# Patient Record
Sex: FEMALE | Race: WHITE | Employment: OTHER | URBAN - NONMETROPOLITAN AREA
[De-identification: names, ages, dates, MRNs, and addresses within clinical notes are randomized per-mention and may not be internally consistent; named-entity substitution may affect disease eponyms.]

---

## 2018-06-03 ENCOUNTER — NURSE TRIAGE (OUTPATIENT)
Dept: ADMINISTRATIVE | Age: 23
End: 2018-06-03

## 2019-02-03 ENCOUNTER — HOSPITAL ENCOUNTER (EMERGENCY)
Age: 24
Discharge: HOME | End: 2019-02-03
Payer: MEDICARE

## 2019-02-03 ENCOUNTER — APPOINTMENT (OUTPATIENT)
Dept: GENERAL RADIOLOGY | Age: 24
End: 2019-02-03
Payer: MEDICARE

## 2019-02-03 VITALS
SYSTOLIC BLOOD PRESSURE: 140 MMHG | RESPIRATION RATE: 18 BRPM | HEART RATE: 90 BPM | DIASTOLIC BLOOD PRESSURE: 91 MMHG | TEMPERATURE: 98.3 F | OXYGEN SATURATION: 98 %

## 2019-02-03 DIAGNOSIS — S39.012A STRAIN OF LUMBAR REGION, INITIAL ENCOUNTER: Primary | ICD-10-CM

## 2019-02-03 DIAGNOSIS — R10.9 FLANK PAIN: ICD-10-CM

## 2019-02-03 LAB
BILIRUBIN URINE: NEGATIVE
BLOOD, URINE: NEGATIVE
CHARACTER, URINE: CLEAR
COLOR: YELLOW
GLUCOSE URINE: NEGATIVE MG/DL
KETONES, URINE: NEGATIVE
LEUKOCYTE ESTERASE, URINE: NEGATIVE
NITRITE, URINE: NEGATIVE
PH UA: 6.5
PREGNANCY, URINE: NEGATIVE
PROTEIN UA: NEGATIVE
SPECIFIC GRAVITY, URINE: 1.01 (ref 1–1.03)
UROBILINOGEN, URINE: 0.2 EU/DL

## 2019-02-03 PROCEDURE — 6370000000 HC RX 637 (ALT 250 FOR IP)

## 2019-02-03 PROCEDURE — 72100 X-RAY EXAM L-S SPINE 2/3 VWS: CPT

## 2019-02-03 PROCEDURE — 81025 URINE PREGNANCY TEST: CPT

## 2019-02-03 PROCEDURE — 81003 URINALYSIS AUTO W/O SCOPE: CPT

## 2019-02-03 PROCEDURE — 99283 EMERGENCY DEPT VISIT LOW MDM: CPT

## 2019-02-03 RX ORDER — LIDOCAINE 4 G/G
1 PATCH TOPICAL DAILY
Status: DISCONTINUED | OUTPATIENT
Start: 2019-02-03 | End: 2019-02-03 | Stop reason: HOSPADM

## 2019-02-03 RX ORDER — LIDOCAINE 4 G/G
PATCH TOPICAL
Status: DISCONTINUED
Start: 2019-02-03 | End: 2019-02-03 | Stop reason: HOSPADM

## 2019-02-03 ASSESSMENT — PAIN SCALES - GENERAL: PAINLEVEL_OUTOF10: 6

## 2019-02-03 ASSESSMENT — ENCOUNTER SYMPTOMS: BACK PAIN: 1

## 2019-03-20 ENCOUNTER — HOSPITAL ENCOUNTER (OUTPATIENT)
Dept: CT IMAGING | Age: 24
Discharge: HOME | End: 2019-03-20
Payer: MEDICARE

## 2019-03-20 DIAGNOSIS — M43.25 FUSION OF SPINE OF THORACOLUMBAR REGION: ICD-10-CM

## 2019-03-20 DIAGNOSIS — M41.125 ADOLESCENT IDIOPATHIC SCOLIOSIS OF THORACOLUMBAR REGION: ICD-10-CM

## 2019-03-20 DIAGNOSIS — Z98.1 S/P LUMBAR SPINAL FUSION: ICD-10-CM

## 2019-03-20 PROCEDURE — 72131 CT LUMBAR SPINE W/O DYE: CPT

## 2019-12-15 ENCOUNTER — HOSPITAL ENCOUNTER (EMERGENCY)
Age: 24
Discharge: HOME | End: 2019-12-15
Payer: MEDICARE

## 2019-12-15 VITALS
HEART RATE: 85 BPM | SYSTOLIC BLOOD PRESSURE: 162 MMHG | RESPIRATION RATE: 16 BRPM | DIASTOLIC BLOOD PRESSURE: 97 MMHG | OXYGEN SATURATION: 99 % | TEMPERATURE: 98.7 F

## 2019-12-15 DIAGNOSIS — H66.90 ACUTE OTITIS MEDIA, UNSPECIFIED OTITIS MEDIA TYPE: Primary | ICD-10-CM

## 2019-12-15 PROCEDURE — 99282 EMERGENCY DEPT VISIT SF MDM: CPT

## 2019-12-15 PROCEDURE — 6370000000 HC RX 637 (ALT 250 FOR IP): Performed by: NURSE PRACTITIONER

## 2019-12-15 RX ORDER — DIPHENHYDRAMINE HCL 25 MG
25 TABLET ORAL ONCE
Status: COMPLETED | OUTPATIENT
Start: 2019-12-15 | End: 2019-12-15

## 2019-12-15 RX ORDER — AMOXICILLIN 250 MG/1
500 CAPSULE ORAL ONCE
Status: COMPLETED | OUTPATIENT
Start: 2019-12-15 | End: 2019-12-15

## 2019-12-15 RX ORDER — AMOXICILLIN 500 MG/1
500 CAPSULE ORAL 3 TIMES DAILY
Qty: 30 CAPSULE | Refills: 0 | Status: SHIPPED | OUTPATIENT
Start: 2019-12-15 | End: 2019-12-25

## 2019-12-15 RX ORDER — NAPROXEN 250 MG/1
500 TABLET ORAL ONCE
Status: COMPLETED | OUTPATIENT
Start: 2019-12-15 | End: 2019-12-15

## 2019-12-15 RX ADMIN — DIPHENHYDRAMINE HCL 25 MG: 25 TABLET ORAL at 20:49

## 2019-12-15 RX ADMIN — NAPROXEN 500 MG: 250 TABLET ORAL at 20:49

## 2019-12-15 RX ADMIN — AMOXICILLIN 500 MG: 250 CAPSULE ORAL at 20:48

## 2019-12-15 ASSESSMENT — ENCOUNTER SYMPTOMS
SHORTNESS OF BREATH: 0
COUGH: 0
RHINORRHEA: 0
WHEEZING: 0
SINUS PRESSURE: 1
TROUBLE SWALLOWING: 0
ABDOMINAL PAIN: 0
CHEST TIGHTNESS: 0
NAUSEA: 0
VOMITING: 0
SORE THROAT: 0

## 2019-12-15 ASSESSMENT — PAIN DESCRIPTION - PAIN TYPE: TYPE: ACUTE PAIN

## 2019-12-15 ASSESSMENT — PAIN SCALES - GENERAL: PAINLEVEL_OUTOF10: 8

## 2019-12-15 ASSESSMENT — PAIN DESCRIPTION - LOCATION: LOCATION: EAR;FACE

## 2019-12-15 ASSESSMENT — PAIN DESCRIPTION - DESCRIPTORS: DESCRIPTORS: PRESSURE

## 2019-12-15 ASSESSMENT — PAIN DESCRIPTION - ORIENTATION: ORIENTATION: RIGHT;LEFT

## 2020-03-05 ENCOUNTER — HOSPITAL ENCOUNTER (OUTPATIENT)
Age: 25
Discharge: HOME | End: 2020-03-05
Payer: MEDICARE

## 2020-03-05 LAB
ABO: NORMAL
ANTIBODY SCREEN: NORMAL
ERYTHROCYTE [DISTWIDTH] IN BLOOD BY AUTOMATED COUNT: 12.7 % (ref 11.5–14.5)
ERYTHROCYTE [DISTWIDTH] IN BLOOD BY AUTOMATED COUNT: 38.5 FL (ref 35–45)
HCT VFR BLD CALC: 38.2 % (ref 37–47)
HEMOGLOBIN: 13 GM/DL (ref 12–16)
HEPATITIS B SURFACE ANTIGEN: NEGATIVE
MCH RBC QN AUTO: 28.7 PG (ref 26–33)
MCHC RBC AUTO-ENTMCNC: 34 GM/DL (ref 32.2–35.5)
MCV RBC AUTO: 84.3 FL (ref 81–99)
PLATELET # BLD: 313 THOU/MM3 (ref 130–400)
PMV BLD AUTO: 9.5 FL (ref 9.4–12.4)
RBC # BLD: 4.53 MILL/MM3 (ref 4.2–5.4)
RH FACTOR: NORMAL
RUBELLA: 180.4 IU/ML
WBC # BLD: 8.5 THOU/MM3 (ref 4.8–10.8)

## 2020-03-05 PROCEDURE — 86762 RUBELLA ANTIBODY: CPT

## 2020-03-05 PROCEDURE — 36415 COLL VENOUS BLD VENIPUNCTURE: CPT

## 2020-03-05 PROCEDURE — 86900 BLOOD TYPING SEROLOGIC ABO: CPT

## 2020-03-05 PROCEDURE — 87086 URINE CULTURE/COLONY COUNT: CPT

## 2020-03-05 PROCEDURE — 87389 HIV-1 AG W/HIV-1&-2 AB AG IA: CPT

## 2020-03-05 PROCEDURE — 86592 SYPHILIS TEST NON-TREP QUAL: CPT

## 2020-03-05 PROCEDURE — 85027 COMPLETE CBC AUTOMATED: CPT

## 2020-03-05 PROCEDURE — 86850 RBC ANTIBODY SCREEN: CPT

## 2020-03-05 PROCEDURE — 87340 HEPATITIS B SURFACE AG IA: CPT

## 2020-03-05 PROCEDURE — 86901 BLOOD TYPING SEROLOGIC RH(D): CPT

## 2020-03-06 LAB
HIV-2 AB: NEGATIVE
ORGANISM: ABNORMAL
RPR: NONREACTIVE
URINE CULTURE, ROUTINE: ABNORMAL

## 2020-04-09 ENCOUNTER — HOSPITAL ENCOUNTER (OUTPATIENT)
Age: 25
Discharge: HOME | End: 2020-04-09
Payer: MEDICARE

## 2020-04-09 PROCEDURE — 82105 ALPHA-FETOPROTEIN SERUM: CPT

## 2020-04-09 PROCEDURE — 36415 COLL VENOUS BLD VENIPUNCTURE: CPT

## 2020-04-12 LAB
AFP MOM: 1.68
AFP: 43 NG/ML
DATING: NORMAL
DUE DATE: NORMAL
FAMILY HISTORY NTD: NO
GESTATIONAL AGE CALC AT COLLECT: NORMAL
INSULIN REQ DIABETES: NO
MATERNAL AGE AT EDD: 27.1 YR
MATERNAL SCREEN INTERPRETATION: NORMAL
MATERNAL WEIGHT: NORMAL
NUMBER OF FETUSES: NORMAL
RACE: NORMAL
SMOKING: NO
SPECIMEN: NORMAL

## 2020-05-30 ENCOUNTER — APPOINTMENT (OUTPATIENT)
Dept: CT IMAGING | Age: 25
End: 2020-05-30
Payer: MEDICARE

## 2020-05-30 ENCOUNTER — HOSPITAL ENCOUNTER (EMERGENCY)
Age: 25
Discharge: HOME | End: 2020-05-30
Attending: FAMILY MEDICINE
Payer: MEDICARE

## 2020-05-30 VITALS
OXYGEN SATURATION: 100 % | RESPIRATION RATE: 20 BRPM | TEMPERATURE: 98.9 F | SYSTOLIC BLOOD PRESSURE: 136 MMHG | HEART RATE: 94 BPM | DIASTOLIC BLOOD PRESSURE: 81 MMHG

## 2020-05-30 LAB
ALBUMIN SERPL-MCNC: 3.6 G/DL (ref 3.5–5.1)
ALP BLD-CCNC: 64 U/L (ref 38–126)
ALT SERPL-CCNC: 47 U/L (ref 11–66)
ANION GAP SERPL CALCULATED.3IONS-SCNC: 11 MEQ/L (ref 8–16)
AST SERPL-CCNC: 32 U/L (ref 5–40)
BACTERIA: ABNORMAL
BASOPHILS # BLD: 0.3 %
BASOPHILS ABSOLUTE: 0 THOU/MM3 (ref 0–0.1)
BILIRUB SERPL-MCNC: < 0.2 MG/DL (ref 0.3–1.2)
BILIRUBIN URINE: NEGATIVE
BLOOD, URINE: NEGATIVE
BUN BLDV-MCNC: 7 MG/DL (ref 7–22)
CALCIUM SERPL-MCNC: 9.8 MG/DL (ref 8.5–10.5)
CASTS: ABNORMAL /LPF
CASTS: ABNORMAL /LPF
CHARACTER, URINE: ABNORMAL
CHLORIDE BLD-SCNC: 99 MEQ/L (ref 98–111)
CO2: 22 MEQ/L (ref 23–33)
COLOR: YELLOW
CREAT SERPL-MCNC: 0.4 MG/DL (ref 0.4–1.2)
CRYSTALS: ABNORMAL
EKG ATRIAL RATE: 90 BPM
EKG P AXIS: 46 DEGREES
EKG P-R INTERVAL: 182 MS
EKG Q-T INTERVAL: 330 MS
EKG QRS DURATION: 88 MS
EKG QTC CALCULATION (BAZETT): 403 MS
EKG R AXIS: 66 DEGREES
EKG T AXIS: 56 DEGREES
EKG VENTRICULAR RATE: 90 BPM
EOSINOPHIL # BLD: 0.9 %
EOSINOPHILS ABSOLUTE: 0.1 THOU/MM3 (ref 0–0.4)
EPITHELIAL CELLS, UA: ABNORMAL /HPF
ERYTHROCYTE [DISTWIDTH] IN BLOOD BY AUTOMATED COUNT: 13.2 % (ref 11.5–14.5)
ERYTHROCYTE [DISTWIDTH] IN BLOOD BY AUTOMATED COUNT: 42.9 FL (ref 35–45)
GFR SERPL CREATININE-BSD FRML MDRD: > 90 ML/MIN/1.73M2
GLUCOSE BLD-MCNC: 103 MG/DL (ref 70–108)
GLUCOSE, URINE: NEGATIVE MG/DL
HCT VFR BLD CALC: 32 % (ref 37–47)
HEMOGLOBIN: 10.6 GM/DL (ref 12–16)
IMMATURE GRANS (ABS): 0.07 THOU/MM3 (ref 0–0.07)
IMMATURE GRANULOCYTES: 0.6 %
KETONES, URINE: NEGATIVE
LEUKOCYTE EST, POC: ABNORMAL
LYMPHOCYTES # BLD: 17.7 %
LYMPHOCYTES ABSOLUTE: 2 THOU/MM3 (ref 1–4.8)
MCH RBC QN AUTO: 29.5 PG (ref 26–33)
MCHC RBC AUTO-ENTMCNC: 33.1 GM/DL (ref 32.2–35.5)
MCV RBC AUTO: 89.1 FL (ref 81–99)
MISCELLANEOUS LAB TEST RESULT: ABNORMAL
MONOCYTES # BLD: 5.8 %
MONOCYTES ABSOLUTE: 0.7 THOU/MM3 (ref 0.4–1.3)
NITRITE, URINE: NEGATIVE
NUCLEATED RED BLOOD CELLS: 0 /100 WBC
OSMOLALITY CALCULATION: 262.7 MOSMOL/KG (ref 275–300)
PH UA: 5.5 (ref 5–9)
PLATELET # BLD: 308 THOU/MM3 (ref 130–400)
PMV BLD AUTO: 9.2 FL (ref 9.4–12.4)
POTASSIUM REFLEX MAGNESIUM: 3.6 MEQ/L (ref 3.5–5.2)
PROTEIN UA: NEGATIVE MG/DL
RBC # BLD: 3.59 MILL/MM3 (ref 4.2–5.4)
RBC URINE: ABNORMAL /HPF
RENAL EPITHELIAL, UA: ABNORMAL
SEG NEUTROPHILS: 74.7 %
SEGMENTED NEUTROPHILS ABSOLUTE COUNT: 8.4 THOU/MM3 (ref 1.8–7.7)
SODIUM BLD-SCNC: 132 MEQ/L (ref 135–145)
SPECIFIC GRAVITY UA: 1.01 (ref 1–1.03)
TOTAL PROTEIN: 6.3 G/DL (ref 6.1–8)
UROBILINOGEN, URINE: 0.2 EU/DL (ref 0–1)
WBC # BLD: 11.3 THOU/MM3 (ref 4.8–10.8)
WBC UA: ABNORMAL /HPF
YEAST: ABNORMAL

## 2020-05-30 PROCEDURE — 85025 COMPLETE CBC W/AUTO DIFF WBC: CPT

## 2020-05-30 PROCEDURE — 36415 COLL VENOUS BLD VENIPUNCTURE: CPT

## 2020-05-30 PROCEDURE — 80053 COMPREHEN METABOLIC PANEL: CPT

## 2020-05-30 PROCEDURE — 99282 EMERGENCY DEPT VISIT SF MDM: CPT

## 2020-05-30 PROCEDURE — 70450 CT HEAD/BRAIN W/O DYE: CPT

## 2020-05-30 PROCEDURE — 93005 ELECTROCARDIOGRAM TRACING: CPT | Performed by: FAMILY MEDICINE

## 2020-05-30 PROCEDURE — 81001 URINALYSIS AUTO W/SCOPE: CPT

## 2020-05-30 RX ORDER — 0.9 % SODIUM CHLORIDE 0.9 %
1000 INTRAVENOUS SOLUTION INTRAVENOUS ONCE
Status: DISCONTINUED | OUTPATIENT
Start: 2020-05-30 | End: 2020-05-30 | Stop reason: HOSPADM

## 2020-05-30 ASSESSMENT — PAIN SCALES - WONG BAKER: WONGBAKER_NUMERICALRESPONSE: 4

## 2020-05-30 NOTE — ED PROVIDER NOTES
EMERGENCY DEPARTMENT ENCOUNTER     CHIEF COMPLAINT   Chief Complaint   Patient presents with    Headache    Vision Change        HPI   Karly Osorio is a 30 y.o. female 23 weeks pregnant  who presents with dizziness since onset this afternoon after cleaning her house all day, on her knees scrubbing the floors and sweeping. When she got up, she felt her vision got floaters on them for a while with a slight headache. She denies any vision loss or extremity weakness. The quality of the dizziness is non-specific, and the symptoms are aggravated by changing head position, and headache is mild throbbing in frontal area The patient has associated blurry vision but denies neck stiffness or recent trauma. REVIEW OF SYSTEMS   Neurologic: +dizziness and headache; No LOC, No hearing loss   Eyes: blurry vision  Cardiac: No Chest Pain, Denies syncope   Respiratory: No cough or difficulty breathing   GI: See history of present illness   General: Denies Fever   All other review of systems were reviewed and are otherwise negative.      PAST MEDICAL & SURGICAL HISTORY   Past Medical History:   Diagnosis Date    Ovarian cyst     Scoliosis       Past Surgical History:   Procedure Laterality Date    ADENOIDECTOMY      BACK SURGERY  2006 and 2008    TONSILLECTOMY          CURRENT MEDICATIONS   Current Outpatient Rx   Medication Sig Dispense Refill    cyclobenzaprine (FLEXERIL) 10 MG tablet Take 1 tablet by mouth 3 times daily as needed for Muscle spasms 90 tablet 2    traMADol (ULTRAM) 50 MG tablet Take 1 tablet by mouth 2 times daily 60 tablet 2    traMADol (ULTRAM) 50 MG tablet Take 50 mg by mouth every 6 hours as needed for Pain      ibuprofen (ADVIL;MOTRIN) 800 MG tablet Take 1 tablet by mouth every 6 hours as needed for Pain 120 tablet 1    Tens Unit MISC by Does not apply route 1 each 0    fluticasone (FLONASE) 50 MCG/ACT nasal spray 1 spray by Nasal route daily for 20 days 1 Bottle 0      ALLERGIES   No Known Pupils equally round and react to light, extraocular movement are intact, The patient had fatigable horizontal nystagmus upon lateral gaze during this maneuver that exacerbates the vertigo   HENT: Atraumatic, moist mucus membranes   Neck: supple, no JVD   Respiratory: Lungs Clear, no retractions   Cardiovascular: Reg rate, no murmurs   GI: Soft, nontender, normal bowel sounds   Musculoskeletal: No edema, no deformities   Integument: Well hydrated, no petechiae   Neurologic: Alert & oriented, no slurred speech, normal finger to nose test bilaterally, normal gross motor. No truncal ataxia noted, speech is normal. 5/5 strength in both upper and lower extremities. No pronator drift noted in UE. Psych: Pleasant affect     EKG (interpreted by me)   EKG Interpretation. EKG Interpretation    Interpreted by emergency department physician on 5/30/2020 17:34    Rhythm: normal sinus   Rate: 90 bpm  Axis: normal  Ectopy: none  Conduction: normal  ST Segments: no acute change  T Waves: no acute change  Q Waves: none    Clinical Impression: non-specific EKG      RADIOLOGY   CT Head WO Contrast   Final Result   Negative CT brain            **This report has been created using voice recognition software. It may contain minor errors which are inherent in voice recognition technology. **      Final report electronically signed by Dr. Man Ocampo on 5/30/2020 7:46 PM           LABS   Labs Reviewed   CBC WITH AUTO DIFFERENTIAL - Abnormal; Notable for the following components:       Result Value    WBC 11.3 (*)     RBC 3.59 (*)     Hemoglobin 10.6 (*)     Hematocrit 32.0 (*)     MPV 9.2 (*)     Segs Absolute 8.4 (*)     All other components within normal limits   COMPREHENSIVE METABOLIC PANEL W/ REFLEX TO MG FOR LOW K - Abnormal; Notable for the following components:    Sodium 132 (*)     CO2 22 (*)     Total Bilirubin <0.2 (*)     All other components within normal limits   OSMOLALITY - Abnormal; Notable for the following components:

## 2020-05-31 PROCEDURE — 93010 ELECTROCARDIOGRAM REPORT: CPT | Performed by: INTERNAL MEDICINE

## 2020-08-09 ENCOUNTER — NURSE TRIAGE (OUTPATIENT)
Dept: OTHER | Facility: CLINIC | Age: 25
End: 2020-08-09

## 2020-08-09 NOTE — TELEPHONE ENCOUNTER
Reason for Disposition   Baby has dropped    Answer Assessment - Initial Assessment Questions  1. ONSET: \"When did the symptoms begin? \"        10 minutes ago  2. CONTRACTIONS: \"Describe the contractions that you are having. \" (e.g., duration, frequency, regularity, severity)     Heart beat and increased movement  3. RADHA: \"What date are you expecting to deliver? \"      6 weeks  4. PARITY: \"Have you had a baby before? \" If yes, \"How long did the labor last?\"     No  5. FETAL MOVEMENT: \"Has the baby's movement decreased or changed significantly from normal?\"      Increased Movement   6. OTHER SYMPTOMS: \"Do you have any other symptoms? \" (e.g., leaking fluid from vagina, fever, hand/facial swelling)      No    Protocols used: PREGNANCY - LABOR - -ADULT-    Patient educated regarding care advice and disposition. Patient notified to call back if symptoms worsen or if she has any question or concerns. Please do not reply to the triage nurse through this encounter. Any subsequent communication should be directly with the patient.  Patient recommended to call OBGYN today to report symptoms

## 2020-09-21 ENCOUNTER — ANESTHESIA (OUTPATIENT)
Dept: LABOR AND DELIVERY | Age: 25
End: 2020-09-21
Payer: MEDICARE

## 2020-09-21 ENCOUNTER — ANESTHESIA EVENT (OUTPATIENT)
Dept: LABOR AND DELIVERY | Age: 25
End: 2020-09-21
Payer: MEDICARE

## 2020-09-21 ENCOUNTER — HOSPITAL ENCOUNTER (INPATIENT)
Age: 25
LOS: 4 days | Discharge: HOME | End: 2020-09-25
Attending: OBSTETRICS & GYNECOLOGY | Admitting: OBSTETRICS & GYNECOLOGY
Payer: MEDICARE

## 2020-09-21 VITALS — SYSTOLIC BLOOD PRESSURE: 230 MMHG | OXYGEN SATURATION: 100 % | DIASTOLIC BLOOD PRESSURE: 110 MMHG

## 2020-09-21 PROBLEM — E66.01 MORBID OBESITY (HCC): Status: ACTIVE | Noted: 2020-09-21

## 2020-09-21 LAB
ABO: NORMAL
AMNISURE PATIENT RESULT: NORMAL
AMPHETAMINE+METHAMPHETAMINE URINE SCREEN: NEGATIVE
ANTIBODY SCREEN: NORMAL
BARBITURATE QUANTITATIVE URINE: NEGATIVE
BENZODIAZEPINE QUANTITATIVE URINE: NEGATIVE
CANNABINOID QUANTITATIVE URINE: NEGATIVE
COCAINE METABOLITE QUANTITATIVE URINE: NEGATIVE
ERYTHROCYTE [DISTWIDTH] IN BLOOD BY AUTOMATED COUNT: 14.7 % (ref 11.5–14.5)
ERYTHROCYTE [DISTWIDTH] IN BLOOD BY AUTOMATED COUNT: 48.8 FL (ref 35–45)
HCT VFR BLD CALC: 38.4 % (ref 37–47)
HEMOGLOBIN: 12.9 GM/DL (ref 12–16)
MCH RBC QN AUTO: 30.8 PG (ref 26–33)
MCHC RBC AUTO-ENTMCNC: 33.6 GM/DL (ref 32.2–35.5)
MCV RBC AUTO: 91.6 FL (ref 81–99)
OPIATES, URINE: NEGATIVE
OXYCODONE: NEGATIVE
PHENCYCLIDINE QUANTITATIVE URINE: NEGATIVE
PLATELET # BLD: 282 THOU/MM3 (ref 130–400)
PMV BLD AUTO: 10 FL (ref 9.4–12.4)
RBC # BLD: 4.19 MILL/MM3 (ref 4.2–5.4)
RH FACTOR: NORMAL
RPR: NONREACTIVE
WBC # BLD: 10.4 THOU/MM3 (ref 4.8–10.8)

## 2020-09-21 PROCEDURE — 84112 EVAL AMNIOTIC FLUID PROTEIN: CPT

## 2020-09-21 PROCEDURE — 88307 TISSUE EXAM BY PATHOLOGIST: CPT

## 2020-09-21 PROCEDURE — 2580000003 HC RX 258: Performed by: OBSTETRICS & GYNECOLOGY

## 2020-09-21 PROCEDURE — 85027 COMPLETE CBC AUTOMATED: CPT

## 2020-09-21 PROCEDURE — 6360000002 HC RX W HCPCS

## 2020-09-21 PROCEDURE — 2580000003 HC RX 258

## 2020-09-21 PROCEDURE — 2500000003 HC RX 250 WO HCPCS

## 2020-09-21 PROCEDURE — 6360000002 HC RX W HCPCS: Performed by: OBSTETRICS & GYNECOLOGY

## 2020-09-21 PROCEDURE — 6370000000 HC RX 637 (ALT 250 FOR IP): Performed by: OBSTETRICS & GYNECOLOGY

## 2020-09-21 PROCEDURE — 2709999900 HC NON-CHARGEABLE SUPPLY: Performed by: OBSTETRICS & GYNECOLOGY

## 2020-09-21 PROCEDURE — 3609079900 HC CESAREAN SECTION: Performed by: OBSTETRICS & GYNECOLOGY

## 2020-09-21 PROCEDURE — 3700000000 HC ANESTHESIA ATTENDED CARE: Performed by: OBSTETRICS & GYNECOLOGY

## 2020-09-21 PROCEDURE — 86901 BLOOD TYPING SEROLOGIC RH(D): CPT

## 2020-09-21 PROCEDURE — 2500000003 HC RX 250 WO HCPCS: Performed by: OBSTETRICS & GYNECOLOGY

## 2020-09-21 PROCEDURE — 7100000000 HC PACU RECOVERY - FIRST 15 MIN: Performed by: OBSTETRICS & GYNECOLOGY

## 2020-09-21 PROCEDURE — 86850 RBC ANTIBODY SCREEN: CPT

## 2020-09-21 PROCEDURE — 86900 BLOOD TYPING SEROLOGIC ABO: CPT

## 2020-09-21 PROCEDURE — 86592 SYPHILIS TEST NON-TREP QUAL: CPT

## 2020-09-21 PROCEDURE — 7100000001 HC PACU RECOVERY - ADDTL 15 MIN: Performed by: OBSTETRICS & GYNECOLOGY

## 2020-09-21 PROCEDURE — 3700000001 HC ADD 15 MINUTES (ANESTHESIA): Performed by: OBSTETRICS & GYNECOLOGY

## 2020-09-21 PROCEDURE — 80307 DRUG TEST PRSMV CHEM ANLYZR: CPT

## 2020-09-21 PROCEDURE — 36415 COLL VENOUS BLD VENIPUNCTURE: CPT

## 2020-09-21 PROCEDURE — 1220000000 HC SEMI PRIVATE OB R&B

## 2020-09-21 RX ORDER — DIPHENHYDRAMINE HCL 25 MG
50 TABLET ORAL EVERY 6 HOURS PRN
Status: DISCONTINUED | OUTPATIENT
Start: 2020-09-21 | End: 2020-09-25 | Stop reason: HOSPADM

## 2020-09-21 RX ORDER — PROPOFOL 10 MG/ML
INJECTION, EMULSION INTRAVENOUS PRN
Status: DISCONTINUED | OUTPATIENT
Start: 2020-09-21 | End: 2020-09-21 | Stop reason: SDUPTHER

## 2020-09-21 RX ORDER — SIMETHICONE 80 MG
80 TABLET,CHEWABLE ORAL EVERY 6 HOURS PRN
Status: DISCONTINUED | OUTPATIENT
Start: 2020-09-21 | End: 2020-09-25 | Stop reason: HOSPADM

## 2020-09-21 RX ORDER — FERROUS SULFATE 325(65) MG
325 TABLET ORAL
Status: DISCONTINUED | OUTPATIENT
Start: 2020-09-22 | End: 2020-09-25 | Stop reason: HOSPADM

## 2020-09-21 RX ORDER — SODIUM CHLORIDE, SODIUM LACTATE, POTASSIUM CHLORIDE, AND CALCIUM CHLORIDE .6; .31; .03; .02 G/100ML; G/100ML; G/100ML; G/100ML
1000 INJECTION, SOLUTION INTRAVENOUS ONCE
Status: CANCELLED | OUTPATIENT
Start: 2020-09-21 | End: 2020-09-21

## 2020-09-21 RX ORDER — MODIFIED LANOLIN
OINTMENT (GRAM) TOPICAL
Status: DISCONTINUED | OUTPATIENT
Start: 2020-09-21 | End: 2020-09-25 | Stop reason: HOSPADM

## 2020-09-21 RX ORDER — METOCLOPRAMIDE HYDROCHLORIDE 5 MG/ML
10 INJECTION INTRAMUSCULAR; INTRAVENOUS ONCE
Status: COMPLETED | OUTPATIENT
Start: 2020-09-21 | End: 2020-09-21

## 2020-09-21 RX ORDER — HYDROMORPHONE HCL 110MG/55ML
PATIENT CONTROLLED ANALGESIA SYRINGE INTRAVENOUS PRN
Status: DISCONTINUED | OUTPATIENT
Start: 2020-09-21 | End: 2020-09-21 | Stop reason: SDUPTHER

## 2020-09-21 RX ORDER — ONDANSETRON 4 MG/1
8 TABLET, ORALLY DISINTEGRATING ORAL EVERY 8 HOURS PRN
Status: DISCONTINUED | OUTPATIENT
Start: 2020-09-21 | End: 2020-09-25 | Stop reason: HOSPADM

## 2020-09-21 RX ORDER — PRENATAL WITH FERROUS FUM AND FOLIC ACID 3080; 920; 120; 400; 22; 1.84; 3; 20; 10; 1; 12; 200; 27; 25; 2 [IU]/1; [IU]/1; MG/1; [IU]/1; MG/1; MG/1; MG/1; MG/1; MG/1; MG/1; UG/1; MG/1; MG/1; MG/1; MG/1
1 TABLET ORAL DAILY
Status: DISCONTINUED | OUTPATIENT
Start: 2020-09-21 | End: 2020-09-25 | Stop reason: HOSPADM

## 2020-09-21 RX ORDER — ONDANSETRON 2 MG/ML
4 INJECTION INTRAMUSCULAR; INTRAVENOUS EVERY 6 HOURS PRN
Status: DISCONTINUED | OUTPATIENT
Start: 2020-09-21 | End: 2020-09-25 | Stop reason: HOSPADM

## 2020-09-21 RX ORDER — ZOLPIDEM TARTRATE 10 MG/1
10 TABLET ORAL NIGHTLY PRN
Status: DISCONTINUED | OUTPATIENT
Start: 2020-09-21 | End: 2020-09-25 | Stop reason: HOSPADM

## 2020-09-21 RX ORDER — ROCURONIUM BROMIDE 10 MG/ML
INJECTION, SOLUTION INTRAVENOUS PRN
Status: DISCONTINUED | OUTPATIENT
Start: 2020-09-21 | End: 2020-09-21 | Stop reason: SDUPTHER

## 2020-09-21 RX ORDER — SODIUM CHLORIDE, SODIUM LACTATE, POTASSIUM CHLORIDE, CALCIUM CHLORIDE 600; 310; 30; 20 MG/100ML; MG/100ML; MG/100ML; MG/100ML
INJECTION, SOLUTION INTRAVENOUS CONTINUOUS PRN
Status: DISCONTINUED | OUTPATIENT
Start: 2020-09-21 | End: 2020-09-21 | Stop reason: SDUPTHER

## 2020-09-21 RX ORDER — KETOROLAC TROMETHAMINE 30 MG/ML
INJECTION, SOLUTION INTRAMUSCULAR; INTRAVENOUS PRN
Status: DISCONTINUED | OUTPATIENT
Start: 2020-09-21 | End: 2020-09-21 | Stop reason: SDUPTHER

## 2020-09-21 RX ORDER — OXYCODONE HYDROCHLORIDE 5 MG/1
5 TABLET ORAL EVERY 4 HOURS PRN
Status: DISCONTINUED | OUTPATIENT
Start: 2020-09-21 | End: 2020-09-25 | Stop reason: HOSPADM

## 2020-09-21 RX ORDER — METHYLERGONOVINE MALEATE 0.2 MG/ML
200 INJECTION INTRAVENOUS PRN
Status: DISCONTINUED | OUTPATIENT
Start: 2020-09-21 | End: 2020-09-25 | Stop reason: HOSPADM

## 2020-09-21 RX ORDER — SODIUM CHLORIDE, SODIUM LACTATE, POTASSIUM CHLORIDE, CALCIUM CHLORIDE 600; 310; 30; 20 MG/100ML; MG/100ML; MG/100ML; MG/100ML
INJECTION, SOLUTION INTRAVENOUS CONTINUOUS
Status: DISCONTINUED | OUTPATIENT
Start: 2020-09-21 | End: 2020-09-21

## 2020-09-21 RX ORDER — ACETAMINOPHEN 500 MG
1000 TABLET ORAL EVERY 8 HOURS SCHEDULED
Status: DISCONTINUED | OUTPATIENT
Start: 2020-09-21 | End: 2020-09-25 | Stop reason: HOSPADM

## 2020-09-21 RX ORDER — FENTANYL CITRATE 50 UG/ML
INJECTION, SOLUTION INTRAMUSCULAR; INTRAVENOUS PRN
Status: DISCONTINUED | OUTPATIENT
Start: 2020-09-21 | End: 2020-09-21 | Stop reason: SDUPTHER

## 2020-09-21 RX ORDER — SODIUM CHLORIDE 0.9 % (FLUSH) 0.9 %
10 SYRINGE (ML) INJECTION PRN
Status: DISCONTINUED | OUTPATIENT
Start: 2020-09-21 | End: 2020-09-22

## 2020-09-21 RX ORDER — KETOROLAC TROMETHAMINE 30 MG/ML
30 INJECTION, SOLUTION INTRAMUSCULAR; INTRAVENOUS EVERY 6 HOURS
Status: DISPENSED | OUTPATIENT
Start: 2020-09-21 | End: 2020-09-22

## 2020-09-21 RX ORDER — OXYCODONE HYDROCHLORIDE 5 MG/1
10 TABLET ORAL EVERY 4 HOURS PRN
Status: DISCONTINUED | OUTPATIENT
Start: 2020-09-21 | End: 2020-09-25 | Stop reason: HOSPADM

## 2020-09-21 RX ORDER — METHYLERGONOVINE MALEATE 0.2 MG/ML
INJECTION INTRAVENOUS PRN
Status: DISCONTINUED | OUTPATIENT
Start: 2020-09-21 | End: 2020-09-21 | Stop reason: SDUPTHER

## 2020-09-21 RX ORDER — MORPHINE SULFATE 4 MG/ML
4 INJECTION, SOLUTION INTRAMUSCULAR; INTRAVENOUS
Status: DISCONTINUED | OUTPATIENT
Start: 2020-09-21 | End: 2020-09-25 | Stop reason: HOSPADM

## 2020-09-21 RX ORDER — OXYTOCIN 10 [USP'U]/ML
INJECTION, SOLUTION INTRAMUSCULAR; INTRAVENOUS PRN
Status: DISCONTINUED | OUTPATIENT
Start: 2020-09-21 | End: 2020-09-21 | Stop reason: SDUPTHER

## 2020-09-21 RX ORDER — DEXAMETHASONE SODIUM PHOSPHATE 4 MG/ML
INJECTION, SOLUTION INTRA-ARTICULAR; INTRALESIONAL; INTRAMUSCULAR; INTRAVENOUS; SOFT TISSUE PRN
Status: DISCONTINUED | OUTPATIENT
Start: 2020-09-21 | End: 2020-09-21 | Stop reason: SDUPTHER

## 2020-09-21 RX ORDER — SODIUM CHLORIDE 0.9 % (FLUSH) 0.9 %
10 SYRINGE (ML) INJECTION EVERY 12 HOURS SCHEDULED
Status: DISCONTINUED | OUTPATIENT
Start: 2020-09-21 | End: 2020-09-22

## 2020-09-21 RX ORDER — DOCUSATE SODIUM 100 MG/1
100 CAPSULE, LIQUID FILLED ORAL 2 TIMES DAILY
Status: DISCONTINUED | OUTPATIENT
Start: 2020-09-21 | End: 2020-09-25 | Stop reason: HOSPADM

## 2020-09-21 RX ORDER — SUCCINYLCHOLINE/SOD CL,ISO/PF 200MG/10ML
SYRINGE (ML) INTRAVENOUS PRN
Status: DISCONTINUED | OUTPATIENT
Start: 2020-09-21 | End: 2020-09-21 | Stop reason: SDUPTHER

## 2020-09-21 RX ORDER — IBUPROFEN 800 MG/1
800 TABLET ORAL EVERY 8 HOURS
Status: DISCONTINUED | OUTPATIENT
Start: 2020-09-22 | End: 2020-09-25 | Stop reason: HOSPADM

## 2020-09-21 RX ORDER — DIPHENHYDRAMINE HYDROCHLORIDE 50 MG/ML
25 INJECTION INTRAMUSCULAR; INTRAVENOUS EVERY 6 HOURS PRN
Status: DISCONTINUED | OUTPATIENT
Start: 2020-09-21 | End: 2020-09-25 | Stop reason: HOSPADM

## 2020-09-21 RX ORDER — MORPHINE SULFATE 2 MG/ML
2 INJECTION, SOLUTION INTRAMUSCULAR; INTRAVENOUS
Status: DISCONTINUED | OUTPATIENT
Start: 2020-09-21 | End: 2020-09-25 | Stop reason: HOSPADM

## 2020-09-21 RX ORDER — TRISODIUM CITRATE DIHYDRATE AND CITRIC ACID MONOHYDRATE 500; 334 MG/5ML; MG/5ML
15 SOLUTION ORAL ONCE
Status: COMPLETED | OUTPATIENT
Start: 2020-09-21 | End: 2020-09-21

## 2020-09-21 RX ORDER — CARBOPROST TROMETHAMINE 250 UG/ML
250 INJECTION, SOLUTION INTRAMUSCULAR PRN
Status: DISCONTINUED | OUTPATIENT
Start: 2020-09-21 | End: 2020-09-25 | Stop reason: HOSPADM

## 2020-09-21 RX ORDER — MISOPROSTOL 200 UG/1
800 TABLET ORAL PRN
Status: DISCONTINUED | OUTPATIENT
Start: 2020-09-21 | End: 2020-09-25 | Stop reason: HOSPADM

## 2020-09-21 RX ORDER — SODIUM CHLORIDE, SODIUM LACTATE, POTASSIUM CHLORIDE, CALCIUM CHLORIDE 600; 310; 30; 20 MG/100ML; MG/100ML; MG/100ML; MG/100ML
INJECTION, SOLUTION INTRAVENOUS CONTINUOUS
Status: DISCONTINUED | OUTPATIENT
Start: 2020-09-21 | End: 2020-09-25 | Stop reason: HOSPADM

## 2020-09-21 RX ORDER — ACETAMINOPHEN 325 MG/1
975 TABLET ORAL ONCE
Status: COMPLETED | OUTPATIENT
Start: 2020-09-21 | End: 2020-09-21

## 2020-09-21 RX ORDER — BISACODYL 10 MG
10 SUPPOSITORY, RECTAL RECTAL DAILY PRN
Status: DISCONTINUED | OUTPATIENT
Start: 2020-09-21 | End: 2020-09-25 | Stop reason: HOSPADM

## 2020-09-21 RX ORDER — ONDANSETRON 2 MG/ML
4 INJECTION INTRAMUSCULAR; INTRAVENOUS EVERY 6 HOURS PRN
Status: DISCONTINUED | OUTPATIENT
Start: 2020-09-21 | End: 2020-09-21 | Stop reason: HOSPADM

## 2020-09-21 RX ORDER — FAMOTIDINE 20 MG/1
20 TABLET, FILM COATED ORAL 2 TIMES DAILY
Status: DISCONTINUED | OUTPATIENT
Start: 2020-09-21 | End: 2020-09-25 | Stop reason: HOSPADM

## 2020-09-21 RX ORDER — ONDANSETRON 2 MG/ML
INJECTION INTRAMUSCULAR; INTRAVENOUS PRN
Status: DISCONTINUED | OUTPATIENT
Start: 2020-09-21 | End: 2020-09-21 | Stop reason: SDUPTHER

## 2020-09-21 RX ORDER — ESMOLOL HYDROCHLORIDE 10 MG/ML
INJECTION INTRAVENOUS PRN
Status: DISCONTINUED | OUTPATIENT
Start: 2020-09-21 | End: 2020-09-21 | Stop reason: SDUPTHER

## 2020-09-21 RX ORDER — HYDROCORTISONE ACETATE 25 MG/1
25 SUPPOSITORY RECTAL 2 TIMES DAILY PRN
Status: DISCONTINUED | OUTPATIENT
Start: 2020-09-21 | End: 2020-09-25 | Stop reason: HOSPADM

## 2020-09-21 RX ADMIN — Medication 3 G: at 12:01

## 2020-09-21 RX ADMIN — ACETAMINOPHEN 975 MG: 325 TABLET ORAL at 09:11

## 2020-09-21 RX ADMIN — Medication 3 G: at 20:03

## 2020-09-21 RX ADMIN — FENTANYL CITRATE 50 MCG: 50 INJECTION, SOLUTION INTRAMUSCULAR; INTRAVENOUS at 12:35

## 2020-09-21 RX ADMIN — ACETAMINOPHEN 1000 MG: 500 TABLET ORAL at 16:43

## 2020-09-21 RX ADMIN — Medication 50 MILLI-UNITS/MIN: at 13:30

## 2020-09-21 RX ADMIN — OXYTOCIN 1 ML: 10 INJECTION, SOLUTION INTRAMUSCULAR; INTRAVENOUS at 12:28

## 2020-09-21 RX ADMIN — Medication 160 MG: at 12:22

## 2020-09-21 RX ADMIN — METOCLOPRAMIDE 10 MG: 5 INJECTION, SOLUTION INTRAMUSCULAR; INTRAVENOUS at 11:16

## 2020-09-21 RX ADMIN — ONDANSETRON HYDROCHLORIDE 4 MG: 4 INJECTION, SOLUTION INTRAMUSCULAR; INTRAVENOUS at 12:27

## 2020-09-21 RX ADMIN — FAMOTIDINE 20 MG: 10 INJECTION INTRAVENOUS at 11:03

## 2020-09-21 RX ADMIN — SODIUM CITRATE AND CITRIC ACID MONOHYDRATE 15 ML: 500; 334 SOLUTION ORAL at 11:53

## 2020-09-21 RX ADMIN — PROPOFOL 70 MG: 10 INJECTION, EMULSION INTRAVENOUS at 12:41

## 2020-09-21 RX ADMIN — HYDROMORPHONE HYDROCHLORIDE 0.5 MG: 2 INJECTION INTRAMUSCULAR; INTRAVENOUS; SUBCUTANEOUS at 13:12

## 2020-09-21 RX ADMIN — HYDROMORPHONE HYDROCHLORIDE 0.5 MG: 2 INJECTION INTRAMUSCULAR; INTRAVENOUS; SUBCUTANEOUS at 13:17

## 2020-09-21 RX ADMIN — OXYTOCIN 2 ML: 10 INJECTION, SOLUTION INTRAMUSCULAR; INTRAVENOUS at 12:27

## 2020-09-21 RX ADMIN — FENTANYL CITRATE 50 MCG: 50 INJECTION, SOLUTION INTRAMUSCULAR; INTRAVENOUS at 12:28

## 2020-09-21 RX ADMIN — SUGAMMADEX 400 MG: 100 INJECTION, SOLUTION INTRAVENOUS at 13:05

## 2020-09-21 RX ADMIN — SODIUM CHLORIDE, POTASSIUM CHLORIDE, SODIUM LACTATE AND CALCIUM CHLORIDE: 600; 310; 30; 20 INJECTION, SOLUTION INTRAVENOUS at 12:08

## 2020-09-21 RX ADMIN — SODIUM CHLORIDE, POTASSIUM CHLORIDE, SODIUM LACTATE AND CALCIUM CHLORIDE: 600; 310; 30; 20 INJECTION, SOLUTION INTRAVENOUS at 12:27

## 2020-09-21 RX ADMIN — SODIUM CHLORIDE, POTASSIUM CHLORIDE, SODIUM LACTATE AND CALCIUM CHLORIDE: 600; 310; 30; 20 INJECTION, SOLUTION INTRAVENOUS at 16:46

## 2020-09-21 RX ADMIN — METHYLERGONOVINE MALEATE 200 MCG: 0.2 INJECTION, SOLUTION INTRAMUSCULAR; INTRAVENOUS at 12:32

## 2020-09-21 RX ADMIN — OXYCODONE 5 MG: 5 TABLET ORAL at 23:49

## 2020-09-21 RX ADMIN — VITAMIN A, VITAMIN C, VITAMIN D-3, VITAMIN E, VITAMIN B-1, VITAMIN B-2, NIACIN, VITAMIN B-6, CALCIUM, IRON, ZINC, COPPER 1 TABLET: 4000; 120; 400; 22; 1.84; 3; 20; 10; 1; 12; 200; 27; 25; 2 TABLET ORAL at 17:35

## 2020-09-21 RX ADMIN — DEXAMETHASONE SODIUM PHOSPHATE 10 MG: 4 INJECTION, SOLUTION INTRAMUSCULAR; INTRAVENOUS at 12:32

## 2020-09-21 RX ADMIN — DOCUSATE SODIUM 100 MG: 100 CAPSULE, LIQUID FILLED ORAL at 21:22

## 2020-09-21 RX ADMIN — ROCURONIUM BROMIDE 40 MG: 10 INJECTION INTRAVENOUS at 12:41

## 2020-09-21 RX ADMIN — SODIUM CHLORIDE, POTASSIUM CHLORIDE, SODIUM LACTATE AND CALCIUM CHLORIDE: 600; 310; 30; 20 INJECTION, SOLUTION INTRAVENOUS at 08:35

## 2020-09-21 RX ADMIN — KETOROLAC TROMETHAMINE 30 MG: 30 INJECTION, SOLUTION INTRAMUSCULAR at 18:50

## 2020-09-21 RX ADMIN — ESMOLOL HYDROCHLORIDE 10 MG: 10 INJECTION, SOLUTION INTRAVENOUS at 13:02

## 2020-09-21 RX ADMIN — OXYCODONE 5 MG: 5 TABLET ORAL at 23:08

## 2020-09-21 RX ADMIN — HYDROMORPHONE HYDROCHLORIDE 1 MG: 2 INJECTION INTRAMUSCULAR; INTRAVENOUS; SUBCUTANEOUS at 13:25

## 2020-09-21 RX ADMIN — KETOROLAC TROMETHAMINE 30 MG: 30 INJECTION, SOLUTION INTRAMUSCULAR at 13:09

## 2020-09-21 RX ADMIN — OXYCODONE 10 MG: 5 TABLET ORAL at 18:52

## 2020-09-21 RX ADMIN — SODIUM CHLORIDE, POTASSIUM CHLORIDE, SODIUM LACTATE AND CALCIUM CHLORIDE: 600; 310; 30; 20 INJECTION, SOLUTION INTRAVENOUS at 12:55

## 2020-09-21 RX ADMIN — PROPOFOL 200 MG: 10 INJECTION, EMULSION INTRAVENOUS at 12:22

## 2020-09-21 RX ADMIN — KETOROLAC TROMETHAMINE 30 MG: 30 INJECTION, SOLUTION INTRAMUSCULAR at 13:10

## 2020-09-21 RX ADMIN — SODIUM CHLORIDE, POTASSIUM CHLORIDE, SODIUM LACTATE AND CALCIUM CHLORIDE: 600; 310; 30; 20 INJECTION, SOLUTION INTRAVENOUS at 11:01

## 2020-09-21 ASSESSMENT — PULMONARY FUNCTION TESTS
PIF_VALUE: 28
PIF_VALUE: 1
PIF_VALUE: 28
PIF_VALUE: 0
PIF_VALUE: 30
PIF_VALUE: 25
PIF_VALUE: 25
PIF_VALUE: 30
PIF_VALUE: 31
PIF_VALUE: 29
PIF_VALUE: 25
PIF_VALUE: 30
PIF_VALUE: 25
PIF_VALUE: 0
PIF_VALUE: 13
PIF_VALUE: 32
PIF_VALUE: 2
PIF_VALUE: 29
PIF_VALUE: 9
PIF_VALUE: 31
PIF_VALUE: 30
PIF_VALUE: 25
PIF_VALUE: 35
PIF_VALUE: 3
PIF_VALUE: 30
PIF_VALUE: 30
PIF_VALUE: 0
PIF_VALUE: 2
PIF_VALUE: 15
PIF_VALUE: 1
PIF_VALUE: 32
PIF_VALUE: 1
PIF_VALUE: 8
PIF_VALUE: 1
PIF_VALUE: 13
PIF_VALUE: 1
PIF_VALUE: 33
PIF_VALUE: 15
PIF_VALUE: 2
PIF_VALUE: 1
PIF_VALUE: 2
PIF_VALUE: 6
PIF_VALUE: 2
PIF_VALUE: 31
PIF_VALUE: 30
PIF_VALUE: 10
PIF_VALUE: 30
PIF_VALUE: 2
PIF_VALUE: 23
PIF_VALUE: 30
PIF_VALUE: 15
PIF_VALUE: 23
PIF_VALUE: 26
PIF_VALUE: 1
PIF_VALUE: 2
PIF_VALUE: 14
PIF_VALUE: 32
PIF_VALUE: 3
PIF_VALUE: 30
PIF_VALUE: 0
PIF_VALUE: 34
PIF_VALUE: 25
PIF_VALUE: 30
PIF_VALUE: 30
PIF_VALUE: 35
PIF_VALUE: 1
PIF_VALUE: 16
PIF_VALUE: 28

## 2020-09-21 ASSESSMENT — PAIN SCALES - GENERAL
PAINLEVEL_OUTOF10: 7
PAINLEVEL_OUTOF10: 4
PAINLEVEL_OUTOF10: 7
PAINLEVEL_OUTOF10: 5
PAINLEVEL_OUTOF10: 0
PAINLEVEL_OUTOF10: 6

## 2020-09-21 NOTE — FLOWSHEET NOTE
PT to left lateral, FHR able to be found now. Adjusting EFM often due to still having difficulty tracing.

## 2020-09-21 NOTE — FLOWSHEET NOTE
Dr Funmilayo bernal, states is aware of c/s with Dr Stormy Soto around noon and that she will go under general anesthesia d/t a history of a spinal fusion.

## 2020-09-21 NOTE — FLOWSHEET NOTE
RN to bedside with doppler to attempt to find FHR. Difficulty tracing due to abdominal girth. Report received from RN. SROM at 0545 this morning. PT unsure if having ctx. Clear fluids noted. SVE 1cm but unsure of presenting part. Dr. Brina Elmore was made aware that pt was on her way up from ED when he was on the phone earlier, have not called for report until assessment completed. FHR difficult to trace. Repositioning to assist. PT calm and cooperative. States feeling fetal movement.

## 2020-09-21 NOTE — FLOWSHEET NOTE
Dr Saintclair Silvan returned page informed of prolonged decels on fetal tracing, pt was on her back repositioned. Reassuring at this time with contractions noted approximately every 7 minutes. Thick meconium noted to be leaking.  No change in POC

## 2020-09-21 NOTE — PROGRESS NOTES
Patient in room at 1314 on room air.  Patient complaining of pain    1320- still complaining of pain, VSS    1325- VSS    1330- VSS    1335- VSS    1340- resting more comfortably, VSS     1350- complaining of right hand/arm being numb    1354- meets pacu discharge criteria, no complaints of pain

## 2020-09-21 NOTE — PROGRESS NOTES
OB/Gyn Service   Brief Operative Report      Pre-operative Diagnosis:  Breech, morbid obesity    Post-operative Diagnosis:  same    Procedure:      Surgeon:  Angelica Carrero    First assist:  Namrata     Anesthesia:  general    Estimated blood loss:  800 ML     Findings:  VIABLE INFANT male    Complications:  NONE      See dictated operative report for full details.

## 2020-09-21 NOTE — H&P
[address] [place] [address]  History and Physical Update    Pt Name: Kwaku Benjamin  MRN: [id number]  YOB: 1995  Date of evaluation: 9/21/2020    [] I have examined the patient and reviewed the H&P/Consult and there are no changes to the patient or plans. [x] I have examined the patient and reviewed the H&P/Consult and have noted the following changes:    Presents and 40+ wks with SROM, not in labor, FHTs Cat. 1.  Bedside US confirms transverse, back down presentation. Known LGA baby by 36 wks US. Not a candidate for attempted version. Morbid obesity with large pannus. Will need supra umbilical midline incision. RBA of CS discussed and will proceed later this morning. Will need General d/t prior spine surgery. Discussion with the patient and/ or family for proposed care, treatment, services; benefits, risks, side effects; likelihood of achieving goals and potential problems that may occur during recuperation was had and all questions were answered. Discussion with the patient and/ or family of reasonable alternatives to the proposed care, treatment, services and the discussion of the risks, benefits, side effects related to the alternatives and the risk related to not receiving the proposed care treatment services was also had and all questions were answered. For scheduled inductions of labor, please refer to the scheduling sheet for any maternal and / or fetal indications for the induction. They are not being placed here to avoid possible discrepancies and duplications in the medical record. Thank you. This will be/was done the day of admission/surgery in the pre op holding area or in L and D as applicable to this patient.         Louisa Gonzalez MD  Electronically signed 9/21/2020 at 8:19 AM

## 2020-09-21 NOTE — OP NOTE
[address] [place] [address]                                OPERATIVE REPORT    PATIENT NAME: Dc Antunez                       :        1995  MED REC NO:   [id number]                           ROOM:       0034  ACCOUNT NO:   [de-identified]                           ADMIT DATE: 2020  PROVIDER:     Olivia Martinez M.D.    DATE OF PROCEDURE:  2020    PREOPERATIVE DIAGNOSES:  40+ weeks' gestation, transverse lie/breech  presentation, extreme morbid obesity, meconium-stained fluid. POSTOPERATIVE DIAGNOSES:  40+ weeks' gestation, transverse lie/breech  presentation, extreme morbid obesity, meconium-stained fluid. OPERATION PERFORMED:  Primary low-transverse supracervical   section. SURGEON:  Olivia Martinez MD    FIRST ASSISTANT:  Stella. ANESTHETIST:  CRNA. TYPE OF ANESTHESIA:  General endotracheal, performed due to prior back  surgery. ESTIMATED BLOOD LOSS:  800 mL. COMPLICATIONS:  None. POSTOPERATIVE CONDITION:  Good. NARRATIVE SUMMARY:  The patient was taken to the operating room and  placed on the operating table. The abdomen, perineum, and vagina were  all prepped and draped in usual manner and a Felipe catheter was in place  draining clear yellow urine. Once the patient was draped, general  endotracheal anesthetic was initiated. A midline periumbilical incision  was made. Dissection carried down through the subcutaneous fat and  fascia and the peritoneal cavity entered without difficulty. This high  vertical incision brought us down directly onto the lower uterine  segment. Peritoneal bladder flap was established and reflected  inferiorly. The uterus was entered in a low transverse supracervical  fashion in the midline with sharp dissection and this incision extended  bilaterally with digital traction. Light meconium-stained fluid was  encountered.   This revealed a viable infant male in a transverse back up  lie with feet in the lower uterine segment. The infant was delivered  through the incision using usual breech maneuvers without difficulty,  suctioned of his nasal and oropharynx on the mother's abdomen due to the  meconium. Cord clamped and cut, the infant removed from the operative  field. The Apgars and birthweight are pending at the time of this  dictation. Segment of cord obtained for protocol drug studies. Routine  cord blood studies obtained as well. Placenta delivered spontaneously. Uterus cleansed of any remaining blood clots or products of conception  and then closed in a triple layer fashion using a running locked #1  chromic suture, a second imbricating layer of interrupted  figure-of-eight 2-0 Vicryl sutures and a final serosal closure of  running 2-0 Vicryl. Excellent hemostasis was present at the uterine  incision line. The pelvic cavity was irrigated. The anterior fascia  then closed with a running 0 loop PDS. The subcutaneous fat was  plicated with inverted interrupted 2-0 Vicryl suture and the skin was  plicated with stainless steel clips. Sponge, instrument, and needle  counts were all correct throughout the procedure. Felipe drained clear  yellow urine at the end of procedure. Mother, father, and their   infant, son, all went to Recovery in good condition. Buster Rodriguez M.D.    D: 2020 13:19:29       T: 2020 13:22:50     [email or web address]  Job#: [id number]     Doc#: [id number]    CC:   Da Talbert M.D.

## 2020-09-21 NOTE — FLOWSHEET NOTE
Bedside report given to Nelly Harrington RN. PT upset at this time due to having to have  instead. Reassurance provided. Pt verbalized understanding. Fiance at side for support. PT repositioned to left lateral again and monitors adjusted.

## 2020-09-21 NOTE — PROGRESS NOTES
Dr. Nitish Cabral at bedside, [address] for fetal position. Transverse back down. Head to maternal left. Discussing a planned c-birth for tentatively noon. MD will call surgery to schedule. Questions answered, POC discussed. Pt verbalized understanding. Tearful with FOB and mother in room for support.

## 2020-09-21 NOTE — ANESTHESIA PRE PROCEDURE
Department of Anesthesiology  Preprocedure Note       Name:  Ivan Chaves   Age:  30 y.o.  :  1995                                          MRN:  [id number]         Date:  2020      Surgeon: Nisha Dawkins): Gloria Villa MD    Procedure: Procedure(s):   SECTION    Medications prior to admission:   Prior to Admission medications    Medication Sig Start Date End Date Taking? Authorizing Provider   Prenatal Vit-Fe Fumarate-FA (PRENATAL 1+1 PO) Take 1 tablet by mouth daily   Yes Historical Provider, MD   cyclobenzaprine (FLEXERIL) 10 MG tablet Take 1 tablet by mouth 3 times daily as needed for Muscle spasms 16   Lizzette Guerra MD   Tens Unit MISC by Does not apply route 10/11/16   FIORDALIZA Griffiths - CNP       Current medications:    Current Facility-Administered Medications   Medication Dose Route Frequency Provider Last Rate Last Dose    lactated ringers infusion   Intravenous Continuous Gloria Villa  mL/hr at 20 1101      oxytocin (PITOCIN) 30 units in 500 mL infusion  1 bigg-units/min Intravenous Continuous Gloria Villa MD        ondansetron [facility]) injection 4 mg  4 mg Intravenous Q6H PRN Gloria Villa MD        ceFAZolin (ANCEF) 3 g in dextrose 5 % 100 mL IVPB  3 g Intravenous Once Gloria Villa  mL/hr at 20 1201 3 g at 20 1201       Allergies:     Allergies   Allergen Reactions    Gabapentin Hives       Problem List:    Patient Active Problem List   Diagnosis Code    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    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       Past Medical History:        Diagnosis Date    Ovarian cyst     Scoliosis        Past Surgical History:        Procedure Laterality Date    ADENOIDECTOMY      BACK SURGERY  2006 and 2009, 2018    TONSILLECTOMY         Social History:    Social History     Tobacco Use    Smoking status: Former Smoker     Years: 4.00 Last attempt to quit: 12/10/2015     Years since quittin.7    Smokeless tobacco: Never Used   Substance Use Topics    Alcohol use: No     Alcohol/week: 0.0 standard drinks     Comment: socially                                Counseling given: Not Answered      Vital Signs (Current):   Vitals:    20 0727 20 0730 20 0731 20 0732   BP:    134/71   Pulse:    85   Resp:    18   Temp:   98.6 °F (37 °C)    TempSrc:   Temporal    SpO2: 99% 100%  100%   Weight:  291 lb (132 kg)     Height:  5' 4\" (1.626 m)                                                BP Readings from Last 3 Encounters:   20 134/71   20 136/81   12/15/19 (!) 162/97       NPO Status: Time of last liquid consumption: 06                        Time of last solid consumption:                         Date of last liquid consumption: 20                        Date of last solid food consumption: 20    BMI:   Wt Readings from Last 3 Encounters:   20 291 lb (132 kg)   16 230 lb (104.3 kg)   10/11/16 224 lb (101.6 kg)     Body mass index is 49.95 kg/m². CBC:   Lab Results   Component Value Date    WBC 10.4 2020    RBC 4.19 2020    RBC 4.76 2011    HGB 12.9 2020    HCT 38.4 2020    MCV 91.6 2020    RDW 12.5 2016     2020       CMP:   Lab Results   Component Value Date     2020    K 3.6 2020    CL 99 2020    CO2 22 2020    BUN 7 2020    CREATININE 0.4 2020    LABGLOM >90 2020    GLUCOSE 103 2020    GLUCOSE 95 2016    PROT 6.3 2020    CALCIUM 9.8 2020    BILITOT <0.2 2020    ALKPHOS 64 2020    AST 32 2020    ALT 47 2020       POC Tests: No results for input(s): POCGLU, POCNA, POCK, POCCL, POCBUN, POCHEMO, POCHCT in the last 72 hours.     Coags: No results found for: PROTIME, INR, APTT    HCG (If Applicable):   Lab Results   Component Value Date

## 2020-09-21 NOTE — FLOWSHEET NOTE
Dr. Katey Devine at [facility], informed of his patients admission. Pt HX reviewed. Prenatal provided to Dr. Katey Devine due to extensive history. SROM at 0545 this morning, unable to get all the way through cervix for SVE. Unable to determine presenting part. Difficult to monitor due to abdominal girth. States he will be in to perform US for presenting part.

## 2020-09-22 LAB — HEMOGLOBIN: 9.1 GM/DL (ref 12–16)

## 2020-09-22 PROCEDURE — 2580000003 HC RX 258: Performed by: OBSTETRICS & GYNECOLOGY

## 2020-09-22 PROCEDURE — 36415 COLL VENOUS BLD VENIPUNCTURE: CPT

## 2020-09-22 PROCEDURE — 1220000000 HC SEMI PRIVATE OB R&B

## 2020-09-22 PROCEDURE — 85018 HEMOGLOBIN: CPT

## 2020-09-22 PROCEDURE — 6370000000 HC RX 637 (ALT 250 FOR IP): Performed by: OBSTETRICS & GYNECOLOGY

## 2020-09-22 PROCEDURE — 6360000002 HC RX W HCPCS: Performed by: OBSTETRICS & GYNECOLOGY

## 2020-09-22 RX ADMIN — OXYCODONE 10 MG: 5 TABLET ORAL at 16:10

## 2020-09-22 RX ADMIN — SIMETHICONE CHEW TAB 80 MG 80 MG: 80 TABLET ORAL at 15:24

## 2020-09-22 RX ADMIN — ACETAMINOPHEN 1000 MG: 500 TABLET ORAL at 23:42

## 2020-09-22 RX ADMIN — IBUPROFEN 800 MG: 800 TABLET, FILM COATED ORAL at 23:05

## 2020-09-22 RX ADMIN — FAMOTIDINE 20 MG: 20 TABLET, FILM COATED ORAL at 08:07

## 2020-09-22 RX ADMIN — SODIUM CHLORIDE, POTASSIUM CHLORIDE, SODIUM LACTATE AND CALCIUM CHLORIDE: 600; 310; 30; 20 INJECTION, SOLUTION INTRAVENOUS at 01:17

## 2020-09-22 RX ADMIN — SIMETHICONE CHEW TAB 80 MG 80 MG: 80 TABLET ORAL at 03:55

## 2020-09-22 RX ADMIN — SIMETHICONE CHEW TAB 80 MG 80 MG: 80 TABLET ORAL at 23:04

## 2020-09-22 RX ADMIN — OXYCODONE 10 MG: 5 TABLET ORAL at 12:25

## 2020-09-22 RX ADMIN — ENOXAPARIN SODIUM 80 MG: 80 INJECTION SUBCUTANEOUS at 01:10

## 2020-09-22 RX ADMIN — KETOROLAC TROMETHAMINE 30 MG: 30 INJECTION, SOLUTION INTRAMUSCULAR at 07:09

## 2020-09-22 RX ADMIN — OXYCODONE 10 MG: 5 TABLET ORAL at 20:10

## 2020-09-22 RX ADMIN — KETOROLAC TROMETHAMINE 30 MG: 30 INJECTION, SOLUTION INTRAMUSCULAR at 01:09

## 2020-09-22 RX ADMIN — OXYCODONE 10 MG: 5 TABLET ORAL at 08:07

## 2020-09-22 RX ADMIN — ACETAMINOPHEN 1000 MG: 500 TABLET ORAL at 16:10

## 2020-09-22 RX ADMIN — ACETAMINOPHEN 1000 MG: 500 TABLET ORAL at 00:14

## 2020-09-22 RX ADMIN — OXYCODONE 10 MG: 5 TABLET ORAL at 03:55

## 2020-09-22 RX ADMIN — DOCUSATE SODIUM 100 MG: 100 CAPSULE, LIQUID FILLED ORAL at 08:07

## 2020-09-22 RX ADMIN — ACETAMINOPHEN 1000 MG: 500 TABLET ORAL at 08:08

## 2020-09-22 RX ADMIN — VITAMIN A, VITAMIN C, VITAMIN D-3, VITAMIN E, VITAMIN B-1, VITAMIN B-2, NIACIN, VITAMIN B-6, CALCIUM, IRON, ZINC, COPPER 1 TABLET: 4000; 120; 400; 22; 1.84; 3; 20; 10; 1; 12; 200; 27; 25; 2 TABLET ORAL at 08:07

## 2020-09-22 RX ADMIN — IBUPROFEN 800 MG: 800 TABLET, FILM COATED ORAL at 14:36

## 2020-09-22 RX ADMIN — Medication 3 G: at 03:52

## 2020-09-22 ASSESSMENT — PAIN SCALES - GENERAL
PAINLEVEL_OUTOF10: 8
PAINLEVEL_OUTOF10: 7
PAINLEVEL_OUTOF10: 8
PAINLEVEL_OUTOF10: 7
PAINLEVEL_OUTOF10: 7
PAINLEVEL_OUTOF10: 8
PAINLEVEL_OUTOF10: 7
PAINLEVEL_OUTOF10: 7
PAINLEVEL_OUTOF10: 9
PAINLEVEL_OUTOF10: 8

## 2020-09-22 NOTE — LACTATION NOTE
Met with pt. Reviewed pump and discussed how to know breastfeeding is going well. Offered support prn. Baby given 5ml of formula, reluctantly took.

## 2020-09-22 NOTE — LACTATION NOTE
Met with pt in room. Pt reports baby is latching well and she is mainly interested in the pump order. Baby sucking on pacifier vigorously, encouraged pt to feed baby. Pump RX complete, will call [facility] for order today. Offered to return to room for questions prn.

## 2020-09-22 NOTE — ANESTHESIA POSTPROCEDURE EVALUATION
Department of Anesthesiology  Postprocedure Note    Patient: Sarah Jefferson  MRN: [id number]  YOB: 1995  Date of evaluation: 2020  Time:  7:39 AM     Procedure Summary     Date:  20 Room / Location:  [facility] OR [address] [place] [address]    Anesthesia Start:  904 Anesthesia Stop:      Procedure:   SECTION (N/A Uterus) Diagnosis:  (RUPTURED MEMBRANES, REPEAT)    Surgeon:  Richie Leonardo MD Responsible Provider:  Rere Ayers MD    Anesthesia Type:  general ASA Status:  3          Anesthesia Type: No value filed. Austin Phase I: Austin Score: 10    Austin Phase II: Austin Score: 10    Last vitals: Reviewed and per EMR flowsheets.        Anesthesia Post Evaluation    Patient location during evaluation: floor  Patient participation: complete - patient participated  Level of consciousness: awake and alert  Airway patency: patent  Nausea & Vomiting: no nausea and no vomiting  Complications: no  Cardiovascular status: hemodynamically stable  Respiratory status: acceptable, room air and spontaneous ventilation  Hydration status: stable

## 2020-09-22 NOTE — PROGRESS NOTES
Department of Obstetrics and Gynecology  Labor and Delivery  Attending Post Partum Progress Note      SUBJECTIVE:  POD# 1    OBJECTIVE: feels well    Vitals:  /66   Pulse 75   Temp 98.6 °F (37 °C) (Oral)   Resp 18   Ht 5' 4\" (1.626 m)   Wt 291 lb (132 kg)   SpO2 98%   Breastfeeding Unknown   BMI 49.95 kg/m²       DATA:    CBC:    Lab Results   Component Value Date    WBC 10.4 09/21/2020    HGB 9.1 (L) 09/22/2020    HCT 38.4 09/21/2020    MCV 91.6 09/21/2020     09/21/2020       ASSESSMENT & PLAN:  Adv. Diet as Parth., IV OUT, INC ACTIVITY, ENCOURAGE PULM.  Sangita [place] Mate[place] Arrieta

## 2020-09-22 NOTE — PLAN OF CARE
Problem: Discharge Planning:  Goal: Discharged to appropriate level of care  Description: Discharged to appropriate level of care  9/21/2020 2208 by Morgan Saenz RN  Outcome: Ongoing  Note: Discharge not anticipated. Will continue to monitor for needs. Problem: Fluid Volume - Imbalance:  Goal: Absence of imbalanced fluid volume signs and symptoms  Description: Absence of imbalanced fluid volume signs and symptoms  9/21/2020 2208 by Morgan Saenz RN  Outcome: Ongoing  Note: Drinking fluids well. QS output. Problem: Infection - Surgical Site:  Goal: Will show no infection signs and symptoms  Description: Will show no infection signs and symptoms  9/21/2020 2208 by Morgan Saenz RN  Outcome: Ongoing  Note: No signs or symptoms of infection. Vitals WNL. Problem: Mood - Altered:  Goal: Mood stable  Description: Mood stable  9/21/2020 2208 by Morgan Saenz RN  Outcome: Ongoing  Note: Calm and cooperative; bonding well with infant. Problem: Pain - Acute:  Goal: Pain level will decrease  Description: Pain level will decrease  9/21/2020 2208 by Morgan Saenz RN  Outcome: Ongoing  Note: Pain being controlled with pain medication and ice. Pain goal discussed with patient. Problem: Urinary Retention:  Goal: Urinary elimination within specified parameters  Description: Urinary elimination within specified parameters  9/21/2020 2208 by Morgan Saenz RN  Outcome: Ongoing  Note: Output within parameters. Problem: Venous Thromboembolism:  Goal: Will show no signs or symptoms of venous thromboembolism  Description: Will show no signs or symptoms of venous thromboembolism  9/21/2020 2208 by Morgan Saenz RN  Outcome: Ongoing  Note: SCD's on. Ambulated in hallway. Problem: Nausea/Vomiting:  Goal: Absence of nausea/vomiting  Description: Absence of nausea/vomiting  9/21/2020 2208 by Morgan Saenz RN  Outcome: Completed  Note: Denies.    Care plan reviewed with patient and she contributes to goal setting and voices understanding of plan of care.

## 2020-09-22 NOTE — FLOWSHEET NOTE
Pt up to bathroom and voided a large amount. Sybil care and teaching given, scant amount of lochia rubra noted. Back to bed, gait steady. Denies needs at this time.

## 2020-09-23 LAB
BASOPHILS # BLD: 0.3 %
BASOPHILS ABSOLUTE: 0 THOU/MM3 (ref 0–0.1)
EOSINOPHIL # BLD: 0.8 %
EOSINOPHILS ABSOLUTE: 0.1 THOU/MM3 (ref 0–0.4)
ERYTHROCYTE [DISTWIDTH] IN BLOOD BY AUTOMATED COUNT: 15.1 % (ref 11.5–14.5)
ERYTHROCYTE [DISTWIDTH] IN BLOOD BY AUTOMATED COUNT: 49.6 FL (ref 35–45)
HCT VFR BLD CALC: 30.4 % (ref 37–47)
HEMOGLOBIN: 10 GM/DL (ref 12–16)
IMMATURE GRANS (ABS): 0.12 THOU/MM3 (ref 0–0.07)
IMMATURE GRANULOCYTES: 0.8 %
LYMPHOCYTES # BLD: 20.9 %
LYMPHOCYTES ABSOLUTE: 3.3 THOU/MM3 (ref 1–4.8)
MCH RBC QN AUTO: 30.5 PG (ref 26–33)
MCHC RBC AUTO-ENTMCNC: 32.9 GM/DL (ref 32.2–35.5)
MCV RBC AUTO: 92.7 FL (ref 81–99)
MONOCYTES # BLD: 6.7 %
MONOCYTES ABSOLUTE: 1.1 THOU/MM3 (ref 0.4–1.3)
NUCLEATED RED BLOOD CELLS: 0 /100 WBC
PLATELET # BLD: 285 THOU/MM3 (ref 130–400)
PMV BLD AUTO: 9.6 FL (ref 9.4–12.4)
RBC # BLD: 3.28 MILL/MM3 (ref 4.2–5.4)
SEG NEUTROPHILS: 70.5 %
SEGMENTED NEUTROPHILS ABSOLUTE COUNT: 11.1 THOU/MM3 (ref 1.8–7.7)
WBC # BLD: 15.8 THOU/MM3 (ref 4.8–10.8)

## 2020-09-23 PROCEDURE — 6370000000 HC RX 637 (ALT 250 FOR IP): Performed by: OBSTETRICS & GYNECOLOGY

## 2020-09-23 PROCEDURE — 6360000002 HC RX W HCPCS: Performed by: OBSTETRICS & GYNECOLOGY

## 2020-09-23 PROCEDURE — 90471 IMMUNIZATION ADMIN: CPT | Performed by: OBSTETRICS & GYNECOLOGY

## 2020-09-23 PROCEDURE — 90715 TDAP VACCINE 7 YRS/> IM: CPT | Performed by: OBSTETRICS & GYNECOLOGY

## 2020-09-23 PROCEDURE — 36415 COLL VENOUS BLD VENIPUNCTURE: CPT

## 2020-09-23 PROCEDURE — 85025 COMPLETE CBC W/AUTO DIFF WBC: CPT

## 2020-09-23 PROCEDURE — 1220000000 HC SEMI PRIVATE OB R&B

## 2020-09-23 RX ADMIN — ACETAMINOPHEN 1000 MG: 500 TABLET ORAL at 18:41

## 2020-09-23 RX ADMIN — OXYCODONE 10 MG: 5 TABLET ORAL at 11:02

## 2020-09-23 RX ADMIN — ENOXAPARIN SODIUM 80 MG: 80 INJECTION SUBCUTANEOUS at 04:30

## 2020-09-23 RX ADMIN — VITAMIN A, VITAMIN C, VITAMIN D-3, VITAMIN E, VITAMIN B-1, VITAMIN B-2, NIACIN, VITAMIN B-6, CALCIUM, IRON, ZINC, COPPER 1 TABLET: 4000; 120; 400; 22; 1.84; 3; 20; 10; 1; 12; 200; 27; 25; 2 TABLET ORAL at 07:59

## 2020-09-23 RX ADMIN — ACETAMINOPHEN 1000 MG: 500 TABLET ORAL at 09:38

## 2020-09-23 RX ADMIN — IBUPROFEN 800 MG: 800 TABLET, FILM COATED ORAL at 07:28

## 2020-09-23 RX ADMIN — IBUPROFEN 800 MG: 800 TABLET, FILM COATED ORAL at 16:08

## 2020-09-23 RX ADMIN — OXYCODONE 10 MG: 5 TABLET ORAL at 00:06

## 2020-09-23 RX ADMIN — TETANUS TOXOID, REDUCED DIPHTHERIA TOXOID AND ACELLULAR PERTUSSIS VACCINE, ADSORBED 0.5 ML: 5; 2.5; 8; 8; 2.5 SUSPENSION INTRAMUSCULAR at 14:49

## 2020-09-23 RX ADMIN — OXYCODONE 10 MG: 5 TABLET ORAL at 04:30

## 2020-09-23 RX ADMIN — OXYCODONE 10 MG: 5 TABLET ORAL at 15:07

## 2020-09-23 RX ADMIN — OXYCODONE 10 MG: 5 TABLET ORAL at 19:28

## 2020-09-23 ASSESSMENT — PAIN SCALES - GENERAL
PAINLEVEL_OUTOF10: 7
PAINLEVEL_OUTOF10: 6
PAINLEVEL_OUTOF10: 9
PAINLEVEL_OUTOF10: 7
PAINLEVEL_OUTOF10: 7
PAINLEVEL_OUTOF10: 5
PAINLEVEL_OUTOF10: 7
PAINLEVEL_OUTOF10: 6
PAINLEVEL_OUTOF10: 7

## 2020-09-23 NOTE — LACTATION NOTE
Pt. Stated she has no questions or concerns at this time. Encouraged pt. To call lactation with any questions. Will follow up with pt. PRN.

## 2020-09-23 NOTE — PLAN OF CARE
Problem: Discharge Planning:  Goal: Discharged to appropriate level of care  Description: Discharged to appropriate level of care  9/23/2020 0029 by Eddie Sandoval RN  Outcome: Ongoing  Note: Remains in hospital, discussed possible discharge needs. Problem: Fluid Volume - Imbalance:  Goal: Absence of imbalanced fluid volume signs and symptoms  Description: Absence of imbalanced fluid volume signs and symptoms  9/23/2020 0029 by Eddie Sandoval RN  Outcome: Ongoing  Note: Remains in hospital, discussed possible discharge needs. Problem: Infection - Surgical Site:  Goal: Will show no infection signs and symptoms  Description: Will show no infection signs and symptoms  9/23/2020 0029 by Eddie Sandoval RN  Outcome: Ongoing  Note: Surgical site open to air with serous drainage. Problem: Mood - Altered:  Goal: Mood stable  Description: Mood stable  9/23/2020 0029 by Eddie Sandoval RN  Outcome: Ongoing  Note: Bonding with baby, participating in infant care. Problem: Pain - Acute:  Goal: Pain level will decrease  Description: Pain level will decrease  9/23/2020 0029 by Eddie Sandoval RN  Outcome: Ongoing  Note: Pain controlled with po meds. Discussed ice for perineal pain and/or incisional pain or the use of warm blanket/heating pad for uterine cramps. Pt states her pain goal 5/10 is working towards being met. Problem: Urinary Retention:  Goal: Urinary elimination within specified parameters  Description: Urinary elimination within specified parameters  9/23/2020 0029 by Eddie Sandoval RN  Outcome: Ongoing  Note: Pt urinating well this shift. Problem: Venous Thromboembolism:  Goal: Will show no signs or symptoms of venous thromboembolism  Description: Will show no signs or symptoms of venous thromboembolism  9/23/2020 0029 by Eddie Sandoval RN  Outcome: Ongoing  Note: Homans sign negative     Care plan reviewed with patient and she contributes to goal setting and voices understanding of plan of care.

## 2020-09-23 NOTE — PLAN OF CARE
Problem: Discharge Planning:  Goal: Discharged to appropriate level of care  Description: Discharged to appropriate level of care  9/23/2020 1004 by Josh Garcia RN  Outcome: Ongoing  Note: No plans to discharge this shift     Problem: Fluid Volume - Imbalance:  Goal: Absence of imbalanced fluid volume signs and symptoms  Description: Absence of imbalanced fluid volume signs and symptoms  9/23/2020 1004 by Josh Garcia RN  Outcome: Ongoing  Note: Vaginal bleeding WNL, no clots or foul odors. Vital signs and assessments WNL. Problem: Infection - Surgical Site:  Goal: Will show no infection signs and symptoms  Description: Will show no infection signs and symptoms  9/23/2020 1004 by Josh Garcia RN  Outcome: Ongoing  Note: No redness, swelling, or ordor, sm amount of drainage see flowsheet note     Problem: Mood - Altered:  Goal: Mood stable  Description: Mood stable  9/23/2020 1011 by Josh Garcia RN  Outcome: Ongoing  Note: Pt calm and cooperative, bonding well with baby      Problem: Pain - Acute:  Goal: Pain level will decrease  Description: Pain level will decrease  9/23/2020 1004 by Josh Garcia RN  Outcome: Ongoing  Note: Pain controlled with po meds. Discussed ice for perineal pain and/or incisional pain or the use of warm blanket/heating pad for uterine cramps. Pt states her pain goal 5/10 has been met. Problem: Urinary Retention:  Goal: Urinary elimination within specified parameters  Description: Urinary elimination within specified parameters  9/23/2020 1004 by Josh Garcia RN  Outcome: Ongoing     Problem: Venous Thromboembolism:  Goal: Will show no signs or symptoms of venous thromboembolism  Description: Will show no signs or symptoms of venous thromboembolism  9/23/2020 1004 by Josh Garcia RN  Outcome: Ongoing  Note: Homans sign negative    Care plan reviewed with patient.   Patient verbalize understanding of the plan of care and contribute to goal setting.

## 2020-09-23 NOTE — PROGRESS NOTES
Department of Obstetrics and Gynecology  Labor and Delivery  Attending Post Partum Progress Note      SUBJECTIVE:  POD 2    OBJECTIVE: DOING WELL    Vitals:  BP (!) 143/86   Pulse 100   Temp 98.6 °F (37 °C) (Oral)   Resp 18   Ht 5' 4\" (1.626 m)   Wt 291 lb (132 kg)   SpO2 98%   Breastfeeding Unknown   BMI 49.95 kg/m²     DATA:    CBC:    Lab Results   Component Value Date    WBC 15.8 (H) 09/23/2020    HGB 10.0 (L) 09/23/2020    HCT 30.4 (L) 09/23/2020    MCV 92.7 09/23/2020     09/23/2020       ASSESSMENT & PLAN:  HOME POST OP DAY 3-4 BASED ON DISCHARGE PLANS FOR BABY.     Denise Davila

## 2020-09-24 PROCEDURE — 1220000000 HC SEMI PRIVATE OB R&B

## 2020-09-24 PROCEDURE — 6370000000 HC RX 637 (ALT 250 FOR IP): Performed by: OBSTETRICS & GYNECOLOGY

## 2020-09-24 PROCEDURE — 6360000002 HC RX W HCPCS: Performed by: OBSTETRICS & GYNECOLOGY

## 2020-09-24 RX ORDER — IBUPROFEN 800 MG/1
800 TABLET ORAL EVERY 8 HOURS PRN
Qty: 40 TABLET | Refills: 0 | Status: SHIPPED | OUTPATIENT
Start: 2020-09-24 | End: 2021-10-31

## 2020-09-24 RX ORDER — OXYCODONE HYDROCHLORIDE AND ACETAMINOPHEN 5; 325 MG/1; MG/1
1 TABLET ORAL EVERY 6 HOURS PRN
Qty: 28 TABLET | Refills: 0 | Status: SHIPPED | OUTPATIENT
Start: 2020-09-24 | End: 2020-09-27

## 2020-09-24 RX ORDER — LANOLIN ALCOHOL/MO/W.PET/CERES
325 CREAM (GRAM) TOPICAL DAILY
Qty: 30 TABLET | Refills: 1 | Status: SHIPPED | OUTPATIENT
Start: 2020-09-24 | End: 2022-03-16 | Stop reason: ALTCHOICE

## 2020-09-24 RX ADMIN — OXYCODONE 10 MG: 5 TABLET ORAL at 09:55

## 2020-09-24 RX ADMIN — OXYCODONE 10 MG: 5 TABLET ORAL at 00:33

## 2020-09-24 RX ADMIN — ACETAMINOPHEN 1000 MG: 500 TABLET ORAL at 14:06

## 2020-09-24 RX ADMIN — ENOXAPARIN SODIUM 80 MG: 80 INJECTION SUBCUTANEOUS at 01:04

## 2020-09-24 RX ADMIN — OXYCODONE 10 MG: 5 TABLET ORAL at 18:34

## 2020-09-24 RX ADMIN — OXYCODONE 10 MG: 5 TABLET ORAL at 14:06

## 2020-09-24 RX ADMIN — IBUPROFEN 800 MG: 800 TABLET, FILM COATED ORAL at 00:13

## 2020-09-24 RX ADMIN — IBUPROFEN 800 MG: 800 TABLET, FILM COATED ORAL at 08:19

## 2020-09-24 RX ADMIN — IBUPROFEN 800 MG: 800 TABLET, FILM COATED ORAL at 16:37

## 2020-09-24 RX ADMIN — ACETAMINOPHEN 1000 MG: 500 TABLET ORAL at 22:27

## 2020-09-24 RX ADMIN — VITAMIN A, VITAMIN C, VITAMIN D-3, VITAMIN E, VITAMIN B-1, VITAMIN B-2, NIACIN, VITAMIN B-6, CALCIUM, IRON, ZINC, COPPER: 4000; 120; 400; 22; 1.84; 3; 20; 10; 1; 12; 200; 27; 25; 2 TABLET ORAL at 08:19

## 2020-09-24 RX ADMIN — ACETAMINOPHEN 1000 MG: 500 TABLET ORAL at 05:49

## 2020-09-24 RX ADMIN — SIMETHICONE CHEW TAB 80 MG 80 MG: 80 TABLET ORAL at 22:39

## 2020-09-24 RX ADMIN — OXYCODONE 10 MG: 5 TABLET ORAL at 05:49

## 2020-09-24 RX ADMIN — SIMETHICONE CHEW TAB 80 MG 80 MG: 80 TABLET ORAL at 00:33

## 2020-09-24 RX ADMIN — OXYCODONE 10 MG: 5 TABLET ORAL at 23:14

## 2020-09-24 ASSESSMENT — PAIN - FUNCTIONAL ASSESSMENT: PAIN_FUNCTIONAL_ASSESSMENT: ACTIVITIES ARE NOT PREVENTED

## 2020-09-24 ASSESSMENT — PAIN DESCRIPTION - PAIN TYPE: TYPE: SURGICAL PAIN

## 2020-09-24 ASSESSMENT — PAIN DESCRIPTION - PROGRESSION: CLINICAL_PROGRESSION: GRADUALLY WORSENING

## 2020-09-24 ASSESSMENT — PAIN SCALES - GENERAL
PAINLEVEL_OUTOF10: 6
PAINLEVEL_OUTOF10: 6
PAINLEVEL_OUTOF10: 8
PAINLEVEL_OUTOF10: 7

## 2020-09-24 ASSESSMENT — PAIN DESCRIPTION - ORIENTATION: ORIENTATION: LOWER

## 2020-09-24 ASSESSMENT — PAIN DESCRIPTION - FREQUENCY: FREQUENCY: INTERMITTENT

## 2020-09-24 ASSESSMENT — PAIN DESCRIPTION - LOCATION: LOCATION: INCISION

## 2020-09-24 ASSESSMENT — PAIN DESCRIPTION - DESCRIPTORS: DESCRIPTORS: DISCOMFORT

## 2020-09-24 ASSESSMENT — PAIN DESCRIPTION - ONSET: ONSET: GRADUAL

## 2020-09-24 NOTE — PLAN OF CARE
Problem: Discharge Planning:  Goal: Discharged to appropriate level of care  Description: Discharged to appropriate level of care  9/23/2020 2230 by Jennifer Walker RN  Outcome: Ongoing  Note: Remains in hospital, discussed possible discharge needs. Problem: Fluid Volume - Imbalance:  Goal: Absence of imbalanced fluid volume signs and symptoms  Description: Absence of imbalanced fluid volume signs and symptoms  9/23/2020 2230 by Jennifer Walker RN  Outcome: Ongoing  Note: No signs of imbalanced fluid volume     Problem: Infection - Surgical Site:  Goal: Will show no infection signs and symptoms  Description: Will show no infection signs and symptoms  9/23/2020 2230 by Jennifer Walker RN  Outcome: Ongoing  Note: No signs of infection at this time     Problem: Pain - Acute:  Goal: Pain level will decrease  Description: Pain level will decrease  9/23/2020 2230 by Jennifer Walker RN  Outcome: Ongoing  Note: Pt taking pain medication with relief     Problem: Urinary Retention:  Goal: Urinary elimination within specified parameters  Description: Urinary elimination within specified parameters  9/23/2020 2230 by Jennifer Walker RN  Outcome: Ongoing  Note: Pt voiding without difficulty     Problem: Venous Thromboembolism:  Goal: Will show no signs or symptoms of venous thromboembolism  Description: Will show no signs or symptoms of venous thromboembolism  9/23/2020 2230 by Jennifer Walker RN  Outcome: Ongoing  Note: No signs of venous thromboembolism at this time   Care plan reviewed with patient and she contributes to goal setting and voices understanding of plan of care.

## 2020-09-24 NOTE — LACTATION NOTE
Pt states latching is going well. Home pump teaching provided. Pt states no other questions at this time. Encouraged Pt to call with any questions or to set up an outpatient appointment as needed. Will follow up PRN.

## 2020-09-25 VITALS
HEIGHT: 64 IN | BODY MASS INDEX: 49.68 KG/M2 | SYSTOLIC BLOOD PRESSURE: 138 MMHG | RESPIRATION RATE: 18 BRPM | OXYGEN SATURATION: 98 % | TEMPERATURE: 98.5 F | WEIGHT: 291 LBS | HEART RATE: 73 BPM | DIASTOLIC BLOOD PRESSURE: 76 MMHG

## 2020-09-25 PROCEDURE — 6370000000 HC RX 637 (ALT 250 FOR IP): Performed by: OBSTETRICS & GYNECOLOGY

## 2020-09-25 PROCEDURE — 6360000002 HC RX W HCPCS: Performed by: OBSTETRICS & GYNECOLOGY

## 2020-09-25 RX ADMIN — OXYCODONE 10 MG: 5 TABLET ORAL at 10:57

## 2020-09-25 RX ADMIN — IBUPROFEN 800 MG: 800 TABLET, FILM COATED ORAL at 08:20

## 2020-09-25 RX ADMIN — IBUPROFEN 800 MG: 800 TABLET, FILM COATED ORAL at 00:44

## 2020-09-25 RX ADMIN — OXYCODONE 10 MG: 5 TABLET ORAL at 03:35

## 2020-09-25 RX ADMIN — ENOXAPARIN SODIUM 80 MG: 80 INJECTION SUBCUTANEOUS at 00:45

## 2020-09-25 RX ADMIN — ACETAMINOPHEN 1000 MG: 500 TABLET ORAL at 06:50

## 2020-09-25 ASSESSMENT — PAIN SCALES - GENERAL
PAINLEVEL_OUTOF10: 7
PAINLEVEL_OUTOF10: 7
PAINLEVEL_OUTOF10: 4

## 2020-09-25 NOTE — FLOWSHEET NOTE
Discharge prescriptions given to pt with instructions on use and side effects. See AVS. Pt verbalized understanding of medications. Postpartum  teaching completed and forms signed by patient. Copy witnessed by RN and given to patient. Patient verbalized understanding of all teaching points. Patient plans to follow-up with [facility] Provider as instructed. Patient verbalizes understanding of discharge instructions and denies further questions. ID bands checked. Patient discharged in stable condition accompanied by family/guardian. Discharged in wheelchair, holding baby in arms.

## 2020-09-25 NOTE — FLOWSHEET NOTE
Post birth warning signs education paper given and reviewed, teaching complete. [place] postpartum depression screening discussed with patient, instructed to contact her healthcare provider if her score is > 10. Patient voiced understanding. Mother's blood type is B+. Baby's blood type is N/A.   Mother did not receive Rhogam.

## 2020-09-25 NOTE — PLAN OF CARE
Problem: Discharge Planning:  Goal: Discharged to appropriate level of care  Description: Discharged to appropriate level of care  9/25/2020 1006 by Abdirizak Dawn RN  Outcome: Ongoing  Note: Assessed discharge needs, plan to discharge to home today     Problem: Infection - Surgical Site:  Goal: Will show no infection signs and symptoms  Description: Will show no infection signs and symptoms  9/25/2020 1006 by Abdirizak Dawn RN  Outcome: Ongoing  Note: Vitals stable, incision intact with staples, no redness. Problem: Mood - Altered:  Goal: Mood stable  Description: Mood stable  9/25/2020 1006 by Abdirizak Dawn RN  Outcome: Ongoing  Note: Mood stable and cooperative     Problem: Pain - Acute:  Goal: Pain level will decrease  Description: Pain level will decrease  9/25/2020 1006 by Abdirizak Dawn RN  Outcome: Ongoing  Note: Incisional pain manageable with oral pain medication, discussed may use heat or ice for comfort. Pain goal a 5/10 has been met this shift. Problem: Venous Thromboembolism:  Goal: Will show no signs or symptoms of venous thromboembolism  Description: Will show no signs or symptoms of venous thromboembolism  9/25/2020 1006 by Abdirizak Dawn RN  Outcome: Ongoing  Note: Negative homans  Pt taking Lovenox    Care plan reviewed with patient and she contributes to goal setting and voices understanding of plan of care.

## 2020-09-25 NOTE — PROGRESS NOTES
Subjective:     Postpartum Day 4:  Delivery    Doing well. Ambulating and tolerating diet    Objective:    VITALS:  /85   Pulse 90   Temp 99.2 °F (37.3 °C) (Oral)   Resp 18   Ht 5' 4\" (1.626 m)   Wt 291 lb (132 kg)   SpO2 98%   Breastfeeding Unknown   BMI 49.95 kg/m²     Vitals:    20 0044   BP: 135/85   Pulse: 90   Resp: 18   Temp: 99.2 °F (37.3 °C)   SpO2:          General:    alert, appears stated age and cooperative   Bowel Sounds:  active           Incision:  healing well, no significant drainage, no dehiscence, no significant erythema         DATA: CBC   Lab Results   Component Value Date    WBC 15.8 (H) 2020    HGB 10.0 (L) 2020    HCT 30.4 (L) 2020     2020        Assessment:     Status post  section. Doing well postoperatively. Plan:     Discharge home with standard precautions and return to clinic in 4-6 weeks.       [address] [place] [address]

## 2020-09-25 NOTE — PLAN OF CARE
Problem: Discharge Planning:  Goal: Discharged to appropriate level of care  Description: Discharged to appropriate level of care  Outcome: Ongoing  Note:  Continuing to work on getting Ducks in a row, to meet discharge needs, no discharge order for this shift     Problem: Infection - Surgical Site:  Goal: Will show no infection signs and symptoms  Description: Will show no infection signs and symptoms  Outcome: Ongoing  Note: No odor noted from incision area     Problem: Mood - Altered:  Goal: Mood stable  Description: Mood stable  Outcome: Ongoing  Note: Pt pleasant with visitors and staff as well as with infant. Problem: Pain - Acute:  Goal: Pain level will decrease  Description: Pain level will decrease  Outcome: Ongoing  Note: Pt is using ordered pain medication and heat to decrease pain. Pain goal discussed     Problem: Venous Thromboembolism:  Goal: Will show no signs or symptoms of venous thromboembolism  Description: Will show no signs or symptoms of venous thromboembolism  Outcome: Ongoing  Note: Pt ambulatory, negative Homans     Problem: Fluid Volume - Imbalance:  Goal: Absence of imbalanced fluid volume signs and symptoms  Description: Absence of imbalanced fluid volume signs and symptoms  Outcome: Completed  Note: No fluid volume imbalance noted     Care plan reviewed with patient and she contributes to goal setting and voices understanding of plan of care.

## 2020-09-29 ENCOUNTER — APPOINTMENT (OUTPATIENT)
Dept: CT IMAGING | Age: 25
DRG: 776 | End: 2020-09-29
Payer: MEDICARE

## 2020-09-29 ENCOUNTER — HOSPITAL ENCOUNTER (INPATIENT)
Age: 25
LOS: 2 days | Discharge: HOME | DRG: 776 | End: 2020-10-01
Attending: EMERGENCY MEDICINE | Admitting: OBSTETRICS & GYNECOLOGY
Payer: MEDICARE

## 2020-09-29 LAB
ALBUMIN SERPL-MCNC: 3.6 G/DL (ref 3.5–5.1)
ALP BLD-CCNC: 105 U/L (ref 38–126)
ALT SERPL-CCNC: 39 U/L (ref 11–66)
ANION GAP SERPL CALCULATED.3IONS-SCNC: 13 MEQ/L (ref 8–16)
AST SERPL-CCNC: 22 U/L (ref 5–40)
BASOPHILS # BLD: 0.3 %
BASOPHILS ABSOLUTE: 0 THOU/MM3 (ref 0–0.1)
BILIRUB SERPL-MCNC: 0.2 MG/DL (ref 0.3–1.2)
BUN BLDV-MCNC: 11 MG/DL (ref 7–22)
CALCIUM SERPL-MCNC: 9.3 MG/DL (ref 8.5–10.5)
CHLORIDE BLD-SCNC: 104 MEQ/L (ref 98–111)
CO2: 24 MEQ/L (ref 23–33)
CREAT SERPL-MCNC: 0.5 MG/DL (ref 0.4–1.2)
CREATININE URINE: 19.8 MG/DL
EKG ATRIAL RATE: 102 BPM
EKG P AXIS: 38 DEGREES
EKG P-R INTERVAL: 162 MS
EKG Q-T INTERVAL: 332 MS
EKG QRS DURATION: 66 MS
EKG QTC CALCULATION (BAZETT): 432 MS
EKG R AXIS: 46 DEGREES
EKG T AXIS: 25 DEGREES
EKG VENTRICULAR RATE: 102 BPM
EOSINOPHIL # BLD: 1.4 %
EOSINOPHILS ABSOLUTE: 0.1 THOU/MM3 (ref 0–0.4)
ERYTHROCYTE [DISTWIDTH] IN BLOOD BY AUTOMATED COUNT: 14.1 % (ref 11.5–14.5)
ERYTHROCYTE [DISTWIDTH] IN BLOOD BY AUTOMATED COUNT: 47.2 FL (ref 35–45)
GFR SERPL CREATININE-BSD FRML MDRD: > 90 ML/MIN/1.73M2
GLUCOSE BLD-MCNC: 86 MG/DL (ref 70–108)
HCT VFR BLD CALC: 30.4 % (ref 37–47)
HEMOGLOBIN: 10 GM/DL (ref 12–16)
IMMATURE GRANS (ABS): 0.08 THOU/MM3 (ref 0–0.07)
IMMATURE GRANULOCYTES: 0.9 %
LYMPHOCYTES # BLD: 14.1 %
LYMPHOCYTES ABSOLUTE: 1.3 THOU/MM3 (ref 1–4.8)
MCH RBC QN AUTO: 30 PG (ref 26–33)
MCHC RBC AUTO-ENTMCNC: 32.9 GM/DL (ref 32.2–35.5)
MCV RBC AUTO: 91.3 FL (ref 81–99)
MONOCYTES # BLD: 4.9 %
MONOCYTES ABSOLUTE: 0.5 THOU/MM3 (ref 0.4–1.3)
NUCLEATED RED BLOOD CELLS: 0 /100 WBC
OSMOLALITY CALCULATION: 280 MOSMOL/KG (ref 275–300)
PLATELET # BLD: 407 THOU/MM3 (ref 130–400)
PMV BLD AUTO: 9 FL (ref 9.4–12.4)
POTASSIUM REFLEX MAGNESIUM: 3.9 MEQ/L (ref 3.5–5.2)
PROT/CREAT RATIO, UR: 0.43
PROTEIN, URINE: 8.6 MG/DL
RBC # BLD: 3.33 MILL/MM3 (ref 4.2–5.4)
SEG NEUTROPHILS: 78.4 %
SEGMENTED NEUTROPHILS ABSOLUTE COUNT: 7.3 THOU/MM3 (ref 1.8–7.7)
SODIUM BLD-SCNC: 141 MEQ/L (ref 135–145)
TOTAL PROTEIN: 6.9 G/DL (ref 6.1–8)
WBC # BLD: 9.3 THOU/MM3 (ref 4.8–10.8)

## 2020-09-29 PROCEDURE — 84156 ASSAY OF PROTEIN URINE: CPT

## 2020-09-29 PROCEDURE — 6360000002 HC RX W HCPCS: Performed by: OBSTETRICS & GYNECOLOGY

## 2020-09-29 PROCEDURE — 6360000002 HC RX W HCPCS

## 2020-09-29 PROCEDURE — 93005 ELECTROCARDIOGRAM TRACING: CPT | Performed by: STUDENT IN AN ORGANIZED HEALTH CARE EDUCATION/TRAINING PROGRAM

## 2020-09-29 PROCEDURE — 99282 EMERGENCY DEPT VISIT SF MDM: CPT

## 2020-09-29 PROCEDURE — 1220000001 HC SEMI PRIVATE L&D R&B

## 2020-09-29 PROCEDURE — 36415 COLL VENOUS BLD VENIPUNCTURE: CPT

## 2020-09-29 PROCEDURE — 85025 COMPLETE CBC W/AUTO DIFF WBC: CPT

## 2020-09-29 PROCEDURE — 6360000002 HC RX W HCPCS: Performed by: STUDENT IN AN ORGANIZED HEALTH CARE EDUCATION/TRAINING PROGRAM

## 2020-09-29 PROCEDURE — 99283 EMERGENCY DEPT VISIT LOW MDM: CPT

## 2020-09-29 PROCEDURE — 70450 CT HEAD/BRAIN W/O DYE: CPT

## 2020-09-29 PROCEDURE — 82570 ASSAY OF URINE CREATININE: CPT

## 2020-09-29 PROCEDURE — 96375 TX/PRO/DX INJ NEW DRUG ADDON: CPT

## 2020-09-29 PROCEDURE — 2580000003 HC RX 258: Performed by: OBSTETRICS & GYNECOLOGY

## 2020-09-29 PROCEDURE — 80053 COMPREHEN METABOLIC PANEL: CPT

## 2020-09-29 PROCEDURE — 96374 THER/PROPH/DIAG INJ IV PUSH: CPT

## 2020-09-29 PROCEDURE — 6370000000 HC RX 637 (ALT 250 FOR IP): Performed by: OBSTETRICS & GYNECOLOGY

## 2020-09-29 RX ORDER — HYDRALAZINE HYDROCHLORIDE 20 MG/ML
10 INJECTION INTRAMUSCULAR; INTRAVENOUS ONCE
Status: COMPLETED | OUTPATIENT
Start: 2020-09-29 | End: 2020-09-29

## 2020-09-29 RX ORDER — MAGNESIUM SULFATE IN WATER 40 MG/ML
4 INJECTION, SOLUTION INTRAVENOUS ONCE
Status: COMPLETED | OUTPATIENT
Start: 2020-09-29 | End: 2020-09-29

## 2020-09-29 RX ORDER — MORPHINE SULFATE 2 MG/ML
INJECTION, SOLUTION INTRAMUSCULAR; INTRAVENOUS
Status: COMPLETED
Start: 2020-09-29 | End: 2020-09-29

## 2020-09-29 RX ORDER — KETOROLAC TROMETHAMINE 30 MG/ML
INJECTION, SOLUTION INTRAMUSCULAR; INTRAVENOUS
Status: COMPLETED
Start: 2020-09-29 | End: 2020-09-29

## 2020-09-29 RX ORDER — CALCIUM GLUCONATE 94 MG/ML
1 INJECTION, SOLUTION INTRAVENOUS PRN
Status: DISCONTINUED | OUTPATIENT
Start: 2020-09-29 | End: 2020-10-01 | Stop reason: HOSPADM

## 2020-09-29 RX ORDER — SODIUM CHLORIDE, SODIUM LACTATE, POTASSIUM CHLORIDE, CALCIUM CHLORIDE 600; 310; 30; 20 MG/100ML; MG/100ML; MG/100ML; MG/100ML
INJECTION, SOLUTION INTRAVENOUS CONTINUOUS
Status: DISCONTINUED | OUTPATIENT
Start: 2020-09-29 | End: 2020-10-01 | Stop reason: HOSPADM

## 2020-09-29 RX ORDER — BUTALBITAL, ACETAMINOPHEN AND CAFFEINE 50; 325; 40 MG/1; MG/1; MG/1
1 TABLET ORAL EVERY 6 HOURS PRN
Status: DISCONTINUED | OUTPATIENT
Start: 2020-09-29 | End: 2020-10-01 | Stop reason: HOSPADM

## 2020-09-29 RX ORDER — KETOROLAC TROMETHAMINE 30 MG/ML
30 INJECTION, SOLUTION INTRAMUSCULAR; INTRAVENOUS EVERY 6 HOURS PRN
Status: DISCONTINUED | OUTPATIENT
Start: 2020-09-29 | End: 2020-09-30 | Stop reason: HOSPADM

## 2020-09-29 RX ORDER — OXYCODONE HYDROCHLORIDE AND ACETAMINOPHEN 5; 325 MG/1; MG/1
1 TABLET ORAL EVERY 4 HOURS PRN
COMMUNITY
End: 2021-10-31

## 2020-09-29 RX ORDER — MAGNESIUM SULFATE IN WATER 40 MG/ML
INJECTION, SOLUTION INTRAVENOUS
Status: DISPENSED
Start: 2020-09-29 | End: 2020-09-30

## 2020-09-29 RX ORDER — MORPHINE SULFATE 2 MG/ML
2 INJECTION, SOLUTION INTRAMUSCULAR; INTRAVENOUS ONCE
Status: COMPLETED | OUTPATIENT
Start: 2020-09-29 | End: 2020-09-29

## 2020-09-29 RX ADMIN — KETOROLAC TROMETHAMINE 30 MG: 30 INJECTION, SOLUTION INTRAMUSCULAR; INTRAVENOUS at 23:19

## 2020-09-29 RX ADMIN — MORPHINE SULFATE 2 MG: 2 INJECTION, SOLUTION INTRAMUSCULAR; INTRAVENOUS at 13:11

## 2020-09-29 RX ADMIN — HYDROMORPHONE HYDROCHLORIDE 0.5 MG: 1 INJECTION, SOLUTION INTRAMUSCULAR; INTRAVENOUS; SUBCUTANEOUS at 14:22

## 2020-09-29 RX ADMIN — MAGNESIUM SULFATE HEPTAHYDRATE 2 G/HR: 40 INJECTION, SOLUTION INTRAVENOUS at 14:29

## 2020-09-29 RX ADMIN — HYDROMORPHONE HYDROCHLORIDE 0.5 MG: 1 INJECTION, SOLUTION INTRAMUSCULAR; INTRAVENOUS; SUBCUTANEOUS at 22:04

## 2020-09-29 RX ADMIN — BUTALBITAL, ACETAMINOPHEN, AND CAFFEINE 1 TABLET: 50; 325; 40 TABLET ORAL at 15:30

## 2020-09-29 RX ADMIN — HYDRALAZINE HYDROCHLORIDE 10 MG: 20 INJECTION, SOLUTION INTRAMUSCULAR; INTRAVENOUS at 13:11

## 2020-09-29 RX ADMIN — BUTALBITAL, ACETAMINOPHEN, AND CAFFEINE 1 TABLET: 50; 325; 40 TABLET ORAL at 21:15

## 2020-09-29 RX ADMIN — HYDROMORPHONE HYDROCHLORIDE 0.5 MG: 1 INJECTION, SOLUTION INTRAMUSCULAR; INTRAVENOUS; SUBCUTANEOUS at 17:05

## 2020-09-29 RX ADMIN — KETOROLAC TROMETHAMINE 30 MG: 30 INJECTION, SOLUTION INTRAMUSCULAR; INTRAVENOUS at 17:20

## 2020-09-29 RX ADMIN — SODIUM CHLORIDE, POTASSIUM CHLORIDE, SODIUM LACTATE AND CALCIUM CHLORIDE: 600; 310; 30; 20 INJECTION, SOLUTION INTRAVENOUS at 14:00

## 2020-09-29 RX ADMIN — HYDROMORPHONE HYDROCHLORIDE 0.5 MG: 1 INJECTION, SOLUTION INTRAMUSCULAR; INTRAVENOUS; SUBCUTANEOUS at 20:05

## 2020-09-29 RX ADMIN — HYDROMORPHONE HYDROCHLORIDE 0.5 MG: 1 INJECTION, SOLUTION INTRAMUSCULAR; INTRAVENOUS; SUBCUTANEOUS at 14:46

## 2020-09-29 RX ADMIN — MAGNESIUM SULFATE IN WATER 4 G: 40 INJECTION, SOLUTION INTRAVENOUS at 14:06

## 2020-09-29 ASSESSMENT — ENCOUNTER SYMPTOMS
WHEEZING: 0
VOICE CHANGE: 0
SHORTNESS OF BREATH: 0
COUGH: 0
BACK PAIN: 0
STRIDOR: 0
SORE THROAT: 0
CHEST TIGHTNESS: 0
ABDOMINAL PAIN: 0
VOMITING: 0
ANAL BLEEDING: 0
TROUBLE SWALLOWING: 0
DIARRHEA: 0
CONSTIPATION: 0
EYE REDNESS: 0
PHOTOPHOBIA: 1
NAUSEA: 1
CHOKING: 0

## 2020-09-29 ASSESSMENT — PAIN DESCRIPTION - DESCRIPTORS
DESCRIPTORS: HEADACHE
DESCRIPTORS: ACHING;PRESSURE
DESCRIPTORS: HEADACHE

## 2020-09-29 ASSESSMENT — PAIN SCALES - GENERAL
PAINLEVEL_OUTOF10: 9
PAINLEVEL_OUTOF10: 8
PAINLEVEL_OUTOF10: 9

## 2020-09-29 ASSESSMENT — PAIN DESCRIPTION - ORIENTATION: ORIENTATION: ANTERIOR

## 2020-09-29 ASSESSMENT — PAIN DESCRIPTION - PAIN TYPE: TYPE: ACUTE PAIN

## 2020-09-29 ASSESSMENT — PAIN DESCRIPTION - LOCATION: LOCATION: HEAD

## 2020-09-29 NOTE — FLOWSHEET NOTE
Dr Eliezer Cruz called the unit for an update, notified pt to the floor at [phone], pt rates headache pain 8/10, all bps reviewed, all lab work done in ER reviewed, pt had 2 mg of morphine in ER at 1311, she last took percocet at home last night but states no relief from that either. IV line was not working when arrived, so got IV restarted and magnesium sulfate bolus started at 1406. Orders received for dilaudid 0.5 mg for headache, may repeat if no relief, also start Fioricet.

## 2020-09-29 NOTE — ED NOTES
Pt transported in stable condition with this RN to L&D room 3.       Joyce Alexis, RN  09/29/20 7447

## 2020-09-29 NOTE — ED NOTES
Pt to the ED with a frontal headache 9/10 s/p recent delivery 1 weeks ago. Pt was under general anesthesia for a . States she tried percocet and ibuprofen for pain relief without any relief. Mother at bedside. Dr. Javed Tirado is OBGYN. Reports a normal amount of bleeding post partum. Denies calf tenderness or SOB.      Sindy Tirado, RN  [phone]

## 2020-09-29 NOTE — FLOWSHEET NOTE
Pt  8 days postpartum primary C/Section arrives via wheelchair from the ER for a severe headache, Dr Alo De León called in orders. Pt still has pain from headache even after the morphine that was given in the ER. Pt denies epigastric pain or blurred vision. Pts surgical incision open to air, staples intact.

## 2020-09-29 NOTE — FLOWSHEET NOTE
Pt appears more comfortable, visiting with her mother, pt states the toradol is helping her relax, still rates headache pain 7.

## 2020-09-29 NOTE — FLOWSHEET NOTE
Dr Maryclare Simpson called the unit, updated pt asked for another dose of IV meds, just gave her 3rd dose of dilaudid, pt states still no relief from headache. Pt has not rested at all, c/o of back pain too and does not like to lay on her side, last bp stable on her back. Orders received for toradol 30 mg Iv now and every 6 hrs prn.

## 2020-09-29 NOTE — ED PROVIDER NOTES
[address] [address]          Pt Name: Veronica Alas  MRN: [id number]  [place] 1995  Date of evaluation: 2020  Treating Resident Physician: Deepa Leroy MD  Supervising Physician: Dr. Katie Javed       Chief Complaint   Patient presents with    Headache     x 3 days     History obtained from the patient. HISTORY OF PRESENT ILLNESS    HPI  Karly Almanza is a 30 y.o. female postpartum day 8, G1, P1 who presents to the emergency department for evaluation of severe headache x3 days. States that she had no prenatal complications,  due to transverse lie with general anesthesia, no complications after surgery. States that she began to have a severe frontal headache, associated with a little bit of dizziness, nausea, worsened with light and sound, rated 9 out of 10, described as throbbing. The patient has no other acute complaints at this time. Patient denies any fever chest pain, shortness of breath, emesis, change in bowel or bladder habits. REVIEW OF SYSTEMS   Review of Systems   Constitutional: Positive for appetite change and fatigue. Negative for chills, diaphoresis and fever. HENT: Negative for congestion, nosebleeds, sore throat, trouble swallowing and voice change. Eyes: Positive for photophobia. Negative for redness and visual disturbance. Respiratory: Negative for cough, choking, chest tightness, shortness of breath, wheezing and stridor. Cardiovascular: Negative for chest pain, palpitations and leg swelling. Gastrointestinal: Positive for nausea. Negative for abdominal pain, anal bleeding, constipation, diarrhea and vomiting. Genitourinary: Positive for pelvic pain and vaginal bleeding. Negative for dysuria, enuresis, frequency, hematuria and urgency. Musculoskeletal: Negative for arthralgias, back pain, gait problem, joint swelling, myalgias, neck pain and neck stiffness. Neurological: Positive for light-headedness and headaches. Negative for tremors, seizures, syncope, facial asymmetry, speech difficulty, weakness and numbness. Psychiatric/Behavioral: Negative for agitation, behavioral problems, confusion and decreased concentration. PAST MEDICAL AND SURGICAL HISTORY     Past Medical History:   Diagnosis Date    Ovarian cyst     Scoliosis      Past Surgical History:   Procedure Laterality Date    ADENOIDECTOMY      BACK SURGERY  2006 and 2018     SECTION N/A 2020     SECTION performed by Gwendolyn Bach MD at [facility] [address] [place] [address]   No current facility-administered medications for this encounter.      Current Outpatient Medications:     ferrous sulfate (FE TABS) 325 (65 Fe) MG EC tablet, Take 1 tablet by mouth daily, Disp: 30 tablet, Rfl: 1    ibuprofen (IBU) 800 MG tablet, Take 1 tablet by mouth every 8 hours as needed for Pain, Disp: 40 tablet, Rfl: 0    Prenatal Vit-Fe Fumarate-FA (PRENATAL 1+1 PO), Take 1 tablet by mouth daily, Disp: , Rfl:     cyclobenzaprine (FLEXERIL) 10 MG tablet, Take 1 tablet by mouth 3 times daily as needed for Muscle spasms, Disp: 90 tablet, Rfl: 2    Tens Unit MISC, by Does not apply route, Disp: 1 each, Rfl: 0      SOCIAL HISTORY     Social History     Social History Narrative    Not on file     Social History     Tobacco Use    Smoking status: Former Smoker     Years: 4.00     Last attempt to quit: 12/10/2015     Years since quittin.8    Smokeless tobacco: Never Used   Substance Use Topics    Alcohol use: No     Alcohol/week: 0.0 standard drinks     Comment: socially    Drug use: No         ALLERGIES     Allergies   Allergen Reactions    Gabapentin Hives         FAMILY HISTORY     Family History   Problem Relation Age of Onset    High Cholesterol Mother     High Blood Pressure Mother     Depression Mother     Diabetes Mother     Stroke Maternal Grandmother     Heart Disease Maternal Grandfather          PREVIOUS RECORDS   Previous records reviewed: Past medical, surgical, medications, allergies, obstetric history. PHYSICAL EXAM     ED Triage Vitals [09/29/20 1219]   BP Temp Temp Source Pulse Resp SpO2 Height Weight   (!) 170/101 97.8 °F (36.6 °C) Oral 94 18 99 % -- 191 lb (86.6 kg)     Initial vital signs and nursing assessment reviewed and Hypertensive and tachycardic. Pulsoximetry is normal per my interpretation. Additional Vital Signs:  Vitals:    09/29/20 1231   BP: (!) 158/96   Pulse: 96   Resp: 18   Temp:    SpO2: 99%       Physical Exam  Constitutional:       General: She is not in acute distress. Appearance: She is ill-appearing. She is not toxic-appearing or diaphoretic. HENT:      Head: Normocephalic and atraumatic. Right Ear: External ear normal.      Left Ear: External ear normal.      Nose: Nose normal.      Mouth/Throat:      Mouth: Mucous membranes are moist.      Pharynx: Oropharynx is clear. Eyes:      Conjunctiva/sclera: Conjunctivae normal.   Neck:      Musculoskeletal: Normal range of motion and neck supple. Cardiovascular:      Rate and Rhythm: Regular rhythm. Tachycardia present. Pulses: Normal pulses. Heart sounds: Normal heart sounds. Pulmonary:      Effort: Pulmonary effort is normal. No respiratory distress. Breath sounds: Normal breath sounds. No stridor. No wheezing, rhonchi or rales. Abdominal:      Palpations: Abdomen is soft. Tenderness: There is no abdominal tenderness. Comments: Vertical incision clean, dry and intact. Musculoskeletal: Normal range of motion. General: No swelling or tenderness. Right lower leg: No edema. Left lower leg: No edema. Skin:     General: Skin is warm and dry. Capillary Refill: Capillary refill takes less than 2 seconds. Coloration: Skin is not jaundiced or pale.       Findings: No bruising, erythema, lesion or rash.   Neurological:      General: No focal deficit present. Mental Status: She is alert and oriented to person, place, and time. Mental status is at baseline. GCS: GCS eye subscore is 4. GCS verbal subscore is 5. GCS motor subscore is 6. Cranial Nerves: No cranial nerve deficit. Sensory: No sensory deficit. Motor: No weakness, tremor, abnormal muscle tone or seizure activity. Coordination: Coordination normal.      Gait: Gait normal.      Deep Tendon Reflexes: Reflexes normal.      Reflex Scores:       Patellar reflexes are 2+ on the right side and 2+ on the left side. MEDICAL DECISION MAKING   Initial Assessment: 58-year-old G1, P1, 8 days), who presents with severe headache and hypertension, concerning for preeclampsia postpartum without myoclonus or seizures, versus headache and hypertension. I consulted OB,  Dr. Rosalina Cheadle, who requested that we transfer the patient up to L&D for management. Patient given hydralazine 10 for management of hypertension. Patient given 2 of morphine for severe headache. Plan: Transfer to L&D per OB request.. ED RESULTS   Laboratory results:  Labs Reviewed   CBC WITH AUTO DIFFERENTIAL - Abnormal; Notable for the following components:       Result Value    RBC 3.33 (*)     Hemoglobin 10.0 (*)     Hematocrit 30.4 (*)     RDW-SD 47.2 (*)     Platelets 084 (*)     MPV 9.0 (*)     Immature Grans (Abs) 0.08 (*)     All other components within normal limits   COMPREHENSIVE METABOLIC PANEL W/ REFLEX TO MG FOR LOW K - Abnormal; Notable for the following components:     Total Bilirubin 0.2 (*)     All other components within normal limits   ANION GAP   GLOMERULAR FILTRATION RATE, ESTIMATED   OSMOLALITY   PROTEIN / CREATININE RATIO, URINE       Radiologic studies results:  No orders to display       ED Medications administered this visit:   Medications   hydrALAZINE (APRESOLINE) injection 10 mg (10 mg Intravenous Given 9/29/20 1311)   morphine (PF) injection 2 mg (2 mg Intravenous Given 9/29/20 1311)         ED COURSE          MEDICATION CHANGES     New Prescriptions    No medications on file         FINAL DISPOSITION     Final diagnoses:   Intractable headache, unspecified chronicity pattern, unspecified headache type   Hypertension, unspecified type     Condition: condition: good  Dispo: Send to L&D      This transcription was electronically signed. Parts of this transcriptions may have been dictated by use of voice recognition software and electronically transcribed, and parts may have been transcribed with the assistance of an ED scribe. The transcription may contain errors not detected in proofreading. Please refer to my supervising physician's documentation if my documentation differs.     Electronically Signed: Katiuska Santos, 09/29/20, 1:12 PM         Katiuska Santos MD  Resident  09/29/20 DO Anna  09/30/20 8246

## 2020-09-30 PROCEDURE — 6370000000 HC RX 637 (ALT 250 FOR IP): Performed by: OBSTETRICS & GYNECOLOGY

## 2020-09-30 PROCEDURE — 1200000000 HC SEMI PRIVATE

## 2020-09-30 PROCEDURE — 93010 ELECTROCARDIOGRAM REPORT: CPT | Performed by: INTERNAL MEDICINE

## 2020-09-30 PROCEDURE — 6360000002 HC RX W HCPCS

## 2020-09-30 PROCEDURE — 2580000003 HC RX 258: Performed by: OBSTETRICS & GYNECOLOGY

## 2020-09-30 PROCEDURE — 6360000002 HC RX W HCPCS: Performed by: OBSTETRICS & GYNECOLOGY

## 2020-09-30 RX ORDER — OXYCODONE HYDROCHLORIDE 5 MG/1
5 TABLET ORAL EVERY 6 HOURS PRN
Status: DISCONTINUED | OUTPATIENT
Start: 2020-09-30 | End: 2020-10-01 | Stop reason: HOSPADM

## 2020-09-30 RX ORDER — IBUPROFEN 800 MG/1
800 TABLET ORAL EVERY 8 HOURS PRN
Status: DISCONTINUED | OUTPATIENT
Start: 2020-09-30 | End: 2020-10-01 | Stop reason: HOSPADM

## 2020-09-30 RX ADMIN — IBUPROFEN 800 MG: 800 TABLET, FILM COATED ORAL at 18:42

## 2020-09-30 RX ADMIN — BUTALBITAL, ACETAMINOPHEN, AND CAFFEINE 1 TABLET: 50; 325; 40 TABLET ORAL at 02:43

## 2020-09-30 RX ADMIN — MAGNESIUM SULFATE HEPTAHYDRATE 2 G/HR: 40 INJECTION, SOLUTION INTRAVENOUS at 11:20

## 2020-09-30 RX ADMIN — MAGNESIUM SULFATE HEPTAHYDRATE 2 G/HR: 40 INJECTION, SOLUTION INTRAVENOUS at 00:56

## 2020-09-30 RX ADMIN — OXYCODONE HYDROCHLORIDE 5 MG: 5 TABLET ORAL at 00:40

## 2020-09-30 RX ADMIN — KETOROLAC TROMETHAMINE 30 MG: 30 INJECTION, SOLUTION INTRAMUSCULAR; INTRAVENOUS at 10:46

## 2020-09-30 RX ADMIN — SODIUM CHLORIDE, POTASSIUM CHLORIDE, SODIUM LACTATE AND CALCIUM CHLORIDE: 600; 310; 30; 20 INJECTION, SOLUTION INTRAVENOUS at 14:35

## 2020-09-30 RX ADMIN — KETOROLAC TROMETHAMINE 30 MG: 30 INJECTION, SOLUTION INTRAMUSCULAR; INTRAVENOUS at 04:46

## 2020-09-30 RX ADMIN — OXYCODONE HYDROCHLORIDE 5 MG: 5 TABLET ORAL at 06:38

## 2020-09-30 RX ADMIN — SODIUM CHLORIDE, POTASSIUM CHLORIDE, SODIUM LACTATE AND CALCIUM CHLORIDE: 600; 310; 30; 20 INJECTION, SOLUTION INTRAVENOUS at 00:58

## 2020-09-30 RX ADMIN — HYDROMORPHONE HYDROCHLORIDE 0.5 MG: 1 INJECTION, SOLUTION INTRAMUSCULAR; INTRAVENOUS; SUBCUTANEOUS at 03:52

## 2020-09-30 RX ADMIN — MAGNESIUM SULFATE IN WATER 2 G/HR: 40 INJECTION, SOLUTION INTRAVENOUS at 00:56

## 2020-09-30 RX ADMIN — HYDROMORPHONE HYDROCHLORIDE 0.5 MG: 1 INJECTION, SOLUTION INTRAMUSCULAR; INTRAVENOUS; SUBCUTANEOUS at 01:51

## 2020-09-30 ASSESSMENT — PAIN DESCRIPTION - DESCRIPTORS: DESCRIPTORS: ACHING;PRESSURE

## 2020-09-30 ASSESSMENT — PAIN SCALES - GENERAL
PAINLEVEL_OUTOF10: 0
PAINLEVEL_OUTOF10: 5
PAINLEVEL_OUTOF10: 0
PAINLEVEL_OUTOF10: 7
PAINLEVEL_OUTOF10: 6
PAINLEVEL_OUTOF10: 0
PAINLEVEL_OUTOF10: 6
PAINLEVEL_OUTOF10: 7

## 2020-09-30 NOTE — PROGRESS NOTES
S: continues to have headache. Rating it a 7/10 but states it is finally feeling like it is decreasing in nature. It is no where near the pain when she came in or compared to her immediate post op pain following her . Denies vision changes, or sob. O:  Vitals:    20 0450   BP:    Pulse:    Resp:    Temp: 97.5 °F (36.4 °C)   SpO2:      -130/60-70s   cc/hr the past 2 hours    Gen: no acute distress, resting comfortably in the bed  Resp: breathing unlabored, lungs clear per nursing  DTRs: 2+ b/l     A: 31 yo  POD#9 s/p PCS, with post partum preeclampsia with severe features  P:  1. Continue mag sulfate for seizure prophylaxis until about 1400 today. No signs of mag toxicity. 2. BP stable. No oral medication or recent IV medication needed. 3. Plan to d/c home Thursday if BP stable. All questions answered.      Otilio Orona  5:03 AM

## 2020-09-30 NOTE — PLAN OF CARE
Problem: Pain:  Goal: Pain level will decrease  Description: Pain level will decrease  Outcome: Met This Shift     Problem: Falls - Risk of:  Goal: Will remain free from falls  Description: Will remain free from falls  Outcome: Met This Shift     Problem: Falls - Risk of:  Goal: Absence of physical injury  Description: Absence of physical injury  Outcome: Met This Shift     Problem: Pain:  Goal: Control of acute pain  Description: Control of acute pain  Outcome: Completed     Problem: Pain:  Goal: Control of chronic pain  Description: Control of chronic pain  Outcome: Completed   Care plan reviewed with patient . Patient  verbalize understanding of the plan of care and contribute to goal setting.

## 2020-09-30 NOTE — PLAN OF CARE
Problem: Pain:  Goal: Pain level will decrease  Description: Pain level will decrease  9/30/2020 1737 by Eliel Craft RN  Outcome: Ongoing  Note: Michelle Mckeon is 5, medications discussed     Problem: Falls - Risk of:  Goal: Will remain free from falls  Description: Will remain free from falls  9/30/2020 1737 by Eliel Craft RN  Outcome: Ongoing  Note: No falls noted     Problem: Falls - Risk of:  Goal: Absence of physical injury  Description: Absence of physical injury  9/30/2020 1737 by Eliel Craft RN  Outcome: Ongoing  Note: No injury noted     Problem: Discharge Planning:  Goal: Discharged to appropriate level of care  Description: Discharged to appropriate level of care  Outcome: Ongoing  Note: Plan of care discussed     Problem: Fluid Volume - Imbalance:  Goal: Absence of imbalanced fluid volume signs and symptoms  Description: Absence of imbalanced fluid volume signs and symptoms  Outcome: Ongoing  Note: Minimal bleeding noted. Problem: Infection - Surgical Site:  Goal: Will show no infection signs and symptoms  Description: Will show no infection signs and symptoms  Outcome: Ongoing  Note: Staples intact with no s\s of infection     Problem: Mood - Altered:  Goal: Mood stable  Description: Mood stable  Outcome: Ongoing  Note: Pt calm and cooperative. Asks appropriate questions. Problem: Nausea/Vomiting:  Goal: Absence of nausea/vomiting  Description: Absence of nausea/vomiting  Outcome: Ongoing  Note: Pt able to eat regular diet     Problem: Urinary Retention:  Goal: Urinary elimination within specified parameters  Description: Urinary elimination within specified parameters  Outcome: Ongoing  Note: Pt able to void without difficulty     Problem: Venous Thromboembolism:  Goal: Will show no signs or symptoms of venous thromboembolism  Description: Will show no signs or symptoms of venous thromboembolism  Outcome: Ongoing  Note[facility]   Care plan reviewed with patient.   Patient verbalize understanding of the plan of care and contribute to goal setting.

## 2020-09-30 NOTE — FLOWSHEET NOTE
TRO to mom baby in sat condition. Assisted to BR. Voided qs. Bedside report given to CAMILA Gomez RN

## 2020-09-30 NOTE — H&P
Department of Obstetrics and Gynecology   History & Physical    Pt Name: Nuha Daugherty  MRN: [id number] [place] #: [de-identified]  YOB: 1995    HPI: The patient is a 30 y.o.  female POD#8 s/p PCS for PROM and transverse lie who presented to the ED c/o severe headache and found to have elevated BP. Denies vision changes, n/v. Allergies: Allergies as of 2020 - Review Complete 2020   Allergen Reaction Noted    Gabapentin Hives 2020       Medications:    Current Facility-Administered Medications:     oxyCODONE (ROXICODONE) immediate release tablet 5 mg, 5 mg, Oral, Q6H PRN, Alexandra Hernandez MD, 5 mg at 20 0040    lactated ringers infusion, , Intravenous, Continuous, Alexandra Hernandez MD, Last Rate: 125 mL/hr at 20 0058    magnesium sulfate (20 G/500 mL) infusion, 2 g/hr, Intravenous, Continuous, Alexandra Hernandez MD, Last Rate: 50 mL/hr at 20 0056, 2 g/hr at 20 0056    calcium gluconate 10 % injection 1 g, 1 g, Intravenous, PRN, Alexandra Hernandez MD    butalbital-acetaminophen-caffeine (FIORICET, [facility]) per tablet 1 tablet, 1 tablet, Oral, Q6H PRN, Alexandra Hernandez MD, 1 tablet at 20 0243    HYDROmorphone (DILAUDID) injection 0.5 mg, 0.5 mg, Intravenous, Q2H PRN, Alexandra Hernandez MD, 0.5 mg at 20 0352    ketorolac (TORADOL) injection 30 mg, 30 mg, Intravenous, Q6H PRN, Alexandra Hernandez MD, 30 mg at 20 0446    OB History: [id number]    Gyn History: Denies h/o abnormal pap smear, h/o STDs.      Past Medical History:   Past Medical History:   Diagnosis Date    Ovarian cyst     Scoliosis        Past Surgical History:   Past Surgical History:   Procedure Laterality Date    ADENOIDECTOMY      BACK SURGERY  2006 and 2009, 2018     SECTION N/A 2020     SECTION performed by Asmita Webster MD at [facility] [address]         Social History:   Social History Tobacco Use   Smoking Status Former Smoker    Years: 4.00    Last attempt to quit: 12/10/2015    Years since quittin.8   Smokeless Tobacco Never Used        Family History: Noncontributory; Denies h/o cancer. ROS:  Negative except as stated in HPI    PE:  Vitals:    20 0450   BP:    Pulse:    Resp:    Temp: 97.5 °F (36.4 °C)   SpO2:        General: well nourished, well developed, in no acute distress  CV: Normal heart sounds  Resp: breathing unlabored  Abdomen: Nontender, no rebound, no guarding  Pelvic: deferred to the OR    Labs:   Lab Results   Component Value Date    WBC 9.3 2020    HGB 10.0 (L) 2020    HCT 30.4 (L) 2020    MCV 91.3 2020     (H) 2020     Lab Results   Component Value Date     2020    K 3.9 2020     2020    CO2 24 2020    BUN 11 2020    CREATININE 0.5 2020    GLUCOSE 86 2020    CALCIUM 9.3 2020    PROT 6.9 2020    LABALBU 3.6 2020    BILITOT 0.2 (L) 2020    ALKPHOS 105 2020    AST 22 2020    ALT 39 2020    LABGLOM >90 2020       Urine prot/cr: 0.43      Assessment: 30 y.o.  female POD#8 s/p PCS for PROM/transverse lie with post partum preeclampsia with severe features    Plan:   1. Mag sulfate for seizure prophylaxis x24 hours  2. Seizure precautions. 3. Monitor BP-if needs to give another IV dose will consider starting PO medication. 3. Discussed POC with pt and her mom. All questions answered.     Mary Trevino  4:54 AM  2020

## 2020-09-30 NOTE — PLAN OF CARE
Problem: Pain:  Goal: Pain level will decrease  Description: Pain level will decrease  Outcome: Ongoing  Note: Patient rating headache 7-8/10. Pain meds given as ordered. Problem: Falls - Risk of:  Goal: Will remain free from falls  Description: Will remain free from falls  Outcome: Ongoing  Note: Pt. Remains free from falls at this time. RN will continue to provide for a safe environment. Care plan reviewed with patient and her mom Bridgette Alarcon. Patient and her mom Bridgette Alarcon verbalize understanding of the plan of care and contribute to goal setting.

## 2020-09-30 NOTE — FLOWSHEET NOTE
Offered pt Motrin and pt refused at this time, states pain is 5 but feels she needs rest, voiced to call for next void. Dimmed lights in room, pt resting easy with eyes closed. Will continue to monitor.

## 2020-10-01 VITALS
BODY MASS INDEX: 47.8 KG/M2 | DIASTOLIC BLOOD PRESSURE: 70 MMHG | OXYGEN SATURATION: 100 % | RESPIRATION RATE: 16 BRPM | TEMPERATURE: 99.4 F | HEIGHT: 64 IN | SYSTOLIC BLOOD PRESSURE: 121 MMHG | WEIGHT: 280 LBS | HEART RATE: 77 BPM

## 2020-10-01 ASSESSMENT — PAIN SCALES - GENERAL: PAINLEVEL_OUTOF10: 0

## 2020-10-01 NOTE — FLOWSHEET NOTE
Messaged Dr. Villasenor Dies regarding CBC that was not drawn. Messaged back no need for bloodwork before discharge.  Ida Keith

## 2020-10-01 NOTE — PLAN OF CARE
Problem: Pain:  Goal: Pain level will decrease  Description: Pain level will decrease  10/1/2020 1017 by Ida Medina RN  Outcome: Ongoing  Note: Denies pain at this time. Pain goal noted to be a 5. Problem: Discharge Planning:  Goal: Discharged to appropriate level of care  Description: Discharged to appropriate level of care  10/1/2020 1017 by Ida Medina RN  Outcome: Ongoing  Note: Plan to be discharged home today with significant other. Problem: Fluid Volume - Imbalance:  Goal: Absence of imbalanced fluid volume signs and symptoms  Description: Absence of imbalanced fluid volume signs and symptoms  10/1/2020 1017 by Ida Medina RN  Outcome: Ongoing  Note: Vaginal bleeding WNL, no clots or foul odors. Problem: Infection - Surgical Site:  Goal: Will show no infection signs and symptoms  Description: Will show no infection signs and symptoms  10/1/2020 1017 by Ida Medina RN  Note: Afebrile this shift. Problem: Mood - Altered:  Goal: Mood stable  Description: Mood stable  10/1/2020 1017 by Ida Medina RN  Outcome: Ongoing  Note: Emotional support provided. Problem: Venous Thromboembolism:  Goal: Will show no signs or symptoms of venous thromboembolism  Description: Will show no signs or symptoms of venous thromboembolism  10/1/2020 1017 by Ida Medina RN  Outcome: Ongoing  Note: Negative homans.       Problem: Falls - Risk of:  Goal: Will remain free from falls  Description: Will remain free from falls  10/1/2020 1017 by Ida Medina RN  Outcome: Completed     Problem: Falls - Risk of:  Goal: Absence of physical injury  Description: Absence of physical injury  10/1/2020 1017 by Ida Medina RN  Outcome: Completed     Problem: Nausea/Vomiting:  Goal: Absence of nausea/vomiting  Description: Absence of nausea/vomiting  10/1/2020 1017 by Ida Medina RN  Outcome: Completed     Problem: Urinary Retention:  Goal: Urinary elimination within specified parameters  Description: Urinary elimination within specified parameters  10/1/2020 1017 by Ida Quintanilla RN  Outcome: Completed   Care plan reviewed with patient and she contributes to goal setting and voices understanding of plan of care.

## 2020-10-01 NOTE — PLAN OF CARE
Retention:  Goal: Urinary elimination within specified parameters  Description: Urinary elimination within specified parameters  9/30/2020 2350 by Ximena Aquino RN  Outcome: Ongoing  Note: Voiding large amounts. Problem: Venous Thromboembolism:  Goal: Will show no signs or symptoms of venous thromboembolism  Description: Will show no signs or symptoms of venous thromboembolism  9/30/2020 2350 by Ximena Aquino RN  Outcome: Ongoing  Note: Homans sign negative     Care plan reviewed with patient and she contributes to goal setting and voices understanding of plan of care.

## 2020-10-01 NOTE — DISCHARGE SUMMARY
Obstetric Discharge Summary      Pt Name: Farida Leung  MRN: [id number] Mishel #: [de-identified]  YOB: 1995        Admitting Diagnosis  30 y.o.  POD#8 s/p PCS for malpresentation, PROM presented with headache and elevated BP. She was diagnosed with post partum preeclampsia with severe features. Reasons for Admission on 2020 12:12 PM  post partum preeclampsia with severe features. Hospital course: 30 y.o.  POD#8 s/p PCS for malpresentation, PROM presented with headache and elevated BP. She was diagnosed with post partum preeclampsia with severe features. She was started on mag sulfate for 24 hours for seizure prophylaxis. She required 1 dose of IV antihypertensives. She did not require oral antihypertensives. She was discharged on HD#3 in good condition. Discharge Diagnosis  post partum preeclampsia with severe features. Discharge Information  Current Discharge Medication List      CONTINUE these medications which have NOT CHANGED    Details   oxyCODONE-acetaminophen (PERCOCET) 5-325 MG per tablet Take 1 tablet by mouth every 4 hours as needed for Pain. ferrous sulfate (FE TABS) 325 (65 Fe) MG EC tablet Take 1 tablet by mouth daily  Qty: 30 tablet, Refills: 1      ibuprofen (IBU) 800 MG tablet Take 1 tablet by mouth every 8 hours as needed for Pain  Qty: 40 tablet, Refills: 0      Prenatal Vit-Fe Fumarate-FA (PRENATAL 1+1 PO) Take 1 tablet by mouth daily      cyclobenzaprine (FLEXERIL) 10 MG tablet Take 1 tablet by mouth 3 times daily as needed for Muscle spasms  Qty: 90 tablet, Refills: 2    Associated Diagnoses: Juvenile idiopathic scoliosis of lumbar region; Thoracogenic scoliosis of thoracic region      Tens Unit MISC by Does not apply route  Qty: 1 each, Refills: 0    Associated Diagnoses: Juvenile idiopathic scoliosis of lumbar region;  Thoracogenic scoliosis of thoracic region               Diet: regular  Activity: nothing in the vagina for 6 weeks-no sex, no tampons, no douching, no heavy lifting, limited activity for 2-3 weeks  Discharge to:  home  Follow up in 1wk for incision and BP check.     Mary Medrano  9:01 AM  10/1/2020

## 2020-10-01 NOTE — FLOWSHEET NOTE
Discharge instruction given and patient understood. Reviewed post birth warning signs especially regarding high blood pressure.  Ida Hedrick

## 2021-07-02 ENCOUNTER — HOSPITAL ENCOUNTER (EMERGENCY)
Age: 26
Discharge: HOME | End: 2021-07-02
Payer: MEDICARE

## 2021-07-02 ENCOUNTER — APPOINTMENT (OUTPATIENT)
Dept: GENERAL RADIOLOGY | Age: 26
End: 2021-07-02
Payer: MEDICARE

## 2021-07-02 VITALS
RESPIRATION RATE: 16 BRPM | WEIGHT: 270 LBS | TEMPERATURE: 98.8 F | OXYGEN SATURATION: 100 % | DIASTOLIC BLOOD PRESSURE: 90 MMHG | HEIGHT: 64 IN | HEART RATE: 80 BPM | BODY MASS INDEX: 46.1 KG/M2 | SYSTOLIC BLOOD PRESSURE: 171 MMHG
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
## 2021-07-02 DIAGNOSIS — S16.1XXA STRAIN OF NECK MUSCLE, INITIAL ENCOUNTER: Primary | ICD-10-CM

## 2021-07-02 PROCEDURE — 6370000000 HC RX 637 (ALT 250 FOR IP): Performed by: NURSE PRACTITIONER

## 2021-07-02 PROCEDURE — 99285 EMERGENCY DEPT VISIT HI MDM: CPT

## 2021-07-02 PROCEDURE — 6370000000 HC RX 637 (ALT 250 FOR IP)

## 2021-07-02 PROCEDURE — 72040 X-RAY EXAM NECK SPINE 2-3 VW: CPT

## 2021-07-02 RX ORDER — LIDOCAINE 4 G/G
1 PATCH TOPICAL DAILY
Qty: 30 PATCH | Refills: 0 | Status: SHIPPED | OUTPATIENT
Start: 2021-07-02 | End: 2021-08-01

## 2021-07-02 RX ORDER — LIDOCAINE 4 G/G
PATCH TOPICAL
Status: DISCONTINUED
Start: 2021-07-02 | End: 2021-07-02 | Stop reason: HOSPADM

## 2021-07-02 RX ORDER — LIDOCAINE 4 G/G
1 PATCH TOPICAL DAILY
Status: DISCONTINUED | OUTPATIENT
Start: 2021-07-03 | End: 2021-07-02 | Stop reason: HOSPADM

## 2021-07-02 RX ORDER — TIZANIDINE 4 MG/1
4 TABLET ORAL EVERY 6 HOURS PRN
Status: DISCONTINUED | OUTPATIENT
Start: 2021-07-02 | End: 2021-07-02 | Stop reason: HOSPADM

## 2021-07-02 RX ORDER — TIZANIDINE 4 MG/1
4 TABLET ORAL EVERY 6 HOURS PRN
Qty: 20 TABLET | Refills: 0 | Status: SHIPPED | OUTPATIENT
Start: 2021-07-02 | End: 2021-10-31

## 2021-07-02 RX ADMIN — TIZANIDINE 4 MG: 4 TABLET ORAL at 21:28

## 2021-07-02 ASSESSMENT — PAIN DESCRIPTION - PAIN TYPE: TYPE: ACUTE PAIN

## 2021-07-02 ASSESSMENT — PAIN DESCRIPTION - FREQUENCY: FREQUENCY: CONTINUOUS

## 2021-07-02 ASSESSMENT — PAIN DESCRIPTION - PROGRESSION: CLINICAL_PROGRESSION: NOT CHANGED

## 2021-07-02 ASSESSMENT — PAIN SCALES - GENERAL: PAINLEVEL_OUTOF10: 6

## 2021-07-02 ASSESSMENT — PAIN DESCRIPTION - DESCRIPTORS: DESCRIPTORS: ACHING

## 2021-07-02 ASSESSMENT — PAIN DESCRIPTION - LOCATION: LOCATION: NECK

## 2021-07-02 ASSESSMENT — PAIN DESCRIPTION - ONSET: ONSET: ON-GOING

## 2021-07-03 ASSESSMENT — ENCOUNTER SYMPTOMS
COLOR CHANGE: 0
SHORTNESS OF BREATH: 0
NAUSEA: 0
CHEST TIGHTNESS: 0
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
## 2021-10-31 ENCOUNTER — HOSPITAL ENCOUNTER (EMERGENCY)
Age: 26
Discharge: HOME | End: 2021-10-31
Payer: MEDICARE

## 2021-10-31 ENCOUNTER — APPOINTMENT (OUTPATIENT)
Dept: GENERAL RADIOLOGY | Age: 26
End: 2021-10-31
Payer: MEDICARE

## 2021-10-31 VITALS
SYSTOLIC BLOOD PRESSURE: 140 MMHG | OXYGEN SATURATION: 99 % | HEIGHT: 64 IN | WEIGHT: 265 LBS | RESPIRATION RATE: 18 BRPM | HEART RATE: 86 BPM | DIASTOLIC BLOOD PRESSURE: 93 MMHG | BODY MASS INDEX: 45.24 KG/M2 | TEMPERATURE: 98.2 F

## 2021-10-31 DIAGNOSIS — S39.012A STRAIN OF LUMBAR REGION, INITIAL ENCOUNTER: Primary | ICD-10-CM

## 2021-10-31 LAB
BACTERIA: NORMAL
BILIRUBIN URINE: NEGATIVE
BLOOD, URINE: NEGATIVE
CASTS: NORMAL /LPF
CASTS: NORMAL /LPF
CHARACTER, URINE: CLEAR
COLOR: YELLOW
CRYSTALS: NORMAL
EPITHELIAL CELLS, UA: NORMAL /HPF
GLUCOSE, URINE: NEGATIVE MG/DL
KETONES, URINE: NEGATIVE
LEUKOCYTE ESTERASE, URINE: NEGATIVE
MISCELLANEOUS LAB TEST RESULT: NORMAL
NITRITE, URINE: NEGATIVE
PH UA: 7.5 (ref 5–9)
PREGNANCY, URINE: NEGATIVE
PROTEIN UA: NEGATIVE MG/DL
RBC URINE: NORMAL /HPF
RENAL EPITHELIAL, UA: NORMAL
SPECIFIC GRAVITY UA: 1 (ref 1–1.03)
UROBILINOGEN, URINE: 0.2 EU/DL (ref 0–1)
WBC UA: NORMAL /HPF
YEAST: NORMAL

## 2021-10-31 PROCEDURE — 96372 THER/PROPH/DIAG INJ SC/IM: CPT

## 2021-10-31 PROCEDURE — 81001 URINALYSIS AUTO W/SCOPE: CPT

## 2021-10-31 PROCEDURE — 81025 URINE PREGNANCY TEST: CPT

## 2021-10-31 PROCEDURE — 6360000002 HC RX W HCPCS: Performed by: NURSE PRACTITIONER

## 2021-10-31 PROCEDURE — 96374 THER/PROPH/DIAG INJ IV PUSH: CPT

## 2021-10-31 PROCEDURE — 99282 EMERGENCY DEPT VISIT SF MDM: CPT

## 2021-10-31 PROCEDURE — 6370000000 HC RX 637 (ALT 250 FOR IP): Performed by: NURSE PRACTITIONER

## 2021-10-31 PROCEDURE — 72100 X-RAY EXAM L-S SPINE 2/3 VWS: CPT

## 2021-10-31 RX ORDER — LIDOCAINE 4 G/G
1 PATCH TOPICAL DAILY
Qty: 30 PATCH | Refills: 0 | Status: SHIPPED | OUTPATIENT
Start: 2021-10-31 | End: 2021-11-30

## 2021-10-31 RX ORDER — ORPHENADRINE CITRATE 30 MG/ML
60 INJECTION INTRAMUSCULAR; INTRAVENOUS ONCE
Status: COMPLETED | OUTPATIENT
Start: 2021-10-31 | End: 2021-10-31

## 2021-10-31 RX ORDER — TIZANIDINE 4 MG/1
4 TABLET ORAL EVERY 6 HOURS PRN
Qty: 20 TABLET | Refills: 0 | Status: SHIPPED | OUTPATIENT
Start: 2021-10-31 | End: 2022-03-16 | Stop reason: ALTCHOICE

## 2021-10-31 RX ORDER — KETOROLAC TROMETHAMINE 30 MG/ML
30 INJECTION, SOLUTION INTRAMUSCULAR; INTRAVENOUS ONCE
Status: COMPLETED | OUTPATIENT
Start: 2021-10-31 | End: 2021-10-31

## 2021-10-31 RX ORDER — OXYCODONE HYDROCHLORIDE AND ACETAMINOPHEN 5; 325 MG/1; MG/1
1 TABLET ORAL EVERY 6 HOURS PRN
Qty: 12 TABLET | Refills: 0 | Status: SHIPPED | OUTPATIENT
Start: 2021-10-31 | End: 2021-11-03

## 2021-10-31 RX ORDER — OXYCODONE HYDROCHLORIDE AND ACETAMINOPHEN 5; 325 MG/1; MG/1
1 TABLET ORAL ONCE
Status: COMPLETED | OUTPATIENT
Start: 2021-10-31 | End: 2021-10-31

## 2021-10-31 RX ORDER — LIDOCAINE 4 G/G
1 PATCH TOPICAL DAILY
Status: DISCONTINUED | OUTPATIENT
Start: 2021-10-31 | End: 2021-10-31 | Stop reason: HOSPADM

## 2021-10-31 RX ADMIN — OXYCODONE AND ACETAMINOPHEN 1 TABLET: 5; 325 TABLET ORAL at 13:54

## 2021-10-31 RX ADMIN — ORPHENADRINE CITRATE 60 MG: 30 INJECTION INTRAMUSCULAR; INTRAVENOUS at 13:54

## 2021-10-31 RX ADMIN — KETOROLAC TROMETHAMINE 30 MG: 30 INJECTION, SOLUTION INTRAMUSCULAR; INTRAVENOUS at 13:53

## 2021-10-31 ASSESSMENT — PAIN DESCRIPTION - PAIN TYPE: TYPE: ACUTE PAIN

## 2021-10-31 ASSESSMENT — ENCOUNTER SYMPTOMS
VOMITING: 0
CHEST TIGHTNESS: 0
ABDOMINAL DISTENTION: 0
DIARRHEA: 0
COLOR CHANGE: 0
BACK PAIN: 1
ABDOMINAL PAIN: 0
COUGH: 0
SHORTNESS OF BREATH: 0
CONSTIPATION: 1
NAUSEA: 0

## 2021-10-31 ASSESSMENT — PAIN DESCRIPTION - LOCATION: LOCATION: BACK

## 2021-10-31 ASSESSMENT — PAIN DESCRIPTION - ORIENTATION: ORIENTATION: LEFT;LOWER

## 2021-10-31 ASSESSMENT — PAIN SCALES - GENERAL
PAINLEVEL_OUTOF10: 9
PAINLEVEL_OUTOF10: 8

## 2021-10-31 NOTE — ED PROVIDER NOTES
[facility] Emergency [address] [place] [address]       Chief Complaint   Patient presents with    Back Pain     lower left side       Nurses Notes reviewed and I agree except as noted in the HPI. HISTORY OF PRESENT ILLNESS    Karly Felder is a 27 y.o. female who presents to the ED for evaluation of back pain. Patient notes history of chronic back pain, has a history of scoliosis, has had multiple back surgeries that include fusion of the thoracic and lumbar spine. She notes yesterday she woke up with left-sided back pain. She has been laying around, and when she gets up she feels stiffness. She describes the pain as an achy throbbing pain, that is worse than her chronic back pain. She notes movement makes it worse. She denies any radiation of the pain down her leg. She denies fevers, she denies saddle paresthesias, she denies urinary or bowel incontinence. She does note some mild constipation. She notes she has tried taking Tylenol, Flexeril, and a over-the-counter pain patch. HPI was provided by the patient. REVIEW OF SYSTEMS     Review of Systems   Constitutional: Negative for activity change, chills, fatigue and fever. Respiratory: Negative for cough, chest tightness and shortness of breath. Cardiovascular: Negative for chest pain. Gastrointestinal: Positive for constipation. Negative for abdominal distention, abdominal pain, diarrhea, nausea and vomiting. Genitourinary: Negative for decreased urine volume, difficulty urinating, dysuria, flank pain and frequency. Musculoskeletal: Positive for back pain and myalgias. Negative for arthralgias, gait problem, joint swelling, neck pain and neck stiffness. Skin: Negative for color change and pallor. Allergic/Immunologic: Negative for immunocompromised state. Neurological: Negative for dizziness, weakness and numbness. Hematological: Does not bruise/bleed easily.    Psychiatric/Behavioral: Negative for agitation, behavioral Financial Resource Strain:     Difficulty of Paying Living Expenses:    Food Insecurity:     Worried About Running Out of Food in the Last Year:     [address] in the Last Year:    Transportation Needs:     Lack of Transportation (Medical):  Lack of Transportation (Non-Medical):    Physical Activity:     Days of Exercise per Week:     Minutes of Exercise per Session:    Stress:     Feeling of Stress :    Social Connections:     Frequency of Communication with Friends and Family:     Frequency of Social Gatherings with Friends and Family:     Attends Anabaptism Services:     Active Member of Clubs or Organizations:     Attends Club or Organization Meetings:     Marital Status:    Intimate Partner Violence:     Fear of Current or Ex-Partner:     Emotionally Abused:     Physically Abused:     Sexually Abused:        PHYSICAL EXAM     INITIAL VITALS:  height is 5' 4\" (1.626 m) and weight is 265 lb (120.2 kg). Her oral temperature is 98.2 °F (36.8 °C). Her blood pressure is 140/93 (abnormal) and her pulse is 86. Her respiration is 18 and oxygen saturation is 99%. Physical Exam  Vitals and nursing note reviewed. Constitutional:       Appearance: Normal appearance. She is well-developed. HENT:      Head: Normocephalic. Mouth/Throat:      Pharynx: Uvula midline. Eyes:      Conjunctiva/sclera: Conjunctivae normal.   Cardiovascular:      Rate and Rhythm: Normal rate and regular rhythm. Heart sounds: Normal heart sounds, S1 normal and S2 normal.   Pulmonary:      Effort: Pulmonary effort is normal. No respiratory distress. Breath sounds: Normal breath sounds. Chest:      Chest wall: No tenderness. Abdominal:      General: Bowel sounds are normal. There is no distension. Palpations: Abdomen is soft. Tenderness: There is no abdominal tenderness. Musculoskeletal:      Cervical back: Normal range of motion and neck supple.       Thoracic back: No deformity, tenderness or bony tenderness. Normal range of motion. Lumbar back: Tenderness present. No swelling, edema, deformity, signs of trauma, spasms or bony tenderness. Decreased range of motion. Negative right straight leg raise test and negative left straight leg raise test.   Lymphadenopathy:      Cervical: No cervical adenopathy. Skin:     General: Skin is warm and dry. Neurological:      Mental Status: She is alert and oriented to person, place, and time. Psychiatric:         Speech: Speech normal.         Behavior: Behavior normal.         Thought Content: Thought content normal.         DIFFERENTIAL DIAGNOSIS:   Lumbar strain sprain degenerative disc disease, spinal stenosis, sacroiliitis, UTI  DIAGNOSTIC RESULTS         RADIOLOGY: non-plainfilm images(s) such as CT, Ultrasound and MRI are read by the radiologist.  Plain radiographic images are visualized and preliminarily interpreted by the emergency physician unless otherwise stated below. XR LUMBAR SPINE (2-3 VIEWS)   Final Result   Postsurgical changes and levoconvex scoliosis. No acute fracture. **This report has been created using voice recognition software. It may contain minor errors which are inherent in voice recognition technology. **      Final report electronically signed by Dr Ratna Rivera on 10/31/2021 2:45 PM            LABS:   Labs Reviewed   [address], URINE       EMERGENCY DEPARTMENT COURSE:   Vitals:    Vitals:    10/31/21 1321   BP: (!) 140/93   Pulse: 86   Resp: 18   Temp: 98.2 °F (36.8 °C)   TempSrc: Oral   SpO2: 99%   Weight: 265 lb (120.2 kg)   Height: 5' 4\" (1.626 m)     MDM    Patient was seen and evaluated in the emergency department, patient appeared to be in no acute distress, vital signs were reviewed, slight hypertension noted. Physical exam was completed, there was some soft tissue tenderness to the left lower back, there was no midline tenderness.   She has a history of multiple surgeries of the back, we obtained x-rays to make sure nothing seems to be displaced. She was treated with medications below. I noted improvement in her symptoms. I discussed my findings and plan of care with the patient she is amenable with discharge. She is advised to follow-up with her family doctor for further evaluation and possible physical therapy. She verbalized understanding. Medications   orphenadrine (NORFLEX) injection 60 mg (60 mg IntraMUSCular Given 10/31/21 1354)   ketorolac (TORADOL) injection 30 mg (30 mg IntraVENous Given 10/31/21 1353)   oxyCODONE-acetaminophen (PERCOCET) 5-325 MG per tablet 1 tablet (1 tablet Oral Given 10/31/21 1354)       Patient was seenindependently by myself. The patient's final impression and disposition and plan was determined by myself. CRITICAL CARE:   None    CONSULTS:  None    PROCEDURES:  None    FINAL IMPRESSION     1.  Strain of lumbar region, initial encounter          DISPOSITION/PLAN   Patient discharged in stable condition  PATIENT REFERREDTO:  Lelia Panda, [phone] [id number]    Call in 1 week  If symptoms worsen or fail to improve      DISCHARGE MEDICATIONS:  Discharge Medication List as of 10/31/2021  2:55 PM      START taking these medications    Details   diclofenac (VOLTAREN) 50 MG EC tablet Take 1 tablet by mouth 2 times daily, Disp-30 tablet, R-0Print      lidocaine 4 % external patch Place 1 patch onto the skin daily, TransDERmal, DAILY Starting Sun 10/31/2021, Until Tue 11/30/2021, For 30 days, Disp-30 patch, R-0, Print             (Please note that portions of this note were completed with a voice recognition program.  Efforts were made to edit the dictations but occasionally words are mis-transcribed.)    Provider:  I personally performed the services described in the documentation,reviewed and edited the documentation which was dictated to the scribe in my presence, and it accurately records my words and actions.     Sim Bruno, CNP 10/31/21 5:30 PM    Xi Munoz Counts, APRN - CNP        Memorandom, APRN - CNP  10/31/21 [id number]

## 2021-10-31 NOTE — ED NOTES
Patient presents to the ED with complaints of having left lower side back pain.      Susan Gonzalez LPN  08/54/85 [id number]

## 2021-11-15 ENCOUNTER — HOSPITAL ENCOUNTER (EMERGENCY)
Age: 26
Discharge: HOME | End: 2021-11-16
Payer: MEDICARE

## 2021-11-15 ENCOUNTER — APPOINTMENT (OUTPATIENT)
Dept: CT IMAGING | Age: 26
End: 2021-11-15
Payer: MEDICARE

## 2021-11-15 DIAGNOSIS — G89.29 ACUTE EXACERBATION OF CHRONIC LOW BACK PAIN: Primary | ICD-10-CM

## 2021-11-15 DIAGNOSIS — M54.32 SCIATICA OF LEFT SIDE: ICD-10-CM

## 2021-11-15 DIAGNOSIS — M54.50 ACUTE EXACERBATION OF CHRONIC LOW BACK PAIN: Primary | ICD-10-CM

## 2021-11-15 LAB
ANION GAP SERPL CALCULATED.3IONS-SCNC: 15 MEQ/L (ref 8–16)
BASOPHILS # BLD: 0.5 %
BASOPHILS ABSOLUTE: 0.1 THOU/MM3 (ref 0–0.1)
BUN BLDV-MCNC: 10 MG/DL (ref 7–22)
C-REACTIVE PROTEIN: 1.77 MG/DL (ref 0–1)
CALCIUM SERPL-MCNC: 9.9 MG/DL (ref 8.5–10.5)
CHLORIDE BLD-SCNC: 102 MEQ/L (ref 98–111)
CO2: 23 MEQ/L (ref 23–33)
CREAT SERPL-MCNC: 0.6 MG/DL (ref 0.4–1.2)
EOSINOPHIL # BLD: 2 %
EOSINOPHILS ABSOLUTE: 0.2 THOU/MM3 (ref 0–0.4)
ERYTHROCYTE [DISTWIDTH] IN BLOOD BY AUTOMATED COUNT: 12.7 % (ref 11.5–14.5)
ERYTHROCYTE [DISTWIDTH] IN BLOOD BY AUTOMATED COUNT: 38.6 FL (ref 35–45)
GFR SERPL CREATININE-BSD FRML MDRD: > 90 ML/MIN/1.73M2
GLUCOSE BLD-MCNC: 91 MG/DL (ref 70–108)
HCT VFR BLD CALC: 40.7 % (ref 37–47)
HEMOGLOBIN: 13.8 GM/DL (ref 12–16)
IMMATURE GRANS (ABS): 0.02 THOU/MM3 (ref 0–0.07)
IMMATURE GRANULOCYTES: 0.2 %
LYMPHOCYTES # BLD: 34 %
LYMPHOCYTES ABSOLUTE: 3.7 THOU/MM3 (ref 1–4.8)
MCH RBC QN AUTO: 28.8 PG (ref 26–33)
MCHC RBC AUTO-ENTMCNC: 33.9 GM/DL (ref 32.2–35.5)
MCV RBC AUTO: 85 FL (ref 81–99)
MONOCYTES # BLD: 5.6 %
MONOCYTES ABSOLUTE: 0.6 THOU/MM3 (ref 0.4–1.3)
NUCLEATED RED BLOOD CELLS: 0 /100 WBC
OSMOLALITY CALCULATION: 278 MOSMOL/KG (ref 275–300)
PLATELET # BLD: 338 THOU/MM3 (ref 130–400)
PMV BLD AUTO: 9.3 FL (ref 9.4–12.4)
POTASSIUM SERPL-SCNC: 3.9 MEQ/L (ref 3.5–5.2)
PREGNANCY, SERUM: NEGATIVE
RBC # BLD: 4.79 MILL/MM3 (ref 4.2–5.4)
SEDIMENTATION RATE, ERYTHROCYTE: 14 MM/HR (ref 0–20)
SEG NEUTROPHILS: 57.7 %
SEGMENTED NEUTROPHILS ABSOLUTE COUNT: 6.3 THOU/MM3 (ref 1.8–7.7)
SODIUM BLD-SCNC: 140 MEQ/L (ref 135–145)
TROPONIN T: < 0.01 NG/ML
WBC # BLD: 10.9 THOU/MM3 (ref 4.8–10.8)

## 2021-11-15 PROCEDURE — 6360000002 HC RX W HCPCS: Performed by: NURSE PRACTITIONER

## 2021-11-15 PROCEDURE — 93005 ELECTROCARDIOGRAM TRACING: CPT | Performed by: NURSE PRACTITIONER

## 2021-11-15 PROCEDURE — 99284 EMERGENCY DEPT VISIT MOD MDM: CPT

## 2021-11-15 PROCEDURE — 36415 COLL VENOUS BLD VENIPUNCTURE: CPT

## 2021-11-15 PROCEDURE — 74177 CT ABD & PELVIS W/CONTRAST: CPT

## 2021-11-15 PROCEDURE — 6360000004 HC RX CONTRAST MEDICATION: Performed by: NURSE PRACTITIONER

## 2021-11-15 PROCEDURE — 84484 ASSAY OF TROPONIN QUANT: CPT

## 2021-11-15 PROCEDURE — 85651 RBC SED RATE NONAUTOMATED: CPT

## 2021-11-15 PROCEDURE — 96372 THER/PROPH/DIAG INJ SC/IM: CPT

## 2021-11-15 PROCEDURE — 84703 CHORIONIC GONADOTROPIN ASSAY: CPT

## 2021-11-15 PROCEDURE — 80048 BASIC METABOLIC PNL TOTAL CA: CPT

## 2021-11-15 PROCEDURE — 86140 C-REACTIVE PROTEIN: CPT

## 2021-11-15 PROCEDURE — 85025 COMPLETE CBC W/AUTO DIFF WBC: CPT

## 2021-11-15 PROCEDURE — 6370000000 HC RX 637 (ALT 250 FOR IP): Performed by: NURSE PRACTITIONER

## 2021-11-15 PROCEDURE — 76376 3D RENDER W/INTRP POSTPROCES: CPT

## 2021-11-15 PROCEDURE — 96374 THER/PROPH/DIAG INJ IV PUSH: CPT

## 2021-11-15 RX ORDER — OXYCODONE HYDROCHLORIDE AND ACETAMINOPHEN 5; 325 MG/1; MG/1
1 TABLET ORAL ONCE
Status: COMPLETED | OUTPATIENT
Start: 2021-11-15 | End: 2021-11-15

## 2021-11-15 RX ORDER — LIDOCAINE 4 G/G
1 PATCH TOPICAL ONCE
Status: DISCONTINUED | OUTPATIENT
Start: 2021-11-15 | End: 2021-11-16 | Stop reason: HOSPADM

## 2021-11-15 RX ORDER — KETOROLAC TROMETHAMINE 30 MG/ML
30 INJECTION, SOLUTION INTRAMUSCULAR; INTRAVENOUS ONCE
Status: COMPLETED | OUTPATIENT
Start: 2021-11-15 | End: 2021-11-15

## 2021-11-15 RX ORDER — CYCLOBENZAPRINE HCL 10 MG
10 TABLET ORAL ONCE
Status: COMPLETED | OUTPATIENT
Start: 2021-11-15 | End: 2021-11-15

## 2021-11-15 RX ORDER — DIAZEPAM 5 MG/1
5 TABLET ORAL ONCE
Status: COMPLETED | OUTPATIENT
Start: 2021-11-15 | End: 2021-11-15

## 2021-11-15 RX ADMIN — KETOROLAC TROMETHAMINE 30 MG: 30 INJECTION, SOLUTION INTRAMUSCULAR; INTRAVENOUS at 19:25

## 2021-11-15 RX ADMIN — IOPAMIDOL 80 ML: 755 INJECTION, SOLUTION INTRAVENOUS at 22:29

## 2021-11-15 RX ADMIN — HYDROMORPHONE HYDROCHLORIDE 0.5 MG: 1 INJECTION, SOLUTION INTRAMUSCULAR; INTRAVENOUS; SUBCUTANEOUS at 21:37

## 2021-11-15 RX ADMIN — DIAZEPAM 5 MG: 5 TABLET ORAL at 23:04

## 2021-11-15 RX ADMIN — OXYCODONE HYDROCHLORIDE AND ACETAMINOPHEN 1 TABLET: 5; 325 TABLET ORAL at 20:10

## 2021-11-15 RX ADMIN — CYCLOBENZAPRINE HYDROCHLORIDE 10 MG: 10 TABLET, FILM COATED ORAL at 19:25

## 2021-11-15 ASSESSMENT — PAIN DESCRIPTION - LOCATION: LOCATION: BACK

## 2021-11-15 ASSESSMENT — PAIN SCALES - GENERAL
PAINLEVEL_OUTOF10: 7
PAINLEVEL_OUTOF10: 10
PAINLEVEL_OUTOF10: 6
PAINLEVEL_OUTOF10: 7

## 2021-11-15 ASSESSMENT — ENCOUNTER SYMPTOMS: BACK PAIN: 1

## 2021-11-15 ASSESSMENT — PAIN DESCRIPTION - ORIENTATION: ORIENTATION: LOWER

## 2021-11-15 NOTE — ED TRIAGE NOTES
Pt presents to the ED with c/o lower back pain. Pt states she has run out of some of her pain medications that she usually takes for it and nothing has helped her pain. Pt rates her pain 10/10 in the lower back and it is shooting down her left leg, pt states she has had several back spasms.

## 2021-11-16 VITALS
HEART RATE: 60 BPM | DIASTOLIC BLOOD PRESSURE: 86 MMHG | OXYGEN SATURATION: 96 % | TEMPERATURE: 99 F | SYSTOLIC BLOOD PRESSURE: 145 MMHG | RESPIRATION RATE: 18 BRPM

## 2021-11-16 LAB
EKG ATRIAL RATE: 81 BPM
EKG P AXIS: 42 DEGREES
EKG P-R INTERVAL: 216 MS
EKG Q-T INTERVAL: 360 MS
EKG QRS DURATION: 92 MS
EKG QTC CALCULATION (BAZETT): 418 MS
EKG R AXIS: 49 DEGREES
EKG T AXIS: 32 DEGREES
EKG VENTRICULAR RATE: 81 BPM

## 2021-11-16 PROCEDURE — 6360000002 HC RX W HCPCS: Performed by: NURSE PRACTITIONER

## 2021-11-16 PROCEDURE — 93010 ELECTROCARDIOGRAM REPORT: CPT | Performed by: NUCLEAR MEDICINE

## 2021-11-16 PROCEDURE — 96375 TX/PRO/DX INJ NEW DRUG ADDON: CPT

## 2021-11-16 PROCEDURE — 96376 TX/PRO/DX INJ SAME DRUG ADON: CPT

## 2021-11-16 RX ORDER — OXYCODONE HYDROCHLORIDE AND ACETAMINOPHEN 5; 325 MG/1; MG/1
1 TABLET ORAL EVERY 6 HOURS PRN
Qty: 12 TABLET | Refills: 0 | Status: SHIPPED | OUTPATIENT
Start: 2021-11-16 | End: 2021-11-19

## 2021-11-16 RX ORDER — DIAZEPAM 2 MG/1
2 TABLET ORAL EVERY 6 HOURS PRN
Qty: 15 TABLET | Refills: 0 | Status: SHIPPED | OUTPATIENT
Start: 2021-11-16 | End: 2021-11-18 | Stop reason: SDUPTHER

## 2021-11-16 RX ORDER — METHYLPREDNISOLONE 4 MG/1
TABLET ORAL
Qty: 1 KIT | Refills: 0 | Status: SHIPPED | OUTPATIENT
Start: 2021-11-16 | End: 2021-11-22

## 2021-11-16 RX ORDER — METHYLPREDNISOLONE SODIUM SUCCINATE 125 MG/2ML
125 INJECTION, POWDER, LYOPHILIZED, FOR SOLUTION INTRAMUSCULAR; INTRAVENOUS ONCE
Status: COMPLETED | OUTPATIENT
Start: 2021-11-16 | End: 2021-11-16

## 2021-11-16 RX ADMIN — METHYLPREDNISOLONE SODIUM SUCCINATE 125 MG: 125 INJECTION, POWDER, FOR SOLUTION INTRAMUSCULAR; INTRAVENOUS at 00:28

## 2021-11-16 RX ADMIN — HYDROMORPHONE HYDROCHLORIDE 1 MG: 1 INJECTION, SOLUTION INTRAMUSCULAR; INTRAVENOUS; SUBCUTANEOUS at 00:28

## 2021-11-16 ASSESSMENT — PAIN SCALES - GENERAL: PAINLEVEL_OUTOF10: 9

## 2021-11-16 NOTE — ED PROVIDER NOTES
[facility] Emergency Department    CHIEF COMPLAINT       Chief Complaint   Patient presents with    Back Pain     lower back       Nurses Notes reviewed and I agree except as noted in the HPI. HISTORY OF PRESENT ILLNESS    Karly De Leon brittny 27 y.o. female who presents to the ED for evaluation of     Back pain that started 3 weeks ago, and the pain has changed character since the last visit 2 weeks ago, and has steadily progressed from left leg pain to being bilateral leg pain. Patient states the pain consists of shooting and stabbing pain mainly down the left leg with numbness and tingling bilaterally. The pain is typically present and worsens with standing, or sitting, for a long period of time with slight relief while laying down. Patient states that the pain is 10/10. Patient has states they have a past medical history of multiple lumbar fusion and revision surgeries. States that the pain has changed character since his past visit. She has an upcoming appointment with her surgeon in about two weeks. HPI was provided by the patient    REVIEW OF SYSTEMS     Review of Systems   Constitutional: Negative for chills, fatigue and fever. HENT: Negative for congestion, ear discharge, ear pain, postnasal drip and rhinorrhea. Eyes: Negative for redness. Respiratory: Negative for cough and chest tightness. Cardiovascular: Negative for chest pain and leg swelling. Gastrointestinal: Negative for abdominal pain, nausea and vomiting. Genitourinary: Negative for difficulty urinating, dysuria, enuresis, flank pain and hematuria. Musculoskeletal: Positive for back pain. Negative for joint swelling. Skin: Negative for rash. Neurological: Positive for weakness and numbness. Negative for dizziness, light-headedness and headaches. Psychiatric/Behavioral: Negative for agitation, behavioral problems and confusion. All other systems negative except as noted.       PAST MEDICAL HISTORY     Past Medical History:   Diagnosis Date    Ovarian cyst     Scoliosis        SURGICALHISTORY      has a past surgical history that includes Tonsillectomy; Adenoidectomy; back surgery (2006 and 2009, ); and  section (N/A, 2020). CURRENT MEDICATIONS       Discharge Medication List as of 2021  1:14 AM      CONTINUE these medications which have NOT CHANGED    Details   diclofenac (VOLTAREN) 50 MG EC tablet Take 1 tablet by mouth 2 times daily, Disp-30 tablet, R-0Print      lidocaine 4 % external patch Place 1 patch onto the skin daily, TransDERmal, DAILY Starting Sun 10/31/2021, Until Tue 2021, For 30 days, Disp-30 patch, R-0, Print      tiZANidine (ZANAFLEX) 4 MG tablet Take 1 tablet by mouth every 6 hours as needed (muscle spasm), Disp-20 tablet, R-0Print      ferrous sulfate (FE TABS) 325 (65 Fe) MG EC tablet Take 1 tablet by mouth daily, Disp-30 tablet,R-1Print      Prenatal Vit-Fe Fumarate-FA (PRENATAL 1+1 PO) Take 1 tablet by mouth dailyHistorical Med      Tens Unit MISC Starting 10/11/2016, Until Discontinued, Disp-1 each, R-0, Print             ALLERGIES     is allergic to gabapentin. FAMILY HISTORY     She indicated that her mother is alive. She indicated that the status of her maternal grandmother is unknown. She indicated that the status of her maternal grandfather is unknown.   family history includes Depression in her mother; Diabetes in her mother; Heart Disease in her maternal grandfather; High Blood Pressure in her mother; High Cholesterol in her mother; Stroke in her maternal grandmother.     SOCIAL HISTORY       Social History     Socioeconomic History    Marital status: Single     Spouse name: Not on file    Number of children: Not on file    Years of education: Not on file    Highest education level: Not on file   Occupational History    Not on file   Tobacco Use    Smoking status: Former Smoker     Years: 4.00     Quit date: 12/10/2015     Years since quittin.9    Smokeless tobacco: Never Used   Substance and Sexual Activity    Alcohol use: No     Alcohol/week: 0.0 standard drinks     Comment: socially    Drug use: No    Sexual activity: Not Currently     Partners: Male   Other Topics Concern    Not on file   Social History Narrative    Not on file     Social Determinants of Health     Financial Resource Strain:     Difficulty of Paying Living Expenses: Not on file   Food Insecurity:     Worried About Running Out of Food in the Last Year: Not on file    Etta of Food in the Last Year: Not on file   Transportation Needs:     Lack of Transportation (Medical): Not on file    Lack of Transportation (Non-Medical): Not on file   Physical Activity:     Days of Exercise per Week: Not on file    Minutes of Exercise per Session: Not on file   Stress:     Feeling of Stress : Not on file   Social Connections:     Frequency of Communication with Friends and Family: Not on file    Frequency of Social Gatherings with Friends and Family: Not on file    Attends Alevism Services: Not on file    Active Member of [address] [place] [address] [employer] or Organizations: Not on file    Attends Club or Organization Meetings: Not on file    Marital Status: Not on file   Intimate Partner Violence:     Fear of Current or Ex-Partner: Not on file    Emotionally Abused: Not on file    Physically Abused: Not on file    Sexually Abused: Not on file   Housing Stability:     Unable to Pay for Housing in the Last Year: Not on file    Number of [place] in the Last Year: Not on file    Unstable Housing in the Last Year: Not on file       PHYSICAL EXAM     INITIAL VITALS:  oral temperature is 99 °F (37.2 °C). Her blood pressure is 145/86 (abnormal) and her pulse is 60. Her respiration is 18 and oxygen saturation is 96%. Physical Exam  Constitutional:       General: She is in acute distress. Appearance: Normal appearance. She is well-developed. She is not ill-appearing or diaphoretic. HENT:      Head: Normocephalic and atraumatic. Nose: Nose normal.      Mouth/Throat:      Mouth: Mucous membranes are moist.      Pharynx: Oropharynx is clear. Eyes:      Extraocular Movements: Extraocular movements intact. Conjunctiva/sclera: Conjunctivae normal.   Cardiovascular:      Pulses: Normal pulses. Pulmonary:      Effort: Pulmonary effort is normal.   Musculoskeletal:         General: Tenderness present. No deformity. Normal range of motion. Cervical back: Normal range of motion. Lumbar back: Tenderness present. Skin:     General: Skin is warm and dry. Capillary Refill: Capillary refill takes less than 2 seconds. Neurological:      General: No focal deficit present. Mental Status: She is alert and oriented to person, place, and time. Sensory: Sensory deficit (tingling noted down the leg) present. Motor: Weakness present. Psychiatric:         Mood and Affect: Mood normal.         Behavior: Behavior normal.         DIFFERENTIAL DIAGNOSIS:   Chronic back pain, sciatica, radiculitis, abscess    DIAGNOSTIC RESULTS     EKG: All EKG's are interpreted by the Emergency Department Physician who eithersigns or Co-signs this chart in the absence of a cardiologist.        RADIOLOGY: non-plainfilm images(s) such as CT, Ultrasound and MRI are read by the radiologist.  Plain radiographic images are visualized and preliminarily interpreted by the emergency physician unless otherwise stated below. CT ABDOMEN PELVIS W IV CONTRAST Additional Contrast? None   Final Result   1. Left ovarian cyst. Small midline ventral hernia. Hepatic steatosis. 2. Unilateral spondylitic defects at L5 left side. Prior posterior lumbar fusion L3-4. Note the right pedicle screw at L3 passes along the lateral aspect of the L3 vertebral body. **This report has been created using voice recognition software.   It may contain minor errors which are inherent in voice recognition technology. **      Final report electronically signed by Dr. Charlie Figueroa on 11/15/2021 10:47 PM      CT LUMBAR RECONSTRUCTION WO POST PROCESS   Final Result   1. No acute fracture seen. Posterior lumbar fusion N5-2 metallic hardware in place. Note that the right pedicle screw at L3 passes along the lateral aspect of the L3 vertebral body. A threaded interbody cages present at level. 2. Marked disc space narrowing L1-2, L2-3, and L3-4.   3. There is a unilateral spondylitic defect at L5 left side. 4. Moderate lumbar levoscoliosis. Cannot exclude muscle spasm. Mild degenerative changes both sacroiliac joints. Note that there appears to be fusion of the bilateral facet joints at the L1-2 and L2-3 levels and apparently also at T12-L1, possibly    congenital.               **This report has been created using voice recognition software. It may contain minor errors which are inherent in voice recognition technology. **      Final report electronically signed by Dr. Charlie Figueroa on 11/15/2021 10:51 PM            LABS:   Labs Reviewed   CBC WITH AUTO DIFFERENTIAL - Abnormal; Notable for the following components:       Result Value    WBC 10.9 (*)     MPV 9.3 (*)     All other components within normal limits   C-REACTIVE PROTEIN - Abnormal; Notable for the following components:    CRP 1.77 (*)     All other components within normal limits   BASIC METABOLIC PANEL   SEDIMENTATION RATE   TROPONIN   HCG, SERUM, QUALITATIVE   ANION GAP   GLOMERULAR FILTRATION RATE, ESTIMATED   OSMOLALITY       EMERGENCY DEPARTMENT COURSE:   Vitals:    Vitals:    11/15/21 2224 11/15/21 2300 11/16/21 0000 11/16/21 0130   BP: 121/73 121/66 121/85 (!) 145/86   Pulse:  79 60    Resp:       Temp:       TempSrc:       SpO2: 98% 96% 97% 96%                               MDM    Patient was seen in the ER for severe back pain. She has no red flag symptoms. She is medicated aggressively for pain. She experienced enough relief to be tolerable.   I offered admission for pain control but her surgeon is in [facility]. She is able to ambulate and has no red flag symptoms. I reviewed case with my attending. The patient will be discharged home with close follow up. Medications   ketorolac (TORADOL) injection 30 mg (30 mg IntraMUSCular Given 11/15/21 1925)   cyclobenzaprine (FLEXERIL) tablet 10 mg (10 mg Oral Given 11/15/21 1925)   oxyCODONE-acetaminophen (PERCOCET) 5-325 MG per tablet 1 tablet (1 tablet Oral Given 11/15/21 2010)   HYDROmorphone (DILAUDID) injection 0.5 mg (0.5 mg IntraVENous Given 11/15/21 2137)   iopamidol (ISOVUE-370) 76 % injection 80 mL (80 mLs IntraVENous Given 11/15/21 2229)   diazePAM (VALIUM) tablet 5 mg (5 mg Oral Given 11/15/21 2304)   HYDROmorphone (DILAUDID) injection 1 mg (1 mg IntraVENous Given 11/16/21 0028)   methylPREDNISolone sodium (SOLU-MEDROL) injection 125 mg (125 mg IntraVENous Given 11/16/21 0028)         Patient was seen independently by myself. The patient's final impression and disposition and plan was determined by myself. Strict return precautions and follow up instructions were discussed with the patient prior to discharge, with which the patient agrees. Physical assessment findings, diagnostic testing(s) if applicable, and vital signs reviewed with patient/patient representative. Questions answered. Medications asdirected, including OTC medications for supportive care. Education provided on medications. Differential diagnosis(s) discussed with patient/patient representative. Home care/self care instructions reviewed withpatient/patient representative. Patient is to follow-up with family care provider in 2-3 days if no improvement. Patient is to go to the emergency department if symptoms worsen. Patient/patient representative isaware of care plan, questions answered, verbalizes understanding and is in agreement.      CRITICAL CARE:   None    CONSULTS:  None    PROCEDURES:  None    FINAL IMPRESSION     1. Acute exacerbation of chronic low back pain    2. Sciatica of left side          DISPOSITION/PLAN   DISPOSITION Decision To Discharge 11/16/2021 01:09:08 AM      PATIENT REFERREDTO:  FIORDALIZA Gill CNP  [address] [facility] [address] [place] [address] [phone]    Schedule an appointment as soon as possible for a visit in 2 days  For follow up    Your spinal surgeon    Call today  For follow up      DISCHARGE MEDICATIONS:  Discharge Medication List as of 11/16/2021  1:14 AM      START taking these medications    Details   oxyCODONE-acetaminophen (PERCOCET) 5-325 MG per tablet Take 1 tablet by mouth every 6 hours as needed for Pain for up to 3 days. Intended supply: 3 days. Take lowest dose possible to manage pain, Disp-12 tablet, R-0Print      methylPREDNISolone (MEDROL, GALO,) 4 MG tablet Take by mouth., Disp-1 kit, R-0Print      diazePAM (VALIUM) 2 MG tablet Take 1 tablet by mouth every 6 hours as needed for Anxiety for up to 10 days. , Disp-15 tablet, R-0Print             (Please note that portions of this note were completed with a voice recognition program.  Efforts were made to edit the dictations but occasionally words are mis-transcribed.)         FIORDALIZA Padilla CNP, APRN - CNP  11/18/21 [id number]

## 2021-11-16 NOTE — ED NOTES
Assumed pt care at this time. Report reecived from [facility]. Pt standing up against wall in [facility]. Call light in [facility].       Ruthie Briggs RN  11/15/21 4002

## 2021-11-18 RX ORDER — DIAZEPAM 2 MG/1
2 TABLET ORAL EVERY 8 HOURS PRN
Qty: 15 TABLET | Refills: 0 | Status: SHIPPED | OUTPATIENT
Start: 2021-11-18 | End: 2021-11-28

## 2021-11-18 ASSESSMENT — ENCOUNTER SYMPTOMS
CHEST TIGHTNESS: 0
RHINORRHEA: 0
ABDOMINAL PAIN: 0
COUGH: 0
VOMITING: 0
NAUSEA: 0
EYE REDNESS: 0

## 2021-12-28 ENCOUNTER — HOSPITAL ENCOUNTER (OUTPATIENT)
Dept: MRI IMAGING | Age: 26
Discharge: HOME | End: 2021-12-28
Payer: MEDICARE

## 2021-12-28 DIAGNOSIS — M51.37 DEGENERATION OF LUMBAR OR LUMBOSACRAL INTERVERTEBRAL DISC: ICD-10-CM

## 2021-12-28 DIAGNOSIS — Z98.1 ARTHRODESIS STATUS: ICD-10-CM

## 2021-12-28 DIAGNOSIS — M54.16 LUMBAR RADICULOPATHY: ICD-10-CM

## 2021-12-28 DIAGNOSIS — M41.125 ADOLESCENT IDIOPATHIC SCOLIOSIS OF THORACOLUMBAR REGION: ICD-10-CM

## 2021-12-28 PROCEDURE — 72148 MRI LUMBAR SPINE W/O DYE: CPT

## 2022-01-26 ENCOUNTER — APPOINTMENT (OUTPATIENT)
Dept: GENERAL RADIOLOGY | Age: 27
End: 2022-01-26
Payer: MEDICARE

## 2022-01-26 ENCOUNTER — HOSPITAL ENCOUNTER (EMERGENCY)
Age: 27
Discharge: HOME | End: 2022-01-26
Attending: STUDENT IN AN ORGANIZED HEALTH CARE EDUCATION/TRAINING PROGRAM
Payer: MEDICARE

## 2022-01-26 VITALS
DIASTOLIC BLOOD PRESSURE: 92 MMHG | RESPIRATION RATE: 16 BRPM | BODY MASS INDEX: 44.39 KG/M2 | SYSTOLIC BLOOD PRESSURE: 150 MMHG | OXYGEN SATURATION: 98 % | TEMPERATURE: 97.6 F | HEIGHT: 64 IN | WEIGHT: 260 LBS | HEART RATE: 87 BPM

## 2022-01-26 DIAGNOSIS — U07.1 COVID-19: Primary | ICD-10-CM

## 2022-01-26 DIAGNOSIS — N39.0 URINARY TRACT INFECTION WITHOUT HEMATURIA, SITE UNSPECIFIED: ICD-10-CM

## 2022-01-26 LAB
ALBUMIN SERPL-MCNC: 4.7 G/DL (ref 3.5–5.1)
ALP BLD-CCNC: 53 U/L (ref 38–126)
ALT SERPL-CCNC: 19 U/L (ref 11–66)
ANION GAP SERPL CALCULATED.3IONS-SCNC: 14 MEQ/L (ref 8–16)
AST SERPL-CCNC: 21 U/L (ref 5–40)
BACTERIA: ABNORMAL
BASOPHILS # BLD: 0.4 %
BASOPHILS ABSOLUTE: 0 THOU/MM3 (ref 0–0.1)
BILIRUB SERPL-MCNC: 0.3 MG/DL (ref 0.3–1.2)
BILIRUBIN URINE: NEGATIVE
BLOOD, URINE: ABNORMAL
BUN BLDV-MCNC: 9 MG/DL (ref 7–22)
CALCIUM SERPL-MCNC: 9.5 MG/DL (ref 8.5–10.5)
CASTS: ABNORMAL /LPF
CASTS: ABNORMAL /LPF
CHARACTER, URINE: CLEAR
CHLORIDE BLD-SCNC: 104 MEQ/L (ref 98–111)
CO2: 21 MEQ/L (ref 23–33)
COLOR: YELLOW
CREAT SERPL-MCNC: 0.6 MG/DL (ref 0.4–1.2)
CRYSTALS: ABNORMAL
EKG ATRIAL RATE: 61 BPM
EKG P AXIS: 31 DEGREES
EKG P-R INTERVAL: 188 MS
EKG Q-T INTERVAL: 344 MS
EKG QRS DURATION: 90 MS
EKG QTC CALCULATION (BAZETT): 346 MS
EKG R AXIS: 39 DEGREES
EKG T AXIS: 27 DEGREES
EKG VENTRICULAR RATE: 61 BPM
EOSINOPHIL # BLD: 0.2 %
EOSINOPHILS ABSOLUTE: 0 THOU/MM3 (ref 0–0.4)
EPITHELIAL CELLS, UA: ABNORMAL /HPF
ERYTHROCYTE [DISTWIDTH] IN BLOOD BY AUTOMATED COUNT: 13.2 % (ref 11.5–14.5)
ERYTHROCYTE [DISTWIDTH] IN BLOOD BY AUTOMATED COUNT: 41.1 FL (ref 35–45)
FLU A ANTIGEN: NEGATIVE
FLU B ANTIGEN: NEGATIVE
GFR SERPL CREATININE-BSD FRML MDRD: > 90 ML/MIN/1.73M2
GLUCOSE BLD-MCNC: 94 MG/DL (ref 70–108)
GLUCOSE, URINE: NEGATIVE MG/DL
HCT VFR BLD CALC: 40.6 % (ref 37–47)
HEMOGLOBIN: 14.1 GM/DL (ref 12–16)
IMMATURE GRANS (ABS): 0.01 THOU/MM3 (ref 0–0.07)
IMMATURE GRANULOCYTES: 0.2 %
KETONES, URINE: 40
LEUKOCYTE EST, POC: ABNORMAL
LYMPHOCYTES # BLD: 32.4 %
LYMPHOCYTES ABSOLUTE: 1.6 THOU/MM3 (ref 1–4.8)
MCH RBC QN AUTO: 29.4 PG (ref 26–33)
MCHC RBC AUTO-ENTMCNC: 34.7 GM/DL (ref 32.2–35.5)
MCV RBC AUTO: 84.8 FL (ref 81–99)
MISCELLANEOUS LAB TEST RESULT: ABNORMAL
MONOCYTES # BLD: 9.3 %
MONOCYTES ABSOLUTE: 0.5 THOU/MM3 (ref 0.4–1.3)
NITRITE, URINE: NEGATIVE
NUCLEATED RED BLOOD CELLS: 0 /100 WBC
OSMOLALITY CALCULATION: 276 MOSMOL/KG (ref 275–300)
PH UA: 5.5 (ref 5–9)
PLATELET # BLD: 257 THOU/MM3 (ref 130–400)
PMV BLD AUTO: 9 FL (ref 9.4–12.4)
POTASSIUM REFLEX MAGNESIUM: 3.9 MEQ/L (ref 3.5–5.2)
PREGNANCY, URINE: NEGATIVE
PROTEIN UA: 30 MG/DL
RBC # BLD: 4.79 MILL/MM3 (ref 4.2–5.4)
RBC URINE: ABNORMAL /HPF
RENAL EPITHELIAL, UA: ABNORMAL
SARS-COV-2, NAAT: DETECTED
SEG NEUTROPHILS: 57.5 %
SEGMENTED NEUTROPHILS ABSOLUTE COUNT: 2.8 THOU/MM3 (ref 1.8–7.7)
SODIUM BLD-SCNC: 139 MEQ/L (ref 135–145)
SPECIFIC GRAVITY UA: 1.02 (ref 1–1.03)
TOTAL PROTEIN: 7.4 G/DL (ref 6.1–8)
TROPONIN T: < 0.01 NG/ML
UROBILINOGEN, URINE: 0.2 EU/DL (ref 0–1)
WBC # BLD: 4.9 THOU/MM3 (ref 4.8–10.8)
WBC UA: ABNORMAL /HPF
YEAST: ABNORMAL

## 2022-01-26 PROCEDURE — 99281 EMR DPT VST MAYX REQ PHY/QHP: CPT

## 2022-01-26 PROCEDURE — 84484 ASSAY OF TROPONIN QUANT: CPT

## 2022-01-26 PROCEDURE — 36415 COLL VENOUS BLD VENIPUNCTURE: CPT

## 2022-01-26 PROCEDURE — 80053 COMPREHEN METABOLIC PANEL: CPT

## 2022-01-26 PROCEDURE — 87804 INFLUENZA ASSAY W/OPTIC: CPT

## 2022-01-26 PROCEDURE — 81001 URINALYSIS AUTO W/SCOPE: CPT

## 2022-01-26 PROCEDURE — 93010 ELECTROCARDIOGRAM REPORT: CPT | Performed by: INTERNAL MEDICINE

## 2022-01-26 PROCEDURE — 71045 X-RAY EXAM CHEST 1 VIEW: CPT

## 2022-01-26 PROCEDURE — 85025 COMPLETE CBC W/AUTO DIFF WBC: CPT

## 2022-01-26 PROCEDURE — 93005 ELECTROCARDIOGRAM TRACING: CPT | Performed by: STUDENT IN AN ORGANIZED HEALTH CARE EDUCATION/TRAINING PROGRAM

## 2022-01-26 PROCEDURE — 99282 EMERGENCY DEPT VISIT SF MDM: CPT

## 2022-01-26 PROCEDURE — 81025 URINE PREGNANCY TEST: CPT

## 2022-01-26 PROCEDURE — 87635 SARS-COV-2 COVID-19 AMP PRB: CPT

## 2022-01-26 RX ORDER — NITROFURANTOIN 25; 75 MG/1; MG/1
100 CAPSULE ORAL 2 TIMES DAILY
Qty: 10 CAPSULE | Refills: 0 | Status: SHIPPED | OUTPATIENT
Start: 2022-01-26 | End: 2022-01-31

## 2022-01-26 ASSESSMENT — ENCOUNTER SYMPTOMS
NAUSEA: 1
EYE REDNESS: 0
SHORTNESS OF BREATH: 1
DIARRHEA: 0
RHINORRHEA: 0
VOMITING: 0
ABDOMINAL PAIN: 0
COUGH: 1
CONSTIPATION: 0
SORE THROAT: 0
BACK PAIN: 0

## 2022-01-26 NOTE — ED TRIAGE NOTES
Presents to ED with c/o hypertenstion. States her bp has been going up and down. States is was 155/110 at home. Alert and oriented. Respirations easy and unlabored.

## 2022-01-27 NOTE — ED PROVIDER NOTES
[facility] ENCOUNTER          Pt Name: Rajinder Cr  MRN: [id number]  [place] 1995  Date of evaluation: 1/26/2022  Physician: Melanie Monaco MD    CHIEF COMPLAINT       Chief Complaint   Patient presents with    Hypertension     History obtained from the patient. HISTORY OF PRESENT ILLNESS    HPI  Karly Ramos is a 27 y.o. female with a history of scoliosis who presents to the emergency department for evaluation of Covid-like symptoms for the past 4 days, hypertension. Patient states that over the past 4 days she has had intermittent fevers, cough, nausea, dysuria. She states that today she developed a headache and so she checked her blood pressure because that she thought it was related to her high blood pressure. She states that her systolic blood pressure at home was 155. She states that after she laid down and relax it improved to 110. Of note, patient's significant other was diagnosed with Covid. The patient has no other acute complaints at this time. REVIEW OF SYSTEMS   Review of Systems   Constitutional: Positive for fever. Negative for chills. HENT: Negative for rhinorrhea and sore throat. Eyes: Negative for redness and visual disturbance. Respiratory: Positive for cough and shortness of breath. Cardiovascular: Positive for chest pain. Gastrointestinal: Positive for nausea. Negative for abdominal pain, constipation, diarrhea and vomiting. Endocrine: Negative for polyuria. Genitourinary: Positive for dysuria. Negative for flank pain. Musculoskeletal: Negative for back pain and myalgias. Skin: Negative for rash. Neurological: Positive for headaches. Negative for syncope. Psychiatric/Behavioral: Negative for confusion.          PAST MEDICAL AND SURGICAL HISTORY     Past Medical History:   Diagnosis Date    Ovarian cyst     Scoliosis      Past Surgical History:   Procedure Laterality Date    ADENOIDECTOMY      BACK SURGERY  2006 and 2018     SECTION N/A 2020     SECTION performed by Amador Badillo MD at [facility] [address] [place] [address]   No current facility-administered medications for this encounter. Current Outpatient Medications:     nitrofurantoin, macrocrystal-monohydrate, (MACROBID) 100 MG capsule, Take 1 capsule by mouth 2 times daily for 5 days, Disp: 10 capsule, Rfl: 0    diclofenac (VOLTAREN) 50 MG EC tablet, Take 1 tablet by mouth 2 times daily, Disp: 30 tablet, Rfl: 0    tiZANidine (ZANAFLEX) 4 MG tablet, Take 1 tablet by mouth every 6 hours as needed (muscle spasm), Disp: 20 tablet, Rfl: 0    ferrous sulfate (FE TABS) 325 (65 Fe) MG EC tablet, Take 1 tablet by mouth daily, Disp: 30 tablet, Rfl: 1    Prenatal Vit-Fe Fumarate-FA (PRENATAL 1+1 PO), Take 1 tablet by mouth daily, Disp: , Rfl:     Tens Unit MISC, by Does not apply route, Disp: 1 each, Rfl: 0      SOCIAL HISTORY     Social History     Social History Narrative    Not on file     Social History     Tobacco Use    Smoking status: Former Smoker     Years: 4.00     Quit date: 12/10/2015     Years since quittin.1    Smokeless tobacco: Never Used   Substance Use Topics    Alcohol use: No     Alcohol/week: 0.0 standard drinks     Comment: socially    Drug use: No         ALLERGIES     Allergies   Allergen Reactions    Gabapentin Hives         FAMILY HISTORY     Family History   Problem Relation Age of Onset    High Cholesterol Mother     High Blood Pressure Mother     Depression Mother     Diabetes Mother     Stroke Maternal Grandmother     Heart Disease Maternal Grandfather          PREVIOUS RECORDS   Previous records reviewed:   ED visit 11/15 for back pain.         PHYSICAL EXAM     ED Triage Vitals [22 1556]   BP Temp Temp Source Pulse Resp SpO2 Height Weight   (!) 150/92 97.6 °F (36.4 °C) Oral 87 16 98 % 5' 4\" (1.626 m) 260 lb (117.9 kg)     Initial vital signs and nursing assessment reviewed and normal. Body mass index is 44.63 kg/m². Pulsoximetry is normal per my interpretation. Additional Vital Signs:  Vitals:    01/26/22 1556   BP: (!) 150/92   Pulse: 87   Resp: 16   Temp: 97.6 °F (36.4 °C)   SpO2: 98%       Physical Exam  Vitals and nursing note reviewed. Constitutional:       General: She is not in acute distress. Appearance: Normal appearance. HENT:      Head: Normocephalic and atraumatic. Mouth/Throat:      Mouth: Mucous membranes are moist.   Eyes:      Extraocular Movements: Extraocular movements intact. Conjunctiva/sclera: Conjunctivae normal.      Pupils: Pupils are equal, round, and reactive to light. Cardiovascular:      Rate and Rhythm: Normal rate and regular rhythm. Pulses: Normal pulses. Heart sounds: Normal heart sounds. Pulmonary:      Effort: Pulmonary effort is normal.      Breath sounds: Normal breath sounds. Abdominal:      General: Abdomen is flat. Palpations: Abdomen is soft. Tenderness: There is no abdominal tenderness. Musculoskeletal:         General: Normal range of motion. Cervical back: Normal range of motion and neck supple. Skin:     General: Skin is warm and dry. Capillary Refill: Capillary refill takes less than 2 seconds. Neurological:      General: No focal deficit present. Mental Status: She is alert and oriented to person, place, and time. Psychiatric:         Mood and Affect: Mood normal.         Behavior: Behavior normal.             MEDICAL DECISION MAKING   Initial Assessment:   Abelardo Crowe is a 27 y.o. female with a history of scoliosis who presents to the emergency department for evaluation of Covid-like symptoms for the past 4 days, hypertension. Patient hemodynamically stable and in no distress. Differential diagnosis includes but is not limited to Covid, pneumonia, influenza, ACS, UTI.      Plan:    CBC, CMP, troponin, rapid Covid and influenza, UA, urine pregnancy   Chest x-ray      ED RESULTS   Laboratory results:  Labs Reviewed   COVID-19, RAPID - Abnormal; Notable for the following components:       Result Value    SARS-CoV-2, NAAT DETECTED (*)     All other components within normal limits   CBC WITH AUTO DIFFERENTIAL - Abnormal; Notable for the following components:    MPV 9.0 (*)     All other components within normal limits   COMPREHENSIVE METABOLIC PANEL W/ REFLEX TO MG FOR LOW K - Abnormal; Notable for the following components:    CO2 21 (*)     All other components within normal limits   MICROSCOPIC URINALYSIS - Abnormal; Notable for the following components:    Ketones, Urine 40 (*)     Blood, Urine SMALL (*)     Protein, UA 30 (*)     Leukocytes, UA SMALL (*)     All other components within normal limits   RAPID INFLUENZA A/B ANTIGENS   PREGNANCY, URINE   TROPONIN   ANION GAP   OSMOLALITY   GLOMERULAR FILTRATION RATE, ESTIMATED       Radiologic studies results:  XR CHEST PORTABLE   Final Result   Stable radiographic appearance of the chest. No evidence of an acute process. **This report has been created using voice recognition software. It may contain minor errors which are inherent in voice recognition technology. **      Final report electronically signed by Dr. Nayana Anne MD on 1/26/2022 4:54 PM                ED COURSE   ED Medications administered this visit: Medications - No data to display     Laboratory work-up shows Covid positive. Other labs grossly unremarkable other than UA which showed small leukocyte esterase, 10-15 WBCs and moderate bacteria with only 0-2 epithelial cells. Suspect UTI for which patient was started on antibiotics. Patient otherwise well-appearing. Discussed with patient that her blood pressure was likely elevated secondary to stress/pain and that she should keep a blood pressure log at home to bring to her PCP if this continues.   Strict return precautions were discussed with the patient who verbalized agreement and understanding. Patient states that she has a pulse ox at home. Patient to be discharged in stable condition. Strict return precautions and follow up instructions were discussed with the patient prior to discharge, with which the patient agrees. MEDICATION CHANGES     Discharge Medication List as of 1/26/2022  7:16 PM      START taking these medications    Details   nitrofurantoin, macrocrystal-monohydrate, (MACROBID) 100 MG capsule Take 1 capsule by mouth 2 times daily for 5 days, Disp-10 capsule, R-0Normal               FINAL DISPOSITION     Final diagnoses:   [id number]   Urinary tract infection without hematuria, site unspecified     Condition: condition: good  Dispo: Discharge to home      This transcription was electronically signed. It was dictated by use of voice recognition software and electronically transcribed. The transcription may contain errors not detected in proofreading.         Sim Barahona MD  01/26/22 2719

## 2022-03-05 ENCOUNTER — HOSPITAL ENCOUNTER (EMERGENCY)
Age: 27
Discharge: HOME | End: 2022-03-05
Payer: MEDICARE

## 2022-03-05 VITALS
DIASTOLIC BLOOD PRESSURE: 70 MMHG | HEART RATE: 83 BPM | SYSTOLIC BLOOD PRESSURE: 144 MMHG | WEIGHT: 260 LBS | TEMPERATURE: 98.2 F | OXYGEN SATURATION: 98 % | HEIGHT: 64 IN | RESPIRATION RATE: 14 BRPM | BODY MASS INDEX: 44.39 KG/M2

## 2022-03-05 DIAGNOSIS — R30.0 DYSURIA: Primary | ICD-10-CM

## 2022-03-05 LAB
BILIRUBIN URINE: NEGATIVE
BLOOD, URINE: NEGATIVE
CHARACTER, URINE: CLEAR
COLOR: YELLOW
GLUCOSE URINE: NEGATIVE MG/DL
KETONES, URINE: NEGATIVE
LEUKOCYTE ESTERASE, URINE: NEGATIVE
NITRITE, URINE: NEGATIVE
PH, URINE: 5.5 (ref 5–9)
PROTEIN, URINE: NEGATIVE MG/DL
SPECIFIC GRAVITY, URINE: >= 1.03 (ref 1–1.03)
UROBILINOGEN, URINE: 0.2 EU/DL (ref 0.2–1)

## 2022-03-05 PROCEDURE — 99202 OFFICE O/P NEW SF 15 MIN: CPT | Performed by: NURSE PRACTITIONER

## 2022-03-05 PROCEDURE — 99213 OFFICE O/P EST LOW 20 MIN: CPT

## 2022-03-05 PROCEDURE — 81003 URINALYSIS AUTO W/O SCOPE: CPT

## 2022-03-05 RX ORDER — IBUPROFEN 200 MG
200 TABLET ORAL EVERY 6 HOURS PRN
COMMUNITY

## 2022-03-05 RX ORDER — PHENAZOPYRIDINE HYDROCHLORIDE 200 MG/1
200 TABLET, FILM COATED ORAL 3 TIMES DAILY PRN
Qty: 6 TABLET | Refills: 0 | Status: SHIPPED | OUTPATIENT
Start: 2022-03-05 | End: 2022-03-07

## 2022-03-05 ASSESSMENT — PAIN DESCRIPTION - ONSET: ONSET: PROGRESSIVE

## 2022-03-05 ASSESSMENT — PAIN - FUNCTIONAL ASSESSMENT
PAIN_FUNCTIONAL_ASSESSMENT: ACTIVITIES ARE NOT PREVENTED
PAIN_FUNCTIONAL_ASSESSMENT: 0-10

## 2022-03-05 ASSESSMENT — PAIN SCALES - GENERAL: PAINLEVEL_OUTOF10: 3

## 2022-03-05 ASSESSMENT — ENCOUNTER SYMPTOMS
VOMITING: 0
NAUSEA: 0
ABDOMINAL PAIN: 0

## 2022-03-05 ASSESSMENT — PAIN DESCRIPTION - LOCATION: LOCATION: ABDOMEN;PELVIS

## 2022-03-05 ASSESSMENT — PAIN DESCRIPTION - PAIN TYPE: TYPE: ACUTE PAIN

## 2022-03-05 ASSESSMENT — PAIN DESCRIPTION - FREQUENCY: FREQUENCY: CONTINUOUS

## 2022-03-05 ASSESSMENT — PAIN DESCRIPTION - DESCRIPTORS: DESCRIPTORS: ACHING

## 2022-03-05 ASSESSMENT — PAIN DESCRIPTION - PROGRESSION: CLINICAL_PROGRESSION: GRADUALLY WORSENING

## 2022-03-05 NOTE — ED PROVIDER NOTES
[facility]  Urgent Care Encounter       CHIEF COMPLAINT       Chief Complaint   Patient presents with    Urinary Frequency     \"cannot empty my bladder all the way\"    Abdominal Pain     low pelvic pain       Nurses Notes reviewed and I agree except as noted in the HPI. HISTORY OF PRESENT ILLNESS   Karly Arroyo is a 27 y.o. female who presents to the urgent care center complaining of urinary frequency stating that she did not feel her bladder empties out completely. Patient also complained of lower abdominal pain pelvic pain which she rates discomfort 3 on a 10 scale. Denies fever, chills nausea vomiting or flank pain. Patient at present time sitting on table does not appear to be in any acute distress. (See template.)    The history is provided by the patient. No  was used. Dysuria   This is a new problem. The current episode started yesterday. The problem occurs every urination. The problem has been gradually worsening. The quality of the pain is described as aching. The pain is at a severity of 3/10. The pain is mild. There has been no fever. She is sexually active. There is no history of pyelonephritis. Associated symptoms include frequency, hesitancy and urgency. Pertinent negatives include no nausea, no vomiting, no hematuria and no flank pain. She has tried nothing for the symptoms. Her past medical history does not include urological procedure or recurrent UTIs. REVIEW OF SYSTEMS     Review of Systems   Constitutional: Negative for activity change, appetite change, fatigue and fever. Gastrointestinal: Negative for abdominal pain, nausea and vomiting. Genitourinary: Positive for dysuria, frequency, hesitancy and urgency. Negative for flank pain, hematuria, pelvic pain, vaginal bleeding and vaginal discharge.        PAST MEDICAL HISTORY         Diagnosis Date    COVID-19     Ovarian cyst     Scoliosis        SURGICALHISTORY     Patient  has a past surgical history that includes Tonsillectomy; Adenoidectomy; back surgery (2006 and 2009, ); and  section (N/A, 2020). CURRENT MEDICATIONS       Discharge Medication List as of 3/5/2022  6:16 PM      CONTINUE these medications which have NOT CHANGED    Details   ibuprofen (ADVIL;MOTRIN) 200 MG tablet Take 200 mg by mouth every 6 hours as needed for PainHistorical Med      Tens Unit MIS Starting 10/11/2016, Until Discontinued, Disp-1 each, R-0, Print      diclofenac (VOLTAREN) 50 MG EC tablet Take 1 tablet by mouth 2 times daily, Disp-30 tablet, R-0Print      tiZANidine (ZANAFLEX) 4 MG tablet Take 1 tablet by mouth every 6 hours as needed (muscle spasm), Disp-20 tablet, R-0Print      ferrous sulfate (FE TABS) 325 (65 Fe) MG EC tablet Take 1 tablet by mouth daily, Disp-30 tablet,R-1Print      Prenatal Vit-Fe Fumarate-FA (PRENATAL 1+1 PO) Take 1 tablet by mouth dailyHistorical Med             ALLERGIES     Patient is is allergic to gabapentin. Patients   Immunization History   Administered Date(s) Administered    BCG ([place] BCG) 1995    DTaP 1993, 1994, 1994, 1996, 1999    Hepatitis B 2004, 2005, 2005    Hib, unspecified 1993, 1994, 1994, 1996    MMR 1995, 1999    Meningococcal MCV4P (Menactra) 2011    Polio IPV (IPOL) 1993, 1994, 1994, 1999    Tdap (Boostrix, Adacel) 2011, 2020       FAMILY HISTORY     Patient's family history includes Depression in her mother; Diabetes in her mother; Heart Disease in her maternal grandfather; High Blood Pressure in her mother; High Cholesterol in her mother; Stroke in her maternal grandmother. SOCIAL HISTORY     Patient  reports that she quit smoking about 6 years ago. She quit after 4.00 years of use. She has never used smokeless tobacco. She reports current alcohol use.  She reports that she does not use drugs.    PHYSICAL EXAM     ED TRIAGE VITALS  BP: (!) 144/70, Temp: 98.2 °F (36.8 °C), Pulse: 83, Resp: 14, SpO2: 98 %,Estimated body mass index is 44.63 kg/m² as calculated from the following:    Height as of this encounter: 5' 4\" (1.626 m). Weight as of this encounter: 260 lb (117.9 kg). ,Patient's last menstrual period was 02/23/2022. Physical Exam  Vitals and nursing note reviewed. Constitutional:       General: She is not in acute distress. Appearance: Normal appearance. She is well-developed. She is not ill-appearing, toxic-appearing or diaphoretic. HENT:      Head: Normocephalic. Right Ear: External ear normal.      Left Ear: External ear normal.      Nose: Nose normal.   Cardiovascular:      Rate and Rhythm: Normal rate. Pulmonary:      Effort: Pulmonary effort is normal.   Musculoskeletal:         General: Tenderness present. Cervical back: Full passive range of motion without pain, normal range of motion and neck supple. Skin:     General: Skin is warm and dry. Capillary Refill: Capillary refill takes less than 2 seconds. Neurological:      Mental Status: She is alert and oriented to person, place, and time. Psychiatric:         Behavior: Behavior is cooperative.          DIAGNOSTIC RESULTS     Labs:  Results for orders placed or performed during the hospital encounter of 03/05/22   Urinalysis   Result Value Ref Range    Glucose, Ur Negative NEGATIVE mg/dl    Bilirubin Urine Negative NEGATIVE    Ketones, Urine Negative NEGATIVE    Specific Gravity, Urine >= 1.030 1.002 - 1.030    Blood, Urine Negative NEGATIVE    pH, Urine 5.50 5.0 - 9.0    Protein, Urine Negative NEGATIVE mg/dl    Urobilinogen, Urine 0.20 0.2 - 1.0 eu/dl    Nitrite, Urine Negative NEGATIVE    Leukocyte Esterase, Urine Negative NEGATIVE    Color, UA Yellow STRAW-YELLOW    Character, Urine Clear CLEAR-SL CLOUD       IMAGING:    No orders to display         EKG:      URGENT CARE COURSE:     Vitals: 03/05/22 1736   BP: (!) 144/70   Pulse: 83   Resp: 14   Temp: 98.2 °F (36.8 °C)   TempSrc: Temporal   SpO2: 98%   Weight: 260 lb (117.9 kg)   Height: 5' 4\" (1.626 m)       Medications - No data to display         PROCEDURES:  None    FINAL IMPRESSION      1. Dysuria          DISPOSITION/ PLAN        Patient or Patient designated representative was advised to drink plenty of water or fluids and take medication as prescribed. The patient or Patient designated representative is advised to monitor for any changes in pain, development of high fever, chills, persistent vomiting, development of increasing back or flank pain or increase in hematuria the patient is advised to go to the emergency department for reevaluation and further follow-up if they wouldn't notice any of the above symptoms. The patient or Patient designated representative was also advised to follow up with family doctor or primary care provider after the antibiotic is completed for repeat urinalysis. The patient did verbalize understanding of discharge instructions and is agreeable to the treatment plan. The patient left ambulatory without any changes or concerns in stable condition. PATIENT REFERRED TO:  FIORDALIZA Maddox CNP  [address] [address] / [facility] [place] [id number]      DISCHARGE MEDICATIONS:  Discharge Medication List as of 3/5/2022  6:16 PM      START taking these medications    Details   phenazopyridine (PYRIDIUM) 200 MG tablet Take 1 tablet by mouth 3 times daily as needed for Pain, Disp-6 tablet, R-0Normal             Discharge Medication List as of 3/5/2022  6:16 PM          Discharge Medication List as of 3/5/2022  6:16 PM          FIORDALIZA Lyons CNP    (Please note that portions of this note were completed with a voice recognition program. Efforts were made to edit the dictations but occasionally words are mis-transcribed.)           FIORDALIZA Lyons CNP  03/07/22 [id number]

## 2022-03-06 ENCOUNTER — HOSPITAL ENCOUNTER (EMERGENCY)
Age: 27
Discharge: HOME | End: 2022-03-06
Payer: MEDICARE

## 2022-03-06 ENCOUNTER — APPOINTMENT (OUTPATIENT)
Dept: ULTRASOUND IMAGING | Age: 27
End: 2022-03-06
Payer: MEDICARE

## 2022-03-06 VITALS
SYSTOLIC BLOOD PRESSURE: 120 MMHG | BODY MASS INDEX: 27.31 KG/M2 | OXYGEN SATURATION: 99 % | TEMPERATURE: 98 F | WEIGHT: 160 LBS | HEIGHT: 64 IN | RESPIRATION RATE: 18 BRPM | DIASTOLIC BLOOD PRESSURE: 75 MMHG | HEART RATE: 88 BPM

## 2022-03-06 DIAGNOSIS — D21.9 LEIOMYOMA: ICD-10-CM

## 2022-03-06 DIAGNOSIS — N83.201 RIGHT OVARIAN CYST: ICD-10-CM

## 2022-03-06 DIAGNOSIS — N80.129 ENDOMETRIOMA OF OVARY: ICD-10-CM

## 2022-03-06 DIAGNOSIS — N34.2 URETHRITIS: Primary | ICD-10-CM

## 2022-03-06 LAB
BILIRUBIN URINE: NEGATIVE
BLOOD, URINE: NEGATIVE
CHARACTER, URINE: CLEAR
CHLAMYDIA TRACHOMATIS BY RT-PCR: NOT DETECTED
COLOR: YELLOW
CT/NG SOURCE: NORMAL
GLUCOSE URINE: NEGATIVE MG/DL
KETONES, URINE: ABNORMAL
KOH PREP: NORMAL
LEUKOCYTE ESTERASE, URINE: NEGATIVE
NEISSERIA GONORRHOEAE BY RT-PCR: NOT DETECTED
NITRITE, URINE: NEGATIVE
PH UA: 5.5 (ref 5–9)
PREGNANCY, URINE: NEGATIVE
PROTEIN UA: NEGATIVE
SPECIFIC GRAVITY, URINE: 1.02 (ref 1–1.03)
TRICHOMONAS PREP: NORMAL
UROBILINOGEN, URINE: 0.2 EU/DL (ref 0–1)

## 2022-03-06 PROCEDURE — 76830 TRANSVAGINAL US NON-OB: CPT

## 2022-03-06 PROCEDURE — 87205 SMEAR GRAM STAIN: CPT

## 2022-03-06 PROCEDURE — 51798 US URINE CAPACITY MEASURE: CPT

## 2022-03-06 PROCEDURE — 87210 SMEAR WET MOUNT SALINE/INK: CPT

## 2022-03-06 PROCEDURE — 87591 N.GONORRHOEAE DNA AMP PROB: CPT

## 2022-03-06 PROCEDURE — 87220 TISSUE EXAM FOR FUNGI: CPT

## 2022-03-06 PROCEDURE — 81025 URINE PREGNANCY TEST: CPT

## 2022-03-06 PROCEDURE — 87491 CHLMYD TRACH DNA AMP PROBE: CPT

## 2022-03-06 PROCEDURE — 87070 CULTURE OTHR SPECIMN AEROBIC: CPT

## 2022-03-06 PROCEDURE — 6370000000 HC RX 637 (ALT 250 FOR IP): Performed by: PHYSICIAN ASSISTANT

## 2022-03-06 PROCEDURE — 99284 EMERGENCY DEPT VISIT MOD MDM: CPT

## 2022-03-06 PROCEDURE — 6360000002 HC RX W HCPCS: Performed by: PHYSICIAN ASSISTANT

## 2022-03-06 PROCEDURE — 96372 THER/PROPH/DIAG INJ SC/IM: CPT

## 2022-03-06 PROCEDURE — 81003 URINALYSIS AUTO W/O SCOPE: CPT

## 2022-03-06 RX ORDER — PHENAZOPYRIDINE HYDROCHLORIDE 100 MG/1
200 TABLET, FILM COATED ORAL ONCE
Status: COMPLETED | OUTPATIENT
Start: 2022-03-06 | End: 2022-03-06

## 2022-03-06 RX ORDER — LIDOCAINE HYDROCHLORIDE 20 MG/ML
10 SOLUTION OROPHARYNGEAL
Status: DISCONTINUED | OUTPATIENT
Start: 2022-03-06 | End: 2022-03-07 | Stop reason: HOSPADM

## 2022-03-06 RX ORDER — KETOROLAC TROMETHAMINE 30 MG/ML
30 INJECTION, SOLUTION INTRAMUSCULAR; INTRAVENOUS ONCE
Status: COMPLETED | OUTPATIENT
Start: 2022-03-06 | End: 2022-03-06

## 2022-03-06 RX ORDER — DIAPER,BRIEF,INFANT-TODD,DISP
EACH MISCELLANEOUS
Qty: 30 G | Refills: 0 | Status: SHIPPED | OUTPATIENT
Start: 2022-03-06 | End: 2022-03-16 | Stop reason: ALTCHOICE

## 2022-03-06 RX ORDER — LIDOCAINE HYDROCHLORIDE 20 MG/ML
10 SOLUTION OROPHARYNGEAL PRN
Qty: 60 ML | Refills: 0 | Status: SHIPPED | OUTPATIENT
Start: 2022-03-06 | End: 2022-03-16 | Stop reason: ALTCHOICE

## 2022-03-06 RX ADMIN — Medication 10 ML: at 19:24

## 2022-03-06 RX ADMIN — PHENAZOPYRIDINE HYDROCHLORIDE 200 MG: 100 TABLET ORAL at 19:23

## 2022-03-06 RX ADMIN — KETOROLAC TROMETHAMINE 30 MG: 30 INJECTION, SOLUTION INTRAMUSCULAR; INTRAVENOUS at 20:15

## 2022-03-06 ASSESSMENT — ENCOUNTER SYMPTOMS
VOMITING: 0
ABDOMINAL PAIN: 1
NAUSEA: 0
SHORTNESS OF BREATH: 0
RHINORRHEA: 0
BLOOD IN STOOL: 0

## 2022-03-06 ASSESSMENT — PAIN SCALES - GENERAL
PAINLEVEL_OUTOF10: 6
PAINLEVEL_OUTOF10: 2

## 2022-03-06 NOTE — ED PROVIDER NOTES
[facility]  eMERGENCY dEPARTMENT eNCOUnter          CHIEF COMPLAINT       Chief Complaint   Patient presents with    Pelvic Pain       Nurses Notes reviewed and I agree except as noted in the HPI. HISTORY OF PRESENT ILLNESS    Karly Collins is a 27 y.o. female who presents to the Emergency Department for the evaluation of suprapubic pain. Patient states that pain started 2 days ago. She went to Urgent care yesterday and was given pyrydium but has yet to pick it up at pharmacy. Pain is dull and constant. Patient states that she also feels like her bladder feels full quicker than normal and she has decreased urinary volume. Patient states her urethra is swollen and more irritated. Today, she noticed yellow discharge and some bright red blood when she wiped. Patient denies hematuria, foul odor. Patient is sexually active with partner with no protection. Reports last intercourse 1 month ago. States they did use a new clitoral stimulator 1 week ago, 2 uses with several days between use and onset of symptoms with no other known new exposures. LMP 2/23/22. Patient denies fever or chills. The HPI was provided by the patient. REVIEW OF SYSTEMS     Review of Systems   Constitutional: Negative for chills and fever. HENT: Negative for congestion and rhinorrhea. Respiratory: Negative for shortness of breath. Cardiovascular: Negative for chest pain. Gastrointestinal: Positive for abdominal pain. Negative for blood in stool, nausea and vomiting. Genitourinary: Positive for decreased urine volume and vaginal discharge. Negative for dysuria, frequency and hematuria. Musculoskeletal: Negative for gait problem. Skin: Negative for rash. Psychiatric/Behavioral: Negative for agitation. All other systems reviewed and are negative. PAST MEDICAL HISTORY    has a past medical history of COVID-19, Ovarian cyst, and Scoliosis.     SURGICAL HISTORY      has a past surgical history that includes Tonsillectomy; Adenoidectomy; back surgery (2006 and 2018); and  section (N/A, 2020). CURRENT MEDICATIONS       Discharge Medication List as of 3/6/2022 10:05 PM      CONTINUE these medications which have NOT CHANGED    Details   ibuprofen (ADVIL;MOTRIN) 200 MG tablet Take 200 mg by mouth every 6 hours as needed for PainHistorical Med      phenazopyridine (PYRIDIUM) 200 MG tablet Take 1 tablet by mouth 3 times daily as needed for Pain, Disp-6 tablet, R-0Normal      diclofenac (VOLTAREN) 50 MG EC tablet Take 1 tablet by mouth 2 times daily, Disp-30 tablet, R-0Print      tiZANidine (ZANAFLEX) 4 MG tablet Take 1 tablet by mouth every 6 hours as needed (muscle spasm), Disp-20 tablet, R-0Print      ferrous sulfate (FE TABS) 325 (65 Fe) MG EC tablet Take 1 tablet by mouth daily, Disp-30 tablet,R-1Print      Prenatal Vit-Fe Fumarate-FA (PRENATAL 1+1 PO) Take 1 tablet by mouth dailyHistorical Med      Tens Unit MISC Starting 10/11/2016, Until Discontinued, Disp-1 each, R-0, Print             ALLERGIES     is allergic to gabapentin. FAMILY HISTORY     She indicated that her mother is alive. She indicated that the status of her maternal grandmother is unknown. She indicated that the status of her maternal grandfather is unknown.   family history includes Depression in her mother; Diabetes in her mother; Heart Disease in her maternal grandfather; High Blood Pressure in her mother; High Cholesterol in her mother; Stroke in her maternal grandmother. SOCIAL HISTORY      reports that she quit smoking about 6 years ago. She quit after 4.00 years of use. She has never used smokeless tobacco. She reports current alcohol use. She reports that she does not use drugs. PHYSICAL EXAM     INITIAL VITALS:  height is 5' 4\" (1.626 m) and weight is 160 lb (72.6 kg). Her oral temperature is 98 °F (36.7 °C). Her blood pressure is 120/75 and her pulse is 88.  Her respiration is 18 and oxygen saturation is 99%. Physical Exam  Vitals and nursing note reviewed. Exam conducted with a chaperone present. Constitutional:       Appearance: She is well-developed. HENT:      Head: Normocephalic and atraumatic. Eyes:      Conjunctiva/sclera: Conjunctivae normal.      Pupils: Pupils are equal, round, and reactive to light. Cardiovascular:      Rate and Rhythm: Normal rate and regular rhythm. Heart sounds: Normal heart sounds. No murmur heard. No gallop. Pulmonary:      Effort: Pulmonary effort is normal. No respiratory distress. Breath sounds: Normal breath sounds. No wheezing or rales. Abdominal:      General: Bowel sounds are normal.      Palpations: Abdomen is soft. Tenderness: There is abdominal tenderness in the suprapubic area. There is no guarding. Genitourinary:     Exam position: Supine. Labia:         Right: No rash, tenderness or lesion. Left: No rash, tenderness or lesion. Urethra: Urethral pain and urethral swelling present. No prolapse or urethral lesion. Vagina: No signs of injury and foreign body. No bleeding, lesions or prolapsed vaginal walls. Cervix: Friability (mild friability surrounding the cervical os) present. No cervical motion tenderness, discharge, lesion or cervical bleeding. Uterus: Not tender. Adnexa: Right adnexa normal and left adnexa normal.   Musculoskeletal:         General: Normal range of motion. Cervical back: Normal range of motion. Skin:     General: Skin is warm and dry. Neurological:      General: No focal deficit present. Mental Status: She is alert and oriented to person, place, and time.    Psychiatric:         Mood and Affect: Mood normal.         Behavior: Behavior normal.         DIFFERENTIAL DIAGNOSIS:   Differential diagnoses are discussed    DIAGNOSTIC RESULTS     EKG: All EKG's are interpreted by the Emergency Department Physician who either signs or Co-signsthis chart in the absence of a cardiologist.        RADIOLOGY: non-plain film images(s) such as CT, Ultrasound and MRI are read by the radiologist.    US NON OB TRANSVAGINAL   Final Result   Impression:   1. Cervical fluid collection of uncertain significance, correlate    clinically for timing of menstruation. 2. Left ovarian masses that are concerning for endometriomas. See above    for follow-up considerations. 3. Leiomyomatous changes to the uterus. 4. Benign right ovarian cyst.      This document has been electronically signed by: Mirna Fitch MD on    03/06/2022 09:38 PM      Technique Used: Duplex examination performed utilizing grayscale, color    and spectral analysis. LABS:      Labs Reviewed   URINALYSIS WITH REFLEX TO CULTURE - Abnormal; Notable for the following components:       Result Value    Ketones, Urine TRACE (*)     All other components within normal limits   CULTURE, GENITAL    Narrative:     Source: vaginal non-OB patient       Site:           Current Antibiotics: none   KOH (SKIN,HAIR,NAILS)    Narrative:     Source: vaginal non-OB patient       Site:           Current Antibiotics: not stated   WET PREP, GENITAL    Narrative:     Source: vaginal non-OB patient       Site:           Current Antibiotics: not stated   C. TRACHOMATIS / N. GONORRHOEAE, DNA   CULTURE, URINE   PREGNANCY, URINE       EMERGENCY DEPARTMENT COURSE:   Vitals:    Vitals:    03/06/22 1628 03/06/22 1629 03/06/22 1953 03/06/22 2052   BP:  (!) 144/88 133/80 120/75   Pulse: 95  91 88   Resp: 19  19 18   Temp: 98.3 °F (36.8 °C)  98 °F (36.7 °C)    TempSrc: Oral  Oral    SpO2: 100%  100% 99%   Weight: 160 lb (72.6 kg)      Height: 5' 4\" (1.626 m)         6:12 PM EST: The patient was seen and evaluated. Patient presents for complaints of new onset urinary symptoms associate with a dull, throbbing, suprapubic pain and urethral sensitivity.   She has no lesions noted on exam.  She was treated with Pyridium and Toradol with no real change in pain but did have some mild relief in the topical urethral discomfort with viscous lidocaine. Urinalysis is again reassuring with pelvic swabs showing no acute abnormality. She had no reproducible tenderness of the adnexa or uterus on exam but due to the suprapubic discomfort, ultrasound was obtained. This showed cervical fluid collection of uncertain significance, left ovarian masses concerning for endometriomas, leiomyomatosis changes of the uterus, benign right ovarian cyst.  Post void residual volume was 100 mL. Discussed results with the patient. She will follow up with OB/GYN and we will provide viscous lidocaine and 1% hydrocortisone to be applied topically for the urethral discomfort. Return precautions and supportive care discussed and patient denied further needs upon discharge. CRITICAL CARE:   None    CONSULTS:  None    PROCEDURES:  None    FINAL IMPRESSION      1. Urethritis    2. Leiomyoma    3. Endometrioma of ovary    4. Right ovarian cyst          DISPOSITION/PLAN   Discharge    PATIENT REFERRED TO:  Tyrone Medina, [address] [place] [address]  [address] [phone]    Call in 1 day      [address] [facility] [address] [address] EMERGENCY DEPT  [address] [place] [address]  [phone]    If symptoms worsen      DISCHARGEMEDICATIONS:  Discharge Medication List as of 3/6/2022 10:05 PM      START taking these medications    Details   lidocaine viscous hcl (XYLOCAINE) 2 % SOLN solution Take 10 mLs by mouth as needed for Irritation, Disp-60 mL, R-0Normal      hydrocortisone 1 % ointment Apply topically 2 times daily. , Disp-30 g, R-0, Normal             (Please note that portions of this note were completedwith a voice recognition program.  Efforts were made to edit the dictations but occasionally words are mis-transcribed.)       Markos Cr PA-C  03/09/22 [id number]

## 2022-03-07 NOTE — ED NOTES
Patient resting in bed. Respirations easy and unlabored. No distress noted. Call light within reach.   Medicated per servando Sun RN  03/06/22 1954

## 2022-03-07 NOTE — ED NOTES
Patient resting in bed. Respirations easy and unlabored. No distress noted. Call light within reach.   Pt down to [facility]  03/06/22 2052

## 2022-03-07 NOTE — ED NOTES
Patient resting in bed. Respirations easy and unlabored. No distress noted. Call light within reach.   Updated on poc, awaiting results       Vidya Quinones RN  03/06/22 7022

## 2022-03-09 LAB
GENITAL CULTURE, ROUTINE: NORMAL
GRAM STAIN RESULT: NORMAL

## 2022-03-16 ENCOUNTER — OFFICE VISIT (OUTPATIENT)
Dept: PHYSICAL MEDICINE AND REHAB | Age: 27
End: 2022-03-16
Payer: MEDICARE

## 2022-03-16 VITALS
HEIGHT: 64 IN | DIASTOLIC BLOOD PRESSURE: 68 MMHG | WEIGHT: 263 LBS | SYSTOLIC BLOOD PRESSURE: 124 MMHG | BODY MASS INDEX: 44.9 KG/M2

## 2022-03-16 DIAGNOSIS — G89.4 CHRONIC PAIN SYNDROME: ICD-10-CM

## 2022-03-16 DIAGNOSIS — M47.816 LUMBAR SPONDYLOSIS: Primary | ICD-10-CM

## 2022-03-16 DIAGNOSIS — M53.3 SI (SACROILIAC) JOINT DYSFUNCTION: ICD-10-CM

## 2022-03-16 DIAGNOSIS — M47.819 FACET ARTHROPATHY: ICD-10-CM

## 2022-03-16 DIAGNOSIS — M79.2 NEUROPATHIC PAIN: ICD-10-CM

## 2022-03-16 PROCEDURE — G8417 CALC BMI ABV UP PARAM F/U: HCPCS | Performed by: NURSE PRACTITIONER

## 2022-03-16 PROCEDURE — G8484 FLU IMMUNIZE NO ADMIN: HCPCS | Performed by: NURSE PRACTITIONER

## 2022-03-16 PROCEDURE — G8427 DOCREV CUR MEDS BY ELIG CLIN: HCPCS | Performed by: NURSE PRACTITIONER

## 2022-03-16 PROCEDURE — 1036F TOBACCO NON-USER: CPT | Performed by: NURSE PRACTITIONER

## 2022-03-16 PROCEDURE — 99214 OFFICE O/P EST MOD 30 MIN: CPT | Performed by: NURSE PRACTITIONER

## 2022-03-16 RX ORDER — TRAMADOL HYDROCHLORIDE 50 MG/1
50 TABLET ORAL 2 TIMES DAILY PRN
Qty: 60 TABLET | Refills: 0 | Status: SHIPPED | OUTPATIENT
Start: 2022-03-16 | End: 2022-04-13 | Stop reason: SDUPTHER

## 2022-03-16 NOTE — PROGRESS NOTES
Chronic Pain/PM&R Clinic Note     Encounter Date: 3/16/22    Subjective:   Chief Complaint:   Chief Complaint   Patient presents with    New Patient     lumbar pain       History of Present Illness:   Axel Senior is a 27 y.o. female seen in the clinic initially on 03/16/2022 upon request from Aakash Rob, [address] [facility])  for her history of lumbar pain. States her pain started back when she was 9 she has a history of adolescent scoliosis. Patient had her first back surgery in 2006, she had a laminectomy and fusion from T1-L2. About 2 years later in January 2009 she had all of her hardware removed due to issues with the hardware placement. Patient states she was doing well overall for about 10 years. In 2018 she was working as a  at [employer] in [place] and her right leg will go completely numb and give out. At that time she moved back to [place] and she had a lumbar laminectomy and fusion at L3-L4 at [facility] in May 2018. She is currently on disability since her last surgery. She follows with the [place] clinic who referred her here for interventional procedures for pain. She was told she is now having issues with her L5. Patient states her pain is worse with standing, walking long distance, sitting too long. She states it is hard for her to stand and do dishes. Majority of her pain is located in her mid low back. She states she does get some pain that radiates to her left lateral hip, but not past her knee. This is only with increased activity. Patient states she does get some relief when laying in bed but gets uncomfortable if in 1 position for too long. Patient states she has tried ibuprofen, Tylenol, heat, ice, TENS unit, lidocaine patches all which have been relatively ineffective. She has been using an herbal patch called Capsaisin patch which has been the only thing that is taken the edge off.   Patient states she had a baby in September 2020 and she has been trying to lose weight but is difficult due to her pain. She has been to the ER a few times to do her back pain. Patient states last time she did physical therapy was around the time of her surgery in 2018. Patient denies any falls, she does use a cane occasionally if her leg is really bothering her. Patient denies any bowel or bladder dysfunction. Patient states last summer she was walking daily but she has been able to do so due to her pain. Patient currently lives at home with her son and significant other. Patient states her physician at [facility] recommended she have a left transforaminal lumbar epidural steroid injection at L4-5. History of Interventions:   Surgery: Laminectomy and fusion T1-L2 (2006)  Hardware removal (January 2009)  Lumbar laminectomy and fusion L3-L4 (May 2018) [facility]  Injections: None    Current Treatment Medications:   Ibuprofen OTC  Tylenol OTC  THC - edible or smoke - helps sleep. Historical Treatment Medications:   Gabapentin - schmidt/acne  Flexeril - ineffective  Tizanidine - mild effectiveness  Valium - helped  Percocet - helped  Tramdaol - helped  Robaxin - ineffective  Mobic - ineffective     Imaging:    Lumbar MRI (12/28/2021)  Narrative   PROCEDURE: MRI LUMBAR SPINE WO CONTRAST       CLINICAL INFORMATION: Degeneration of lumbar or lumbosacral intervertebral disc, Lumbar radiculopathy, Arthrodesis status, Adolescent idiopathic scoliosis of thoracolumbar region. Low back pain and left leg pain with right leg numbness for 2 months. No    recent injury. Multiple prior back surgeries.       COMPARISON: CT lumbar spine dated 11/15/2021 and MRI lumbar spine dated 8/5/2016.       TECHNIQUE: Sagittal and axial T1 and T2-weighted images were obtained through the lumbar spine.       FINDINGS:   Redemonstration of laminectomy at L3-4 with interbody and posterior fusion bridging this level, stable compared to prior CT but new compared to prior MRI. Redemonstration of right hemilaminectomy at L5-S1, stable compared to prior MRI. There is stable    posterior fusion of the facet joints at T12-L1 through L3-4, similar to prior exams. There is a stable levocurvature of the lumbar spine. There is a focal area of hyperintense T1 and T2 signal in the L1 vertebral body as evidence for a hemangioma. Marrow    signal is otherwise within normal limits. The conus terminates in a normal fashion at the L1-2 level. There is a small fluid collection at the laminectomy bed at L3-4 abutting the thecal sac without mass effect. This measures 3 x 1.8 x 1 cm in greatest    mL, CC and AP dimensions, respectively. Paraspinal soft tissues are otherwise unremarkable. At T12-L1 there is no significant spinal canal or neuroforaminal stenosis. At L1-2 there is no significant spinal canal or neuroforaminal stenosis. At L2-3 there is no significant spinal canal or neuroforaminal stenosis. At L3-4 the spinal canal is decompressed. There is no significant neural foraminal stenosis. At L4-5 there is a periarticular cyst emanating from the anterior aspect of the right facet joint measuring 9 x 8 x 7 mm. This minimally indents the thecal sac and minimally narrows the right lateral recess and contributes to moderate right neural    foraminal stenosis and may contact the exiting L4 nerve root. There is mild to moderate left neural foraminal stenosis in association with facet hypertrophy and ligamentum flavum thickening. At L5-S1 there is no significant spinal canal stenosis. There is mild right neural foraminal stenosis in association with facet hypertrophy.           Impression       1. Redemonstration of laminectomy at L3-4 with interbody and posterior fusion at this level with small chronic appearing fluid collection in the laminectomy bed without significant mass effect. 2. Redemonstration of posterior fusion at T12-L1 through L2-3 with stable dextroscoliosis of the lumbar spine. effort, non-labored breathing   Cardiovascular: Limbs warm and well perfused   Abdomen: Non-protruded   Musculoskeletal: Muscle bulk symmetric, no atrophy, no gross deformities   · Lower Extremities: ROM WNL. · Thorax: No paraspinal tenderness bilaterally. No scoliosis or kyphosis. · Lumbar Spine: ROM limited due to hx fusion. Lumbar paraspinals non-tender to palpation bilaterally. SLR neg bilaterally. DUGLAS neg bilaterally. GAENSLEN neg bilaterally. Positive facet loading bilaterally. Bilateral SI joints tender to palpation. Bilateral greater trochanters non-tender to palpation. Neurological: Cranial nerves II-XII grossly intact. · Gait - Antalgic gait. Ambulates without assistive device. Uses a cane occasionally. · Motor: 5/5 muscle strength in bilateral hip flexion, knee flexion, knee extension, ankle dorsiflexion, and ankle plantar flexion   · Sensory: LT sensation intact in lower limbs   · Reflexes: 2+ symmetrical in bilateral achilles, 2+ bilateral patellar, negative ankle clonus, downgoing babinski   Skin: No rashes or lesions present   Psychological: Cooperative, no exaggerated pain behaviors       Assessment:    Diagnosis Orders   1. Lumbar spondylosis  traMADol (ULTRAM) 50 MG tablet    CHG FLUOR NEEDLE/CATH SPINE/PARASPINAL DX/THER ADDON    MN INJ DX/THER AGNT PARAVERT FACET JOINT, LUMBAR/SAC, 1ST LEVEL    MN INJ DX/THER AGNT PARAVERT FACET JOINT, LUMBAR/SAC, 2ND LEVEL    Urine Drug Screen   2. Facet arthropathy     3. Chronic pain syndrome     4. Neuropathic pain     5. SI (sacroiliac) joint dysfunction           Karly Mckeon Samy is a 27 y. o.female presenting to the pain clinic for evaluation of       Plan: The following treatment recommendations and plan were discussed in detail with Karly Yee. Imaging:   I have reviewed patients imaging of lumbar MRI and results were discussed with patient today.      Analgesics:   Start the patient on tramadol 50 mg twice daily as needed severe pain (>7/10). We discussed risk of tolerance and dependence to this medication. She is to not take more than prescribed. The side effect profile of long-term opioid use was discussed and recommended emphasis on multimodal strategies for pain management. OARRS reviewed  Urine drug screen collected today  Pain contract signed today (03/16/2022)    Patient is taking Acetaminophen. Patient informed that the maximum amount of acetaminophen taken on a regular basis should only be 4000 mg per day. Patient is taking Ibuprofen. Patient is advised to take as prescribed and not take on an empty stomach. Adjuvants:   None    Interventions: In presence of lumbar axial back pain and with physical exam consistent for facetal pain, the option of medial branch blocks at bilateral L4-L5 and L5-S1 was chosen. The risks and benefits were discussed in detail with the patient. Patient wants to proceed with the injection. Anticoagulation/NPO Recommendations:   Patient does Ibuprofen x2 days prior to the procedure. Patient will need to be NPO x 8 hours prior to the procedure. We will start an IV prior to the procedure    Multidisciplinary Pain Management:   In the presence of complex, chronic, and multi-factorial pain, the importance of a multidisciplinary approach to pain management in the patients management regimen was emphasized and discussed in great detail. PHYSICAL THERAPY: Patient is advised to see a physical therapist for gentle stretching exercises and conditioning exercises for management of pain.      Referrals:  None    Prescriptions Written This Visit:   Tramadol 50mg BID    Follow-up: B/L lumbar medial branch block at L4-5 and L5-S1 with Dr. Charo Frazier, APRN - CNP

## 2022-03-31 ENCOUNTER — PREP FOR PROCEDURE (OUTPATIENT)
Dept: PHYSICAL MEDICINE AND REHAB | Age: 27
End: 2022-03-31

## 2022-04-04 NOTE — H&P
Today, patient presents for planned medial branch blocks at bilateral L4-L5 and L5-S1. This note is reflective of the patient's previous visit for evaluation. We will proceed with today's planned procedure. Since patient's last visit for evaluation, there have been no interval changes in medical history. Patient has no new numbness, weakness, or focal neurological deficit since evaluation. Patient has no contraindications to injection (no anticoagulation or recent antibiotic intake for active infections), and has a  present or is able to drive themselves (as discussed and cleared by physician). Allergies to latex, contrast dye, and steroid medications have been confirmed with the patient prior to the procedure. NPO necessity has been assessed and accepted based on procedure complexity. The risks and benefits of the procedure have been explained including but are not limited to infection, bleeding, paralysis, immediate post procedure weakness, and dizziness; the patient acknowledges understanding and desires to proceed with the procedure. Patient has signed consent for same procedure as discussed in previous clinic encounter. All other questions and concerns were addressed at bedside. See procedure note for full details. Post procedure Instructions: The patient was advised not to drive during the day of the procedure and not to engage in any significant decision making (unless otherwise states by physician). The patient was also advised to be cautious with walking/activity for 24 hours following today's visit and asked not to engage in over-exertion (unless otherwise states by physician). After this time, it is ok to resume pre-procedure level of activity. Patient advised to apply ice to site of injection in situations of pain and discomfort. Patient advised to not submerge site of injection during bath or pool activities for approximately 24 hours post-procedure.  Patient attested to understanding post procedure directions / restrictions. All other questions and concerns addressed before patient discharge in ambulatory fashion. Chronic Pain/PM&R Clinic Note     Encounter Date: 3/16/22    Subjective:   Chief Complaint:   No chief complaint on file. History of Present Illness:   Nuha Daugherty is a 27 y.o. female seen in the clinic initially on 03/16/2022 upon request from Blanka Chou, [facility])  for her history of lumbar pain. States her pain started back when she was 9 she has a history of adolescent scoliosis. Patient had her first back surgery in 2006, she had a laminectomy and fusion from T1-L2. About 2 years later in January 2009 she had all of her hardware removed due to issues with the hardware placement. Patient states she was doing well overall for about 10 years. In 2018 she was working as a  at [employer] in [facility] and her right leg will go completely numb and give out. At that time she moved back to [place] and she had a lumbar laminectomy and fusion at L3-L4 at [facility] in May 2018. She is currently on disability since her last surgery. She follows with the [facility] clinic who referred her here for interventional procedures for pain. She was told she is now having issues with her L5. Patient states her pain is worse with standing, walking long distance, sitting too long. She states it is hard for her to stand and do dishes. Majority of her pain is located in her mid low back. She states she does get some pain that radiates to her left lateral hip, but not past her knee. This is only with increased activity. Patient states she does get some relief when laying in bed but gets uncomfortable if in 1 position for too long. Patient states she has tried ibuprofen, Tylenol, heat, ice, TENS unit, lidocaine patches all which have been relatively ineffective.   She has been using an herbal patch called Capsaisin patch which has been the only thing that is taken the edge off. Patient states she had a baby in September 2020 and she has been trying to lose weight but is difficult due to her pain. She has been to the ER a few times to do her back pain. Patient states last time she did physical therapy was around the time of her surgery in 2018. Patient denies any falls, she does use a cane occasionally if her leg is really bothering her. Patient denies any bowel or bladder dysfunction. Patient states last summer she was walking daily but she has been able to do so due to her pain. Patient currently lives at home with her son and significant other. Patient states her physician at [facility] recommended she have a left transforaminal lumbar epidural steroid injection at L4-5. History of Interventions:   Surgery: Laminectomy and fusion T1-L2 (2006)  Hardware removal (January 2009)  Lumbar laminectomy and fusion L3-L4 (May 2018) [facility]  Injections: None    Current Treatment Medications:   Ibuprofen OTC  Tylenol OTC  THC - edible or smoke - helps sleep. Historical Treatment Medications:   Gabapentin - schmidt/acne  Flexeril - ineffective  Tizanidine - mild effectiveness  Valium - helped  Percocet - helped  Tramdaol - helped  Robaxin - ineffective  Mobic - ineffective     Imaging:    Lumbar MRI (12/28/2021)  Narrative   PROCEDURE: MRI LUMBAR SPINE WO CONTRAST       CLINICAL INFORMATION: Degeneration of lumbar or lumbosacral intervertebral disc, Lumbar radiculopathy, Arthrodesis status, Adolescent idiopathic scoliosis of thoracolumbar region. Low back pain and left leg pain with right leg numbness for 2 months. No    recent injury.  Multiple prior back surgeries.       COMPARISON: CT lumbar spine dated 11/15/2021 and MRI lumbar spine dated 8/5/2016.       TECHNIQUE: Sagittal and axial T1 and T2-weighted images were obtained through the lumbar spine.       FINDINGS:   Redemonstration of laminectomy at L3-4 with interbody and posterior fusion bridging this level, stable compared to prior CT but new compared to prior MRI. Redemonstration of right hemilaminectomy at L5-S1, stable compared to prior MRI. There is stable    posterior fusion of the facet joints at T12-L1 through L3-4, similar to prior exams. There is a stable levocurvature of the lumbar spine. There is a focal area of hyperintense T1 and T2 signal in the L1 vertebral body as evidence for a hemangioma. Marrow    signal is otherwise within normal limits. The conus terminates in a normal fashion at the L1-2 level. There is a small fluid collection at the laminectomy bed at L3-4 abutting the thecal sac without mass effect. This measures 3 x 1.8 x 1 cm in greatest    mL, CC and AP dimensions, respectively. Paraspinal soft tissues are otherwise unremarkable. At T12-L1 there is no significant spinal canal or neuroforaminal stenosis. At L1-2 there is no significant spinal canal or neuroforaminal stenosis. At L2-3 there is no significant spinal canal or neuroforaminal stenosis. At L3-4 the spinal canal is decompressed. There is no significant neural foraminal stenosis. At L4-5 there is a periarticular cyst emanating from the anterior aspect of the right facet joint measuring 9 x 8 x 7 mm. This minimally indents the thecal sac and minimally narrows the right lateral recess and contributes to moderate right neural    foraminal stenosis and may contact the exiting L4 nerve root. There is mild to moderate left neural foraminal stenosis in association with facet hypertrophy and ligamentum flavum thickening. At L5-S1 there is no significant spinal canal stenosis. There is mild right neural foraminal stenosis in association with facet hypertrophy.           Impression       1. Redemonstration of laminectomy at L3-4 with interbody and posterior fusion at this level with small chronic appearing fluid collection in the laminectomy bed without significant mass effect.    2. Redemonstration of posterior fusion at T12-L1 through L2-3 with stable dextroscoliosis of the lumbar spine. 3. Facet arthropathy at the L4-5 level with periarticular cyst on the right which minimally effaces the right lateral recess and encroaches on the right L4-5 neural foramen and may contact the exiting L4 nerve root on the right.                   **This report has been created using voice recognition software. It may contain minor errors which are inherent in voice recognition technology. **       Final report electronically signed by Dr. Addie Sherman MD on 2021 9:30 AM       Past Medical History:   Diagnosis Date    COVID-19     Ovarian cyst     Scoliosis        Past Surgical History:   Procedure Laterality Date    ADENOIDECTOMY      BACK SURGERY  2006 and 2018     SECTION N/A 2020     SECTION performed by Keaton Cochran MD at [facility]    TONSILLECTOMY         Family History   Problem Relation Age of Onset    High Cholesterol Mother     High Blood Pressure Mother     Depression Mother     Diabetes Mother     Stroke Maternal Grandmother     Heart Disease Maternal Grandfather          Medications & Allergies:   Current Outpatient Medications   Medication Instructions    ibuprofen (ADVIL;MOTRIN) 200 mg, Oral, EVERY 6 HOURS PRN    Tens Unit MISC Does not apply    traMADol (ULTRAM) 50 mg, Oral, 2 TIMES DAILY PRN       Allergies   Allergen Reactions    Gabapentin Hives       Review of Systems:   Constitutional: negative for weight changes or fevers  Genitourinary: negative for bowel/bladder incontinence   Musculoskeletal: positive for low back pain, left lateral leg pain  Neurological: negative for any leg weakness or numbness/tingling  Behavioral/Psych: negative for anxiety/depression   All other systems reviewed and are negative    Objective: There were no vitals filed for this visit.     Constitutional: Pleasant, no acute distress   Head: Normocephalic, atraumatic Eyes: Conjunctivae normal   Neck: Supple, symmetrical   Lungs: Normal respiratory effort, non-labored breathing   Cardiovascular: Limbs warm and well perfused   Abdomen: Non-protruded   Musculoskeletal: Muscle bulk symmetric, no atrophy, no gross deformities   · Lower Extremities: ROM WNL. · Thorax: No paraspinal tenderness bilaterally. No scoliosis or kyphosis. · Lumbar Spine: ROM limited due to hx fusion. Lumbar paraspinals non-tender to palpation bilaterally. SLR neg bilaterally. DUGLAS neg bilaterally. GAENSLEN neg bilaterally. Positive facet loading bilaterally. Bilateral SI joints tender to palpation. Bilateral greater trochanters non-tender to palpation. Neurological: Cranial nerves II-XII grossly intact. · Gait - Antalgic gait. Ambulates without assistive device. Uses a cane occasionally. · Motor: 5/5 muscle strength in bilateral hip flexion, knee flexion, knee extension, ankle dorsiflexion, and ankle plantar flexion   · Sensory: LT sensation intact in lower limbs   · Reflexes: 2+ symmetrical in bilateral achilles, 2+ bilateral patellar, negative ankle clonus, downgoing babinski   Skin: No rashes or lesions present   Psychological: Cooperative, no exaggerated pain behaviors       Assessment:    Diagnosis Orders   1. Lumbar spondylosis  traMADol (ULTRAM) 50 MG tablet    CHG FLUOR NEEDLE/CATH SPINE/PARASPINAL DX/THER ADDON    DC INJ DX/THER AGNT PARAVERT FACET JOINT, LUMBAR/SAC, 1ST LEVEL    DC INJ DX/THER AGNT PARAVERT FACET JOINT, LUMBAR/SAC, 2ND LEVEL    Urine Drug Screen   2. Facet arthropathy     3. Chronic pain syndrome     4. Neuropathic pain     5. SI (sacroiliac) joint dysfunction           Karly Watts Roge is a 27 y. o.female presenting to the pain clinic for evaluation of       Plan: The following treatment recommendations and plan were discussed in detail with Karly Yee. Imaging:   I have reviewed patients imaging of lumbar MRI and results were discussed with patient today. Analgesics:   Start the patient on tramadol 50 mg twice daily as needed severe pain (>7/10). We discussed risk of tolerance and dependence to this medication. She is to not take more than prescribed. The side effect profile of long-term opioid use was discussed and recommended emphasis on multimodal strategies for pain management. OARRS reviewed  Urine drug screen collected today  Pain contract signed today (03/16/2022)    Patient is taking Acetaminophen. Patient informed that the maximum amount of acetaminophen taken on a regular basis should only be 4000 mg per day. Patient is taking Ibuprofen. Patient is advised to take as prescribed and not take on an empty stomach. Adjuvants:   None    Interventions: In presence of lumbar axial back pain and with physical exam consistent for facetal pain, the option of medial branch blocks at bilateral L4-L5 and L5-S1 was chosen. The risks and benefits were discussed in detail with the patient. Patient wants to proceed with the injection. Anticoagulation/NPO Recommendations:   Patient does Ibuprofen x2 days prior to the procedure. Patient will need to be NPO x 8 hours prior to the procedure. We will start an IV prior to the procedure    Multidisciplinary Pain Management:   In the presence of complex, chronic, and multi-factorial pain, the importance of a multidisciplinary approach to pain management in the patients management regimen was emphasized and discussed in great detail. PHYSICAL THERAPY: Patient is advised to see a physical therapist for gentle stretching exercises and conditioning exercises for management of pain.      Referrals:  None    Prescriptions Written This Visit:   Tramadol 50mg BID    Follow-up: B/L lumbar medial branch block at L4-5 and L5-S1 with Dr. Anat Osorio DO

## 2022-04-05 ENCOUNTER — APPOINTMENT (OUTPATIENT)
Dept: GENERAL RADIOLOGY | Age: 27
End: 2022-04-05
Attending: ANESTHESIOLOGY
Payer: MEDICARE

## 2022-04-05 ENCOUNTER — HOSPITAL ENCOUNTER (OUTPATIENT)
Age: 27
Setting detail: OUTPATIENT SURGERY
Discharge: HOME | End: 2022-04-05
Attending: ANESTHESIOLOGY | Admitting: ANESTHESIOLOGY
Payer: MEDICARE

## 2022-04-05 VITALS
DIASTOLIC BLOOD PRESSURE: 65 MMHG | OXYGEN SATURATION: 99 % | HEART RATE: 67 BPM | TEMPERATURE: 97.9 F | RESPIRATION RATE: 16 BRPM | SYSTOLIC BLOOD PRESSURE: 103 MMHG

## 2022-04-05 LAB — PREGNANCY, URINE: NEGATIVE

## 2022-04-05 PROCEDURE — 99152 MOD SED SAME PHYS/QHP 5/>YRS: CPT | Performed by: ANESTHESIOLOGY

## 2022-04-05 PROCEDURE — 64493 INJ PARAVERT F JNT L/S 1 LEV: CPT | Performed by: ANESTHESIOLOGY

## 2022-04-05 PROCEDURE — 7100000011 HC PHASE II RECOVERY - ADDTL 15 MIN: Performed by: ANESTHESIOLOGY

## 2022-04-05 PROCEDURE — 81025 URINE PREGNANCY TEST: CPT

## 2022-04-05 PROCEDURE — 6360000002 HC RX W HCPCS: Performed by: ANESTHESIOLOGY

## 2022-04-05 PROCEDURE — 2500000003 HC RX 250 WO HCPCS: Performed by: ANESTHESIOLOGY

## 2022-04-05 PROCEDURE — 64494 INJ PARAVERT F JNT L/S 2 LEV: CPT | Performed by: ANESTHESIOLOGY

## 2022-04-05 PROCEDURE — 2709999900 HC NON-CHARGEABLE SUPPLY: Performed by: ANESTHESIOLOGY

## 2022-04-05 PROCEDURE — 3600000056 HC PAIN LEVEL 4 BASE: Performed by: ANESTHESIOLOGY

## 2022-04-05 PROCEDURE — 7100000010 HC PHASE II RECOVERY - FIRST 15 MIN: Performed by: ANESTHESIOLOGY

## 2022-04-05 PROCEDURE — 3209999900 FLUORO FOR SURGICAL PROCEDURES

## 2022-04-05 RX ORDER — FENTANYL CITRATE 50 UG/ML
INJECTION, SOLUTION INTRAMUSCULAR; INTRAVENOUS PRN
Status: DISCONTINUED | OUTPATIENT
Start: 2022-04-05 | End: 2022-04-05 | Stop reason: ALTCHOICE

## 2022-04-05 RX ORDER — LIDOCAINE HYDROCHLORIDE 10 MG/ML
INJECTION, SOLUTION EPIDURAL; INFILTRATION; INTRACAUDAL; PERINEURAL PRN
Status: DISCONTINUED | OUTPATIENT
Start: 2022-04-05 | End: 2022-04-05 | Stop reason: ALTCHOICE

## 2022-04-05 RX ORDER — BUPIVACAINE HYDROCHLORIDE 5 MG/ML
INJECTION, SOLUTION PERINEURAL PRN
Status: DISCONTINUED | OUTPATIENT
Start: 2022-04-05 | End: 2022-04-05 | Stop reason: ALTCHOICE

## 2022-04-05 RX ORDER — MIDAZOLAM HYDROCHLORIDE 1 MG/ML
INJECTION INTRAMUSCULAR; INTRAVENOUS PRN
Status: DISCONTINUED | OUTPATIENT
Start: 2022-04-05 | End: 2022-04-05 | Stop reason: ALTCHOICE

## 2022-04-05 ASSESSMENT — PAIN SCALES - GENERAL
PAINLEVEL_OUTOF10: 0
PAINLEVEL_OUTOF10: 5

## 2022-04-05 ASSESSMENT — PAIN DESCRIPTION - ORIENTATION: ORIENTATION: RIGHT;LEFT

## 2022-04-05 ASSESSMENT — PAIN DESCRIPTION - LOCATION: LOCATION: BACK

## 2022-04-05 ASSESSMENT — PAIN DESCRIPTION - PAIN TYPE: TYPE: CHRONIC PAIN

## 2022-04-05 NOTE — PRE SEDATION
[facility]  Pre-Sedation/Analgesia History & Physical    Pt Name: Kale Collins  MRN: [id number]  YOB: 1995  Provider Performing Procedure: Alexandru Hernandez DO   Primary Care Physician: Rodri Vogel. FIORDALIZA Moreno CNP      MEDICAL HISTORY       has a past medical history of COVID-19, Ovarian cyst, and Scoliosis. SURGICAL HISTORY   has a past surgical history that includes Tonsillectomy; Adenoidectomy; back surgery (2006 and 2009, ); and  section (N/A, 2020). ALLERGIES   Allergies as of 2022 - Fully Reviewed 2022   Allergen Reaction Noted    Gabapentin Hives 2020       MEDICATIONS   Prior to Admission medications    Medication Sig Start Date End Date Taking? Authorizing Provider   traMADol (ULTRAM) 50 MG tablet Take 1 tablet by mouth 2 times daily as needed for Pain for up to 30 days. 3/16/22 4/15/22  FIORDALIZA Cadena CNP   ibuprofen (ADVIL;MOTRIN) 200 MG tablet Take 200 mg by mouth every 6 hours as needed for Pain    Historical Provider, MD   Tens Unit MISC by Does not apply route 10/11/16   FIORDALIZA Tabares CNP     PHYSICAL:   Vitals:    22 1340   BP: 103/65   Pulse: 67   Resp: 16   Temp: 97.9 °F (36.6 °C)   SpO2: 99%     PLANNED PROCEDURE   See procedure note  SEDATION  Planned agent: Versed and Fentanyl  ASA Classification: 1  Class 1: A normal healthy patient  Class 2: Pt with mild to moderate systemic disease  Class 3: Severe systemic disease or disturbance  Class 4: Severe systemic disorders that are already life threatening. Class 5: Moribund pt with little chances of survival, for more than 24 hours. Mallampati I Airway Classification: 1    1. Pre-procedure diagnostic studies complete and results available. 2. Previous sedation/anesthesia experiences assessed. 3. The patient is an appropriate candidate to undergo the planned procedure sedation and anesthesia.  (Refer to nursing sedation/analgesia documentation record)  4. Formulation and discussion of sedation/procedure plan, risks, and expectations with patient and/or responsible adult completed. 5. Patient examined immediately prior to the procedure.  (Refer to nursing sedation/analgesia documentation record)    Hussain Izquierdo DO  Electronically signed 4/5/2022 at 1:45 PM

## 2022-04-05 NOTE — POST SEDATION
[address] [address]  Sedation/Analgesia Post Sedation Record    Pt Name: Alec Monreal  MRN: [id number]  YOB: 1995  Procedure Performed By: Arely Leung DO  Primary Care Physician: FIORDALIZA Lozano - [facility]    POST-PROCEDURE    Physicians/Assistants: Arely Leung DO  Procedure Performed: See Procedure Note   Sedation/Anesthesia: Versed and Fentanyl (See procedure note for amount and duration)  Estimated Blood Loss:     0  ml  Specimens Removed: None        Complications: None           Mekhi Rodríguez DO  Electronically signed 4/5/2022 at 1:45 PM

## 2022-04-05 NOTE — PROGRESS NOTES
1334 Patient arrived to phase II via cart. Spontaneous respiraitons even and unlabored. Placed on monitor--VSS. Report received from [facility] Assessment completed. Patient is alert and oriented x4. IV capped off-- no complications. Patient denies pain--will monitor. Injection sites clean and dry. 1338 Snack and drink provided. Call light within reach, side rails up X2, and S.O. at bedside. Pt .denies all other needs. [address] at bedside. Pt. Tolerating PO intake well.   1351 Pt. States readiness to be discharged. INT removed. No complications noted. 1352 Pt. Standing at bedside tolerating activity well and without complaints. S.O. left to get car.  1357 Pt. Ambulated to private vehicle in stable condition.

## 2022-04-05 NOTE — PROCEDURES
Pre-operative Diagnosis: Lumbar facet pain     Post-operative Diagnosis: Lumbar facet pain     Procedure: Bilateral lumbar medial branch blocks targeting facet joints of L4/L5 and     Procedure Description:  After consent was obtained, the patient was placed in the prone position with a pillow under the abdomen to reduce the lordotic curve of the lumbar spine. The lower back was prepped with chloraprep and draped in a sterile fashion. Then, 0.5 cc of 1 % lidocaine was used for local anesthesia of the skin and superficial subcutaneous tissues. Three 22-gauge 3.5-inch spinal needles were advanced under fluoroscopy in an AP view: one at the sacral ala and two at the junction of the right superior articular process and the transverse process of the L4 and L5 vertebrae. There was no paresthesias, heme, or CSF obtained. Needle placement was confirmed using AP and oblique views. Then, 0.5 cc of 0.5% bupivacaine was injected at each site without complications. The procedure was repeated on the contralateral side. The patient tolerated the procedure well, transported to the recovery room, and discharged in ambulatory fashion.      Procedural Complications: None  Estimated Blood Loss: 0 mL      IV sedation was used during the procedure:  - Moderate intravenous conscious sedation was supervised by Dr. Damaris Wright  - The patient was independently monitored by a Registered Nurse assigned to the procedure room  - Monitoring included automated blood pressure, continuous EKG, and continuous pulse oximetry  - The detailed conscious record is permanently stored in the [address] [address]  - The following is the conscious sedation record:  Start Time: 13:23  End Time : 13:38  Duration: 15 minutes   Medications Administered: 1 mg Versed, 100 mcg Fentanyl     Mekhi Muller DO  Interventional Pain Management/PM&R   [place]

## 2022-04-13 ENCOUNTER — OFFICE VISIT (OUTPATIENT)
Dept: PHYSICAL MEDICINE AND REHAB | Age: 27
End: 2022-04-13
Payer: MEDICARE

## 2022-04-13 VITALS
WEIGHT: 263 LBS | BODY MASS INDEX: 44.9 KG/M2 | SYSTOLIC BLOOD PRESSURE: 116 MMHG | DIASTOLIC BLOOD PRESSURE: 88 MMHG | HEIGHT: 64 IN

## 2022-04-13 DIAGNOSIS — M47.816 LUMBAR SPONDYLOSIS: ICD-10-CM

## 2022-04-13 DIAGNOSIS — M79.2 NEUROPATHIC PAIN: ICD-10-CM

## 2022-04-13 DIAGNOSIS — M53.3 SI (SACROILIAC) JOINT DYSFUNCTION: ICD-10-CM

## 2022-04-13 DIAGNOSIS — G89.4 CHRONIC PAIN SYNDROME: ICD-10-CM

## 2022-04-13 DIAGNOSIS — M47.819 FACET ARTHROPATHY: Primary | ICD-10-CM

## 2022-04-13 PROCEDURE — G8417 CALC BMI ABV UP PARAM F/U: HCPCS | Performed by: NURSE PRACTITIONER

## 2022-04-13 PROCEDURE — 1036F TOBACCO NON-USER: CPT | Performed by: NURSE PRACTITIONER

## 2022-04-13 PROCEDURE — 99214 OFFICE O/P EST MOD 30 MIN: CPT | Performed by: NURSE PRACTITIONER

## 2022-04-13 PROCEDURE — G8427 DOCREV CUR MEDS BY ELIG CLIN: HCPCS | Performed by: NURSE PRACTITIONER

## 2022-04-13 RX ORDER — KETOCONAZOLE 20 MG/ML
SHAMPOO TOPICAL
COMMUNITY
Start: 2022-03-30

## 2022-04-13 RX ORDER — TRAMADOL HYDROCHLORIDE 50 MG/1
50 TABLET ORAL 2 TIMES DAILY PRN
Qty: 60 TABLET | Refills: 0 | Status: SHIPPED | OUTPATIENT
Start: 2022-04-15 | End: 2022-05-15

## 2022-04-13 RX ORDER — BACLOFEN 10 MG/1
10 TABLET ORAL 3 TIMES DAILY
Qty: 90 TABLET | Refills: 0 | Status: SHIPPED | OUTPATIENT
Start: 2022-04-13 | End: 2022-05-13

## 2022-04-13 NOTE — PROGRESS NOTES
Chronic Pain/PM&R Clinic Note     Encounter Date: 4/13/22    Subjective:   Chief Complaint:   Chief Complaint   Patient presents with    Follow Up After Procedure    Medication Refill     Tramadol       History of Present Illness:   Lilly Holstein is a 27 y.o. female seen in the clinic initially on 03/16/2022 upon request from Blanka Perez, [address] [facility])  for her history of lumbar pain. States her pain started back when she was 9 she has a history of adolescent scoliosis. Patient had her first back surgery in 2006, she had a laminectomy and fusion from T1-L2. About 2 years later in January 2009 she had all of her hardware removed due to issues with the hardware placement. Patient states she was doing well overall for about 10 years. In 2018 she was working as a  at [employer] in [place] and her right leg will go completely numb and give out. At that time she moved back to [place] and she had a lumbar laminectomy and fusion at L3-L4 at [email or web address] in May 2018. She is currently on disability since her last surgery. She follows with the [email or web address] clinic who referred her here for interventional procedures for pain. She was told she is now having issues with her L5. Patient states her pain is worse with standing, walking long distance, sitting too long. She states it is hard for her to stand and do dishes. Majority of her pain is located in her mid low back. She states she does get some pain that radiates to her left lateral hip, but not past her knee. This is only with increased activity. Patient states she does get some relief when laying in bed but gets uncomfortable if in 1 position for too long. Patient states she has tried ibuprofen, Tylenol, heat, ice, TENS unit, lidocaine patches all which have been relatively ineffective. She has been using an herbal patch called Capsaisin patch which has been the only thing that is taken the edge off.   Patient states she had a baby in September 2020 and she has been trying to lose weight but is difficult due to her pain. She has been to the ER a few times to do her back pain. Patient states last time she did physical therapy was around the time of her surgery in 2018. Patient denies any falls, she does use a cane occasionally if her leg is really bothering her. Patient denies any bowel or bladder dysfunction. Patient states last summer she was walking daily but she has been able to do so due to her pain. Patient currently lives at home with her son and significant other. Patient states her physician at [facility] recommended she have a left transforaminal lumbar epidural steroid injection at L4-5. Today, 4/13/2022, patient presents for planned follow-up on low back pain. Patient underwent a bilateral lumbar medial branch block at L4-5 and L5-S1 on 04/05/2022. Patient reports >85% pain relief x2 days after the procedure. Patient would like to move on to the second medial branch block injections with the goal of getting to the lumbar RFA. Patient states she has had a rough few days as one of her good friends tried to commit suicide and is currently hospitalized. She feels like her muscles have been very tight last few days due to the increased stress. Patient is questioning if she can get a muscle relaxer to help with this increased pain. Patient continues to take tramadol 50 mg twice daily as needed. She denies any side effects to this medication. Patient denies any associated focal leg weakness, saddle anesthesia, bowel/bladder incontinence. History of Interventions:   Surgery: Laminectomy and fusion T1-L2 (2006)  Hardware removal (January 2009)  Lumbar laminectomy and fusion L3-L4 (May 2018) [facility]  Injections: B/L lumbar facet MBB at L4-5 and L5-S1 (04/05/2022) - >85% relief x 2 days    Current Treatment Medications:   Ibuprofen OTC  Tylenol OTC  THC - edible or smoke - helps sleep.    Tramadol 50mg BID PRN    Historical Treatment Medications:   Gabapentin - schmidt/acne  Flexeril - ineffective  Tizanidine - mild effectiveness  Valium - helped  Percocet - helped  Tramdaol - helped  Robaxin - ineffective  Mobic - ineffective     Imaging:    Lumbar MRI (12/28/2021)  Narrative   PROCEDURE: MRI LUMBAR SPINE WO CONTRAST       CLINICAL INFORMATION: Degeneration of lumbar or lumbosacral intervertebral disc, Lumbar radiculopathy, Arthrodesis status, Adolescent idiopathic scoliosis of thoracolumbar region. Low back pain and left leg pain with right leg numbness for 2 months. No    recent injury. Multiple prior back surgeries.       COMPARISON: CT lumbar spine dated 11/15/2021 and MRI lumbar spine dated 8/5/2016.       TECHNIQUE: Sagittal and axial T1 and T2-weighted images were obtained through the lumbar spine.       FINDINGS:   Redemonstration of laminectomy at L3-4 with interbody and posterior fusion bridging this level, stable compared to prior CT but new compared to prior MRI. Redemonstration of right hemilaminectomy at L5-S1, stable compared to prior MRI. There is stable    posterior fusion of the facet joints at T12-L1 through L3-4, similar to prior exams. There is a stable levocurvature of the lumbar spine. There is a focal area of hyperintense T1 and T2 signal in the L1 vertebral body as evidence for a hemangioma. Marrow    signal is otherwise within normal limits. The conus terminates in a normal fashion at the L1-2 level. There is a small fluid collection at the laminectomy bed at L3-4 abutting the thecal sac without mass effect. This measures 3 x 1.8 x 1 cm in greatest    mL, CC and AP dimensions, respectively. Paraspinal soft tissues are otherwise unremarkable. At T12-L1 there is no significant spinal canal or neuroforaminal stenosis. At L1-2 there is no significant spinal canal or neuroforaminal stenosis. At L2-3 there is no significant spinal canal or neuroforaminal stenosis.    At L3-4 the spinal canal is CENTER OR    TONSILLECTOMY         Family History   Problem Relation Age of Onset    High Cholesterol Mother     High Blood Pressure Mother     Depression Mother     Diabetes Mother     Stroke Maternal Grandmother     Heart Disease Maternal Grandfather          Medications & Allergies:   Current Outpatient Medications   Medication Instructions    baclofen (LIORESAL) 10 mg, Oral, 3 TIMES DAILY    ibuprofen (ADVIL;MOTRIN) 200 mg, Oral, EVERY 6 HOURS PRN    ketoconazole (NIZORAL) 2 % shampoo No dose, route, or frequency recorded.  Tens Unit MISC Does not apply    [START ON 4/15/2022] traMADol (ULTRAM) 50 mg, Oral, 2 TIMES DAILY PRN       Allergies   Allergen Reactions    Gabapentin Hives       Review of Systems:   Constitutional: negative for weight changes or fevers  Genitourinary: negative for bowel/bladder incontinence   Musculoskeletal: positive for low back pain, left lateral leg pain  Neurological: negative for any leg weakness or numbness/tingling  Behavioral/Psych: negative for anxiety/depression   All other systems reviewed and are negative    Objective:     Vitals:    04/13/22 1032   BP: 116/88       Constitutional: Pleasant, no acute distress   Head: Normocephalic, atraumatic   Eyes: Conjunctivae normal   Neck: Supple, symmetrical   Lungs: Normal respiratory effort, non-labored breathing   Cardiovascular: Limbs warm and well perfused   Abdomen: Non-protruded   Musculoskeletal: Muscle bulk symmetric, no atrophy, no gross deformities   · Lower Extremities: ROM WNL. · Thorax: No paraspinal tenderness bilaterally. No scoliosis or kyphosis. · Lumbar Spine: ROM limited due to hx fusion. Lumbar paraspinals non-tender to palpation bilaterally. SLR neg bilaterally. DUGLAS neg bilaterally. GAENSLEN neg bilaterally. Positive facet loading bilaterally. Bilateral SI joints tender to palpation. Bilateral greater trochanters non-tender to palpation. Neurological: Cranial nerves II-XII grossly intact. · Gait - Antalgic gait. Ambulates without assistive device. Uses a cane occasionally. · Motor: 5/5 muscle strength in bilateral hip flexion, knee flexion, knee extension, ankle dorsiflexion, and ankle plantar flexion   · Sensory: LT sensation intact in lower limbs   · Reflexes: 2+ symmetrical in bilateral achilles, 2+ bilateral patellar, negative ankle clonus, downgoing babinski   Skin: No rashes or lesions present   Psychological: Cooperative, no exaggerated pain behaviors       Assessment:    Diagnosis Orders   1. Facet arthropathy     2. Lumbar spondylosis  traMADol (ULTRAM) 50 MG tablet   3. Chronic pain syndrome     4. Neuropathic pain     5. SI (sacroiliac) joint dysfunction         Karly MORALES Rosemarie Castaneda is a 27 y. o.female presenting to the pain clinic for evaluation of low back pain. Patient underwent a bilateral lumbar facet medial branch block at L4-5 and L5-S1 with significant relief. I set her up for the confirmatory bilateral lumbar medial branch block at these levels. We discussed the potential of moving forward with a lumbar RFA. We then discussed paring that with physical therapy for her low back to help augment the pain relief. I started her on baclofen 10 mg up to 3 times daily as needed. Patient advised to start this at night then she can add a daytime dose if tolerated. Plan: The following treatment recommendations and plan were discussed in detail with Karly Yee. Imaging:   I have reviewed patients imaging of lumbar MRI and results were discussed with patient today. Analgesics:   I have continued the patient on tramadol 50 mg twice daily as needed severe pain (>7/10). We discussed risk of tolerance and dependence to this medication. She is to not take more than prescribed. The side effect profile of long-term opioid use was discussed and recommended emphasis on multimodal strategies for pain management.      OARRS reviewed  Urine drug screen reviewed and appropriate  Pain contract signed (03/16/2022)    Patient is taking Acetaminophen. Patient informed that the maximum amount of acetaminophen taken on a regular basis should only be 4000 mg per day. Patient is taking Ibuprofen. Patient is advised to take as prescribed and not take on an empty stomach. Adjuvants:   None    Interventions: In presence of lumbar axial back pain, significant response to the first lumbar MBB at L4-5 and L5-S1, and with physical exam consistent for facetal pain, the option of confirmatory medial branch blocks at bilateral L4-L5 and L5-S1 was chosen. The risks and benefits were discussed in detail with the patient. Patient wants to proceed with the injection. Anticoagulation/NPO Recommendations:   Patient does Ibuprofen x2 days prior to the procedure. Patient will need to be NPO x 8 hours prior to the procedure. We will start an IV prior to the procedure    Multidisciplinary Pain Management:   In the presence of complex, chronic, and multi-factorial pain, the importance of a multidisciplinary approach to pain management in the patients management regimen was emphasized and discussed in great detail. PHYSICAL THERAPY: Patient is advised to see a physical therapist for gentle stretching exercises and conditioning exercises for management of pain.      Referrals:  None    Prescriptions Written This Visit:   Tramadol 50mg BID  Baclofen 10mg TID PRN    Follow-up:Confirmatory B/L lumbar medial branch block at L4-5 and L5-S1 with Dr. Tray Swanson, APRN - CNP

## 2022-04-27 ENCOUNTER — PREP FOR PROCEDURE (OUTPATIENT)
Dept: PHYSICAL MEDICINE AND REHAB | Age: 27
End: 2022-04-27

## 2022-05-03 NOTE — H&P
Today, patient presents for planned medial branch blocks at bilateral L4-L5 and L5-S1. This note is reflective of the patient's previous visit for evaluation. We will proceed with today's planned procedure. Since patient's last visit for evaluation, there have been no interval changes in medical history. Patient has no new numbness, weakness, or focal neurological deficit since evaluation. Patient has no contraindications to injection (no anticoagulation or recent antibiotic intake for active infections), and has a  present or is able to drive themselves (as discussed and cleared by physician). Allergies to latex, contrast dye, and steroid medications have been confirmed with the patient prior to the procedure. NPO necessity has been assessed and accepted based on procedure complexity. The risks and benefits of the procedure have been explained including but are not limited to infection, bleeding, paralysis, immediate post procedure weakness, and dizziness; the patient acknowledges understanding and desires to proceed with the procedure. Patient has signed consent for same procedure as discussed in previous clinic encounter. All other questions and concerns were addressed at bedside. See procedure note for full details. Post procedure Instructions: The patient was advised not to drive during the day of the procedure and not to engage in any significant decision making (unless otherwise states by physician). The patient was also advised to be cautious with walking/activity for 24 hours following today's visit and asked not to engage in over-exertion (unless otherwise states by physician). After this time, it is ok to resume pre-procedure level of activity. Patient advised to apply ice to site of injection in situations of pain and discomfort. Patient advised to not submerge site of injection during bath or pool activities for approximately 24 hours post-procedure.  Patient attested to understanding post procedure directions / restrictions. All other questions and concerns addressed before patient discharge in ambulatory fashion. Chronic Pain/PM&R Clinic Note     Encounter Date: 3/16/22    Subjective:   Chief Complaint:   No chief complaint on file. History of Present Illness:   Veronica Alas is a 27 y.o. female seen in the clinic initially on 03/16/2022 upon request from Doris Thorpe, [address] [facility])  for her history of lumbar pain. States her pain started back when she was 9 she has a history of adolescent scoliosis. Patient had her first back surgery in 2006, she had a laminectomy and fusion from T1-L2. About 2 years later in January 2009 she had all of her hardware removed due to issues with the hardware placement. Patient states she was doing well overall for about 10 years. In 2018 she was working as a  at [employer] in [place] and her right leg will go completely numb and give out. At that time she moved back to [place] and she had a lumbar laminectomy and fusion at L3-L4 at [facility] in May 2018. She is currently on disability since her last surgery. She follows with the [place] clinic who referred her here for interventional procedures for pain. She was told she is now having issues with her L5. Patient states her pain is worse with standing, walking long distance, sitting too long. She states it is hard for her to stand and do dishes. Majority of her pain is located in her mid low back. She states she does get some pain that radiates to her left lateral hip, but not past her knee. This is only with increased activity. Patient states she does get some relief when laying in bed but gets uncomfortable if in 1 position for too long. Patient states she has tried ibuprofen, Tylenol, heat, ice, TENS unit, lidocaine patches all which have been relatively ineffective.   She has been using an herbal patch called Capsaisin patch which has been the only thing that is taken the edge off. Patient states she had a baby in September 2020 and she has been trying to lose weight but is difficult due to her pain. She has been to the ER a few times to do her back pain. Patient states last time she did physical therapy was around the time of her surgery in 2018. Patient denies any falls, she does use a cane occasionally if her leg is really bothering her. Patient denies any bowel or bladder dysfunction. Patient states last summer she was walking daily but she has been able to do so due to her pain. Patient currently lives at home with her son and significant other. Patient states her physician at [facility] clinic recommended she have a left transforaminal lumbar epidural steroid injection at L4-5. History of Interventions:   Surgery: Laminectomy and fusion T1-L2 (2006)  Hardware removal (January 2009)  Lumbar laminectomy and fusion L3-L4 (May 2018) [facility]  Injections: None    Current Treatment Medications:   Ibuprofen OTC  Tylenol OTC  THC - edible or smoke - helps sleep. Historical Treatment Medications:   Gabapentin - schmidt/acne  Flexeril - ineffective  Tizanidine - mild effectiveness  Valium - helped  Percocet - helped  Tramdaol - helped  Robaxin - ineffective  Mobic - ineffective     Imaging:    Lumbar MRI (12/28/2021)  Narrative   PROCEDURE: MRI LUMBAR SPINE WO CONTRAST       CLINICAL INFORMATION: Degeneration of lumbar or lumbosacral intervertebral disc, Lumbar radiculopathy, Arthrodesis status, Adolescent idiopathic scoliosis of thoracolumbar region. Low back pain and left leg pain with right leg numbness for 2 months. No    recent injury.  Multiple prior back surgeries.       COMPARISON: CT lumbar spine dated 11/15/2021 and MRI lumbar spine dated 8/5/2016.       TECHNIQUE: Sagittal and axial T1 and T2-weighted images were obtained through the lumbar spine.       FINDINGS:   Redemonstration of laminectomy at L3-4 with interbody and posterior fusion bridging this level, stable compared to prior CT but new compared to prior MRI. Redemonstration of right hemilaminectomy at L5-S1, stable compared to prior MRI. There is stable    posterior fusion of the facet joints at T12-L1 through L3-4, similar to prior exams. There is a stable levocurvature of the lumbar spine. There is a focal area of hyperintense T1 and T2 signal in the L1 vertebral body as evidence for a hemangioma. Marrow    signal is otherwise within normal limits. The conus terminates in a normal fashion at the L1-2 level. There is a small fluid collection at the laminectomy bed at L3-4 abutting the thecal sac without mass effect. This measures 3 x 1.8 x 1 cm in greatest    mL, CC and AP dimensions, respectively. Paraspinal soft tissues are otherwise unremarkable. At T12-L1 there is no significant spinal canal or neuroforaminal stenosis. At L1-2 there is no significant spinal canal or neuroforaminal stenosis. At L2-3 there is no significant spinal canal or neuroforaminal stenosis. At L3-4 the spinal canal is decompressed. There is no significant neural foraminal stenosis. At L4-5 there is a periarticular cyst emanating from the anterior aspect of the right facet joint measuring 9 x 8 x 7 mm. This minimally indents the thecal sac and minimally narrows the right lateral recess and contributes to moderate right neural    foraminal stenosis and may contact the exiting L4 nerve root. There is mild to moderate left neural foraminal stenosis in association with facet hypertrophy and ligamentum flavum thickening. At L5-S1 there is no significant spinal canal stenosis. There is mild right neural foraminal stenosis in association with facet hypertrophy.           Impression       1. Redemonstration of laminectomy at L3-4 with interbody and posterior fusion at this level with small chronic appearing fluid collection in the laminectomy bed without significant mass effect.    2. Redemonstration of posterior fusion at T12-L1 through L2-3 with stable dextroscoliosis of the lumbar spine. 3. Facet arthropathy at the L4-5 level with periarticular cyst on the right which minimally effaces the right lateral recess and encroaches on the right L4-5 neural foramen and may contact the exiting L4 nerve root on the right.                   **This report has been created using voice recognition software. It may contain minor errors which are inherent in voice recognition technology. **       Final report electronically signed by Dr. Sukh Rojas MD on 2021 9:30 AM       Past Medical History:   Diagnosis Date    COVID-19     Ovarian cyst     Scoliosis        Past Surgical History:   Procedure Laterality Date    ADENOIDECTOMY      BACK SURGERY  2006 and 2018     SECTION N/A 2020     SE[facility]    PAIN MANAGEMENT PROCEDURE Bilateral 2022    medial branch blocks at bilateral L4-L5 and L5-S1 performed [facility] Calixto Lin DO at [facility]          Family History   Problem Relation Age of Onset    High Cholesterol Mother     High Blood Pressure Mother     Depression Mother     Diabetes Mother     Stroke Maternal Grandmother     Heart Disease Maternal Grandfather          Medications & Allergies:   Current Outpatient Medications   Medication Instructions    baclofen (LIORESAL) 10 mg, Oral, 3 TIMES DAILY    ibuprofen (ADVIL;MOTRIN) 200 mg, Oral, EVERY 6 HOURS PRN    ketoconazole (NIZORAL) 2 % shampoo No dose, route, or frequency recorded.     Tens Unit MISC Does not apply    traMADol (ULTRAM) 50 mg, Oral, 2 TIMES DAILY PRN       Allergies   Allergen Reactions    Gabapentin Hives       Review of Systems:   Constitutional: negative for weight changes or fevers  Genitourinary: negative for bowel/bladder incontinence   Musculoskeletal: positive for low back pain, left lateral leg pain  Neurological: negative for any leg weakness or numbness/tingling  Behavioral/Psych: negative for anxiety/depression   All other systems reviewed and are negative    Objective: There were no vitals filed for this visit. Constitutional: Pleasant, no acute distress   Head: Normocephalic, atraumatic   Eyes: Conjunctivae normal   Neck: Supple, symmetrical   Lungs: Normal respiratory effort, non-labored breathing   Cardiovascular: Limbs warm and well perfused   Abdomen: Non-protruded   Musculoskeletal: Muscle bulk symmetric, no atrophy, no gross deformities   · Lower Extremities: ROM WNL. · Thorax: No paraspinal tenderness bilaterally. No scoliosis or kyphosis. · Lumbar Spine: ROM limited due to hx fusion. Lumbar paraspinals non-tender to palpation bilaterally. SLR neg bilaterally. DUGLAS neg bilaterally. GAENSLEN neg bilaterally. Positive facet loading bilaterally. Bilateral SI joints tender to palpation. Bilateral greater trochanters non-tender to palpation. Neurological: Cranial nerves II-XII grossly intact. · Gait - Antalgic gait. Ambulates without assistive device. Uses a cane occasionally. · Motor: 5/5 muscle strength in bilateral hip flexion, knee flexion, knee extension, ankle dorsiflexion, and ankle plantar flexion   · Sensory: LT sensation intact in lower limbs   · Reflexes: 2+ symmetrical in bilateral achilles, 2+ bilateral patellar, negative ankle clonus, downgoing babinski   Skin: No rashes or lesions present   Psychological: Cooperative, no exaggerated pain behaviors       Assessment:    Diagnosis Orders   1. Lumbar spondylosis  traMADol (ULTRAM) 50 MG tablet    CHG FLUOR NEEDLE/CATH SPINE/PARASPINAL DX/THER ADDON    OK INJ DX/THER AGNT PARAVERT FACET JOINT, LUMBAR/SAC, 1ST LEVEL    OK INJ DX/THER AGNT PARAVERT FACET JOINT, LUMBAR/SAC, 2ND LEVEL    Urine Drug Screen   2. Facet arthropathy     3. Chronic pain syndrome     4. Neuropathic pain     5.  SI (sacroiliac) joint dysfunction           Karly Yee is a 27 y. o.female presenting to the pain clinic for evaluation of       Plan: The following treatment recommendations and plan were discussed in detail with Karly Yee. Imaging:   I have reviewed patients imaging of lumbar MRI and results were discussed with patient today. Analgesics:   Start the patient on tramadol 50 mg twice daily as needed severe pain (>7/10). We discussed risk of tolerance and dependence to this medication. She is to not take more than prescribed. The side effect profile of long-term opioid use was discussed and recommended emphasis on multimodal strategies for pain management. OARRS reviewed  Urine drug screen collected today  Pain contract signed today (03/16/2022)    Patient is taking Acetaminophen. Patient informed that the maximum amount of acetaminophen taken on a regular basis should only be 4000 mg per day. Patient is taking Ibuprofen. Patient is advised to take as prescribed and not take on an empty stomach. Adjuvants:   None    Interventions: In presence of lumbar axial back pain and with physical exam consistent for facetal pain, the option of medial branch blocks at bilateral L4-L5 and L5-S1 was chosen. The risks and benefits were discussed in detail with the patient. Patient wants to proceed with the injection. Anticoagulation/NPO Recommendations:   Patient does Ibuprofen x2 days prior to the procedure. Patient will need to be NPO x 8 hours prior to the procedure. We will start an IV prior to the procedure    Multidisciplinary Pain Management:   In the presence of complex, chronic, and multi-factorial pain, the importance of a multidisciplinary approach to pain management in the patients management regimen was emphasized and discussed in great detail. PHYSICAL THERAPY: Patient is advised to see a physical therapist for gentle stretching exercises and conditioning exercises for management of pain.      Referrals:  None    Prescriptions Written This Visit:   Tramadol 50mg BID    Follow-up: B/L lumbar medial branch block at L4-5 and L5-S1 with Dr. Abbie Frankel, DO

## 2022-05-04 ENCOUNTER — HOSPITAL ENCOUNTER (OUTPATIENT)
Age: 27
Setting detail: OUTPATIENT SURGERY
Discharge: HOME | End: 2022-05-04
Attending: ANESTHESIOLOGY | Admitting: ANESTHESIOLOGY
Payer: MEDICARE

## 2022-05-04 ENCOUNTER — APPOINTMENT (OUTPATIENT)
Dept: GENERAL RADIOLOGY | Age: 27
End: 2022-05-04
Attending: ANESTHESIOLOGY
Payer: MEDICARE

## 2022-05-04 VITALS
OXYGEN SATURATION: 97 % | HEART RATE: 75 BPM | DIASTOLIC BLOOD PRESSURE: 77 MMHG | TEMPERATURE: 97 F | HEIGHT: 64 IN | WEIGHT: 262.4 LBS | BODY MASS INDEX: 44.8 KG/M2 | RESPIRATION RATE: 16 BRPM | SYSTOLIC BLOOD PRESSURE: 105 MMHG

## 2022-05-04 LAB — PREGNANCY, URINE: NEGATIVE

## 2022-05-04 PROCEDURE — 3209999900 FLUORO FOR SURGICAL PROCEDURES

## 2022-05-04 PROCEDURE — 64493 INJ PARAVERT F JNT L/S 1 LEV: CPT | Performed by: ANESTHESIOLOGY

## 2022-05-04 PROCEDURE — 99152 MOD SED SAME PHYS/QHP 5/>YRS: CPT | Performed by: ANESTHESIOLOGY

## 2022-05-04 PROCEDURE — 2500000003 HC RX 250 WO HCPCS: Performed by: ANESTHESIOLOGY

## 2022-05-04 PROCEDURE — 64494 INJ PARAVERT F JNT L/S 2 LEV: CPT | Performed by: ANESTHESIOLOGY

## 2022-05-04 PROCEDURE — 7100000011 HC PHASE II RECOVERY - ADDTL 15 MIN: Performed by: ANESTHESIOLOGY

## 2022-05-04 PROCEDURE — 7100000010 HC PHASE II RECOVERY - FIRST 15 MIN: Performed by: ANESTHESIOLOGY

## 2022-05-04 PROCEDURE — 2709999900 HC NON-CHARGEABLE SUPPLY: Performed by: ANESTHESIOLOGY

## 2022-05-04 PROCEDURE — 3600000056 HC PAIN LEVEL 4 BASE: Performed by: ANESTHESIOLOGY

## 2022-05-04 PROCEDURE — 6360000002 HC RX W HCPCS: Performed by: ANESTHESIOLOGY

## 2022-05-04 PROCEDURE — 81025 URINE PREGNANCY TEST: CPT

## 2022-05-04 RX ORDER — BUPIVACAINE HYDROCHLORIDE 5 MG/ML
INJECTION, SOLUTION PERINEURAL PRN
Status: DISCONTINUED | OUTPATIENT
Start: 2022-05-04 | End: 2022-05-04 | Stop reason: ALTCHOICE

## 2022-05-04 RX ORDER — MIDAZOLAM HYDROCHLORIDE 1 MG/ML
INJECTION INTRAMUSCULAR; INTRAVENOUS PRN
Status: DISCONTINUED | OUTPATIENT
Start: 2022-05-04 | End: 2022-05-04 | Stop reason: ALTCHOICE

## 2022-05-04 RX ORDER — LIDOCAINE HYDROCHLORIDE 10 MG/ML
INJECTION, SOLUTION EPIDURAL; INFILTRATION; INTRACAUDAL; PERINEURAL PRN
Status: DISCONTINUED | OUTPATIENT
Start: 2022-05-04 | End: 2022-05-04 | Stop reason: ALTCHOICE

## 2022-05-04 RX ORDER — FENTANYL CITRATE 50 UG/ML
INJECTION, SOLUTION INTRAMUSCULAR; INTRAVENOUS PRN
Status: DISCONTINUED | OUTPATIENT
Start: 2022-05-04 | End: 2022-05-04 | Stop reason: ALTCHOICE

## 2022-05-04 ASSESSMENT — PAIN - FUNCTIONAL ASSESSMENT: PAIN_FUNCTIONAL_ASSESSMENT: 0-10

## 2022-05-04 NOTE — POST SEDATION
[facility]  Sedation/Analgesia Post Sedation Record    Pt Name: Kale Collins  MRN: [id number]  YOB: 1995  Procedure Performed By: Alexandru Hernandez DO  Primary Care Physician: FIORDALIZA Sullivan - ELIZABETH    POST-PROCEDURE    Physicians/Assistants: Alexandru Hernandez DO  Procedure Performed: See Procedure Note   Sedation/Anesthesia: Versed and Fentanyl (See procedure note for amount and duration)  Estimated Blood Loss:     0  ml  Specimens Removed: None        Complications: None           Mekhi Jang DO  Electronically signed 5/4/2022 at 11:16 AM

## 2022-05-04 NOTE — PROCEDURES
Pre-operative Diagnosis: Lumbar facet pain     Post-operative Diagnosis: Lumbar facet pain     Procedure: Bilateral lumbar medial branch blocks targeting facet joints of L4/L5 and L5/S1     Procedure Description:  After consent was obtained, the patient was placed in the prone position with a pillow under the abdomen to reduce the lordotic curve of the lumbar spine. The lower back was prepped with chloraprep and draped in a sterile fashion. Then, 0.5 cc of 1 % lidocaine was used for local anesthesia of the skin and superficial subcutaneous tissues. Three 22-gauge 3.5-inch spinal needles were advanced under fluoroscopy in an AP view: one at the sacral ala and two at the junction of the right superior articular process and the transverse process of the L4 and L5 vertebrae. There was no paresthesias, heme, or CSF obtained. Needle placement was confirmed using AP and oblique views. Then, 0.5 cc of 0.5% bupivacaine was injected at each site without complications. The procedure was repeated on the contralateral side.  The patient tolerated the procedure well, transported to the recovery room, and discharged in ambulatory fashion.     Procedural Complications: None  Estimated Blood Loss: 0 mL        IV sedation was used during the procedure:  - Moderate intravenous conscious sedation was supervised by Dr. Joao Akhtar  - The patient was independently monitored by a Registered Nurse assigned to the procedure room  - Monitoring included automated blood pressure, continuous EKG, and continuous pulse oximetry  - The detailed conscious record is permanently stored in the [place] [id number]  - The following is the conscious sedation record:  Start Time: 10:25  End Time : 10:40  Duration: 15 minutes   Medications Administered: 2 mg Versed, 100 mcg Fentanyl     Rasheeda Somers DO  Interventional Pain Management/PM&R   [facility]

## 2022-05-04 NOTE — PROGRESS NOTES
1034: Patient arrives to recovery room via cart. Spontaneous respirations, vss, report received from surgical RN. Patient denies pain, numbness, tingling , nausea. Injection site clean, dry, intact. HOB elevated. IV capped. Snack and drink given. Call light within reach. 1045: patient denies needs, IV removed. 1100: patient ambulated to discharge lobby in stable condition. Patient discharged home with friend.

## 2022-05-04 NOTE — PRE SEDATION
[address] [address]  Pre-Sedation/Analgesia History & Physical    Pt Name: Danii Romano  MRN: [id number]  YOB: 1995  Provider Performing Procedure: Jalyn Jonas DO   Primary Care Physician: Neisha Dobbs. FIORDALIZA Luke CNP      MEDICAL HISTORY       has a past medical history of COVID-19, Ovarian cyst, and Scoliosis. SURGICAL HISTORY   has a past surgical history that includes Tonsillectomy; Adenoidectomy; back surgery (2006 and 2009, );  section (N/A, 2020); and Pain management procedure (Bilateral, 2022). ALLERGIES   Allergies as of 2022 - Fully Reviewed 2022   Allergen Reaction Noted    Gabapentin Hives 2020       MEDICATIONS   Prior to Admission medications    Medication Sig Start Date End Date Taking? Authorizing Provider   ketoconazole (NIZORAL) 2 % shampoo  3/30/22   Historical Provider, MD   traMADol (ULTRAM) 50 MG tablet Take 1 tablet by mouth 2 times daily as needed for Pain for up to 30 days. 4/15/22 5/15/22  Marvell Mortimer, APRN - CNP   baclofen (LIORESAL) 10 MG tablet Take 1 tablet by mouth 3 times daily 22  Marvell Mortimer, APRN - CNP   ibuprofen (ADVIL;MOTRIN) 200 MG tablet Take 200 mg by mouth every 6 hours as needed for Pain    Historical Provider, MD   Tens Unit [address] [facility] by Does not apply route 10/11/16   FIORDALIZA Dale CNP     PHYSICAL:   Vitals:    22 1034   BP: 105/77   Pulse: 75   Resp: 16   Temp: 97 °F (36.1 °C)   SpO2: 97%     PLANNED PROCEDURE   See procedure note  SEDATION  Planned agent: Versed and Fentanyl  ASA Classification: 1  Class 1: A normal healthy patient  Class 2: Pt with mild to moderate systemic disease  Class 3: Severe systemic disease or disturbance  Class 4: Severe systemic disorders that are already life threatening. Class 5: Moribund pt with little chances of survival, for more than 24 hours. Mallampati I Airway Classification: 1    1.  Pre-procedure diagnostic studies complete and results available. 2. Previous sedation/anesthesia experiences assessed. 3. The patient is an appropriate candidate to undergo the planned procedure sedation and anesthesia. (Refer to nursing sedation/analgesia documentation record)  4. Formulation and discussion of sedation/procedure plan, risks, and expectations with patient and/or responsible adult completed. 5. Patient examined immediately prior to the procedure.  (Refer to nursing sedation/analgesia documentation record)    Gilbert Chambers DO  Electronically signed 5/4/2022 at 11:16 AM

## 2022-05-11 NOTE — PROGRESS NOTES
Chronic Pain/PM&R Clinic Note     Encounter Date: 5/12/22    Subjective:   Chief Complaint:   Chief Complaint   Patient presents with    Follow-up     f/u procedure       History of Present Illness:   Alec Monreal is a 27 y.o. female seen in the clinic initially on 03/16/2022 upon request from Blanka Melo, [id number] [facility])  for her history of lumbar pain. States her pain started back when she was 9 she has a history of adolescent scoliosis. Patient had her first back surgery in 2006, she had a laminectomy and fusion from T1-L2. About 2 years later in January 2009 she had all of her hardware removed due to issues with the hardware placement. Patient states she was doing well overall for about 10 years. In 2018 she was working as a  at [employer] in [place] and her right leg will go completely numb and give out. At that time she moved back to [place] and she had a lumbar laminectomy and fusion at L3-L4 at [facility] in May 2018. She is currently on disability since her last surgery. She follows with the [facility] who referred her here for interventional procedures for pain. She was told she is now having issues with her L5. Patient states her pain is worse with standing, walking long distance, sitting too long. She states it is hard for her to stand and do dishes. Majority of her pain is located in her mid low back. She states she does get some pain that radiates to her left lateral hip, but not past her knee. This is only with increased activity. Patient states she does get some relief when laying in bed but gets uncomfortable if in 1 position for too long. Patient states she has tried ibuprofen, Tylenol, heat, ice, TENS unit, lidocaine patches all which have been relatively ineffective. She has been using an herbal patch which has been the only thing that is taken the edge off.   Patient states she had a baby in September 2020 and she has been trying to lose weight but is difficult due to her pain. She has been to the ER a few times to do her back pain. Patient states last time she did physical therapy was around the time of her surgery in 2018. Patient denies any falls, she does use a cane occasionally if her leg is really bothering her. Patient denies any bowel or bladder dysfunction. Patient states last summer she was walking daily but she has been able to do so due to her pain. Patient currently lives at home with her son and significant other. Patient states her physician at [facility] recommended she have a left transforaminal lumbar epidural steroid injection at L4-5. Today, 4/13/2022, patient presents for planned follow-up on low back pain. Patient underwent a bilateral lumbar medial branch block at L4-5 and L5-S1 on 04/05/2022. Patient reports >85% pain relief x2 days after the procedure. Patient would like to move on to the second medial branch block injections with the goal of getting to the lumbar RFA. Patient states she has had a rough few days as one of her good friends tried to commit suicide and is currently hospitalized. She feels like her muscles have been very tight last few days due to the increased stress. Patient is questioning if she can get a muscle relaxer to help with this increased pain. Patient continues to take tramadol 50 mg twice daily as needed. She denies any side effects to this medication. Patient denies any associated focal leg weakness, saddle anesthesia, bowel/bladder incontinence. Today, 05/12/2022, patient presents for planned follow-up on low back pain. Patient underwent the confirmatory bilateral lumbar facet medial branch block at L4-5 and L5-S1 on 5/4/2022. Patient reports greater than 85% pain relief x2 days following the procedure. Patient would like to proceed with a lumbar RFA. Patient states she is continuing to try to cut carbs from her diet as a means to lose weight.   She has discussed the potential of starting medication for weight loss with her PCP but they have been hesitant to do so. Patient states she would like to start trying to conceive here in a couple months but would like to lose some weight first.  Patient continues take the tramadol twice a day as needed, this medication has been keeping her functional with a toddler at home. She also feels like the baclofen has been helping. She denies side effects to these medications. Patient denies any new symptoms such as associated focal leg weakness, saddle anesthesia, bowel/bladder incontinence. History of Interventions:   Surgery: Laminectomy and fusion T1-L2 (2006)  Hardware removal (January 2009)  Lumbar laminectomy and fusion L3-L4 (May 2018) [facility]  Injections: B/L lumbar facet MBB at L4-5 and L5-S1 (04/05/2022) - >85% relief x 2 days  B/L lumbar facet MBB L4-5 and L5-S1 (05/04/2022) - >85% relief x 2 days    Current Treatment Medications:   Ibuprofen OTC  Tylenol OTC  THC - edible or smoke - helps sleep. Tramadol 50mg BID PRN - prescribed by me  Baclofen 10 mg TID PRN - prescribed by me    Historical Treatment Medications:   Gabapentin - schmidt/acne  Flexeril - ineffective  Tizanidine - mild effectiveness  Valium - helped  Percocet - helped  Tramdaol - helped  Robaxin - ineffective  Mobic - ineffective     Imaging:    Lumbar MRI (12/28/2021)  Narrative   PROCEDURE: MRI LUMBAR SPINE WO CONTRAST       CLINICAL INFORMATION: Degeneration of lumbar or lumbosacral intervertebral disc, Lumbar radiculopathy, Arthrodesis status, Adolescent idiopathic scoliosis of thoracolumbar region. Low back pain and left leg pain with right leg numbness for 2 months. No    recent injury.  Multiple prior back surgeries.       COMPARISON: CT lumbar spine dated 11/15/2021 and MRI lumbar spine dated 8/5/2016.       TECHNIQUE: Sagittal and axial T1 and T2-weighted images were obtained through the lumbar spine.       FINDINGS:   Redemonstration of laminectomy at L3-4 with interbody and posterior fusion bridging this level, stable compared to prior CT but new compared to prior MRI. Redemonstration of right hemilaminectomy at L5-S1, stable compared to prior MRI. There is stable    posterior fusion of the facet joints at T12-L1 through L3-4, similar to prior exams. There is a stable levocurvature of the lumbar spine. There is a focal area of hyperintense T1 and T2 signal in the L1 vertebral body as evidence for a hemangioma. Marrow    signal is otherwise within normal limits. The conus terminates in a normal fashion at the L1-2 level. There is a small fluid collection at the laminectomy bed at L3-4 abutting the thecal sac without mass effect. This measures 3 x 1.8 x 1 cm in greatest    mL, CC and AP dimensions, respectively. Paraspinal soft tissues are otherwise unremarkable. At T12-L1 there is no significant spinal canal or neuroforaminal stenosis. At L1-2 there is no significant spinal canal or neuroforaminal stenosis. At L2-3 there is no significant spinal canal or neuroforaminal stenosis. At L3-4 the spinal canal is decompressed. There is no significant neural foraminal stenosis. At L4-5 there is a periarticular cyst emanating from the anterior aspect of the right facet joint measuring 9 x 8 x 7 mm. This minimally indents the thecal sac and minimally narrows the right lateral recess and contributes to moderate right neural    foraminal stenosis and may contact the exiting L4 nerve root. There is mild to moderate left neural foraminal stenosis in association with facet hypertrophy and ligamentum flavum thickening. At L5-S1 there is no significant spinal canal stenosis. There is mild right neural foraminal stenosis in association with facet hypertrophy.           Impression       1.  Redemonstration of laminectomy at L3-4 with interbody and posterior fusion at this level with small chronic appearing fluid collection in the laminectomy bed without significant mass effect. 2. Redemonstration of posterior fusion at T12-L1 through L2-3 with stable dextroscoliosis of the lumbar spine. 3. Facet arthropathy at the L4-5 level with periarticular cyst on the right which minimally effaces the right lateral recess and encroaches on the right L4-5 neural foramen and may contact the exiting L4 nerve root on the right.                   **This report has been created using voice recognition software. It may contain minor errors which are inherent in voice recognition technology. **       Final report electronically signed by Dr. Adarsh Dobson MD on 2021 9:30 AM       Past Medical History:   Diagnosis Date    COVID-19     Ovarian cyst     Scoliosis        Past Surgical History:   Procedure Laterality Date    ADENOIDECTOMY      BACK SURGERY  2006 and 2018     SECTION N/A 2020     SECTION performed by Kalie Linares MD at [address] [employer] [address] [facility] Bilateral 2022    medial branch blocks at bilateral L4-L5 and L5-S1 performed by Jalyn Jonas DO at [address] [place] [address] PAIN MANAGEMENT PROCEDURE Bilateral 2022    B/L lumbar medial branch block at L4-5 and L5-S1 performed by Jalyn Jonas DO at [facility]          Family History   Problem Relation Age of Onset    High Cholesterol Mother     High Blood Pressure Mother     Depression Mother     Diabetes Mother     Stroke Maternal Grandmother     Heart Disease Maternal Grandfather          Medications & Allergies:   Current Outpatient Medications   Medication Instructions    baclofen (LIORESAL) 10 mg, Oral, 3 TIMES DAILY    ibuprofen (ADVIL;MOTRIN) 200 mg, Oral, EVERY 6 HOURS PRN    ketoconazole (NIZORAL) 2 % shampoo No dose, route, or frequency recorded.     pantoprazole (PROTONIX) 40 mg, Oral, 2 TIMES DAILY PRN    Tens Unit MISC Does not apply    traMADol (ULTRAM) 50 mg, Oral, 2 TIMES DAILY PRN Allergies   Allergen Reactions    Gabapentin Hives       Review of Systems:   Constitutional: negative for weight changes or fevers  Genitourinary: negative for bowel/bladder incontinence   Musculoskeletal: positive for low back pain, left lateral leg pain  Neurological: negative for any leg weakness or numbness/tingling  Behavioral/Psych: negative for anxiety/depression   All other systems reviewed and are negative    Objective:     Vitals:    05/12/22 1007   BP: 118/62       Constitutional: Pleasant, no acute distress   Head: Normocephalic, atraumatic   Eyes: Conjunctivae normal   Neck: Supple, symmetrical   Lungs: Normal respiratory effort, non-labored breathing   Cardiovascular: Limbs warm and well perfused   Abdomen: Non-protruded   Musculoskeletal: Muscle bulk symmetric, no atrophy, no gross deformities   · Lower Extremities: ROM WNL. · Thorax: No paraspinal tenderness bilaterally. No scoliosis or kyphosis. · Lumbar Spine: ROM limited due to hx fusion. Lumbar paraspinals non-tender to palpation bilaterally. SLR neg bilaterally. DUGLAS neg bilaterally. GAENSLEN neg bilaterally. Positive facet loading bilaterally. Bilateral SI joints tender to palpation. Bilateral greater trochanters non-tender to palpation. Neurological: Cranial nerves II-XII grossly intact. · Gait - Antalgic gait. Ambulates without assistive device. Uses a cane occasionally. · Motor: 5/5 muscle strength in bilateral hip flexion, knee flexion, knee extension, ankle dorsiflexion, and ankle plantar flexion   · Sensory: LT sensation intact in lower limbs   · Reflexes: 2+ symmetrical in bilateral achilles, 2+ bilateral patellar, negative ankle clonus, downgoing babinski   Skin: No rashes or lesions present   Psychological: Cooperative, no exaggerated pain behaviors     Assessment:    Diagnosis Orders   1.  Lumbar spondylosis  CHG FLUOR NEEDLE/CATH SPINE/PARASPINAL DX/THER ADDON    FL RADIOFREQUENCY NEUROTOMY LUMBAR OR SACRAL, W IMAGE GUIDANCE, SINGLE    AK RADIOFREQ NEUROTOMY LUMBAR OR SACRAL, W IMAGE GUIDE,EA ADDL LEVEL   2. Facet arthropathy     3. Chronic pain syndrome     4. Neuropathic pain     5. SI (sacroiliac) joint dysfunction         Karly MORALES Cha Samuels is a 27 y. o.female presenting to the pain clinic for evaluation of low back pain. Patient underwent a bilateral lumbar facet medial branch block at L4-5 and L5-S1 with significant relief. I set her up for the confirmatory bilateral lumbar medial branch block at these levels. We discussed the potential of moving forward with a lumbar RFA. We then discussed paring that with physical therapy for her low back to help augment the pain relief. I started her on baclofen 10 mg up to 3 times daily as needed. Patient advised to start this at night then she can add a daytime dose if tolerated. Patient has had 2 significant responses to lumbar facet medial branch blocks. I have set her up for the lumbar facet radiofrequency ablation at bilateral L4-5 and L5-S1, starting with the right side first and then proceeding with the left side 1 week later. I have continued her tramadol 50 mg twice a day as needed as well as baclofen. After completion of the lumbar RFA we will likely pursue physical therapy for posterior core strengthening. Patient encouraged to continue with weight loss efforts. Plan: The following treatment recommendations and plan were discussed in detail with Karly Yee. Imaging:   I have reviewed patients imaging of lumbar MRI and results were discussed with patient today. Analgesics:   I have continued the patient on tramadol 50 mg twice daily as needed severe pain (>7/10). We discussed risk of tolerance and dependence to this medication. She is to not take more than prescribed. The side effect profile of long-term opioid use was discussed and recommended emphasis on multimodal strategies for pain management.      OARRS reviewed  Urine drug screen reviewed and appropriate  Pain contract signed (03/16/2022)    Patient is taking Acetaminophen. Patient informed that the maximum amount of acetaminophen taken on a regular basis should only be 4000 mg per day. Patient is taking Ibuprofen. Patient is advised to take as prescribed and not take on an empty stomach. Adjuvants:   None    Interventions: In presence of lumbar axial back pain, significant response to the lumbar MBBs at L4-5 and L5-S1, and with physical exam consistent for facetal pain, the option of lumbar radiofrequency ablation at bilateral L4-L5 and L5-S1 was chosen, RIGHT side first the left 1 week later. The risks and benefits were discussed in detail with the patient. Patient wants to proceed with the injection. Anticoagulation/NPO Recommendations:   Patient does Ibuprofen x2 days prior to the procedure. Patient will need to be NPO x 8 hours prior to the procedure. We will start an IV prior to the procedure    Multidisciplinary Pain Management:   In the presence of complex, chronic, and multi-factorial pain, the importance of a multidisciplinary approach to pain management in the patients management regimen was emphasized and discussed in great detail. PHYSICAL THERAPY: Patient is advised to see a physical therapist for gentle stretching exercises and conditioning exercises for management of pain.      Referrals:  None    Prescriptions Written This Visit:   None    Follow-up: Bilateral lumbar RFA at L4-5 and L5-S1, RIGHT side first and left 1 week later with Dr. Alicia Gonzalez, APRN - CNP

## 2022-05-12 ENCOUNTER — OFFICE VISIT (OUTPATIENT)
Dept: PHYSICAL MEDICINE AND REHAB | Age: 27
End: 2022-05-12
Payer: MEDICARE

## 2022-05-12 VITALS
DIASTOLIC BLOOD PRESSURE: 62 MMHG | SYSTOLIC BLOOD PRESSURE: 118 MMHG | HEIGHT: 64 IN | WEIGHT: 262 LBS | BODY MASS INDEX: 44.73 KG/M2

## 2022-05-12 DIAGNOSIS — M79.2 NEUROPATHIC PAIN: ICD-10-CM

## 2022-05-12 DIAGNOSIS — G89.4 CHRONIC PAIN SYNDROME: ICD-10-CM

## 2022-05-12 DIAGNOSIS — M47.819 FACET ARTHROPATHY: ICD-10-CM

## 2022-05-12 DIAGNOSIS — M53.3 SI (SACROILIAC) JOINT DYSFUNCTION: ICD-10-CM

## 2022-05-12 DIAGNOSIS — M47.816 LUMBAR SPONDYLOSIS: Primary | ICD-10-CM

## 2022-05-12 PROCEDURE — G8427 DOCREV CUR MEDS BY ELIG CLIN: HCPCS | Performed by: NURSE PRACTITIONER

## 2022-05-12 PROCEDURE — 99214 OFFICE O/P EST MOD 30 MIN: CPT | Performed by: NURSE PRACTITIONER

## 2022-05-12 PROCEDURE — 1036F TOBACCO NON-USER: CPT | Performed by: NURSE PRACTITIONER

## 2022-05-12 PROCEDURE — G8417 CALC BMI ABV UP PARAM F/U: HCPCS | Performed by: NURSE PRACTITIONER

## 2022-05-12 RX ORDER — PANTOPRAZOLE SODIUM 40 MG/1
40 TABLET, DELAYED RELEASE ORAL 2 TIMES DAILY PRN
COMMUNITY

## 2022-05-31 ENCOUNTER — PREP FOR PROCEDURE (OUTPATIENT)
Dept: PHYSICAL MEDICINE AND REHAB | Age: 27
End: 2022-05-31

## 2022-06-06 NOTE — H&P
Today, patient presents for planned lumbar radiofrequency ablation at RIGHT facet joints L4-L5 and L5-S1. This note is reflective of the patient's previous visit for evaluation. We will proceed with today's planned procedure. Since patient's last visit for evaluation, there have been no interval changes in medical history. Patient has no new numbness, weakness, or focal neurological deficit since evaluation. Patient has no contraindications to injection (no anticoagulation or recent antibiotic intake for active infections), and has a  present or is able to drive themselves (as discussed and cleared by physician). Allergies to latex, contrast dye, and steroid medications have been confirmed with the patient prior to the procedure. NPO necessity has been assessed and accepted based on procedure complexity. The risks and benefits of the procedure have been explained including but are not limited to infection, bleeding, paralysis, immediate post procedure weakness, and dizziness; the patient acknowledges understanding and desires to proceed with the procedure. Patient has signed consent for same procedure as discussed in previous clinic encounter. All other questions and concerns were addressed at bedside. See procedure note for full details. Post procedure Instructions: The patient was advised not to drive during the day of the procedure and not to engage in any significant decision making (unless otherwise states by physician). The patient was also advised to be cautious with walking/activity for 24 hours following today's visit and asked not to engage in over-exertion (unless otherwise states by physician). After this time, it is ok to resume pre-procedure level of activity. Patient advised to apply ice to site of injection in situations of pain and discomfort. Patient advised to not submerge site of injection during bath or pool activities for approximately 24 hours post-procedure.  Patient attested to understanding post procedure directions / restrictions. All other questions and concerns addressed before patient discharge in ambulatory fashion. Chronic Pain/PM&R Clinic Note     Encounter Date: 5/12/22    Subjective:   Chief Complaint:   No chief complaint on file. History of Present Illness:   Sabina Mosqueda is a 27 y.o. female seen in the clinic initially on 03/16/2022 upon request from Blanka Zambrano [facility])  for her history of lumbar pain. States her pain started back when she was 9 she has a history of adolescent scoliosis. Patient had her first back surgery in 2006, she had a laminectomy and fusion from T1-L2. About 2 years later in January 2009 she had all of her hardware removed due to issues with the hardware placement. Patient states she was doing well overall for about 10 years. In 2018 she was working as a  at [employer] in [place] and her right leg will go completely numb and give out. At that time she moved back to [address] [place] [address] and she had a lumbar laminectomy and fusion at L3-L4 at [facility] in May 2018. She is currently on disability since her last surgery. She follows with the [facility] clinic who referred her here for interventional procedures for pain. She was told she is now having issues with her L5. Patient states her pain is worse with standing, walking long distance, sitting too long. She states it is hard for her to stand and do dishes. Majority of her pain is located in her mid low back. She states she does get some pain that radiates to her left lateral hip, but not past her knee. This is only with increased activity. Patient states she does get some relief when laying in bed but gets uncomfortable if in 1 position for too long. Patient states she has tried ibuprofen, Tylenol, heat, ice, TENS unit, lidocaine patches all which have been relatively ineffective.   She has been using an herbal patch which has been the only thing that is taken the edge off. Patient states she had a baby in September 2020 and she has been trying to lose weight but is difficult due to her pain. She has been to the ER a few times to do her back pain. Patient states last time she did physical therapy was around the time of her surgery in 2018. Patient denies any falls, she does use a cane occasionally if her leg is really bothering her. Patient denies any bowel or bladder dysfunction. Patient states last summer she was walking daily but she has been able to do so due to her pain. Patient currently lives at home with her son and significant other. Patient states her physician at [facility] recommended she have a left transforaminal lumbar epidural steroid injection at L4-5. Today, 4/13/2022, patient presents for planned follow-up on low back pain. Patient underwent a bilateral lumbar medial branch block at L4-5 and L5-S1 on 04/05/2022. Patient reports >85% pain relief x2 days after the procedure. Patient would like to move on to the second medial branch block injections with the goal of getting to the lumbar RFA. Patient states she has had a rough few days as one of her good friends tried to commit suicide and is currently hospitalized. She feels like her muscles have been very tight last few days due to the increased stress. Patient is questioning if she can get a muscle relaxer to help with this increased pain. Patient continues to take tramadol 50 mg twice daily as needed. She denies any side effects to this medication. Patient denies any associated focal leg weakness, saddle anesthesia, bowel/bladder incontinence. Today, 05/12/2022, patient presents for planned follow-up on low back pain. Patient underwent the confirmatory bilateral lumbar facet medial branch block at L4-5 and L5-S1 on 5/4/2022. Patient reports greater than 85% pain relief x2 days following the procedure. Patient would like to proceed with a lumbar RFA.   Patient states she is continuing to try to cut carbs from her diet as a means to lose weight. She has discussed the potential of starting medication for weight loss with her PCP but they have been hesitant to do so. Patient states she would like to start trying to conceive here in a couple months but would like to lose some weight first.  Patient continues take the tramadol twice a day as needed, this medication has been keeping her functional with a toddler at home. She also feels like the baclofen has been helping. She denies side effects to these medications. Patient denies any new symptoms such as associated focal leg weakness, saddle anesthesia, bowel/bladder incontinence. History of Interventions:   Surgery: Laminectomy and fusion T1-L2 (2006)  Hardware removal (January 2009)  Lumbar laminectomy and fusion L3-L4 (May 2018) [facility]  Injections: B/L lumbar facet MBB at L4-5 and L5-S1 (04/05/2022) - >85% relief x 2 days  B/L lumbar facet MBB L4-5 and L5-S1 (05/04/2022) - >85% relief x 2 days    Current Treatment Medications:   Ibuprofen OTC  Tylenol OTC  THC - edible or smoke - helps sleep. Tramadol 50mg BID PRN - prescribed by me  Baclofen 10 mg TID PRN - prescribed by me    Historical Treatment Medications:   Gabapentin - schmidt/acne  Flexeril - ineffective  Tizanidine - mild effectiveness  Valium - helped  Percocet - helped  Tramdaol - helped  Robaxin - ineffective  Mobic - ineffective     Imaging:    Lumbar MRI (12/28/2021)  Narrative   PROCEDURE: MRI LUMBAR SPINE WO CONTRAST       CLINICAL INFORMATION: Degeneration of lumbar or lumbosacral intervertebral disc, Lumbar radiculopathy, Arthrodesis status, Adolescent idiopathic scoliosis of thoracolumbar region. Low back pain and left leg pain with right leg numbness for 2 months. No    recent injury.  Multiple prior back surgeries.       COMPARISON: CT lumbar spine dated 11/15/2021 and MRI lumbar spine dated 8/5/2016.       TECHNIQUE: Sagittal and axial T1 and T2-weighted images were obtained through the lumbar spine.       FINDINGS:   Redemonstration of laminectomy at L3-4 with interbody and posterior fusion bridging this level, stable compared to prior CT but new compared to prior MRI. Redemonstration of right hemilaminectomy at L5-S1, stable compared to prior MRI. There is stable    posterior fusion of the facet joints at T12-L1 through L3-4, similar to prior exams. There is a stable levocurvature of the lumbar spine. There is a focal area of hyperintense T1 and T2 signal in the L1 vertebral body as evidence for a hemangioma. Marrow    signal is otherwise within normal limits. The conus terminates in a normal fashion at the L1-2 level. There is a small fluid collection at the laminectomy bed at L3-4 abutting the thecal sac without mass effect. This measures 3 x 1.8 x 1 cm in greatest    mL, CC and AP dimensions, respectively. Paraspinal soft tissues are otherwise unremarkable. At T12-L1 there is no significant spinal canal or neuroforaminal stenosis. At L1-2 there is no significant spinal canal or neuroforaminal stenosis. At L2-3 there is no significant spinal canal or neuroforaminal stenosis. At L3-4 the spinal canal is decompressed. There is no significant neural foraminal stenosis. At L4-5 there is a periarticular cyst emanating from the anterior aspect of the right facet joint measuring 9 x 8 x 7 mm. This minimally indents the thecal sac and minimally narrows the right lateral recess and contributes to moderate right neural    foraminal stenosis and may contact the exiting L4 nerve root. There is mild to moderate left neural foraminal stenosis in association with facet hypertrophy and ligamentum flavum thickening. At L5-S1 there is no significant spinal canal stenosis. There is mild right neural foraminal stenosis in association with facet hypertrophy.           Impression       1.  Redemonstration of laminectomy at L3-4 with interbody and posterior fusion at this level with small chronic appearing fluid collection in the laminectomy bed without significant mass effect. 2. Redemonstration of posterior fusion at T12-L1 through L2-3 with stable dextroscoliosis of the lumbar spine. 3. Facet arthropathy at the L4-5 level with periarticular cyst on the right which minimally effaces the right lateral recess and encroaches on the right L4-5 neural foramen and may contact the exiting L4 nerve root on the right.                   **This report has been created using voice recognition software. It may contain minor errors which are inherent in voice recognition technology. **       Final report electronically signed by Dr. Sukh Rojas MD on 2021 9:30 AM       Past Medical History:   Diagnosis Date    COVID-19     Ovarian cyst     Scoliosis        Past Surgical History:   Procedure Laterality Date    ADENOIDECTOMY      BACK SURGERY  2006 and 2018     SECTION N/A 2020     SECTION performed by Janeth Perez MD at [facility] Bilateral 2022    medial branch blocks at bilateral L4-L5 and L5-S1 performed by Calixto Lin DO at [address] [place] [address] PAIN MANAGEMENT PROCEDURE Bilateral 2022    B/L lumbar medial branch block at L4-5 and L5-S1 performed by Calixto Lin DO at [place] [address]         Family History   Problem Relation Age of Onset    High Cholesterol Mother     High Blood Pressure Mother     Depression Mother     Diabetes Mother     Stroke Maternal Grandmother     Heart Disease Maternal Grandfather          Medications & Allergies:   Current Outpatient Medications   Medication Instructions    ibuprofen (ADVIL;MOTRIN) 200 mg, Oral, EVERY 6 HOURS PRN    ketoconazole (NIZORAL) 2 % shampoo No dose, route, or frequency recorded.     pantoprazole (PROTONIX) 40 mg, Oral, 2 TIMES DAILY PRN    Tens Unit MISC Does not apply Allergies   Allergen Reactions    Gabapentin Hives       Review of Systems:   Constitutional: negative for weight changes or fevers  Genitourinary: negative for bowel/bladder incontinence   Musculoskeletal: positive for low back pain, left lateral leg pain  Neurological: negative for any leg weakness or numbness/tingling  Behavioral/Psych: negative for anxiety/depression   All other systems reviewed and are negative    Objective: There were no vitals filed for this visit. Constitutional: Pleasant, no acute distress   Head: Normocephalic, atraumatic   Eyes: Conjunctivae normal   Neck: Supple, symmetrical   Lungs: Normal respiratory effort, non-labored breathing   Cardiovascular: Limbs warm and well perfused   Abdomen: Non-protruded   Musculoskeletal: Muscle bulk symmetric, no atrophy, no gross deformities   · Lower Extremities: ROM WNL. · Thorax: No paraspinal tenderness bilaterally. No scoliosis or kyphosis. · Lumbar Spine: ROM limited due to hx fusion. Lumbar paraspinals non-tender to palpation bilaterally. SLR neg bilaterally. DUGLAS neg bilaterally. GAENSLEN neg bilaterally. Positive facet loading bilaterally. Bilateral SI joints tender to palpation. Bilateral greater trochanters non-tender to palpation. Neurological: Cranial nerves II-XII grossly intact. · Gait - Antalgic gait. Ambulates without assistive device. Uses a cane occasionally. · Motor: 5/5 muscle strength in bilateral hip flexion, knee flexion, knee extension, ankle dorsiflexion, and ankle plantar flexion   · Sensory: LT sensation intact in lower limbs   · Reflexes: 2+ symmetrical in bilateral achilles, 2+ bilateral patellar, negative ankle clonus, downgoing babinski   Skin: No rashes or lesions present   Psychological: Cooperative, no exaggerated pain behaviors     Assessment:    Diagnosis Orders   1.  Lumbar spondylosis  CHG FLUOR NEEDLE/CATH SPINE/PARASPINAL DX/THER ADDON    ME RADIOFREQUENCY NEUROTOMY LUMBAR OR SACRAL, W IMAGE GUIDANCE, SINGLE    NE RADIOFREQ NEUROTOMY LUMBAR OR SACRAL, W IMAGE GUIDE,EA ADDL LEVEL   2. Facet arthropathy     3. Chronic pain syndrome     4. Neuropathic pain     5. SI (sacroiliac) joint dysfunction         Karly MORALES Hedy Freeman is a 27 y. o.female presenting to the pain clinic for evaluation of low back pain. Patient underwent a bilateral lumbar facet medial branch block at L4-5 and L5-S1 with significant relief. I set her up for the confirmatory bilateral lumbar medial branch block at these levels. We discussed the potential of moving forward with a lumbar RFA. We then discussed paring that with physical therapy for her low back to help augment the pain relief. I started her on baclofen 10 mg up to 3 times daily as needed. Patient advised to start this at night then she can add a daytime dose if tolerated. Patient has had 2 significant responses to lumbar facet medial branch blocks. I have set her up for the lumbar facet radiofrequency ablation at bilateral L4-5 and L5-S1, starting with the right side first and then proceeding with the left side 1 week later. I have continued her tramadol 50 mg twice a day as needed as well as baclofen. After completion of the lumbar RFA we will likely pursue physical therapy for posterior core strengthening. Patient encouraged to continue with weight loss efforts. Plan: The following treatment recommendations and plan were discussed in detail with Karly Yee. Imaging:   I have reviewed patients imaging of lumbar MRI and results were discussed with patient today. Analgesics:   I have continued the patient on tramadol 50 mg twice daily as needed severe pain (>7/10). We discussed risk of tolerance and dependence to this medication. She is to not take more than prescribed. The side effect profile of long-term opioid use was discussed and recommended emphasis on multimodal strategies for pain management.      OARRS reviewed  Urine drug screen reviewed and appropriate  Pain contract signed (03/16/2022)    Patient is taking Acetaminophen. Patient informed that the maximum amount of acetaminophen taken on a regular basis should only be 4000 mg per day. Patient is taking Ibuprofen. Patient is advised to take as prescribed and not take on an empty stomach. Adjuvants:   None    Interventions: In presence of lumbar axial back pain, significant response to the lumbar MBBs at L4-5 and L5-S1, and with physical exam consistent for facetal pain, the option of lumbar radiofrequency ablation at bilateral L4-L5 and L5-S1 was chosen, RIGHT side first the left 1 week later. The risks and benefits were discussed in detail with the patient. Patient wants to proceed with the injection. Anticoagulation/NPO Recommendations:   Patient does Ibuprofen x2 days prior to the procedure. Patient will need to be NPO x 8 hours prior to the procedure. We will start an IV prior to the procedure    Multidisciplinary Pain Management:   In the presence of complex, chronic, and multi-factorial pain, the importance of a multidisciplinary approach to pain management in the patients management regimen was emphasized and discussed in great detail. PHYSICAL THERAPY: Patient is advised to see a physical therapist for gentle stretching exercises and conditioning exercises for management of pain.      Referrals:  None    Prescriptions Written This Visit:   None    Follow-up: Bilateral lumbar RFA at L4-5 and L5-S1, RIGHT side first and left 1 week later with Dr. Moo Jerez DO

## 2022-06-07 ENCOUNTER — APPOINTMENT (OUTPATIENT)
Dept: GENERAL RADIOLOGY | Age: 27
End: 2022-06-07
Attending: ANESTHESIOLOGY
Payer: MEDICARE

## 2022-06-07 ENCOUNTER — HOSPITAL ENCOUNTER (OUTPATIENT)
Age: 27
Setting detail: OUTPATIENT SURGERY
Discharge: HOME | End: 2022-06-07
Attending: ANESTHESIOLOGY | Admitting: ANESTHESIOLOGY
Payer: MEDICARE

## 2022-06-07 VITALS
DIASTOLIC BLOOD PRESSURE: 69 MMHG | HEART RATE: 85 BPM | WEIGHT: 258 LBS | OXYGEN SATURATION: 97 % | TEMPERATURE: 98 F | HEIGHT: 64 IN | SYSTOLIC BLOOD PRESSURE: 120 MMHG | BODY MASS INDEX: 44.05 KG/M2 | RESPIRATION RATE: 18 BRPM

## 2022-06-07 LAB — PREGNANCY, URINE: NEGATIVE

## 2022-06-07 PROCEDURE — 7100000010 HC PHASE II RECOVERY - FIRST 15 MIN: Performed by: ANESTHESIOLOGY

## 2022-06-07 PROCEDURE — 64635 DESTROY LUMB/SAC FACET JNT: CPT | Performed by: ANESTHESIOLOGY

## 2022-06-07 PROCEDURE — 6360000002 HC RX W HCPCS: Performed by: ANESTHESIOLOGY

## 2022-06-07 PROCEDURE — 99152 MOD SED SAME PHYS/QHP 5/>YRS: CPT | Performed by: ANESTHESIOLOGY

## 2022-06-07 PROCEDURE — 2709999900 HC NON-CHARGEABLE SUPPLY: Performed by: ANESTHESIOLOGY

## 2022-06-07 PROCEDURE — 3600000056 HC PAIN LEVEL 4 BASE: Performed by: ANESTHESIOLOGY

## 2022-06-07 PROCEDURE — 64636 DESTROY L/S FACET JNT ADDL: CPT | Performed by: ANESTHESIOLOGY

## 2022-06-07 PROCEDURE — 81025 URINE PREGNANCY TEST: CPT

## 2022-06-07 PROCEDURE — 3600000057 HC PAIN LEVEL 4 ADDL 15 MIN: Performed by: ANESTHESIOLOGY

## 2022-06-07 PROCEDURE — 3209999900 FLUORO FOR SURGICAL PROCEDURES

## 2022-06-07 PROCEDURE — 7100000011 HC PHASE II RECOVERY - ADDTL 15 MIN: Performed by: ANESTHESIOLOGY

## 2022-06-07 PROCEDURE — 2500000003 HC RX 250 WO HCPCS: Performed by: ANESTHESIOLOGY

## 2022-06-07 RX ORDER — LIDOCAINE HYDROCHLORIDE 20 MG/ML
INJECTION, SOLUTION EPIDURAL; INFILTRATION; INTRACAUDAL; PERINEURAL PRN
Status: DISCONTINUED | OUTPATIENT
Start: 2022-06-07 | End: 2022-06-07 | Stop reason: ALTCHOICE

## 2022-06-07 RX ORDER — BACLOFEN 10 MG/1
10 TABLET ORAL PRN
COMMUNITY
End: 2022-07-05 | Stop reason: SDUPTHER

## 2022-06-07 RX ORDER — FENTANYL CITRATE 50 UG/ML
INJECTION, SOLUTION INTRAMUSCULAR; INTRAVENOUS PRN
Status: DISCONTINUED | OUTPATIENT
Start: 2022-06-07 | End: 2022-06-07 | Stop reason: ALTCHOICE

## 2022-06-07 RX ORDER — LIDOCAINE HYDROCHLORIDE 10 MG/ML
INJECTION, SOLUTION EPIDURAL; INFILTRATION; INTRACAUDAL; PERINEURAL PRN
Status: DISCONTINUED | OUTPATIENT
Start: 2022-06-07 | End: 2022-06-07 | Stop reason: ALTCHOICE

## 2022-06-07 RX ORDER — MIDAZOLAM HYDROCHLORIDE 1 MG/ML
INJECTION INTRAMUSCULAR; INTRAVENOUS PRN
Status: DISCONTINUED | OUTPATIENT
Start: 2022-06-07 | End: 2022-06-07 | Stop reason: ALTCHOICE

## 2022-06-07 ASSESSMENT — PAIN - FUNCTIONAL ASSESSMENT: PAIN_FUNCTIONAL_ASSESSMENT: 0-10

## 2022-06-07 ASSESSMENT — PAIN SCALES - GENERAL: PAINLEVEL_OUTOF10: 0

## 2022-06-07 NOTE — PROGRESS NOTES
1243: patient to phase II. Report received from Osvaldo Shields. Patient awake and alert. Denies pain and nausea. Vital signs obtained. Cart in lowest position and locked. Patient given call light. 1245: Pt given snack and drink. 1300: Patient tolerating snack. Denies nausea. IV removed and patient getting dressed. Ride called. Patient meets discharge criteria. Patient ambulated to car passenger seat in stable condition with this RN by side.

## 2022-06-07 NOTE — PROCEDURES
Pre-operative Diagnosis: Lumbar facet pain     Post-operative Diagnosis: Lumbar facet pain     Procedure: RIGHT lumbar thermal radiofrequency ablation targeting facet joints L4/L5 and L5/S1    Procedure Description:  After consent was obtained, the patient was placed in the prone position with a pillow under the abdomen to reduce the lordotic curve of the lumbar spine. The lower back was prepped with chloraprep and draped in a sterile fashion. Then, 0.5 cc of 1 % lidocaine was used for local anesthesia of the skin and superficial subcutaneous tissues. Three 20-gauge 100mm SMK cannulas with 10-mm active tips were advanced under fluoroscopic guidance in an AP view to the junction of the right superior articular process and the transverse process of the L4 and L5 vertebra and at the sacral ala. There were no paresthesias, heme or CSF obtained. Needle placement was confirmed using AP and oblique views. Sensory and motor stimulation at 50Hz and 2Hz and impedance measurements were carried out having reached threshold at:     RIGHT  L4: 0.2V/3V/150-300 Ohms  L5: 0.2V/3V/150-300 Ohms  SA: 0.2V/3V/150-300 Ohms       Then, 1cc of 2% Lidocaine was injected at the site. Temperature was then raised to 80 degrees centigrade for 90 seconds with a 15 second temperature ramp. No pain was reported during the lesioning. The needles were then withdrawn without complications. The patient tolerated the procedure well. The patient was transported to the recovery room and discharged in ambulatory fashion.     Procedural Complications: None  Estimated Blood Loss: 0 mL    IV sedation was used during the procedure:  - Moderate intravenous conscious sedation was supervised by Dr. Albaro Ray  - The patient was independently monitored by a Registered Nurse assigned to the procedure room  - Monitoring included automated blood pressure, continuous EKG, and continuous pulse oximetry  - The detailed conscious record is permanently stored in the [address] following is the conscious sedation record:  Start Time: 12:29  End Time : 12:34  Duration: 15 minutes   Medications Administered: 2 mg Versed, 100 mcg Fentanyl        Mkehi Muller DO  Interventional Pain Management/PM&R   [place]

## 2022-06-07 NOTE — PROGRESS NOTES
Discharge instructions reviewed with patient. All questions were addressed and answered. Patient and verbalized understanding of discharge plan.

## 2022-06-07 NOTE — PRE SEDATION
[facility]  Pre-Sedation/Analgesia History & Physical    Pt Name: Clement River  MRN: [id number]  YOB: 1995  Provider Performing Procedure: Hussain Izquierdo DO   Primary Care Physician: Gay Coats. FIORDALIZA Valdes CNP      MEDICAL HISTORY       has a past medical history of COVID-19, Ovarian cyst, and Scoliosis. SURGICAL HISTORY   has a past surgical history that includes Tonsillectomy; Adenoidectomy; back surgery (2006 and 2009, );  section (N/A, 2020); Pain management procedure (Bilateral, 2022); and Pain management procedure (Bilateral, 2022). ALLERGIES   Allergies as of 2022 - Fully Reviewed 2022   Allergen Reaction Noted    Gabapentin Hives 2020       MEDICATIONS   Prior to Admission medications    Medication Sig Start Date End Date Taking? Authorizing Provider   baclofen (LIORESAL) 10 MG tablet Take 10 mg by mouth as needed   Yes Historical Provider, MD   pantoprazole (PROTONIX) 40 MG tablet Take 40 mg by mouth 2 times daily as needed    Historical Provider, MD   ketoconazole (NIZORAL) 2 % shampoo  3/30/22   Historical Provider, MD   ibuprofen (ADVIL;MOTRIN) 200 MG tablet Take 200 mg by mouth every 6 hours as needed for Pain    Historical Provider, MD   Tens Unit [facility] by Does not apply route 10/11/16   FIORDALIZA Gregory CNP     PHYSICAL:   Vitals:    22 1243   BP: 120/69   Pulse: 85   Resp: 18   Temp: 98 °F (36.7 °C)   SpO2: 97%     PLANNED PROCEDURE   See procedure note  SEDATION  Planned agent: Versed and Fentanyl  ASA Classification: 1  Class 1: A normal healthy patient  Class 2: Pt with mild to moderate systemic disease  Class 3: Severe systemic disease or disturbance  Class 4: Severe systemic disorders that are already life threatening. Class 5: Moribund pt with little chances of survival, for more than 24 hours. Mallampati I Airway Classification: 1    1.  Pre-procedure diagnostic studies complete and results available. 2. Previous sedation/anesthesia experiences assessed. 3. The patient is an appropriate candidate to undergo the planned procedure sedation and anesthesia. (Refer to nursing sedation/analgesia documentation record)  4. Formulation and discussion of sedation/procedure plan, risks, and expectations with patient and/or responsible adult completed. 5. Patient examined immediately prior to the procedure.  (Refer to nursing sedation/analgesia documentation record)    Magaly Collins DO  Electronically signed 6/7/2022 at 2:52 PM

## 2022-06-09 ENCOUNTER — PREP FOR PROCEDURE (OUTPATIENT)
Dept: PHYSICAL MEDICINE AND REHAB | Age: 27
End: 2022-06-09

## 2022-06-13 NOTE — H&P
Today, patient presents for planned lumbar radiofrequency ablation at LEFT facet joints L4-L5 and L5-S1. This note is reflective of the patient's previous visit for evaluation. We will proceed with today's planned procedure. Since patient's last visit for evaluation, there have been no interval changes in medical history. Patient has no new numbness, weakness, or focal neurological deficit since evaluation. Patient has no contraindications to injection (no anticoagulation or recent antibiotic intake for active infections), and has a  present or is able to drive themselves (as discussed and cleared by physician). Allergies to latex, contrast dye, and steroid medications have been confirmed with the patient prior to the procedure. NPO necessity has been assessed and accepted based on procedure complexity. The risks and benefits of the procedure have been explained including but are not limited to infection, bleeding, paralysis, immediate post procedure weakness, and dizziness; the patient acknowledges understanding and desires to proceed with the procedure. Patient has signed consent for same procedure as discussed in previous clinic encounter. All other questions and concerns were addressed at bedside. See procedure note for full details. Post procedure Instructions: The patient was advised not to drive during the day of the procedure and not to engage in any significant decision making (unless otherwise states by physician). The patient was also advised to be cautious with walking/activity for 24 hours following today's visit and asked not to engage in over-exertion (unless otherwise states by physician). After this time, it is ok to resume pre-procedure level of activity. Patient advised to apply ice to site of injection in situations of pain and discomfort. Patient advised to not submerge site of injection during bath or pool activities for approximately 24 hours post-procedure.  Patient attested to understanding post procedure directions / restrictions. All other questions and concerns addressed before patient discharge in ambulatory fashion. Chronic Pain/PM&R Clinic Note     Encounter Date: 5/12/22    Subjective:   Chief Complaint:   No chief complaint on file. History of Present Illness:   Lilly Holstein is a 27 y.o. female seen in the clinic initially on 03/16/2022 upon request from Janeth Perez, [facility])  for her history of lumbar pain. States her pain started back when she was 9 she has a history of adolescent scoliosis. Patient had her first back surgery in 2006, she had a laminectomy and fusion from T1-L2. About 2 years later in January 2009 she had all of her hardware removed due to issues with the hardware placement. Patient states she was doing well overall for about 10 years. In 2018 she was working as a  at [employer] in [place] and her right leg will go completely numb and give out. At that time she moved back to [place] and she had a lumbar laminectomy and fusion at L3-L4 at [address] [place] [address]  [facility] in May 2018. She is currently on disability since her last surgery. She follows with the [address] [place] [address] who referred her here for interventional procedures for pain. She was told she is now having issues with her L5. Patient states her pain is worse with standing, walking long distance, sitting too long. She states it is hard for her to stand and do dishes. Majority of her pain is located in her mid low back. She states she does get some pain that radiates to her left lateral hip, but not past her knee. This is only with increased activity. Patient states she does get some relief when laying in bed but gets uncomfortable if in 1 position for too long. Patient states she has tried ibuprofen, Tylenol, heat, ice, TENS unit, lidocaine patches all which have been relatively ineffective.   She has been using an herbal patch which has been the only thing that is taken the edge off. Patient states she had a baby in September 2020 and she has been trying to lose weight but is difficult due to her pain. She has been to the ER a few times to do her back pain. Patient states last time she did physical therapy was around the time of her surgery in 2018. Patient denies any falls, she does use a cane occasionally if her leg is really bothering her. Patient denies any bowel or bladder dysfunction. Patient states last summer she was walking daily but she has been able to do so due to her pain. Patient currently lives at home with her son and significant other. Patient states her physician at [facility] recommended she have a left transforaminal lumbar epidural steroid injection at L4-5. Today, 4/13/2022, patient presents for planned follow-up on low back pain. Patient underwent a bilateral lumbar medial branch block at L4-5 and L5-S1 on 04/05/2022. Patient reports >85% pain relief x2 days after the procedure. Patient would like to move on to the second medial branch block injections with the goal of getting to the lumbar RFA. Patient states she has had a rough few days as one of her good friends tried to commit suicide and is currently hospitalized. She feels like her muscles have been very tight last few days due to the increased stress. Patient is questioning if she can get a muscle relaxer to help with this increased pain. Patient continues to take tramadol 50 mg twice daily as needed. She denies any side effects to this medication. Patient denies any associated focal leg weakness, saddle anesthesia, bowel/bladder incontinence. Today, 05/12/2022, patient presents for planned follow-up on low back pain. Patient underwent the confirmatory bilateral lumbar facet medial branch block at L4-5 and L5-S1 on 5/4/2022. Patient reports greater than 85% pain relief x2 days following the procedure. Patient would like to proceed with a lumbar RFA.   Patient states she is continuing to try to cut carbs from her diet as a means to lose weight. She has discussed the potential of starting medication for weight loss with her PCP but they have been hesitant to do so. Patient states she would like to start trying to conceive here in a couple months but would like to lose some weight first.  Patient continues take the tramadol twice a day as needed, this medication has been keeping her functional with a toddler at home. She also feels like the baclofen has been helping. She denies side effects to these medications. Patient denies any new symptoms such as associated focal leg weakness, saddle anesthesia, bowel/bladder incontinence. History of Interventions:   Surgery: Laminectomy and fusion T1-L2 (2006)  Hardware removal (January 2009)  Lumbar laminectomy and fusion L3-L4 (May 2018) [facility]  Injections: B/L lumbar facet MBB at L4-5 and L5-S1 (04/05/2022) - >85% relief x 2 days  B/L lumbar facet MBB L4-5 and L5-S1 (05/04/2022) - >85% relief x 2 days    Current Treatment Medications:   Ibuprofen OTC  Tylenol OTC  THC - edible or smoke - helps sleep. Tramadol 50mg BID PRN - prescribed by me  Baclofen 10 mg TID PRN - prescribed by me    Historical Treatment Medications:   Gabapentin - schmidt/acne  Flexeril - ineffective  Tizanidine - mild effectiveness  Valium - helped  Percocet - helped  Tramdaol - helped  Robaxin - ineffective  Mobic - ineffective     Imaging:    Lumbar MRI (12/28/2021)  Narrative   PROCEDURE: MRI LUMBAR SPINE WO CONTRAST       CLINICAL INFORMATION: Degeneration of lumbar or lumbosacral intervertebral disc, Lumbar radiculopathy, Arthrodesis status, Adolescent idiopathic scoliosis of thoracolumbar region. Low back pain and left leg pain with right leg numbness for 2 months. No    recent injury.  Multiple prior back surgeries.       COMPARISON: CT lumbar spine dated 11/15/2021 and MRI lumbar spine dated 8/5/2016.       TECHNIQUE: Sagittal and axial T1 and T2-weighted images were obtained through the lumbar spine.       FINDINGS:   Redemonstration of laminectomy at L3-4 with interbody and posterior fusion bridging this level, stable compared to prior CT but new compared to prior MRI. Redemonstration of right hemilaminectomy at L5-S1, stable compared to prior MRI. There is stable    posterior fusion of the facet joints at T12-L1 through L3-4, similar to prior exams. There is a stable levocurvature of the lumbar spine. There is a focal area of hyperintense T1 and T2 signal in the L1 vertebral body as evidence for a hemangioma. Marrow    signal is otherwise within normal limits. The conus terminates in a normal fashion at the L1-2 level. There is a small fluid collection at the laminectomy bed at L3-4 abutting the thecal sac without mass effect. This measures 3 x 1.8 x 1 cm in greatest    mL, CC and AP dimensions, respectively. Paraspinal soft tissues are otherwise unremarkable. At T12-L1 there is no significant spinal canal or neuroforaminal stenosis. At L1-2 there is no significant spinal canal or neuroforaminal stenosis. At L2-3 there is no significant spinal canal or neuroforaminal stenosis. At L3-4 the spinal canal is decompressed. There is no significant neural foraminal stenosis. At L4-5 there is a periarticular cyst emanating from the anterior aspect of the right facet joint measuring 9 x 8 x 7 mm. This minimally indents the thecal sac and minimally narrows the right lateral recess and contributes to moderate right neural    foraminal stenosis and may contact the exiting L4 nerve root. There is mild to moderate left neural foraminal stenosis in association with facet hypertrophy and ligamentum flavum thickening. At L5-S1 there is no significant spinal canal stenosis. There is mild right neural foraminal stenosis in association with facet hypertrophy.           Impression       1.  Redemonstration of laminectomy at L3-4 with interbody and posterior fusion at this level with small chronic appearing fluid collection in the laminectomy bed without significant mass effect. 2. Redemonstration of posterior fusion at T12-L1 through L2-3 with stable dextroscoliosis of the lumbar spine. 3. Facet arthropathy at the L4-5 level with periarticular cyst on the right which minimally effaces the right lateral recess and encroaches on the right L4-5 neural foramen and may contact the exiting L4 nerve root on the right.                   **This report has been created using voice recognition software. It may contain minor errors which are inherent in voice recognition technology. **       Final report electronically signed by Dr. Boston Maria MD on 2021 9:30 AM       Past Medical History:   Diagnosis Date    COVID-19     Ovarian cyst     Scoliosis        Past Surgical History:   Procedure Laterality Date    ADENOIDECTOMY      BACK SURGERY  2006 and 2018     SECTION N/A 2020     SECTION performed by Marcella Garcia MD at [facility] Bilateral 2022    medial branch blocks at bilateral L4-L5 and L5-S1 performed by Magaly Collins DO at [place] [address] Bilateral 2022    B/L lumbar medial branch block at L4-5 and L5-S1 performed by Magaly Collins DO at [facility] Right 2022    : Bilateral lumbar RFA at L4-5 and L5-S1, RIGHT side first performed by Magaly Collins DO at [facility]          Family History   Problem Relation Age of Onset    High Cholesterol Mother     High Blood Pressure Mother     Depression Mother     Diabetes Mother     Stroke Maternal Grandmother     Heart Disease Maternal Grandfather          Medications & Allergies:   Current Outpatient Medications   Medication Instructions    baclofen (LIORESAL) 10 mg, Oral, PRN    ibuprofen (ADVIL;MOTRIN) 200 mg, Oral, EVERY 6 HOURS PRN    ketoconazole (NIZORAL) 2 % shampoo No dose, route, or frequency recorded.  pantoprazole (PROTONIX) 40 mg, Oral, 2 TIMES DAILY PRN    Tens Unit MISC Does not apply       Allergies   Allergen Reactions    Gabapentin Hives       Review of Systems:   Constitutional: negative for weight changes or fevers  Genitourinary: negative for bowel/bladder incontinence   Musculoskeletal: positive for low back pain, left lateral leg pain  Neurological: negative for any leg weakness or numbness/tingling  Behavioral/Psych: negative for anxiety/depression   All other systems reviewed and are negative    Objective: There were no vitals filed for this visit. Constitutional: Pleasant, no acute distress   Head: Normocephalic, atraumatic   Eyes: Conjunctivae normal   Neck: Supple, symmetrical   Lungs: Normal respiratory effort, non-labored breathing   Cardiovascular: Limbs warm and well perfused   Abdomen: Non-protruded   Musculoskeletal: Muscle bulk symmetric, no atrophy, no gross deformities   · Lower Extremities: ROM WNL. · Thorax: No paraspinal tenderness bilaterally. No scoliosis or kyphosis. · Lumbar Spine: ROM limited due to hx fusion. Lumbar paraspinals non-tender to palpation bilaterally. SLR neg bilaterally. DUGLAS neg bilaterally. GAENSLEN neg bilaterally. Positive facet loading bilaterally. Bilateral SI joints tender to palpation. Bilateral greater trochanters non-tender to palpation. Neurological: Cranial nerves II-XII grossly intact. · Gait - Antalgic gait. Ambulates without assistive device. Uses a cane occasionally.    · Motor: 5/5 muscle strength in bilateral hip flexion, knee flexion, knee extension, ankle dorsiflexion, and ankle plantar flexion   · Sensory: LT sensation intact in lower limbs   · Reflexes: 2+ symmetrical in bilateral achilles, 2+ bilateral patellar, negative ankle clonus, downgoing babinski   Skin: No rashes or lesions present   Psychological: Cooperative, no exaggerated pain behaviors     Assessment:    Diagnosis Orders   1. Lumbar spondylosis  CHG FLUOR NEEDLE/CATH SPINE/PARASPINAL DX/THER ADDON    NC RADIOFREQUENCY NEUROTOMY LUMBAR OR SACRAL, W IMAGE GUIDANCE, SINGLE    NC RADIOFREQ NEUROTOMY LUMBAR OR SACRAL, W IMAGE GUIDE,EA ADDL LEVEL   2. Facet arthropathy     3. Chronic pain syndrome     4. Neuropathic pain     5. SI (sacroiliac) joint dysfunction         Karly MORALES Marybel White is a 27 y. o.female presenting to the pain clinic for evaluation of low back pain. Patient underwent a bilateral lumbar facet medial branch block at L4-5 and L5-S1 with significant relief. I set her up for the confirmatory bilateral lumbar medial branch block at these levels. We discussed the potential of moving forward with a lumbar RFA. We then discussed paring that with physical therapy for her low back to help augment the pain relief. I started her on baclofen 10 mg up to 3 times daily as needed. Patient advised to start this at night then she can add a daytime dose if tolerated. Patient has had 2 significant responses to lumbar facet medial branch blocks. I have set her up for the lumbar facet radiofrequency ablation at bilateral L4-5 and L5-S1, starting with the right side first and then proceeding with the left side 1 week later. I have continued her tramadol 50 mg twice a day as needed as well as baclofen. After completion of the lumbar RFA we will likely pursue physical therapy for posterior core strengthening. Patient encouraged to continue with weight loss efforts. Plan: The following treatment recommendations and plan were discussed in detail with Kalry Yee. Imaging:   I have reviewed patients imaging of lumbar MRI and results were discussed with patient today. Analgesics:   I have continued the patient on tramadol 50 mg twice daily as needed severe pain (>7/10). We discussed risk of tolerance and dependence to this medication.   She is to not take more than prescribed. The side effect profile of long-term opioid use was discussed and recommended emphasis on multimodal strategies for pain management. OARRS reviewed  Urine drug screen reviewed and appropriate  Pain contract signed (03/16/2022)    Patient is taking Acetaminophen. Patient informed that the maximum amount of acetaminophen taken on a regular basis should only be 4000 mg per day. Patient is taking Ibuprofen. Patient is advised to take as prescribed and not take on an empty stomach. Adjuvants:   None    Interventions: In presence of lumbar axial back pain, significant response to the lumbar MBBs at L4-5 and L5-S1, and with physical exam consistent for facetal pain, the option of lumbar radiofrequency ablation at bilateral L4-L5 and L5-S1 was chosen, RIGHT side first the left 1 week later. The risks and benefits were discussed in detail with the patient. Patient wants to proceed with the injection. Anticoagulation/NPO Recommendations:   Patient does Ibuprofen x2 days prior to the procedure. Patient will need to be NPO x 8 hours prior to the procedure. We will start an IV prior to the procedure    Multidisciplinary Pain Management:   In the presence of complex, chronic, and multi-factorial pain, the importance of a multidisciplinary approach to pain management in the patients management regimen was emphasized and discussed in great detail. PHYSICAL THERAPY: Patient is advised to see a physical therapist for gentle stretching exercises and conditioning exercises for management of pain.      Referrals:  None    Prescriptions Written This Visit:   None    Follow-up: Bilateral lumbar RFA at L4-5 and L5-S1, RIGHT side first and left 1 week later with Dr. Janett Ashley DO

## 2022-06-14 ENCOUNTER — APPOINTMENT (OUTPATIENT)
Dept: GENERAL RADIOLOGY | Age: 27
End: 2022-06-14
Attending: ANESTHESIOLOGY
Payer: MEDICARE

## 2022-06-14 ENCOUNTER — HOSPITAL ENCOUNTER (OUTPATIENT)
Age: 27
Setting detail: OUTPATIENT SURGERY
Discharge: HOME | End: 2022-06-14
Attending: ANESTHESIOLOGY | Admitting: ANESTHESIOLOGY
Payer: MEDICARE

## 2022-06-14 VITALS
HEIGHT: 64 IN | TEMPERATURE: 98.5 F | RESPIRATION RATE: 16 BRPM | HEART RATE: 77 BPM | DIASTOLIC BLOOD PRESSURE: 90 MMHG | WEIGHT: 260 LBS | BODY MASS INDEX: 44.39 KG/M2 | OXYGEN SATURATION: 95 % | SYSTOLIC BLOOD PRESSURE: 140 MMHG

## 2022-06-14 LAB — PREGNANCY, URINE: NEGATIVE

## 2022-06-14 PROCEDURE — 99152 MOD SED SAME PHYS/QHP 5/>YRS: CPT | Performed by: ANESTHESIOLOGY

## 2022-06-14 PROCEDURE — 81025 URINE PREGNANCY TEST: CPT

## 2022-06-14 PROCEDURE — 2500000003 HC RX 250 WO HCPCS: Performed by: ANESTHESIOLOGY

## 2022-06-14 PROCEDURE — 7100000010 HC PHASE II RECOVERY - FIRST 15 MIN: Performed by: ANESTHESIOLOGY

## 2022-06-14 PROCEDURE — 3600000055 HC PAIN LEVEL 3 ADDL 15 MIN: Performed by: ANESTHESIOLOGY

## 2022-06-14 PROCEDURE — 64635 DESTROY LUMB/SAC FACET JNT: CPT | Performed by: ANESTHESIOLOGY

## 2022-06-14 PROCEDURE — 7100000011 HC PHASE II RECOVERY - ADDTL 15 MIN: Performed by: ANESTHESIOLOGY

## 2022-06-14 PROCEDURE — 2709999900 HC NON-CHARGEABLE SUPPLY: Performed by: ANESTHESIOLOGY

## 2022-06-14 PROCEDURE — 3209999900 FLUORO FOR SURGICAL PROCEDURES

## 2022-06-14 PROCEDURE — 6360000002 HC RX W HCPCS: Performed by: ANESTHESIOLOGY

## 2022-06-14 PROCEDURE — 64636 DESTROY L/S FACET JNT ADDL: CPT | Performed by: ANESTHESIOLOGY

## 2022-06-14 PROCEDURE — 3600000054 HC PAIN LEVEL 3 BASE: Performed by: ANESTHESIOLOGY

## 2022-06-14 RX ORDER — LIDOCAINE HYDROCHLORIDE 20 MG/ML
INJECTION, SOLUTION EPIDURAL; INFILTRATION; INTRACAUDAL; PERINEURAL PRN
Status: DISCONTINUED | OUTPATIENT
Start: 2022-06-14 | End: 2022-06-14 | Stop reason: ALTCHOICE

## 2022-06-14 RX ORDER — FENTANYL CITRATE 50 UG/ML
INJECTION, SOLUTION INTRAMUSCULAR; INTRAVENOUS PRN
Status: DISCONTINUED | OUTPATIENT
Start: 2022-06-14 | End: 2022-06-14 | Stop reason: ALTCHOICE

## 2022-06-14 RX ORDER — TRAMADOL HYDROCHLORIDE 50 MG/1
50 TABLET ORAL EVERY 6 HOURS PRN
COMMUNITY

## 2022-06-14 RX ORDER — DEXAMETHASONE SODIUM PHOSPHATE 4 MG/ML
INJECTION, SOLUTION INTRA-ARTICULAR; INTRALESIONAL; INTRAMUSCULAR; INTRAVENOUS; SOFT TISSUE PRN
Status: DISCONTINUED | OUTPATIENT
Start: 2022-06-14 | End: 2022-06-14 | Stop reason: ALTCHOICE

## 2022-06-14 RX ORDER — LIDOCAINE HYDROCHLORIDE 10 MG/ML
INJECTION, SOLUTION EPIDURAL; INFILTRATION; INTRACAUDAL; PERINEURAL PRN
Status: DISCONTINUED | OUTPATIENT
Start: 2022-06-14 | End: 2022-06-14 | Stop reason: ALTCHOICE

## 2022-06-14 RX ORDER — MIDAZOLAM HYDROCHLORIDE 1 MG/ML
INJECTION INTRAMUSCULAR; INTRAVENOUS PRN
Status: DISCONTINUED | OUTPATIENT
Start: 2022-06-14 | End: 2022-06-14 | Stop reason: ALTCHOICE

## 2022-06-14 ASSESSMENT — PAIN - FUNCTIONAL ASSESSMENT: PAIN_FUNCTIONAL_ASSESSMENT: 0-10

## 2022-06-14 NOTE — PROGRESS NOTES
1034: Patient arrived to Phase II recovery via cart. Awake and tearful on cart. Report received from Papa Porter 51: VSS, patient rates pain 6/10 to back. HOB elevated and emotional support provided. Snack and drink provided and call light placed within reach. 1056: Patient denies nausea. IV removed without complications. Band aid applied to site. Patient sat edge of bed and stood without difficulty. Denies dizziness\lightheadedness. Dressed independently in room. 1100: Patient ambulatory to vehicle and discharged home in stable condition with friend.
Discharge instructions reviewed with patient. All questions were addressed and answered. Patient  verbalized understanding of discharge plan.
Normal for race

## 2022-06-14 NOTE — PRE SEDATION
[facility]  Pre-Sedation/Analgesia History & Physical    Pt Name: Danii Romano  MRN: [id number]  YOB: 1995  Provider Performing Procedure: Jalyn Jonas DO   Primary Care Physician: Neisha Dobbs. FIORDALIZA Luke CNP      MEDICAL HISTORY       has a past medical history of COVID-19, Ovarian cyst, and Scoliosis. SURGICAL HISTORY   has a past surgical history that includes Tonsillectomy; Adenoidectomy; back surgery (2006 and 2009, );  section (N/A, 2020); Pain management procedure (Bilateral, 2022); Pain management procedure (Bilateral, 2022); and Pain management procedure (Right, 2022). ALLERGIES   Allergies as of 2022 - Fully Reviewed 2022   Allergen Reaction Noted    Gabapentin Hives 2020       MEDICATIONS   Prior to Admission medications    Medication Sig Start Date End Date Taking? Authorizing Provider   traMADol (ULTRAM) 50 MG tablet Take 50 mg by mouth every 6 hours as needed for Pain. Yes Historical Provider, MD   baclofen (LIORESAL) 10 MG tablet Take 10 mg by mouth as needed    Historical Provider, MD   pantoprazole (PROTONIX) 40 MG tablet Take 40 mg by mouth 2 times daily as needed    Historical Provider, MD   ketoconazole (NIZORAL) 2 % shampoo  3/30/22   Historical Provider, MD   ibuprofen (ADVIL;MOTRIN) 200 MG tablet Take 200 mg by mouth every 6 hours as needed for Pain    Historical Provider, MD   Tens Unit MISC by Does not apply route 10/11/16   FIORDALIZA Dale CNP     PHYSICAL:   Vitals:    22 1035   BP: (!) 140/90   Pulse: 77   Resp: 16   Temp: 98.5 °F (36.9 °C)   SpO2: 95%     PLANNED PROCEDURE   See procedure note  SEDATION  Planned agent: Versed and Fentanyl  ASA Classification: 1  Class 1: A normal healthy patient  Class 2: Pt with mild to moderate systemic disease  Class 3: Severe systemic disease or disturbance  Class 4: Severe systemic disorders that are already life threatening.   Class 5: Moribund pt with little chances of survival, for more than 24 hours. Mallampati I Airway Classification: 1    1. Pre-procedure diagnostic studies complete and results available. 2. Previous sedation/anesthesia experiences assessed. 3. The patient is an appropriate candidate to undergo the planned procedure sedation and anesthesia. (Refer to nursing sedation/analgesia documentation record)  4. Formulation and discussion of sedation/procedure plan, risks, and expectations with patient and/or responsible adult completed. 5. Patient examined immediately prior to the procedure.  (Refer to nursing sedation/analgesia documentation record)    Lucila Valerio DO  Electronically signed 6/14/2022 at 11:44 AM

## 2022-06-14 NOTE — PROCEDURES
Pre-operative Diagnosis: Lumbar facet pain     Post-operative Diagnosis: Lumbar facet pain     Procedure: LEFT lumbar thermal radiofrequency ablation targeting facet joints L4/L5 and L5/S1     Procedure Description:  After consent was obtained, the patient was placed in the prone position with a pillow under the abdomen to reduce the lordotic curve of the lumbar spine. The lower back was prepped with chloraprep and draped in a sterile fashion.  Then, 0.5 cc of 1 % lidocaine was used for local anesthesia of the skin and superficial subcutaneous tissues.  Three 20-gauge 100mm SMK cannulas with 10-mm active tips were advanced under fluoroscopic guidance in an AP view to the junction of the LEFT superior articular process and the transverse process of the L4 and L5 vertebra and at the sacral ala. There were no paresthesias, heme or CSF obtained.  Needle placement was confirmed using AP and oblique views.      Sensory and motor stimulation at 50Hz and 2Hz and impedance measurements were carried out having reached threshold at:     LEFT  L4: 0.2V/3V/150-300 Ohms  L5: 0.2V/3V/150-300 Ohms  SA: 0.2V/3V/150-300 Ohms        Then, 1cc of 2% Lidocaine was injected at the site. Temperature was then raised to 80 degrees centigrade for 90 seconds with a 15 second temperature ramp. No pain was reported during the lesioning. Then, 3.33 mg of dexamethasone was injected at each site. The needles were then withdrawn without complications. The patient tolerated the procedure well.  The patient was transported to the recovery room and discharged in ambulatory fashion.     Procedural Complications: None  Estimated Blood Loss: 0 mL     IV sedation was used during the procedure:  - Moderate intravenous conscious sedation was supervised by Dr. Grace Peres  - The patient was independently monitored by a Registered Nurse assigned to the procedure room  - Monitoring included automated blood pressure, continuous EKG, and continuous pulse oximetry  - The detailed conscious record is permanently stored in the [place] [id number]  - The following is the conscious sedation record:  Start Time: 10:17  End Time : 10:32  Duration: 15 minutes   Medications Administered: 3 mg Versed, 150 mcg Fentanyl         Jeremy Thapa, DO  Interventional Pain Management/PM&R   [facility]

## 2022-06-14 NOTE — POST SEDATION
[address] [place] [address]  Sedation/Analgesia Post Sedation Record    Pt Name: Farida Leung  MRN: [id number]  YOB: 1995  Procedure Performed By: Misty Gaona DO  Primary Care Physician: Michael Ovalle APRN - CNP    POST-PROCEDURE    Physicians/Assistants: Misty Gaona DO  Procedure Performed: See Procedure Note   Sedation/Anesthesia: Versed and Fentanyl (See procedure note for amount and duration)  Estimated Blood Loss:     0  ml  Specimens Removed: None        Complications: None           Mekhi Stanton DO  Electronically signed 6/14/2022 at 11:44 AM

## 2022-07-05 ENCOUNTER — OFFICE VISIT (OUTPATIENT)
Dept: PHYSICAL MEDICINE AND REHAB | Age: 27
End: 2022-07-05
Payer: MEDICARE

## 2022-07-05 VITALS
SYSTOLIC BLOOD PRESSURE: 126 MMHG | WEIGHT: 260 LBS | BODY MASS INDEX: 44.39 KG/M2 | HEIGHT: 64 IN | DIASTOLIC BLOOD PRESSURE: 82 MMHG

## 2022-07-05 DIAGNOSIS — G89.4 CHRONIC PAIN SYNDROME: ICD-10-CM

## 2022-07-05 DIAGNOSIS — M53.3 SACROILIAC JOINT DYSFUNCTION OF BOTH SIDES: ICD-10-CM

## 2022-07-05 DIAGNOSIS — M47.819 FACET ARTHROPATHY: ICD-10-CM

## 2022-07-05 DIAGNOSIS — M47.816 LUMBAR SPONDYLOSIS: Primary | ICD-10-CM

## 2022-07-05 DIAGNOSIS — M53.3 SI (SACROILIAC) JOINT DYSFUNCTION: ICD-10-CM

## 2022-07-05 PROCEDURE — G8427 DOCREV CUR MEDS BY ELIG CLIN: HCPCS | Performed by: NURSE PRACTITIONER

## 2022-07-05 PROCEDURE — G8417 CALC BMI ABV UP PARAM F/U: HCPCS | Performed by: NURSE PRACTITIONER

## 2022-07-05 PROCEDURE — 1036F TOBACCO NON-USER: CPT | Performed by: NURSE PRACTITIONER

## 2022-07-05 PROCEDURE — 99214 OFFICE O/P EST MOD 30 MIN: CPT | Performed by: NURSE PRACTITIONER

## 2022-07-05 RX ORDER — BACLOFEN 10 MG/1
10 TABLET ORAL 3 TIMES DAILY PRN
Qty: 90 TABLET | Refills: 0 | Status: SHIPPED | OUTPATIENT
Start: 2022-07-05 | End: 2022-08-04

## 2022-07-05 NOTE — PROGRESS NOTES
Chronic Pain/PM&R Clinic Note     Encounter Date: 7/5/22    Subjective:   Chief Complaint:   Chief Complaint   Patient presents with    Follow-up       History of Present Illness:   Denice Ruiz is a 27 y.o. female seen in the clinic initially on 03/16/2022 upon request from Blanka Hastings [facility])  for her history of lumbar pain. States her pain started back when she was 9 she has a history of adolescent scoliosis. Patient had her first back surgery in 2006, she had a laminectomy and fusion from T1-L2. About 2 years later in January 2009 she had all of her hardware removed due to issues with the hardware placement. Patient states she was doing well overall for about 10 years. In 2018 she was working as a  at [employer] in [place] and her right leg will go completely numb and give out. At that time she moved back to [place] and she had a lumbar laminectomy and fusion at L3-L4 at [facility] in May 2018. She is currently on disability since her last surgery. She follows with the [place] clinic who referred her here for interventional procedures for pain. She was told she is now having issues with her L5. Patient states her pain is worse with standing, walking long distance, sitting too long. She states it is hard for her to stand and do dishes. Majority of her pain is located in her mid low back. She states she does get some pain that radiates to her left lateral hip, but not past her knee. This is only with increased activity. Patient states she does get some relief when laying in bed but gets uncomfortable if in 1 position for too long. Patient states she has tried ibuprofen, Tylenol, heat, ice, TENS unit, lidocaine patches all which have been relatively ineffective. She has been using an herbal patch which has been the only thing that is taken the edge off.   Patient states she had a baby in September 2020 and she has been trying to lose weight but is difficult due to her pain. She has been to the ER a few times to do her back pain. Patient states last time she did physical therapy was around the time of her surgery in 2018. Patient denies any falls, she does use a cane occasionally if her leg is really bothering her. Patient denies any bowel or bladder dysfunction. Patient states last summer she was walking daily but she has been able to do so due to her pain. Patient currently lives at home with her son and significant other. Patient states her physician at [id number] clinic recommended she have a left transforaminal lumbar epidural steroid injection at L4-5. Today, 4/13/2022, patient presents for planned follow-up on low back pain. Patient underwent a bilateral lumbar medial branch block at L4-5 and L5-S1 on 04/05/2022. Patient reports >85% pain relief x2 days after the procedure. Patient would like to move on to the second medial branch block injections with the goal of getting to the lumbar RFA. Patient states she has had a rough few days as one of her good friends tried to commit suicide and is currently hospitalized. She feels like her muscles have been very tight last few days due to the increased stress. Patient is questioning if she can get a muscle relaxer to help with this increased pain. Patient continues to take tramadol 50 mg twice daily as needed. She denies any side effects to this medication. Patient denies any associated focal leg weakness, saddle anesthesia, bowel/bladder incontinence. Today, 05/12/2022, patient presents for planned follow-up on low back pain. Patient underwent the confirmatory bilateral lumbar facet medial branch block at L4-5 and L5-S1 on 5/4/2022. Patient reports greater than 85% pain relief x2 days following the procedure. Patient would like to proceed with a lumbar RFA. Patient states she is continuing to try to cut carbs from her diet as a means to lose weight.   She has discussed the potential of starting medication for weight loss with her PCP but they have been hesitant to do so. Patient states she would like to start trying to conceive here in a couple months but would like to lose some weight first.  Patient continues take the tramadol twice a day as needed, this medication has been keeping her functional with a toddler at home. She also feels like the baclofen has been helping. She denies side effects to these medications. Patient denies any new symptoms such as associated focal leg weakness, saddle anesthesia, bowel/bladder incontinence. Today, 7/5/2022, patient presents for planned follow-up on low back pain. Patient underwent the lumbar RFA at bilateral L4-5 and L5-S1 on 6/7/2022 and 6/14/2022, respectively. Patient has noticed improvement in her low back pain but still struggles with pain slightly below where we completed the RFA. Patient states she has tried to be more active and she does not know if this is leading to the increased pain. She states pain is worse with activity. She has continued with weight loss efforts and states she has lost 6 pounds. Patient denies any new symptoms such as associated focal leg weakness, saddle anesthesia, bowel/bladder incontinence. History of Interventions:   Surgery: Laminectomy and fusion T1-L2 (2006)  Hardware removal (January 2009)  Lumbar laminectomy and fusion L3-L4 (May 2018) [facility]  Injections: B/L lumbar facet MBB at L4-5 and L5-S1 (04/05/2022) - >85% relief x 2 days  B/L lumbar facet MBB L4-5 and L5-S1 (05/04/2022) - >85% relief x 2 days  B/L lumbar RFA at L4-5 and L5-S1 (06/07/2022 and 06/14/2022)     Current Treatment Medications:   Ibuprofen OTC  Tylenol OTC  THC - edible or smoke - helps sleep.    Tramadol 50mg BID PRN - prescribed by me  Baclofen 10 mg TID PRN - prescribed by me    Historical Treatment Medications:   Gabapentin - schmidt/acne  Flexeril - ineffective  Tizanidine - mild effectiveness  Valium - helped  Percocet - helped  Tramdaol - helped  Robaxin - ineffective  Mobic - ineffective     Imaging:    Lumbar MRI (12/28/2021)  Narrative   PROCEDURE: MRI LUMBAR SPINE WO CONTRAST       CLINICAL INFORMATION: Degeneration of lumbar or lumbosacral intervertebral disc, Lumbar radiculopathy, Arthrodesis status, Adolescent idiopathic scoliosis of thoracolumbar region. Low back pain and left leg pain with right leg numbness for 2 months. No    recent injury. Multiple prior back surgeries.       COMPARISON: CT lumbar spine dated 11/15/2021 and MRI lumbar spine dated 8/5/2016.       TECHNIQUE: Sagittal and axial T1 and T2-weighted images were obtained through the lumbar spine.       FINDINGS:   Redemonstration of laminectomy at L3-4 with interbody and posterior fusion bridging this level, stable compared to prior CT but new compared to prior MRI. Redemonstration of right hemilaminectomy at L5-S1, stable compared to prior MRI. There is stable    posterior fusion of the facet joints at T12-L1 through L3-4, similar to prior exams. There is a stable levocurvature of the lumbar spine. There is a focal area of hyperintense T1 and T2 signal in the L1 vertebral body as evidence for a hemangioma. Marrow    signal is otherwise within normal limits. The conus terminates in a normal fashion at the L1-2 level. There is a small fluid collection at the laminectomy bed at L3-4 abutting the thecal sac without mass effect. This measures 3 x 1.8 x 1 cm in greatest    mL, CC and AP dimensions, respectively. Paraspinal soft tissues are otherwise unremarkable. At T12-L1 there is no significant spinal canal or neuroforaminal stenosis. At L1-2 there is no significant spinal canal or neuroforaminal stenosis. At L2-3 there is no significant spinal canal or neuroforaminal stenosis. At L3-4 the spinal canal is decompressed. There is no significant neural foraminal stenosis.    At L4-5 there is a periarticular cyst emanating from the anterior aspect of the right SURGERY CENTER OR    PAIN MANAGEMENT PROCEDURE Right 6/7/2022    : Bilateral lumbar RFA at L4-5 and L5-S1, RIGHT side first performed by Deana Wasserman DO at [place] [id number] Left 6/14/2022    left lumbar RFA at L4-5 and L5-S1, performed by Deana Wasserman DO at [place] [address]         Family History   Problem Relation Age of Onset    High Cholesterol Mother     High Blood Pressure Mother     Depression Mother     Diabetes Mother     Stroke Maternal Grandmother     Heart Disease Maternal Grandfather          Medications & Allergies:   Current Outpatient Medications   Medication Instructions    baclofen (LIORESAL) 10 mg, Oral, 3 TIMES DAILY PRN    ibuprofen (ADVIL;MOTRIN) 200 mg, Oral, EVERY 6 HOURS PRN    ketoconazole (NIZORAL) 2 % shampoo No dose, route, or frequency recorded.  pantoprazole (PROTONIX) 40 mg, Oral, 2 TIMES DAILY PRN    Tens Unit MISC Does not apply    traMADol (ULTRAM) 50 mg, Oral, EVERY 6 HOURS PRN       Allergies   Allergen Reactions    Gabapentin Hives       Review of Systems:   Constitutional: negative for weight changes or fevers  Genitourinary: negative for bowel/bladder incontinence   Musculoskeletal: positive for low back pain, left lateral leg pain  Neurological: negative for any leg weakness or numbness/tingling  Behavioral/Psych: negative for anxiety/depression   All other systems reviewed and are negative    Objective:     Vitals:    07/05/22 1002   BP: 126/82       Constitutional: Pleasant, no acute distress   Head: Normocephalic, atraumatic   Eyes: Conjunctivae normal   Neck: Supple, symmetrical   Lungs: Normal respiratory effort, non-labored breathing   Cardiovascular: Limbs warm and well perfused   Abdomen: Non-protruded   Musculoskeletal: Muscle bulk symmetric, no atrophy, no gross deformities   · Lower Extremities: ROM WNL. · Thorax: No paraspinal tenderness bilaterally. No scoliosis or kyphosis. · Lumbar Spine: ROM limited due to hx fusion. Lumbar paraspinals non-tender to palpation bilaterally. SLR neg bilaterally. DUGLAS neg bilaterally. GAENSLEN neg bilaterally. Positive facet loading bilaterally. Bilateral SI joints tender to palpation. Bilateral greater trochanters non-tender to palpation. Neurological: Cranial nerves II-XII grossly intact. · Gait - Antalgic gait. Ambulates without assistive device. Uses a cane occasionally. · Motor: 5/5 muscle strength in bilateral hip flexion, knee flexion, knee extension, ankle dorsiflexion, and ankle plantar flexion   · Sensory: LT sensation intact in lower limbs   · Reflexes: 2+ symmetrical in bilateral achilles, 2+ bilateral patellar, negative ankle clonus, downgoing babinski   Skin: No rashes or lesions present   Psychological: Cooperative, no exaggerated pain behaviors     Assessment:    Diagnosis Orders   1. Lumbar spondylosis  [facility]    CHG FLUOR NEEDLE/CATH SPINE/PARASPINAL DX/THER ADDON    MI INJECT SI JOINT ARTHRGRPHY&/ANES/STEROID W/IMAGE   2. SI (sacroiliac) joint dysfunction     3. Chronic pain syndrome  [facility]    CHG FLUOR NEEDLE/CATH SPINE/PARASPINAL DX/THER ADDON    MI INJECT SI JOINT ARTHRGRPHY&/ANES/STEROID W/IMAGE   4. Sacroiliac joint dysfunction of both sides  [facility]    CHG FLUOR NEEDLE/CATH SPINE/PARASPINAL DX/THER ADDON    MI INJECT SI JOINT ARTHRGRPHY&/ANES/STEROID W/IMAGE   5. Facet arthropathy  [facility]    CHG FLUOR NEEDLE/CATH SPINE/PARASPINAL DX/THER ADDON    MI INJECT SI JOINT ARTHRGRPHY&/ANES/STEROID W/IMAGE       Karly To is a 27 y. o.female presenting to the pain clinic for evaluation of low back pain. Patient underwent a bilateral lumbar facet medial branch block at L4-5 and L5-S1 with significant relief. I set her up for the confirmatory bilateral lumbar medial branch block at these levels.   We discussed the potential of moving forward with a lumbar RFA. We then discussed paring that with physical therapy for her low back to help augment the pain relief. I started her on baclofen 10 mg up to 3 times daily as needed. Patient advised to start this at night then she can add a daytime dose if tolerated. Patient had a significant response to the bilateral lumbar facet RFA. She continues to have bilateral SI joint dysfunction. I have set her up for a bilateral SI joint injection with Dr. Christina Jansen. She also has lumbar myofascial pain. I have referred her to physical therapy to work on posterior core strengthening. I have continued her tramadol 50 mg twice daily as needed for severe pain (>7/10), while this has not been filled since April. Have also continued her baclofen 10 mg 3 times daily as needed. Patient encouraged to continue with weight loss efforts. Plan: The following treatment recommendations and plan were discussed in detail with Kalry Yee. Imaging:   I have reviewed patients imaging of lumbar MRI and results were discussed with patient today. Analgesics:   I have continued the patient on tramadol 50 mg twice daily as needed severe pain (>7/10). We discussed risk of tolerance and dependence to this medication. She is to not take more than prescribed. The side effect profile of long-term opioid use was discussed and recommended emphasis on multimodal strategies for pain management. OARRS reviewed  Urine drug screen reviewed and appropriate  Pain contract signed (03/16/2022)    Patient is taking Acetaminophen. Patient informed that the maximum amount of acetaminophen taken on a regular basis should only be 4000 mg per day. Patient is taking Ibuprofen. Patient is advised to take as prescribed and not take on an empty stomach. Adjuvants:   None    Interventions:    With examination consistent with bilateral sacroiliac dysfunction/pain, we will proceed with a bilateral sacroiliac joint injection. The risks and benefits were discussed in detail with the patient. Patient wants to proceed with the injection. Anticoagulation/NPO Recommendations:   Patient does not need to hold any medication prior to the procedure. Patient will NOT need to be NPO x 8 hours prior to the procedure. We will do a LOCAL injection     Multidisciplinary Pain Management:   In the presence of complex, chronic, and multi-factorial pain, the importance of a multidisciplinary approach to pain management in the patients management regimen was emphasized and discussed in great detail. PHYSICAL THERAPY: Patient is advised to see a physical therapist for gentle stretching exercises and conditioning exercises for management of pain.      Referrals:  PT    Prescriptions Written This Visit:   Baclofen 10 mg (#90, 0 refills)    Follow-up: B/L SI injection, follow up 4 weeks after    Darol Dose, APRN - CNP

## 2022-08-01 ENCOUNTER — TELEPHONE (OUTPATIENT)
Dept: PHYSICAL MEDICINE AND REHAB | Age: 27
End: 2022-08-01

## 2022-09-12 ENCOUNTER — HOSPITAL ENCOUNTER (EMERGENCY)
Age: 27
Discharge: HOME | End: 2022-09-12
Payer: MEDICARE

## 2022-09-12 VITALS
TEMPERATURE: 98.2 F | WEIGHT: 260 LBS | RESPIRATION RATE: 16 BRPM | DIASTOLIC BLOOD PRESSURE: 76 MMHG | HEART RATE: 59 BPM | OXYGEN SATURATION: 97 % | SYSTOLIC BLOOD PRESSURE: 132 MMHG | BODY MASS INDEX: 44.63 KG/M2

## 2022-09-12 DIAGNOSIS — Z3A.10 10 WEEKS GESTATION OF PREGNANCY: ICD-10-CM

## 2022-09-12 DIAGNOSIS — R42 POSTURAL LIGHTHEADEDNESS: Primary | ICD-10-CM

## 2022-09-12 LAB
ANION GAP SERPL CALCULATED.3IONS-SCNC: 11 MEQ/L (ref 8–16)
BACTERIA: ABNORMAL /HPF
BASOPHILS # BLD: 0.3 %
BASOPHILS ABSOLUTE: 0 THOU/MM3 (ref 0–0.1)
BILIRUBIN URINE: NEGATIVE
BLOOD, URINE: NEGATIVE
BUN BLDV-MCNC: 10 MG/DL (ref 7–22)
CALCIUM SERPL-MCNC: 9.3 MG/DL (ref 8.5–10.5)
CASTS 2: ABNORMAL /LPF
CASTS UA: ABNORMAL /LPF
CHARACTER, URINE: ABNORMAL
CHLORIDE BLD-SCNC: 106 MEQ/L (ref 98–111)
CO2: 21 MEQ/L (ref 23–33)
COLOR: YELLOW
CREAT SERPL-MCNC: 0.4 MG/DL (ref 0.4–1.2)
CRYSTALS, UA: ABNORMAL
EOSINOPHIL # BLD: 1 %
EOSINOPHILS ABSOLUTE: 0.1 THOU/MM3 (ref 0–0.4)
EPITHELIAL CELLS, UA: ABNORMAL /HPF
ERYTHROCYTE [DISTWIDTH] IN BLOOD BY AUTOMATED COUNT: 12.9 % (ref 11.5–14.5)
ERYTHROCYTE [DISTWIDTH] IN BLOOD BY AUTOMATED COUNT: 39.6 FL (ref 35–45)
GFR SERPL CREATININE-BSD FRML MDRD: > 90 ML/MIN/1.73M2
GLUCOSE BLD-MCNC: 74 MG/DL (ref 70–108)
GLUCOSE URINE: NEGATIVE MG/DL
HCT VFR BLD CALC: 35.9 % (ref 37–47)
HEMOGLOBIN: 12.4 GM/DL (ref 12–16)
IMMATURE GRANS (ABS): 0.04 THOU/MM3 (ref 0–0.07)
IMMATURE GRANULOCYTES: 0.4 %
KETONES, URINE: NEGATIVE
LEUKOCYTE ESTERASE, URINE: NEGATIVE
LYMPHOCYTES # BLD: 22.3 %
LYMPHOCYTES ABSOLUTE: 2.3 THOU/MM3 (ref 1–4.8)
MCH RBC QN AUTO: 29.2 PG (ref 26–33)
MCHC RBC AUTO-ENTMCNC: 34.5 GM/DL (ref 32.2–35.5)
MCV RBC AUTO: 84.7 FL (ref 81–99)
MISCELLANEOUS 2: ABNORMAL
MONOCYTES # BLD: 5.2 %
MONOCYTES ABSOLUTE: 0.5 THOU/MM3 (ref 0.4–1.3)
NITRITE, URINE: NEGATIVE
NUCLEATED RED BLOOD CELLS: 0 /100 WBC
OSMOLALITY CALCULATION: 273.4 MOSMOL/KG (ref 275–300)
PH UA: 7 (ref 5–9)
PLATELET # BLD: 291 THOU/MM3 (ref 130–400)
PMV BLD AUTO: 9.5 FL (ref 9.4–12.4)
POTASSIUM SERPL-SCNC: 3.7 MEQ/L (ref 3.5–5.2)
PROTEIN UA: NEGATIVE
RBC # BLD: 4.24 MILL/MM3 (ref 4.2–5.4)
RBC URINE: ABNORMAL /HPF
RENAL EPITHELIAL, UA: ABNORMAL
SEG NEUTROPHILS: 70.8 %
SEGMENTED NEUTROPHILS ABSOLUTE COUNT: 7.4 THOU/MM3 (ref 1.8–7.7)
SODIUM BLD-SCNC: 138 MEQ/L (ref 135–145)
SPECIFIC GRAVITY, URINE: 1.01 (ref 1–1.03)
UROBILINOGEN, URINE: 0.2 EU/DL (ref 0–1)
WBC # BLD: 10.5 THOU/MM3 (ref 4.8–10.8)
WBC UA: ABNORMAL /HPF
YEAST: ABNORMAL

## 2022-09-12 PROCEDURE — 36415 COLL VENOUS BLD VENIPUNCTURE: CPT

## 2022-09-12 PROCEDURE — 80048 BASIC METABOLIC PNL TOTAL CA: CPT

## 2022-09-12 PROCEDURE — 99283 EMERGENCY DEPT VISIT LOW MDM: CPT

## 2022-09-12 PROCEDURE — 81001 URINALYSIS AUTO W/SCOPE: CPT

## 2022-09-12 PROCEDURE — 85025 COMPLETE CBC W/AUTO DIFF WBC: CPT

## 2022-09-12 ASSESSMENT — ENCOUNTER SYMPTOMS
DIARRHEA: 0
NAUSEA: 0
SHORTNESS OF BREATH: 0
SORE THROAT: 0
CHEST TIGHTNESS: 0
COLOR CHANGE: 0
RHINORRHEA: 0
ABDOMINAL DISTENTION: 0
VOMITING: 0
WHEEZING: 0
COUGH: 0
PHOTOPHOBIA: 0

## 2022-09-12 ASSESSMENT — PAIN DESCRIPTION - FREQUENCY: FREQUENCY: INTERMITTENT

## 2022-09-12 ASSESSMENT — PAIN SCALES - GENERAL: PAINLEVEL_OUTOF10: 2

## 2022-09-12 ASSESSMENT — PAIN DESCRIPTION - DESCRIPTORS: DESCRIPTORS: CRAMPING

## 2022-09-12 ASSESSMENT — PAIN DESCRIPTION - ORIENTATION: ORIENTATION: LOWER

## 2022-09-12 ASSESSMENT — PAIN DESCRIPTION - PAIN TYPE: TYPE: ACUTE PAIN

## 2022-09-12 ASSESSMENT — PAIN DESCRIPTION - LOCATION: LOCATION: ABDOMEN;PELVIS

## 2022-09-12 ASSESSMENT — PAIN - FUNCTIONAL ASSESSMENT: PAIN_FUNCTIONAL_ASSESSMENT: NONE - DENIES PAIN

## 2022-09-12 NOTE — ED TRIAGE NOTES
Patient presents to the ED with complaints of vision changes and some lower abdominal cramping. Patient states she is about 10 weeks pregnants, P2, G1, with a toddler at home. Patient rates intermittent lower abdominal and pelvic pain at a 2/10. She reports this happened to her in her previous pregnancy and was told she was overdoing it. VSS.

## 2022-09-12 NOTE — ED NOTES
Visual acuity completed. Patient does wear glasses and has them on now.    Both eyes: 20/25  Left eye: 20/25  Right eye: 20/20     Jasper MondayCAIN  09/12/22 [id number]

## 2022-09-13 NOTE — ED PROVIDER NOTES
[facility] Emergency Department    CHIEF COMPLAINT       Chief Complaint   Patient presents with    Eye Problem    Abdominal Cramping     10 wks pregnant       Nurses Notes reviewed and I agree except as noted in the HPI. HISTORY OF PRESENT ILLNESS    Karly Gallo is a 32 y.o. female who presents to the ED for evaluation of lightheadedness. Patient notes she is currently 10 weeks pregnant, she was doing some cleaning around the house today, began having blurry vision and feeling lightheaded. She notes she has been pregnant twice, and she had similar symptoms with previous pregnancy. She notes some lower abdominal cramping that she rates a 2 out of 10, she denies any vaginal bleeding. She notes she has 1 living child currently. She notes she did not eat breakfast this morning. She denies any recent illnesses. Denies fevers chills nausea vomiting or diarrhea. She denies any chest pain or palpitations. She denies any significant past medical history she denies passing out completely. She notes with past pregnancy she had preeclampsia postpartum. She notes she had a sinus infection last week, she notes she had COVID-19 a couple months ago. She denies any symptoms currently. HPI was provided by the patient. REVIEW OF SYSTEMS     Review of Systems   Constitutional:  Negative for activity change, chills, fatigue and fever. HENT:  Negative for congestion, rhinorrhea and sore throat. Eyes:  Positive for visual disturbance. Negative for photophobia. Respiratory:  Negative for cough, chest tightness, shortness of breath and wheezing. Cardiovascular:  Negative for chest pain and palpitations. Gastrointestinal:  Negative for abdominal distention, diarrhea, nausea and vomiting. Endocrine: Negative for polyuria. Genitourinary:  Positive for pelvic pain. Negative for difficulty urinating, dysuria and flank pain. Musculoskeletal:  Negative for arthralgias, myalgias and neck stiffness.    Skin: Negative for color change and rash. Allergic/Immunologic: Negative for immunocompromised state. Neurological:  Positive for light-headedness. Negative for dizziness, syncope and weakness. Hematological:  Does not bruise/bleed easily. Psychiatric/Behavioral:  Negative for agitation, behavioral problems and confusion. PAST MEDICAL HISTORY     Past Medical History:   Diagnosis Date    COVID-19     Ovarian cyst     Scoliosis        SURGICALHISTORY      has a past surgical history that includes Tonsillectomy; Adenoidectomy; back surgery (2006 and 2009, );  section (N/A, 2020); Pain management procedure (Bilateral, 2022); Pain management procedure (Bilateral, 2022); Pain management procedure (Right, 2022); and Pain management procedure (Left, 2022). CURRENT MEDICATIONS       Discharge Medication List as of 2022  5:38 PM        CONTINUE these medications which have NOT CHANGED    Details   traMADol (ULTRAM) 50 MG tablet Take 50 mg by mouth every 6 hours as needed for Pain. Historical Med      pantoprazole (PROTONIX) 40 MG tablet Take 40 mg by mouth 2 times daily as neededHistorical Med      ketoconazole (NIZORAL) 2 % shampoo Historical Med      ibuprofen (ADVIL;MOTRIN) 200 MG tablet Take 200 mg by mouth every 6 hours as needed for PainHistorical Med      Tens Unit MISC Starting 10/11/2016, Until Discontinued, Disp-1 each, R-0, Print             ALLERGIES     is allergic to gabapentin. FAMILY HISTORY     She indicated that her mother is alive. She indicated that the status of her maternal grandmother is unknown. She indicated that the status of her maternal grandfather is unknown.   family history includes Depression in her mother; Diabetes in her mother; Heart Disease in her maternal grandfather; High Blood Pressure in her mother; High Cholesterol in her mother; Stroke in her maternal grandmother.     SOCIAL HISTORY       Social History     Socioeconomic History Marital status: Single     Spouse name: Not on file    Number of children: Not on file    Years of education: Not on file    Highest education level: Not on file   Occupational History    Not on file   Tobacco Use    Smoking status: Former     Years: 4.00     Types: Cigarettes     Quit date: 12/10/2015     Years since quittin.7    Smokeless tobacco: Never   Vaping Use    Vaping Use: Never used   Substance and Sexual Activity    Alcohol use: Yes     Alcohol/week: 0.0 standard drinks     Comment: socially    Drug use: No    Sexual activity: Not Currently     Partners: Male   Other Topics Concern    Not on file   Social History Narrative    Not on file     Social Determinants of Health     Financial Resource Strain: Not on file   Food Insecurity: Not on file   Transportation Needs: Not on file   Physical Activity: Not on file   Stress: Not on file   Social Connections: Not on file   Intimate Partner Violence: Not on file   Housing Stability: Not on file       PHYSICAL EXAM     INITIAL VITALS:  weight is 260 lb (117.9 kg). Her oral temperature is 98.2 °F (36.8 °C). Her blood pressure is 132/76 and her pulse is 59. Her respiration is 16 and oxygen saturation is 97%. Physical Exam  Vitals and nursing note reviewed. Constitutional:       Appearance: Normal appearance. She is well-developed. HENT:      Head: Normocephalic. Mouth/Throat:      Pharynx: Uvula midline. Eyes:      Conjunctiva/sclera: Conjunctivae normal.   Cardiovascular:      Rate and Rhythm: Normal rate and regular rhythm. Heart sounds: Normal heart sounds, S1 normal and S2 normal.   Pulmonary:      Effort: Pulmonary effort is normal. No respiratory distress. Breath sounds: Normal breath sounds. Chest:      Chest wall: No tenderness. Abdominal:      General: Bowel sounds are normal. There is no distension. Palpations: Abdomen is soft. Tenderness: There is no abdominal tenderness.    Musculoskeletal:         General:

## 2022-10-07 ENCOUNTER — HOSPITAL ENCOUNTER (EMERGENCY)
Age: 27
Discharge: HOME | End: 2022-10-07
Attending: FAMILY MEDICINE
Payer: MEDICARE

## 2022-10-07 VITALS
OXYGEN SATURATION: 97 % | WEIGHT: 247 LBS | SYSTOLIC BLOOD PRESSURE: 148 MMHG | BODY MASS INDEX: 42.4 KG/M2 | RESPIRATION RATE: 20 BRPM | HEART RATE: 78 BPM | DIASTOLIC BLOOD PRESSURE: 90 MMHG | TEMPERATURE: 99.4 F

## 2022-10-07 DIAGNOSIS — R11.2 NAUSEA AND VOMITING, UNSPECIFIED VOMITING TYPE: ICD-10-CM

## 2022-10-07 DIAGNOSIS — O21.0 MILD HYPEREMESIS GRAVIDARUM, ANTEPARTUM: Primary | ICD-10-CM

## 2022-10-07 DIAGNOSIS — R05.9 COUGH, UNSPECIFIED TYPE: ICD-10-CM

## 2022-10-07 DIAGNOSIS — N30.00 ACUTE CYSTITIS WITHOUT HEMATURIA: ICD-10-CM

## 2022-10-07 LAB
ANION GAP SERPL CALCULATED.3IONS-SCNC: 15 MEQ/L (ref 8–16)
BACTERIA: ABNORMAL /HPF
BASOPHILS # BLD: 0.3 %
BASOPHILS ABSOLUTE: 0 THOU/MM3 (ref 0–0.1)
BILIRUBIN URINE: NEGATIVE
BLOOD, URINE: NEGATIVE
BUN BLDV-MCNC: 4 MG/DL (ref 7–22)
CALCIUM SERPL-MCNC: 10 MG/DL (ref 8.5–10.5)
CASTS 2: ABNORMAL /LPF
CASTS UA: ABNORMAL /LPF
CHARACTER, URINE: ABNORMAL
CHLORIDE BLD-SCNC: 102 MEQ/L (ref 98–111)
CO2: 19 MEQ/L (ref 23–33)
COLOR: YELLOW
CREAT SERPL-MCNC: 0.4 MG/DL (ref 0.4–1.2)
CRYSTALS, UA: ABNORMAL
EOSINOPHIL # BLD: 0.9 %
EOSINOPHILS ABSOLUTE: 0.1 THOU/MM3 (ref 0–0.4)
EPITHELIAL CELLS, UA: ABNORMAL /HPF
ERYTHROCYTE [DISTWIDTH] IN BLOOD BY AUTOMATED COUNT: 13.2 % (ref 11.5–14.5)
ERYTHROCYTE [DISTWIDTH] IN BLOOD BY AUTOMATED COUNT: 39.4 FL (ref 35–45)
FLU A ANTIGEN: NEGATIVE
FLU B ANTIGEN: NEGATIVE
GFR SERPL CREATININE-BSD FRML MDRD: > 90 ML/MIN/1.73M2
GLUCOSE BLD-MCNC: 78 MG/DL (ref 70–108)
GLUCOSE URINE: NEGATIVE MG/DL
HCT VFR BLD CALC: 38.9 % (ref 37–47)
HEMOGLOBIN: 13.8 GM/DL (ref 12–16)
IMMATURE GRANS (ABS): 0.03 THOU/MM3 (ref 0–0.07)
IMMATURE GRANULOCYTES: 0.3 %
KETONES, URINE: 80
LEUKOCYTE ESTERASE, URINE: ABNORMAL
LYMPHOCYTES # BLD: 13.5 %
LYMPHOCYTES ABSOLUTE: 1.2 THOU/MM3 (ref 1–4.8)
MCH RBC QN AUTO: 29.6 PG (ref 26–33)
MCHC RBC AUTO-ENTMCNC: 35.5 GM/DL (ref 32.2–35.5)
MCV RBC AUTO: 83.5 FL (ref 81–99)
MISCELLANEOUS 2: ABNORMAL
MONOCYTES # BLD: 6.2 %
MONOCYTES ABSOLUTE: 0.6 THOU/MM3 (ref 0.4–1.3)
NITRITE, URINE: NEGATIVE
NUCLEATED RED BLOOD CELLS: 0 /100 WBC
OSMOLALITY CALCULATION: 267.7 MOSMOL/KG (ref 275–300)
PH UA: 6.5 (ref 5–9)
PLATELET # BLD: 290 THOU/MM3 (ref 130–400)
PMV BLD AUTO: 9.8 FL (ref 9.4–12.4)
POTASSIUM REFLEX MAGNESIUM: 3.8 MEQ/L (ref 3.5–5.2)
PROTEIN UA: NEGATIVE
RBC # BLD: 4.66 MILL/MM3 (ref 4.2–5.4)
RBC URINE: ABNORMAL /HPF
RENAL EPITHELIAL, UA: ABNORMAL
SARS-COV-2, NAAT: NOT  DETECTED
SEG NEUTROPHILS: 78.8 %
SEGMENTED NEUTROPHILS ABSOLUTE COUNT: 7.2 THOU/MM3 (ref 1.8–7.7)
SODIUM BLD-SCNC: 136 MEQ/L (ref 135–145)
SPECIFIC GRAVITY, URINE: 1.01 (ref 1–1.03)
UROBILINOGEN, URINE: 0.2 EU/DL (ref 0–1)
WBC # BLD: 9.2 THOU/MM3 (ref 4.8–10.8)
WBC UA: ABNORMAL /HPF
YEAST: ABNORMAL

## 2022-10-07 PROCEDURE — 96374 THER/PROPH/DIAG INJ IV PUSH: CPT

## 2022-10-07 PROCEDURE — 6360000002 HC RX W HCPCS: Performed by: FAMILY MEDICINE

## 2022-10-07 PROCEDURE — 87086 URINE CULTURE/COLONY COUNT: CPT

## 2022-10-07 PROCEDURE — 85025 COMPLETE CBC W/AUTO DIFF WBC: CPT

## 2022-10-07 PROCEDURE — 99284 EMERGENCY DEPT VISIT MOD MDM: CPT

## 2022-10-07 PROCEDURE — 80048 BASIC METABOLIC PNL TOTAL CA: CPT

## 2022-10-07 PROCEDURE — 87635 SARS-COV-2 COVID-19 AMP PRB: CPT

## 2022-10-07 PROCEDURE — 87804 INFLUENZA ASSAY W/OPTIC: CPT

## 2022-10-07 PROCEDURE — 81001 URINALYSIS AUTO W/SCOPE: CPT

## 2022-10-07 PROCEDURE — 6370000000 HC RX 637 (ALT 250 FOR IP): Performed by: FAMILY MEDICINE

## 2022-10-07 PROCEDURE — 2580000003 HC RX 258: Performed by: FAMILY MEDICINE

## 2022-10-07 RX ORDER — LANOLIN ALCOHOL/MO/W.PET/CERES
50 CREAM (GRAM) TOPICAL ONCE
Status: COMPLETED | OUTPATIENT
Start: 2022-10-07 | End: 2022-10-07

## 2022-10-07 RX ORDER — CEPHALEXIN 500 MG/1
500 CAPSULE ORAL 2 TIMES DAILY
Qty: 14 CAPSULE | Refills: 0 | Status: SHIPPED | OUTPATIENT
Start: 2022-10-07 | End: 2022-10-14

## 2022-10-07 RX ORDER — PROMETHAZINE HYDROCHLORIDE 25 MG/1
25 TABLET ORAL 3 TIMES DAILY PRN
Qty: 12 TABLET | Refills: 0 | Status: SHIPPED | OUTPATIENT
Start: 2022-10-07 | End: 2022-10-14

## 2022-10-07 RX ORDER — ONDANSETRON 2 MG/ML
4 INJECTION INTRAMUSCULAR; INTRAVENOUS ONCE
Status: COMPLETED | OUTPATIENT
Start: 2022-10-07 | End: 2022-10-07

## 2022-10-07 RX ORDER — LANOLIN ALCOHOL/MO/W.PET/CERES
25 CREAM (GRAM) TOPICAL 3 TIMES DAILY PRN
Qty: 90 TABLET | Refills: 0 | Status: SHIPPED | OUTPATIENT
Start: 2022-10-07

## 2022-10-07 RX ORDER — 0.9 % SODIUM CHLORIDE 0.9 %
1000 INTRAVENOUS SOLUTION INTRAVENOUS ONCE
Status: COMPLETED | OUTPATIENT
Start: 2022-10-07 | End: 2022-10-07

## 2022-10-07 RX ADMIN — Medication 50 MG: at 16:31

## 2022-10-07 RX ADMIN — SODIUM CHLORIDE 1000 ML: 9 INJECTION, SOLUTION INTRAVENOUS at 13:10

## 2022-10-07 RX ADMIN — ONDANSETRON 4 MG: 2 INJECTION INTRAMUSCULAR; INTRAVENOUS at 13:52

## 2022-10-07 ASSESSMENT — PAIN SCALES - GENERAL
PAINLEVEL_OUTOF10: 8

## 2022-10-07 ASSESSMENT — PAIN DESCRIPTION - DESCRIPTORS
DESCRIPTORS: OTHER (COMMENT)
DESCRIPTORS: OTHER (COMMENT)

## 2022-10-07 ASSESSMENT — PAIN - FUNCTIONAL ASSESSMENT
PAIN_FUNCTIONAL_ASSESSMENT: 0-10

## 2022-10-07 ASSESSMENT — PAIN DESCRIPTION - LOCATION
LOCATION: ABDOMEN

## 2022-10-07 ASSESSMENT — PAIN DESCRIPTION - FREQUENCY
FREQUENCY: CONTINUOUS

## 2022-10-07 ASSESSMENT — PAIN DESCRIPTION - PAIN TYPE
TYPE: ACUTE PAIN

## 2022-10-07 NOTE — ED NOTES
In for hourly rounding. Pt resting on cot in position of comfort. Pt remains A&Ox4, resps easy and unlabored. IV shows no s/s of infection or infiltration. Pt pain remains unchanged at this time. Nausea is unchanged with medication. Monitor remains in place. Updated pt on POC. Will monitor.      Jennyfer Johnson RN  10/07/22 1810

## 2022-10-07 NOTE — DISCHARGE INSTRUCTIONS
PUSH FLUIDS. TAKE VITAMIN B6 (PYRIDOXINE) 25MG UP TO THREE TIMES DAILY FOR YOUR SYMPTOMS. TAKE PHENERGAN FOR ADDITIONAL NAUSEA RELIEF. TAKE KELFEX FOR BLADDER INFECTION. RETURN IF WORSE.

## 2022-10-07 NOTE — ED NOTES
Pt presents ambulatory to ED via triage for c/o abdominal pain, nausea and emesis and being 13wks pregnant. Pt states having emesis with pregnancy since about week 4 or 5 gestation, however symptoms got much worse last night. Pt was given Zofran for morning sickness by Dr Damari Dolan, however pt states that it is not helping and pt is unable to tolerate even fluids. Upon initial assessment, pt is A&Ox4, resps easy and unlabored. Pt become emotional during assessment, stating \"I'm just absolutely miserable. And I have a 1yo at home. \" IV established with blood drawn and sent to lab. Pt swabbed for flu and covid. Bessy Zamudio student is at bedside for assessment.  Will monitor     Pedrito Bartlett RN  10/07/22 [id number]

## 2022-10-07 NOTE — ED NOTES
In for hourly rounding. Pt resting on cot in position of comfort. Pt remains A&Ox4, resps easy and unlabored. Dr Narda Issa at bedside to attempt [address] guided IV. IV initiated, flushed and infusing, shows no s/s of infection or infiltration. Pt pain remains unchanged at this time. Monitor remains in place. Updated pt on POC. Will monitor.      Constance Mejias RN  10/07/22 [id number]

## 2022-10-07 NOTE — ED PROVIDER NOTES
EMERGENCY DEPARTMENT ENCOUNTER     CHIEF COMPLAINT   Chief Complaint   Patient presents with    Cough    Emesis        HPI   Berry Felix is a 32 y.o. female 13 weeks pregnant A1, had nausea, who presents with cough, URI symptoms with vomiting with these symptoms, onset was last few days. The duration has been constant since the onset. The patient has associated URI symptoms. There are no alleviating factors. The context is that the symptoms started spontaneously, without any known precipitants. REVIEW OF SYSTEMS   Cardiac: No chest pain or syncope   Pulmonary: +cough and rhinorrrhea  GI: + nausea, vomiting or diarrhea  General: No fevers   MSK: no myalgia or deformity  See HPI for further details. All other review of systems are reviewed and are otherwise negative.      PAST MEDICAL & SURGICAL HISTORY   Past Medical History:   Diagnosis Date    COVID-19     Ovarian cyst     Scoliosis       Past Surgical History:   Procedure Laterality Date    ADENOIDECTOMY      BACK SURGERY  2006 and 2009,      SECTION N/A 2020     SECTION performed by Wei Hi MD at [facility] [address] [place] [address] Bilateral 2022    medial branch blocks at bilateral L4-L5 and L5-S1 performed by Marivel Villarreal, DO at [facility] Bilateral 2022    B/L lumbar medial branch block at L4-5 and L5-S1 performed by Marivel Villarreal, DO at [facility] Right 2022    : Bilateral lumbar RFA at L4-5 and L5-S1, RIGHT side first performed by Marivel Villarreal DO at [facility] Left 2022    left lumbar RFA at L4-5 and L5-S1, performed by Marivel Villarreal DO at [address] [place] [address]   Current Outpatient Rx   Medication Sig Dispense Refill    cephALEXin (KEFLEX) 500 MG capsule Take 1 capsule by mouth 2 times daily for 7 days 14 capsule 0    vitamin B-6 (PYRIDOXINE) 50 MG tablet Take 0.5 tablets by mouth 3 times daily as needed (nausea, vomiting) 90 tablet 0    promethazine (PHENERGAN) 25 MG tablet Take 1 tablet by mouth 3 times daily as needed for Nausea 12 tablet 0    traMADol (ULTRAM) 50 MG tablet Take 50 mg by mouth every 6 hours as needed for Pain.      pantoprazole (PROTONIX) 40 MG tablet Take 40 mg by mouth 2 times daily as needed      ketoconazole (NIZORAL) 2 % shampoo       ibuprofen (ADVIL;MOTRIN) 200 MG tablet Take 200 mg by mouth every 6 hours as needed for Pain      Tens Unit MISC by Does not apply route 1 each 0        ALLERGIES   Allergies   Allergen Reactions    Gabapentin Hives        SOCIAL AND FAMILY HISTORY   Social History     Socioeconomic History    Marital status: Single     Spouse name: None    Number of children: None    Years of education: None    Highest education level: None   Tobacco Use    Smoking status: Former     Years: 4.00     Types: Cigarettes     Quit date: 12/10/2015     Years since quittin.8    Smokeless tobacco: Never   Vaping Use    Vaping Use: Never used   Substance and Sexual Activity    Alcohol use:  Yes     Alcohol/week: 0.0 standard drinks     Comment: socially    Drug use: No    Sexual activity: Not Currently     Partners: Male      Family History   Problem Relation Age of Onset    High Cholesterol Mother     High Blood Pressure Mother     Depression Mother     Diabetes Mother     Stroke Maternal Grandmother     Heart Disease Maternal Grandfather         PHYSICAL EXAM   VITAL SIGNS: BP (!) 148/90   Pulse 78   Temp 99.4 °F (37.4 °C) (Oral)   Resp 20   Wt 247 lb (112 kg)   LMP 2022   SpO2 97%   BMI 42.40 kg/m²    Constitutional: Well developed, well nourished   Eyes: Sclera nonicteric, conjunctiva moist   HENT: Atraumatic, nose normal  Neck: Supple, no JVD   Respiratory: No retractions, no accessory muscle use, normal breath sounds   Cardiovascular: tachycardic rate, normal rhythm, no murmurs   GI: Soft, gravid positive c/w gestation age; no abdominal tenderness, no guarding, bowel sounds present, no audible bruits or palpable pulsatile masses   Musculoskeletal: No edema, no deformity   Integument: No rash, dry skin. Neurologic: Alert & oriented, normal speech   Psychiatric: Cooperative, pleasant affect     RADIOLOGY/PROCEDURES   No orders to display        LABS   Labs Reviewed   BASIC METABOLIC PANEL W/ REFLEX TO MG FOR LOW K - Abnormal; Notable for the following components:       Result Value    CO2 19 (*)     BUN 4 (*)     All other components within normal limits   OSMOLALITY - Abnormal; Notable for the following components:    Osmolality Calc 267.7 (*)     All other components within normal limits   URINE WITH REFLEXED MICRO - Abnormal; Notable for the following components:    Ketones, Urine 80 (*)     Leukocyte Esterase, Urine MODERATE (*)     Character, Urine CLOUDY (*)     All other components within normal limits   RAPID INFLUENZA A/B ANTIGENS   COVID-19, RAPID   CULTURE, REFLEXED, URINE    Narrative:     Source: urine, clean catch       Site:           Current Antibiotics: not stated   CBC WITH AUTO DIFFERENTIAL   ANION GAP   GLOMERULAR FILTRATION RATE, ESTIMATED        ED COURSE & MEDICAL DECISION MAKING   Pertinent Labs & Imaging studies reviewed and interpreted. (See chart for details)   See EMR for medications prescribed   Vitals:    10/07/22 1635   BP: (!) 148/90   Pulse: 78   Resp: 20   Temp:    SpO2: 97%        Differential diagnosis: URI, hyperemesis gravidarum    FINAL IMPRESSION   1. Mild hyperemesis gravidarum, antepartum    2. Nausea and vomiting, unspecified vomiting type    3. Cough, unspecified type    4. Acute cystitis without hematuria         PLAN   Possible viral illness, will also consider hyperemesis gravidarum, without abdominal pain or vaginal bleeding. Re-assessment at 1700 - pt doing better after medications.  Will dc home with Rx for B6 as well as phenergan prn and keflex for UTI coverage.     Electronically signed by: Inez Moore MD, 10/7/2022 7:39 PM   (This note was completed with a voice recognition program)        Fadia Amaya MD  10/07/22 1939

## 2022-10-07 NOTE — ED PROVIDER NOTES
Ultrasound Guided Peripheral Intravenous Catheter Insertion    Resident Performing Procedure: Marybeth Rojas MD    Indicatin: Unable to obtain peripheral IV access, IV access needed for IV medications during admission    Date Placed: 10/07/22   Time Placed: 1525    Location: Right AC Vein   Catheter: BD Nexiva  Size: 18G    Procedure: A suitable vein was identified in the right antecubital fossa with ultrasound. Area cleaned with chloraprep. Using sterile ultrasound gel and under ultrasound guidance, peripheral IV catheter was placed . There was good blood return and line flushed easily. Secured with clear occlusive dressing. Complications: None.         Tory Goss MD  Resident  10/07/22 8003

## 2022-10-07 NOTE — ED NOTES
In for hourly rounding. Pt resting on cot in position of comfort. Pt remains A&Ox4, resps easy and unlabored. IV shows no s/s of infection or infiltration. Pt pain remains unchanged at this time. Medicated pt per MAR. Monitor remains in place. Pt provided with more warm blankets. Updated pt on POC. Will monitor.        Rena Nieves RN  10/07/22 3954

## 2022-10-08 LAB
ORGANISM: ABNORMAL
URINE CULTURE REFLEX: ABNORMAL

## 2022-10-13 ENCOUNTER — HOSPITAL ENCOUNTER (EMERGENCY)
Age: 27
Discharge: HOME | End: 2022-10-13
Attending: STUDENT IN AN ORGANIZED HEALTH CARE EDUCATION/TRAINING PROGRAM
Payer: MEDICARE

## 2022-10-13 ENCOUNTER — APPOINTMENT (OUTPATIENT)
Dept: ULTRASOUND IMAGING | Age: 27
End: 2022-10-13
Payer: MEDICARE

## 2022-10-13 VITALS
SYSTOLIC BLOOD PRESSURE: 116 MMHG | OXYGEN SATURATION: 100 % | RESPIRATION RATE: 18 BRPM | TEMPERATURE: 97.9 F | DIASTOLIC BLOOD PRESSURE: 71 MMHG | HEART RATE: 68 BPM

## 2022-10-13 DIAGNOSIS — O21.9 NAUSEA AND VOMITING IN PREGNANCY: ICD-10-CM

## 2022-10-13 DIAGNOSIS — R10.9 ABDOMINAL PAIN, UNSPECIFIED ABDOMINAL LOCATION: Primary | ICD-10-CM

## 2022-10-13 LAB
ALBUMIN SERPL-MCNC: 4.6 G/DL (ref 3.5–5.1)
ALP BLD-CCNC: 56 U/L (ref 38–126)
ALT SERPL-CCNC: 11 U/L (ref 11–66)
ANION GAP SERPL CALCULATED.3IONS-SCNC: 16 MEQ/L (ref 8–16)
AST SERPL-CCNC: 13 U/L (ref 5–40)
BASOPHILS # BLD: 0.3 %
BASOPHILS ABSOLUTE: 0 THOU/MM3 (ref 0–0.1)
BILIRUB SERPL-MCNC: 0.3 MG/DL (ref 0.3–1.2)
BILIRUBIN URINE: NEGATIVE
BLOOD, URINE: NEGATIVE
BUN BLDV-MCNC: 6 MG/DL (ref 7–22)
CALCIUM SERPL-MCNC: 10.1 MG/DL (ref 8.5–10.5)
CHARACTER, URINE: CLEAR
CHLORIDE BLD-SCNC: 100 MEQ/L (ref 98–111)
CO2: 20 MEQ/L (ref 23–33)
COLOR: YELLOW
CREAT SERPL-MCNC: 0.5 MG/DL (ref 0.4–1.2)
EOSINOPHIL # BLD: 0.8 %
EOSINOPHILS ABSOLUTE: 0.1 THOU/MM3 (ref 0–0.4)
ERYTHROCYTE [DISTWIDTH] IN BLOOD BY AUTOMATED COUNT: 13 % (ref 11.5–14.5)
ERYTHROCYTE [DISTWIDTH] IN BLOOD BY AUTOMATED COUNT: 39.1 FL (ref 35–45)
GFR SERPL CREATININE-BSD FRML MDRD: > 90 ML/MIN/1.73M2
GLUCOSE BLD-MCNC: 81 MG/DL (ref 70–108)
GLUCOSE URINE: NEGATIVE MG/DL
HCG,BETA SUBUNIT,QUAL,SERUM: ABNORMAL MIU/ML (ref 0–5)
HCT VFR BLD CALC: 37.4 % (ref 37–47)
HEMOGLOBIN: 13.3 GM/DL (ref 12–16)
IMMATURE GRANS (ABS): 0.03 THOU/MM3 (ref 0–0.07)
IMMATURE GRANULOCYTES: 0.3 %
KETONES, URINE: ABNORMAL
LEUKOCYTE ESTERASE, URINE: NEGATIVE
LIPASE: 30.5 U/L (ref 5.6–51.3)
LYMPHOCYTES # BLD: 23.6 %
LYMPHOCYTES ABSOLUTE: 2.7 THOU/MM3 (ref 1–4.8)
MAGNESIUM: 2 MG/DL (ref 1.6–2.4)
MCH RBC QN AUTO: 29.6 PG (ref 26–33)
MCHC RBC AUTO-ENTMCNC: 35.6 GM/DL (ref 32.2–35.5)
MCV RBC AUTO: 83.1 FL (ref 81–99)
MONOCYTES # BLD: 4.6 %
MONOCYTES ABSOLUTE: 0.5 THOU/MM3 (ref 0.4–1.3)
NITRITE, URINE: NEGATIVE
NUCLEATED RED BLOOD CELLS: 0 /100 WBC
OSMOLALITY CALCULATION: 268.6 MOSMOL/KG (ref 275–300)
PH UA: 6 (ref 5–9)
PLATELET # BLD: 290 THOU/MM3 (ref 130–400)
PMV BLD AUTO: 9.8 FL (ref 9.4–12.4)
POTASSIUM REFLEX MAGNESIUM: 3.4 MEQ/L (ref 3.5–5.2)
PROTEIN UA: NEGATIVE
RBC # BLD: 4.5 MILL/MM3 (ref 4.2–5.4)
SEG NEUTROPHILS: 70.4 %
SEGMENTED NEUTROPHILS ABSOLUTE COUNT: 8 THOU/MM3 (ref 1.8–7.7)
SODIUM BLD-SCNC: 136 MEQ/L (ref 135–145)
SPECIFIC GRAVITY, URINE: 1.01 (ref 1–1.03)
TOTAL PROTEIN: 7.7 G/DL (ref 6.1–8)
UROBILINOGEN, URINE: 0.2 EU/DL (ref 0–1)
WBC # BLD: 11.4 THOU/MM3 (ref 4.8–10.8)

## 2022-10-13 PROCEDURE — 85025 COMPLETE CBC W/AUTO DIFF WBC: CPT

## 2022-10-13 PROCEDURE — 6360000002 HC RX W HCPCS: Performed by: STUDENT IN AN ORGANIZED HEALTH CARE EDUCATION/TRAINING PROGRAM

## 2022-10-13 PROCEDURE — 81003 URINALYSIS AUTO W/O SCOPE: CPT

## 2022-10-13 PROCEDURE — 99284 EMERGENCY DEPT VISIT MOD MDM: CPT

## 2022-10-13 PROCEDURE — 2580000003 HC RX 258: Performed by: STUDENT IN AN ORGANIZED HEALTH CARE EDUCATION/TRAINING PROGRAM

## 2022-10-13 PROCEDURE — 84702 CHORIONIC GONADOTROPIN TEST: CPT

## 2022-10-13 PROCEDURE — 96375 TX/PRO/DX INJ NEW DRUG ADDON: CPT

## 2022-10-13 PROCEDURE — 96374 THER/PROPH/DIAG INJ IV PUSH: CPT

## 2022-10-13 PROCEDURE — 6370000000 HC RX 637 (ALT 250 FOR IP): Performed by: STUDENT IN AN ORGANIZED HEALTH CARE EDUCATION/TRAINING PROGRAM

## 2022-10-13 PROCEDURE — 96372 THER/PROPH/DIAG INJ SC/IM: CPT

## 2022-10-13 PROCEDURE — 80053 COMPREHEN METABOLIC PANEL: CPT

## 2022-10-13 PROCEDURE — 96376 TX/PRO/DX INJ SAME DRUG ADON: CPT

## 2022-10-13 PROCEDURE — 83690 ASSAY OF LIPASE: CPT

## 2022-10-13 PROCEDURE — 76801 OB US < 14 WKS SINGLE FETUS: CPT

## 2022-10-13 PROCEDURE — 83735 ASSAY OF MAGNESIUM: CPT

## 2022-10-13 RX ORDER — LIDOCAINE 4 G/G
1 PATCH TOPICAL DAILY
Status: DISCONTINUED | OUTPATIENT
Start: 2022-10-14 | End: 2022-10-13

## 2022-10-13 RX ORDER — MORPHINE SULFATE 2 MG/ML
2 INJECTION, SOLUTION INTRAMUSCULAR; INTRAVENOUS ONCE
Status: COMPLETED | OUTPATIENT
Start: 2022-10-13 | End: 2022-10-13

## 2022-10-13 RX ORDER — LIDOCAINE 4 G/G
1 PATCH TOPICAL DAILY
Status: DISCONTINUED | OUTPATIENT
Start: 2022-10-13 | End: 2022-10-14 | Stop reason: HOSPADM

## 2022-10-13 RX ORDER — DICYCLOMINE HYDROCHLORIDE 10 MG/ML
20 INJECTION INTRAMUSCULAR ONCE
Status: COMPLETED | OUTPATIENT
Start: 2022-10-13 | End: 2022-10-13

## 2022-10-13 RX ORDER — ONDANSETRON 2 MG/ML
4 INJECTION INTRAMUSCULAR; INTRAVENOUS ONCE
Status: COMPLETED | OUTPATIENT
Start: 2022-10-13 | End: 2022-10-13

## 2022-10-13 RX ORDER — LIDOCAINE 4 G/G
1 PATCH TOPICAL DAILY
Qty: 5 EACH | Refills: 0 | Status: SHIPPED | OUTPATIENT
Start: 2022-10-13 | End: 2022-10-18

## 2022-10-13 RX ORDER — MORPHINE SULFATE 2 MG/ML
1 INJECTION, SOLUTION INTRAMUSCULAR; INTRAVENOUS ONCE
Status: COMPLETED | OUTPATIENT
Start: 2022-10-13 | End: 2022-10-13

## 2022-10-13 RX ORDER — METOCLOPRAMIDE 10 MG/1
10 TABLET ORAL 4 TIMES DAILY
Qty: 28 TABLET | Refills: 0 | Status: SHIPPED | OUTPATIENT
Start: 2022-10-13 | End: 2022-10-20

## 2022-10-13 RX ORDER — METOCLOPRAMIDE HYDROCHLORIDE 5 MG/ML
10 INJECTION INTRAMUSCULAR; INTRAVENOUS ONCE
Status: COMPLETED | OUTPATIENT
Start: 2022-10-13 | End: 2022-10-13

## 2022-10-13 RX ORDER — SODIUM CHLORIDE, SODIUM LACTATE, POTASSIUM CHLORIDE, AND CALCIUM CHLORIDE .6; .31; .03; .02 G/100ML; G/100ML; G/100ML; G/100ML
1000 INJECTION, SOLUTION INTRAVENOUS ONCE
Status: COMPLETED | OUTPATIENT
Start: 2022-10-13 | End: 2022-10-13

## 2022-10-13 RX ADMIN — DICYCLOMINE HYDROCHLORIDE 20 MG: 20 INJECTION, SOLUTION INTRAMUSCULAR at 18:26

## 2022-10-13 RX ADMIN — MORPHINE SULFATE 2 MG: 2 INJECTION, SOLUTION INTRAMUSCULAR; INTRAVENOUS at 19:28

## 2022-10-13 RX ADMIN — ONDANSETRON 4 MG: 2 INJECTION INTRAMUSCULAR; INTRAVENOUS at 18:28

## 2022-10-13 RX ADMIN — SODIUM CHLORIDE, POTASSIUM CHLORIDE, SODIUM LACTATE AND CALCIUM CHLORIDE 1000 ML: 600; 310; 30; 20 INJECTION, SOLUTION INTRAVENOUS at 18:31

## 2022-10-13 RX ADMIN — MORPHINE SULFATE 1 MG: 2 INJECTION, SOLUTION INTRAMUSCULAR; INTRAVENOUS at 21:56

## 2022-10-13 RX ADMIN — METOCLOPRAMIDE 10 MG: 5 INJECTION, SOLUTION INTRAMUSCULAR; INTRAVENOUS at 19:27

## 2022-10-13 ASSESSMENT — PAIN DESCRIPTION - ORIENTATION: ORIENTATION: LEFT;LOWER

## 2022-10-13 ASSESSMENT — ENCOUNTER SYMPTOMS
EYE REDNESS: 0
ANAL BLEEDING: 0
SINUS PRESSURE: 0
BACK PAIN: 0
ABDOMINAL PAIN: 1
SINUS PAIN: 0
NAUSEA: 1
SORE THROAT: 0
VOMITING: 1
APNEA: 0
CHEST TIGHTNESS: 0
CHOKING: 0
COLOR CHANGE: 0
SHORTNESS OF BREATH: 0
EYE DISCHARGE: 0
COUGH: 0
FACIAL SWELLING: 0
TROUBLE SWALLOWING: 0
DIARRHEA: 0
RECTAL PAIN: 0
EYE ITCHING: 0
EYE PAIN: 0

## 2022-10-13 ASSESSMENT — PAIN DESCRIPTION - LOCATION: LOCATION: ABDOMEN

## 2022-10-13 ASSESSMENT — PAIN SCALES - GENERAL
PAINLEVEL_OUTOF10: 8

## 2022-10-13 ASSESSMENT — PAIN - FUNCTIONAL ASSESSMENT: PAIN_FUNCTIONAL_ASSESSMENT: 0-10

## 2022-10-13 NOTE — ED NOTES
Pt and vs reassessed. RR easy and unlabored. Pt resting in bed alert. Medical student at bedside assessing pt. No distress noted.  Pt stable at this time     Madalyn Hansen RN  10/13/22 1012

## 2022-10-13 NOTE — ED NOTES
Pt and vs reassessed. RR Easy and unlabored. Pt medicated per MAR. No distress noted.  Pt stable at this time     Winter Russell RN  10/13/22 8660

## 2022-10-13 NOTE — ED PROVIDER NOTES
[facility] ENCOUNTER          Pt Name: Sd Pool  MRN: [id number]  [place] 1995  Date of evaluation: 10/13/2022  Treating Resident Physician: Luis E Andrews MD  Supervising Physician: Sal Salinas DO    CHIEF COMPLAINT       Chief Complaint   Patient presents with    Abdominal Pain    Emesis During Pregnancy     History obtained from the patient. HISTORY OF PRESENT ILLNESS    HPI  Karly Sweeney is a  32 y.o. female with PMHx of idiopathic scoliosis, abdominal hernia, ovarian cyst who presents to the emergency department for evaluation of abdominal pain and nausea. Patient states that 3 days ago she began having a twisting, throbbing pain in her left lower quad is constant and spasms to her back. She states that moving makes pain worse, nothing makes it better. She states the pain is 8/10. Denies vomiting, diarrhea, constipation, fever, blood in stool, vaginal bleeding. Additionally she states she has been having severe nausea for the past 3 weeks. She was seen in the emergency department on 10/7/22 intractable nausea diagnosed as mild hyperemesis gravidarum. She was prescribed Phenergan which did not relieve her symptoms. At the time she was also have UTI as prescribed cephalexin. Patient is on her last day of the antibiotic. She currently has loss of appetite and states she has only had broth for meals. The patient has no other acute complaints at this time. REVIEW OF SYSTEMS   Review of Systems   Constitutional:  Negative for activity change, appetite change, diaphoresis and fatigue. HENT:  Negative for ear pain, facial swelling, sinus pressure, sinus pain, sore throat and trouble swallowing. Eyes:  Negative for pain, discharge, redness and itching. Respiratory:  Negative for apnea, cough, choking, chest tightness and shortness of breath.     Cardiovascular:  Negative for chest pain, palpitations and leg swelling. Gastrointestinal:  Positive for abdominal pain, nausea and vomiting. Negative for anal bleeding, diarrhea and rectal pain. Endocrine: Negative for polydipsia, polyphagia and polyuria. Genitourinary:  Negative for dysuria, flank pain, genital sores and hematuria. Musculoskeletal:  Negative for arthralgias, back pain, joint swelling, myalgias, neck pain and neck stiffness. Skin:  Negative for color change, rash and wound. Neurological:  Negative for syncope, facial asymmetry, speech difficulty and headaches. Hematological:  Negative for adenopathy. Psychiatric/Behavioral:  Negative for agitation, behavioral problems, hallucinations, self-injury and suicidal ideas.       PAST MEDICAL AND SURGICAL HISTORY     Past Medical History:   Diagnosis Date    COVID-19     Ovarian cyst     Scoliosis      Past Surgical History:   Procedure Laterality Date    ADENOIDECTOMY      BACK SURGERY  2006 and 2018     SECTION N/A 2020     SECTION performed by Victor Manuel Delgado MD at [facility] [address] Bilateral 2022    medial branch blocks at bilateral L4-L5 and L5-S1 performed by Deonna Franks DO at [facility] Bilateral 2022    B/L lumbar medial branch block at L4-5 and L5-S1 performed by Deonna Franks DO at [facility] Right 2022    : Bilateral lumbar RFA at L4-5 and L5-S1, RIGHT side first performed by Deonna Franks DO at [facility] Left 2022    left lumbar RFA at L4-5 and L5-S1, performed by Deonna Franks DO at [address] [place] [address]     Current Facility-Administered Medications:     lidocaine 4 % external patch 1 patch, 1 patch, TransDERmal, Daily, Paradise Mcmillan DO, 1 patch at 10/13/22 2986    Current Outpatient Medications:     metoclopramide (REGLAN) 10 MG tablet, Take 1 tablet by mouth 4 times daily for 7 days, Disp: 28 tablet, Rfl: 0    lidocaine 4 % external patch, Place 1 patch onto the skin daily for 5 days, Disp: 5 each, Rfl: 0    cephALEXin (KEFLEX) 500 MG capsule, Take 1 capsule by mouth 2 times daily for 7 days, Disp: 14 capsule, Rfl: 0    vitamin B-6 (PYRIDOXINE) 50 MG tablet, Take 0.5 tablets by mouth 3 times daily as needed (nausea, vomiting), Disp: 90 tablet, Rfl: 0    promethazine (PHENERGAN) 25 MG tablet, Take 1 tablet by mouth 3 times daily as needed for Nausea, Disp: 12 tablet, Rfl: 0    traMADol (ULTRAM) 50 MG tablet, Take 50 mg by mouth every 6 hours as needed for Pain., Disp: , Rfl:     pantoprazole (PROTONIX) 40 MG tablet, Take 40 mg by mouth 2 times daily as needed, Disp: , Rfl:     ketoconazole (NIZORAL) 2 % shampoo, , Disp: , Rfl:     ibuprofen (ADVIL;MOTRIN) 200 MG tablet, Take 200 mg by mouth every 6 hours as needed for Pain, Disp: , Rfl:     Tens Unit MISC, by Does not apply route, Disp: 1 each, Rfl: 0    SOCIAL HISTORY     Social History     Social History Narrative    Not on file     Social History     Tobacco Use    Smoking status: Former     Years: 4.00     Types: Cigarettes     Quit date: 12/10/2015     Years since quittin.8    Smokeless tobacco: Never   Vaping Use    Vaping Use: Never used   Substance Use Topics    Alcohol use: Yes     Alcohol/week: 0.0 standard drinks     Comment: socially    Drug use: No       ALLERGIES     Allergies   Allergen Reactions    Gabapentin Hives       FAMILY HISTORY     Family History   Problem Relation Age of Onset    High Cholesterol Mother     High Blood Pressure Mother     Depression Mother     Diabetes Mother     Stroke Maternal Grandmother     Heart Disease Maternal Grandfather        PREVIOUS RECORDS   Previous records reviewed: I reviewed the patient's past medical records including relevant labs, imaging and procedures.   Last seen in the emergency department on 10/7/2022 for mild hyperemesis gravidarum    PHYSICAL EXAM     ED Triage Vitals   BP Temp Temp Source Heart Rate Resp SpO2 Height Weight   10/13/22 1700 10/13/22 1701 10/13/22 1701 10/13/22 1700 10/13/22 1700 10/13/22 1700 -- --   (!) 170/99 97.9 °F (36.6 °C) Oral 72 18 98 %       Initial vital signs and nursing assessment reviewed and abnormal from hypertension . There is no height or weight on file to calculate BMI. Pulsoximetry is normal per my interpretation. Additional Vital Signs:  Vitals:    10/13/22 2100   BP: 116/71   Pulse:    Resp:    Temp:    SpO2: 100%       Physical Exam  Constitutional:       General: She is not in acute distress. Appearance: Normal appearance. She is not ill-appearing or diaphoretic. HENT:      Head: Normocephalic and atraumatic. Right Ear: External ear normal.      Left Ear: External ear normal.      Nose: Nose normal. No congestion or rhinorrhea. Mouth/Throat:      Mouth: Mucous membranes are moist.      Pharynx: No oropharyngeal exudate or posterior oropharyngeal erythema. Eyes:      General: No scleral icterus. Extraocular Movements: Extraocular movements intact. Conjunctiva/sclera: Conjunctivae normal.      Pupils: Pupils are equal, round, and reactive to light. Cardiovascular:      Rate and Rhythm: Normal rate and regular rhythm. Pulses: Normal pulses. Heart sounds: Normal heart sounds. Pulmonary:      Effort: Pulmonary effort is normal. No respiratory distress. Breath sounds: Normal breath sounds. Chest:      Chest wall: No tenderness. Abdominal:      General: Bowel sounds are normal. There is no distension. Palpations: Abdomen is soft. Tenderness: There is abdominal tenderness in the left lower quadrant. There is no guarding or rebound. Hernia: A hernia is present. Musculoskeletal:         General: No swelling, tenderness or deformity. Normal range of motion. Cervical back: Normal range of motion and neck supple.  No rigidity or tenderness. Skin:     General: Skin is warm and dry. Capillary Refill: Capillary refill takes less than 2 seconds. Coloration: Skin is not jaundiced or pale. Findings: No bruising, erythema, lesion or rash. Neurological:      General: No focal deficit present. Mental Status: She is alert and oriented to person, place, and time. Psychiatric:         Mood and Affect: Mood normal.         Behavior: Behavior normal.         Thought Content: Thought content normal.       ED RESULTS   Laboratory results:  Labs Reviewed   CBC WITH AUTO DIFFERENTIAL - Abnormal; Notable for the following components:       Result Value    WBC 11.4 (*)     MCHC 35.6 (*)     Segs Absolute 8.0 (*)     All other components within normal limits   COMPREHENSIVE METABOLIC PANEL W/ REFLEX TO MG FOR LOW K - Abnormal; Notable for the following components:    BUN 6 (*)     Potassium reflex Magnesium 3.4 (*)     CO2 20 (*)     All other components within normal limits   URINALYSIS WITH REFLEX TO CULTURE - Abnormal; Notable for the following components:    Ketones, Urine TRACE (*)     All other components within normal limits   HCG, QUANTITATIVE, PREGNANCY - Abnormal; Notable for the following components:    hCG,Beta Subunit,Qual,Serum 68495.0 (*)     All other components within normal limits   OSMOLALITY - Abnormal; Notable for the following components:    Osmolality Calc 268.6 (*)     All other components within normal limits   LIPASE   ANION GAP   GLOMERULAR FILTRATION RATE, ESTIMATED   MAGNESIUM       Radiologic studies results:  US OB LESS THAN 14 WEEKS SINGLE OR FIRST GESTATION   Final Result   Impression:   Single viable intrauterine pregnancy, with estimated gestational age of 14    weeks and 0 days.       This document has been electronically signed by: Ya Villarreal MD on    10/13/2022 08:57 PM          ED Medications administered this visit:   Medications   lidocaine 4 % external patch 1 patch (1 patch TransDERmal Patch Applied 10/13/22 2157)   ondansetron (ZOFRAN) injection 4 mg (4 mg IntraVENous Given 10/13/22 1828)   dicyclomine (BENTYL) injection 20 mg (20 mg IntraMUSCular Given 10/13/22 1826)   lactated ringers bolus (0 mLs IntraVENous Stopped 10/13/22 1931)   metoclopramide (REGLAN) injection 10 mg (10 mg IntraVENous Given 10/13/22 1927)   morphine (PF) injection 2 mg (2 mg IntraVENous Given 10/13/22 1928)   morphine (PF) injection 1 mg (1 mg IntraVENous Given 10/13/22 2156)       ED COURSE     ED Course as of 10/13/22 2203   Thu Oct 13, 2022   2059  OB LESS THAN 14 WEEKS SINGLE OR FIRST GESTATION  Impression:  Single viable intrauterine pregnancy, with estimated gestational age of 16   weeks and 0 days. [EL]      ED Course User Index  [EL] Pj Hernandez MD       MEDICAL DECISION MAKING   Given the patient's above chief complaint and findings on history and physical examination, I thought it was appropriate to consider the following emergency medical conditions:  Pyelonephritis, urolithiasis, UTI, cystitis, ovarian torsion, ovarian cyst rupture    Although some of these diagnoses are unlikely, they were considered in my medical decision making. Catalino Mccabe is a  32 y.o. female with PMHx of idiopathic scoliosis, abdominal hernia, ovarian cyst who presents to the emergency department for evaluation of abdominal pain and nausea. In the ED, urine negative for UTI. Transvaginal ultrasound showing no evidence of torsion, no free fluid in abdomen. Single viable intrauterine pregnancy gestational age of 12 weeks. Patient was given IV fluids, morphine, Reglan, Zofran, Bentyl, lidocaine in the ED with symptomatic relief. Will discharge patient on lidocaine patch as well as Reglan. Patient does have appointment with her OB on Tuesday as well as GI on Friday. Advised patient to follow-up with these appointments. Patient agreeable to plan.     strict return precautions and follow up instructions were discussed with the patient prior to discharge, with which the patient agrees. MEDICATION CHANGES     Discharge Medication List as of 10/13/2022  9:40 PM        START taking these medications    Details   metoclopramide (REGLAN) 10 MG tablet Take 1 tablet by mouth 4 times daily for 7 days, Disp-28 tablet, R-0Normal      lidocaine 4 % external patch Place 1 patch onto the skin daily for 5 days, TransDERmal, DAILY Starting Thu 10/13/2022, Until Tue 10/18/2022, For 5 days, Disp-5 each, R-0, Normal             FINAL DISPOSITION     Final diagnoses:   Abdominal pain, unspecified abdominal location   Nausea and vomiting in pregnancy     Condition: condition: stable  Dispo: Discharge to home    This transcription was electronically signed. Parts of this transcriptions may have been dictated by use of voice recognition software and electronically transcribed, and parts may have been transcribed with the assistance of an ED scribe. The transcription may contain errors not detected in proofreading. Please refer to my supervising physician's documentation if my documentation differs.     Electronically Signed: Shen Sewell MD, 10/13/22, 10:03 PM         Brittany Garcia MD  Resident  10/13/22 3975

## 2022-10-13 NOTE — ED NOTES
Pt resting in bed, no concerns voiced. Call light in reach. Family at bedside.       Sharee Choudhary RN  10/13/22 8581

## 2022-10-13 NOTE — ED NOTES
Pt in through ED lobby. She is 14 weeks pregnant. She was seen last week for emesis and was diagnosed with UTI and sent home with antibiotics and nausea medication. She states the nausea medication has not helped. She only has one day left of antibiotics. She denies urinary symptoms. She now has left sided abdominal pain she describes as a \"twisting\". She does state she has history of hernia.       Ruslan Jamil RN  10/13/22 0412

## 2022-10-14 NOTE — DISCHARGE INSTRUCTIONS
Have You Had Botox Before?: has had botox You were seen today in the ED for abdominal pain and vomiting    Please follow up with your obgyn and your GI doctor regarding todays visit    I have sent 7 days of reglan to your pharmacy   You can use lidocaine patches to help with the pain     Return to the ED if you develop any new concerning symptoms such as increased abdominal pain, nausea and vomiting that wont go away .

## 2022-10-18 ENCOUNTER — HOSPITAL ENCOUNTER (OUTPATIENT)
Age: 27
Discharge: HOME | End: 2022-10-18
Payer: MEDICARE

## 2022-10-18 LAB
ABO: NORMAL
ANTIBODY SCREEN: NORMAL
ERYTHROCYTE [DISTWIDTH] IN BLOOD BY AUTOMATED COUNT: 13.2 % (ref 11.5–14.5)
ERYTHROCYTE [DISTWIDTH] IN BLOOD BY AUTOMATED COUNT: 40.6 FL (ref 35–45)
HCT VFR BLD CALC: 36.7 % (ref 37–47)
HEMOGLOBIN: 12.9 GM/DL (ref 12–16)
HEPATITIS B SURFACE ANTIGEN: NEGATIVE
HEPATITIS C ANTIBODY: NEGATIVE
HIV AG/AB: NONREACTIVE
MCH RBC QN AUTO: 29.8 PG (ref 26–33)
MCHC RBC AUTO-ENTMCNC: 35.1 GM/DL (ref 32.2–35.5)
MCV RBC AUTO: 84.8 FL (ref 81–99)
PLATELET # BLD: 280 THOU/MM3 (ref 130–400)
PMV BLD AUTO: 9.8 FL (ref 9.4–12.4)
RBC # BLD: 4.33 MILL/MM3 (ref 4.2–5.4)
RH FACTOR: NORMAL
RUBELLA: 156.6 IU/ML
WBC # BLD: 6.9 THOU/MM3 (ref 4.8–10.8)

## 2022-10-18 PROCEDURE — 87340 HEPATITIS B SURFACE AG IA: CPT

## 2022-10-18 PROCEDURE — 87389 HIV-1 AG W/HIV-1&-2 AB AG IA: CPT

## 2022-10-18 PROCEDURE — 86900 BLOOD TYPING SEROLOGIC ABO: CPT

## 2022-10-18 PROCEDURE — 86901 BLOOD TYPING SEROLOGIC RH(D): CPT

## 2022-10-18 PROCEDURE — 85027 COMPLETE CBC AUTOMATED: CPT

## 2022-10-18 PROCEDURE — 86762 RUBELLA ANTIBODY: CPT

## 2022-10-18 PROCEDURE — 86803 HEPATITIS C AB TEST: CPT

## 2022-10-18 PROCEDURE — 36415 COLL VENOUS BLD VENIPUNCTURE: CPT

## 2022-10-18 PROCEDURE — 82105 ALPHA-FETOPROTEIN SERUM: CPT

## 2022-10-18 PROCEDURE — 87086 URINE CULTURE/COLONY COUNT: CPT

## 2022-10-18 PROCEDURE — 86592 SYPHILIS TEST NON-TREP QUAL: CPT

## 2022-10-18 PROCEDURE — 86850 RBC ANTIBODY SCREEN: CPT

## 2022-10-19 LAB
ORGANISM: ABNORMAL
RPR: NONREACTIVE
URINE CULTURE, ROUTINE: ABNORMAL

## 2022-10-20 LAB
AFP MOM: 1.13
AFP: 21 NG/ML
CURRENTLY SMOKING: NO
DATING METHOD: NORMAL
DATING: NORMAL
DIABETIC: NORMAL
DONOR AGE, EGG RETRIEVAL: NORMAL
DUE DATE: NORMAL
DUE DATE: NORMAL
FAMILY HISTORY NTD: NO
GESTATIONAL AGE CALC AT COLLECT: NORMAL
HISTORY OF NEURAL TUBE DEFECT?: NO
IN VITRO FERTILIZATION: NO
INSULIN REQ DIABETES: NO
LAST MENSTRUAL PERIOD: NORMAL
MATERNAL AGE AT EDD: 29.6 YR
MATERNAL DOB: NORMAL
MATERNAL SCREEN INTERPRETATION: NORMAL
MATERNAL WEIGHT: 250
MATERNAL WEIGHT: NORMAL
MONOCHORIONIC TWINS: NORMAL
MOTHER TAKING VALPROIC/CARBAMAZEPINE: NO
NUMBER OF FETUSES: NORMAL
NUMBER OF FETUSES: NORMAL
PATIENT WEIGHT UNITS: NORMAL
RACE (MATERNAL): NORMAL
RACE: NORMAL
REPEAT SPECIMEN?: NO
SMOKING: NO
SPECIMEN: NORMAL

## 2023-02-23 ENCOUNTER — HOSPITAL ENCOUNTER (OUTPATIENT)
Age: 28
Discharge: HOME | End: 2023-02-24
Attending: OBSTETRICS & GYNECOLOGY | Admitting: OBSTETRICS & GYNECOLOGY
Payer: MEDICARE

## 2023-02-23 PROBLEM — R10.9 ABDOMINAL PAIN: Status: ACTIVE | Noted: 2023-02-23

## 2023-02-23 RX ORDER — SODIUM CHLORIDE, SODIUM LACTATE, POTASSIUM CHLORIDE, CALCIUM CHLORIDE 600; 310; 30; 20 MG/100ML; MG/100ML; MG/100ML; MG/100ML
INJECTION, SOLUTION INTRAVENOUS CONTINUOUS
Status: DISCONTINUED | OUTPATIENT
Start: 2023-02-24 | End: 2023-02-24 | Stop reason: HOSPADM

## 2023-02-23 RX ORDER — FAMOTIDINE 20 MG/1
20 TABLET, FILM COATED ORAL 2 TIMES DAILY PRN
COMMUNITY

## 2023-02-23 RX ORDER — ACETAMINOPHEN 500 MG
500 TABLET ORAL EVERY 6 HOURS PRN
COMMUNITY

## 2023-02-23 RX ORDER — CALCIUM CARBONATE 200(500)MG
1 TABLET,CHEWABLE ORAL DAILY
COMMUNITY

## 2023-02-24 VITALS
BODY MASS INDEX: 43.87 KG/M2 | DIASTOLIC BLOOD PRESSURE: 76 MMHG | HEIGHT: 64 IN | RESPIRATION RATE: 18 BRPM | TEMPERATURE: 97.7 F | OXYGEN SATURATION: 99 % | HEART RATE: 76 BPM | SYSTOLIC BLOOD PRESSURE: 133 MMHG | WEIGHT: 257 LBS

## 2023-02-24 LAB
BILIRUB UR QL STRIP.AUTO: NEGATIVE
CHARACTER UR: CLEAR
COLOR: YELLOW
GLUCOSE UR QL STRIP.AUTO: NEGATIVE MG/DL
HGB UR QL STRIP.AUTO: NEGATIVE
KETONES UR QL STRIP.AUTO: ABNORMAL
NITRITE UR QL STRIP: NEGATIVE
PH UR STRIP.AUTO: 7 [PH] (ref 5–9)
PROT UR STRIP.AUTO-MCNC: NEGATIVE MG/DL
SP GR UR REFRACT.AUTO: 1.01 (ref 1–1.03)
UROBILINOGEN, URINE: 0.2 EU/DL (ref 0–1)
WBC #/AREA URNS HPF: NEGATIVE /[HPF]

## 2023-02-24 PROCEDURE — 2580000003 HC RX 258: Performed by: STUDENT IN AN ORGANIZED HEALTH CARE EDUCATION/TRAINING PROGRAM

## 2023-02-24 PROCEDURE — 96361 HYDRATE IV INFUSION ADD-ON: CPT

## 2023-02-24 PROCEDURE — 81003 URINALYSIS AUTO W/O SCOPE: CPT

## 2023-02-24 PROCEDURE — 96360 HYDRATION IV INFUSION INIT: CPT

## 2023-02-24 PROCEDURE — 6370000000 HC RX 637 (ALT 250 FOR IP): Performed by: STUDENT IN AN ORGANIZED HEALTH CARE EDUCATION/TRAINING PROGRAM

## 2023-02-24 RX ORDER — ACETAMINOPHEN 500 MG
1000 TABLET ORAL ONCE
Status: COMPLETED | OUTPATIENT
Start: 2023-02-24 | End: 2023-02-24

## 2023-02-24 RX ADMIN — ACETAMINOPHEN 1000 MG: 500 TABLET ORAL at 01:05

## 2023-02-24 RX ADMIN — SODIUM CHLORIDE, POTASSIUM CHLORIDE, SODIUM LACTATE AND CALCIUM CHLORIDE: 600; 310; 30; 20 INJECTION, SOLUTION INTRAVENOUS at 00:32

## 2023-02-24 RX ADMIN — SODIUM CHLORIDE, POTASSIUM CHLORIDE, SODIUM LACTATE AND CALCIUM CHLORIDE: 600; 310; 30; 20 INJECTION, SOLUTION INTRAVENOUS at 01:31

## 2023-02-24 ASSESSMENT — PAIN DESCRIPTION - DESCRIPTORS
DESCRIPTORS: PRESSURE;DISCOMFORT
DESCRIPTORS: PRESSURE;DISCOMFORT

## 2023-02-24 ASSESSMENT — PAIN SCALES - GENERAL: PAINLEVEL_OUTOF10: 8

## 2023-02-24 NOTE — FLOWSHEET NOTE
RN attempting IV access and IV infiltrated. B Aimee CRNA on unit, states she will attempt IV access due to pt being difficult stick.

## 2023-02-24 NOTE — FLOWSHEET NOTE
Plan for discharge discussed with pt. Pt agreeable with plan. Monitors removed for pt to change into street clothes.

## 2023-02-24 NOTE — FLOWSHEET NOTE
Dr Giles Bonds returned page. Updated on pt's admission. Pt of Dr John Rosario,  at 33 weeks. Pt arrived with complaints of \"severe lower abdominal pain. \"  Pt pointing around pubic area that feels like pressure/pain that's continuous and strengthens intermittently. Pain started around . Pt is a repeat general anesthesia c/s due to scoliosis surgeries. Notes she's has a low lying placenta, but last appointment was resolved measuring 2.2cm from os. FHT's reactive, sketchy tracings due to maternal abdominal girth. Ctx 2-4 minutes apart, pt rating pain 8/10. Speculum exam performed and FFN collected. SVE closed. Vital signs reviewed with MD.  Orders received.

## 2023-02-24 NOTE — FLOWSHEET NOTE
Dr Ashlyn Saini returned page. Urine results reviewed. Two liters of LR infused. FHT's reactive. Contractions spaced to 4-7 minutes, but patient talking through contractions. States her discomfort is improved to 6/10. Orders received for discharge.

## 2023-02-24 NOTE — FLOWSHEET NOTE
Pt of Dr Noa Mahan,  at 33 weeks. Pt arrived to labor and delivery with complaints of \"severe lower abdominal pain. \"  Pt pointing around pubic area that feels like pressure/pain that's continuous and strengthens intermittently. Pain started around . Pt is a repeat c/s. Notes she's has a low lying placenta, but last appointment was resolved measuring 2.2cm from os. Positive fetal movement noted. Denies leaking of fluid and vaginal bleeding. Patient to BR to void and change into gown.

## 2023-02-24 NOTE — DISCHARGE INSTRUCTIONS
Home Undelivered Discharge Instructions    After Discharge Orders:           Diet: regular diet   Increase fluid intake to 8-10 glasses of water daily    Rest: normal activity as tolerated    Other instructions: Do kick counts once a day on your baby. Choose the time of day your baby is most active. Get in a comfortable lying or sitting position and time how long it takes to feel 10 kicks, twists, turns, swishes, or rolls. Call Your Doctor if Any of the Following Occurs   Leaking fluid from vagina with or without contractions  Bright red vaginal bleeding occurs that is as heavy as or heavier than a period  Regular contractions are longer, stronger, and closer together  Noticeable decreased fetal movement  Elevated temperature >100.5°F and chills  Blurred vision, spots before eyes, unrelievable headache, severe facial swelling, or upper abdominal pain  Contact physician or hospital OB unit if signs of premature labor occur at ?36 weeks  Persistent or rhythmic low back pain that feels different than you are used to  Menstrual like cramps  Intestinal cramps with or without diarrhea  Pelvic pressure or rhythmic tightening that feels different than you are used to  [facility] discharge or a gush of fluid from your vagina  Vaginal bleeding as heavy as a period  General Information     Difference between:  False Labor True Labor   1. Contractions often are irregular and don't consistently get closer together (called White-Salgado contractions) 1. Contractions come at regular intervals and, as time goes on, get closer together. 2. Contractions may often stop when you rest or with a position change. 2. Contractions continue despite movement or walking. Contractions get stronger and closer together with time. 3. Often felt in the lower abdomen. 3. Usually felt in back coming around to the front.      Reminder to Patient   Please bring all teaching sheets and discharge information with you if you return to the hospital or the physician's office/clinic for follow-up care. If you have any questions, please call: \"Call-A-Nurse\" [phone] or [phone]. Reviewed Dates   5/12/2007  Document developed by   Labor and Delivery Discharge Education Form  Disclaimer: We want you to understand more clearly each of the health conditions and procedures you may have. This patient leaflet is a summary of useful information to help you gain a better understanding of these health topics. Other information about this condition or procedure may be important for you to know. Please talk with your healthcare provider for more information about your special health needs. Coronavirus ([id number]): Pregnancy, Birth and Baby Care    What do you need know if you are pregnant regarding coronavirus (COVID-19)? · From what experts know so far, pregnant people do NOT seem more likely than others to get COVID-19 and do NOT have a higher risk of severe complications. What can you do to protect yourself from COVID-19? · Practice social distancing.  When you need to leave the home try to stay at least 6 feet away from other people. Avoid large crowds. · When you leave the house to prevent spreading sickness to others - Wear a facemask covering your mouth and nose. Remove by folding outside of mask together and place in container, wash daily or as soiled. · Wash your hands often by rubbing your hands with soap and water for at least 20 seconds, rinse, and dry with a paper towel that can be thrown away or may use hand  of at least 60% alcohol. Covering all surfaces of your hands by rubbing them together until dry. · Avoid touching your face with unwashed hands, especially your mouth, nose and eyes. · Avoid traveling. What should you do if you have or think you may have COVID-19? · For most infected, this is a mild illness and you will be able to recover at home.    · To avoid spreading to others:  ? Stay home except to get medical care.  ? Call a medical facility before you show up. This will help the staff prevent others from being exposed. ? Stay separate from others in your home, at least 6 feet. Use a separate bathroom, if possible. ? Wear a cloth facemask to cover your nose and mouth when you are around other people including at home. ? Clean your hands often. Wash your hands with soap and water for at least 20 seconds. ? Cough or sneeze into a tissue or your arm. Wash your hands immediately after. ? Dont share personal household items including towels and bedding. ? Clean and disinfect objects in your isolation area every day. · If you are in labor and you have, or think you may have COVID-19, call your OB care provider and the hospital birthing unit before you go, so the staff can properly prepare and protect you, your baby and others from being infected. Wear a mask when you leave your home, as you will be asked to continue to wear a mask during your time in the hospital.  · Mother-to-child transmission of COVID-19 during pregnancy is unlikely, but after birth a baby is susceptible to person-to-person spread. ? The determination of whether or not to separate you and your baby at the time of birth will be decided using shared decision-making between you and your clinical team.  ? After your baby is born, your health care provider may recommend you not hold your baby and/or that you stay in a separate room from your baby until you get better. ? If you and your baby are not , wear a facemask at all times and wash your hands thoroughly before touching, holding or feeding your baby.

## 2023-02-24 NOTE — PLAN OF CARE
Problem: Pain  Goal: Verbalizes/displays adequate comfort level or baseline comfort level  Outcome: Progressing  Flowsheets (Taken 2/24/2023 0201)  Verbalizes/displays adequate comfort level or baseline comfort level:   Encourage patient to monitor pain and request assistance   Assess pain using appropriate pain scale     Problem: Infection - Adult  Goal: Absence of infection at discharge  Outcome: Progressing  Flowsheets (Taken 2/24/2023 0201)  Absence of infection at discharge:   Assess and monitor for signs and symptoms of infection   Monitor all insertion sites i.e., indwelling lines, tubes and drains  Goal: Absence of infection during hospitalization  Outcome: Progressing     Problem: Safety - Adult  Goal: Free from fall injury  Outcome: Progressing  Flowsheets (Taken 2/24/2023 0201)  Free From Fall Injury: Instruct family/caregiver on patient safety     Problem: Discharge Planning  Goal: Discharge to home or other facility with appropriate resources  Outcome: Progressing  Flowsheets (Taken 2/24/2023 0201)  Discharge to home or other facility with appropriate resources: Identify barriers to discharge with patient and caregiver     Care plan reviewed with patient and FOB. Patient and FOB verbalize understanding of the plan of care and contribute to goal setting.

## 2023-02-28 ENCOUNTER — HOSPITAL ENCOUNTER (OUTPATIENT)
Age: 28
Discharge: HOME | End: 2023-02-28
Payer: MEDICARE

## 2023-02-28 LAB
ALT SERPL W/O P-5'-P-CCNC: 10 U/L (ref 11–66)
AST SERPL-CCNC: 12 U/L (ref 5–40)
BASOPHILS ABSOLUTE: 0 THOU/MM3 (ref 0–0.1)
BASOPHILS NFR BLD AUTO: 0.2 %
BUN SERPL-MCNC: 10 MG/DL (ref 7–22)
CREAT SERPL-MCNC: 0.6 MG/DL (ref 0.4–1.2)
CREAT UR-MCNC: 176.5 MG/DL
DEPRECATED RDW RBC AUTO: 43.4 FL (ref 35–45)
EOSINOPHIL NFR BLD AUTO: 0.6 %
EOSINOPHILS ABSOLUTE: 0.1 THOU/MM3 (ref 0–0.4)
ERYTHROCYTE [DISTWIDTH] IN BLOOD BY AUTOMATED COUNT: 13.8 % (ref 11.5–14.5)
GFR SERPL CREATININE-BSD FRML MDRD: > 60 ML/MIN/1.73M2
HCT VFR BLD AUTO: 36.9 % (ref 37–47)
HGB BLD-MCNC: 12.9 GM/DL (ref 12–16)
IMM GRANULOCYTES # BLD AUTO: 0.12 THOU/MM3 (ref 0–0.07)
IMM GRANULOCYTES NFR BLD AUTO: 1.2 %
LYMPHOCYTES ABSOLUTE: 1.8 THOU/MM3 (ref 1–4.8)
LYMPHOCYTES NFR BLD AUTO: 18.2 %
MCH RBC QN AUTO: 30.7 PG (ref 26–33)
MCHC RBC AUTO-ENTMCNC: 35 GM/DL (ref 32.2–35.5)
MCV RBC AUTO: 87.9 FL (ref 81–99)
MONOCYTES ABSOLUTE: 0.7 THOU/MM3 (ref 0.4–1.3)
MONOCYTES NFR BLD AUTO: 6.9 %
NEUTROPHILS NFR BLD AUTO: 72.9 %
NRBC BLD AUTO-RTO: 0 /100 WBC
PLATELET # BLD AUTO: 311 THOU/MM3 (ref 130–400)
PMV BLD AUTO: 9.7 FL (ref 9.4–12.4)
PROT UR-MCNC: 22.3 MG/DL
PROT/CREAT 24H UR: 0.13 MG/G{CREAT}
RBC # BLD AUTO: 4.2 MILL/MM3 (ref 4.2–5.4)
SEGMENTED NEUTROPHILS ABSOLUTE COUNT: 7.1 THOU/MM3 (ref 1.8–7.7)
WBC # BLD AUTO: 9.8 THOU/MM3 (ref 4.8–10.8)

## 2023-02-28 PROCEDURE — 84520 ASSAY OF UREA NITROGEN: CPT

## 2023-02-28 PROCEDURE — 84450 TRANSFERASE (AST) (SGOT): CPT

## 2023-02-28 PROCEDURE — 82565 ASSAY OF CREATININE: CPT

## 2023-02-28 PROCEDURE — 84156 ASSAY OF PROTEIN URINE: CPT

## 2023-02-28 PROCEDURE — 36415 COLL VENOUS BLD VENIPUNCTURE: CPT

## 2023-02-28 PROCEDURE — 84460 ALANINE AMINO (ALT) (SGPT): CPT

## 2023-02-28 PROCEDURE — 82570 ASSAY OF URINE CREATININE: CPT

## 2023-02-28 PROCEDURE — 85025 COMPLETE CBC W/AUTO DIFF WBC: CPT

## 2023-03-04 ENCOUNTER — HOSPITAL ENCOUNTER (OUTPATIENT)
Age: 28
Discharge: HOME HEALTH | End: 2023-03-06
Attending: OBSTETRICS & GYNECOLOGY | Admitting: OBSTETRICS & GYNECOLOGY
Payer: MEDICARE

## 2023-03-04 PROBLEM — R51.9 HEADACHE: Status: ACTIVE | Noted: 2023-03-04

## 2023-03-04 LAB
ALBUMIN SERPL BCG-MCNC: 3.8 G/DL (ref 3.5–5.1)
ALP SERPL-CCNC: 95 U/L (ref 38–126)
ALT SERPL W/O P-5'-P-CCNC: 9 U/L (ref 11–66)
ANION GAP SERPL CALC-SCNC: 13 MEQ/L (ref 8–16)
AST SERPL-CCNC: 11 U/L (ref 5–40)
BILIRUB SERPL-MCNC: 0.2 MG/DL (ref 0.3–1.2)
BUN SERPL-MCNC: 9 MG/DL (ref 7–22)
CALCIUM SERPL-MCNC: 9.2 MG/DL (ref 8.5–10.5)
CHLORIDE SERPL-SCNC: 105 MEQ/L (ref 98–111)
CO2 SERPL-SCNC: 20 MEQ/L (ref 23–33)
CREAT SERPL-MCNC: 0.4 MG/DL (ref 0.4–1.2)
CREAT UR-MCNC: 24.1 MG/DL
DEPRECATED RDW RBC AUTO: 44.2 FL (ref 35–45)
ERYTHROCYTE [DISTWIDTH] IN BLOOD BY AUTOMATED COUNT: 13.6 % (ref 11.5–14.5)
GFR SERPL CREATININE-BSD FRML MDRD: > 60 ML/MIN/1.73M2
GLUCOSE SERPL-MCNC: 82 MG/DL (ref 70–108)
HCT VFR BLD AUTO: 34.8 % (ref 37–47)
HGB BLD-MCNC: 12 GM/DL (ref 12–16)
MCH RBC QN AUTO: 30.8 PG (ref 26–33)
MCHC RBC AUTO-ENTMCNC: 34.5 GM/DL (ref 32.2–35.5)
MCV RBC AUTO: 89.5 FL (ref 81–99)
PLATELET # BLD AUTO: 263 THOU/MM3 (ref 130–400)
PMV BLD AUTO: 9.7 FL (ref 9.4–12.4)
POTASSIUM SERPL-SCNC: 3.6 MEQ/L (ref 3.5–5.2)
PROT SERPL-MCNC: 6.7 G/DL (ref 6.1–8)
PROT UR-MCNC: 7.3 MG/DL
PROT/CREAT 24H UR: 0.3 MG/G{CREAT}
RBC # BLD AUTO: 3.89 MILL/MM3 (ref 4.2–5.4)
SODIUM SERPL-SCNC: 138 MEQ/L (ref 135–145)
WBC # BLD AUTO: 9.9 THOU/MM3 (ref 4.8–10.8)

## 2023-03-04 PROCEDURE — 6360000002 HC RX W HCPCS: Performed by: OBSTETRICS & GYNECOLOGY

## 2023-03-04 PROCEDURE — 85027 COMPLETE CBC AUTOMATED: CPT

## 2023-03-04 PROCEDURE — 6370000000 HC RX 637 (ALT 250 FOR IP): Performed by: OBSTETRICS & GYNECOLOGY

## 2023-03-04 PROCEDURE — 87653 STREP B DNA AMP PROBE: CPT

## 2023-03-04 PROCEDURE — 84156 ASSAY OF PROTEIN URINE: CPT

## 2023-03-04 PROCEDURE — 87081 CULTURE SCREEN ONLY: CPT

## 2023-03-04 PROCEDURE — 36415 COLL VENOUS BLD VENIPUNCTURE: CPT

## 2023-03-04 PROCEDURE — 80053 COMPREHEN METABOLIC PANEL: CPT

## 2023-03-04 PROCEDURE — 82570 ASSAY OF URINE CREATININE: CPT

## 2023-03-04 RX ORDER — METOCLOPRAMIDE 10 MG/1
10 TABLET ORAL ONCE
Status: COMPLETED | OUTPATIENT
Start: 2023-03-04 | End: 2023-03-04

## 2023-03-04 RX ORDER — DIPHENHYDRAMINE HCL 25 MG
25 TABLET ORAL ONCE
Status: COMPLETED | OUTPATIENT
Start: 2023-03-04 | End: 2023-03-04

## 2023-03-04 RX ORDER — DIPHENHYDRAMINE HCL 25 MG
25 TABLET ORAL EVERY 6 HOURS PRN
Status: DISCONTINUED | OUTPATIENT
Start: 2023-03-04 | End: 2023-03-05

## 2023-03-04 RX ORDER — ONDANSETRON 4 MG/1
8 TABLET, ORALLY DISINTEGRATING ORAL EVERY 8 HOURS PRN
Status: DISCONTINUED | OUTPATIENT
Start: 2023-03-04 | End: 2023-03-06 | Stop reason: HOSPADM

## 2023-03-04 RX ORDER — BUTALBITAL, ACETAMINOPHEN AND CAFFEINE 50; 325; 40 MG/1; MG/1; MG/1
1 TABLET ORAL EVERY 6 HOURS PRN
Status: DISCONTINUED | OUTPATIENT
Start: 2023-03-04 | End: 2023-03-05

## 2023-03-04 RX ORDER — METOCLOPRAMIDE 10 MG/1
10 TABLET ORAL EVERY 6 HOURS PRN
Status: DISCONTINUED | OUTPATIENT
Start: 2023-03-04 | End: 2023-03-06 | Stop reason: HOSPADM

## 2023-03-04 RX ORDER — BETAMETHASONE SODIUM PHOSPHATE AND BETAMETHASONE ACETATE 3; 3 MG/ML; MG/ML
12 INJECTION, SUSPENSION INTRA-ARTICULAR; INTRALESIONAL; INTRAMUSCULAR; SOFT TISSUE EVERY 24 HOURS
Status: COMPLETED | OUTPATIENT
Start: 2023-03-04 | End: 2023-03-05

## 2023-03-04 RX ORDER — FLUTICASONE PROPIONATE 50 MCG
1 SPRAY, SUSPENSION (ML) NASAL DAILY
Status: DISCONTINUED | OUTPATIENT
Start: 2023-03-04 | End: 2023-03-05

## 2023-03-04 RX ADMIN — FLUTICASONE PROPIONATE 1 SPRAY: 50 SPRAY, METERED NASAL at 22:08

## 2023-03-04 RX ADMIN — BETAMETHASONE SODIUM PHOSPHATE AND BETAMETHASONE ACETATE 12 MG: 3; 3 INJECTION, SUSPENSION INTRA-ARTICULAR; INTRALESIONAL; INTRAMUSCULAR at 21:49

## 2023-03-04 RX ADMIN — METOCLOPRAMIDE 10 MG: 10 TABLET ORAL at 20:45

## 2023-03-04 RX ADMIN — DIPHENHYDRAMINE HYDROCHLORIDE 25 MG: 25 TABLET ORAL at 20:45

## 2023-03-04 RX ADMIN — BUTALBITAL, ACETAMINOPHEN, AND CAFFEINE 1 TABLET: 50; 325; 40 TABLET ORAL at 22:08

## 2023-03-04 ASSESSMENT — PAIN DESCRIPTION - DESCRIPTORS
DESCRIPTORS: THROBBING
DESCRIPTORS: THROBBING

## 2023-03-05 LAB
FLUAV RNA RESP QL NAA+PROBE: NOT DETECTED
FLUBV RNA RESP QL NAA+PROBE: NOT DETECTED
SARS-COV-2 RNA RESP QL NAA+PROBE: NOT DETECTED

## 2023-03-05 PROCEDURE — 6360000002 HC RX W HCPCS: Performed by: OBSTETRICS & GYNECOLOGY

## 2023-03-05 PROCEDURE — 6370000000 HC RX 637 (ALT 250 FOR IP): Performed by: OBSTETRICS & GYNECOLOGY

## 2023-03-05 PROCEDURE — 87636 SARSCOV2 & INF A&B AMP PRB: CPT

## 2023-03-05 RX ORDER — FLUTICASONE PROPIONATE 50 MCG
1 SPRAY, SUSPENSION (ML) NASAL 2 TIMES DAILY PRN
Status: DISCONTINUED | OUTPATIENT
Start: 2023-03-05 | End: 2023-03-06 | Stop reason: HOSPADM

## 2023-03-05 RX ORDER — BUTALBITAL, ACETAMINOPHEN AND CAFFEINE 50; 325; 40 MG/1; MG/1; MG/1
2 TABLET ORAL EVERY 6 HOURS PRN
Status: DISCONTINUED | OUTPATIENT
Start: 2023-03-05 | End: 2023-03-06 | Stop reason: HOSPADM

## 2023-03-05 RX ORDER — DIPHENHYDRAMINE HCL 25 MG
50 TABLET ORAL EVERY 6 HOURS PRN
Status: DISCONTINUED | OUTPATIENT
Start: 2023-03-05 | End: 2023-03-06 | Stop reason: HOSPADM

## 2023-03-05 RX ORDER — FAMOTIDINE 20 MG/1
20 TABLET, FILM COATED ORAL 2 TIMES DAILY PRN
Status: DISCONTINUED | OUTPATIENT
Start: 2023-03-05 | End: 2023-03-06 | Stop reason: HOSPADM

## 2023-03-05 RX ADMIN — Medication 1 SPRAY: at 08:55

## 2023-03-05 RX ADMIN — FAMOTIDINE 20 MG: 20 TABLET ORAL at 00:59

## 2023-03-05 RX ADMIN — BUTALBITAL, ACETAMINOPHEN, AND CAFFEINE 2 TABLET: 50; 325; 40 TABLET ORAL at 20:02

## 2023-03-05 RX ADMIN — DIPHENHYDRAMINE HYDROCHLORIDE 50 MG: 25 TABLET ORAL at 02:46

## 2023-03-05 RX ADMIN — DIPHENHYDRAMINE HYDROCHLORIDE 50 MG: 25 TABLET ORAL at 14:35

## 2023-03-05 RX ADMIN — METOCLOPRAMIDE 10 MG: 10 TABLET ORAL at 14:35

## 2023-03-05 RX ADMIN — FAMOTIDINE 20 MG: 20 TABLET ORAL at 17:31

## 2023-03-05 RX ADMIN — METOCLOPRAMIDE 10 MG: 10 TABLET ORAL at 02:46

## 2023-03-05 RX ADMIN — BUTALBITAL, ACETAMINOPHEN, AND CAFFEINE 1 TABLET: 50; 325; 40 TABLET ORAL at 08:56

## 2023-03-05 RX ADMIN — BETAMETHASONE SODIUM PHOSPHATE AND BETAMETHASONE ACETATE 12 MG: 3; 3 INJECTION, SUSPENSION INTRA-ARTICULAR; INTRALESIONAL; INTRAMUSCULAR at 21:31

## 2023-03-05 RX ADMIN — Medication 1 SPRAY: at 20:02

## 2023-03-05 ASSESSMENT — PAIN DESCRIPTION - DESCRIPTORS
DESCRIPTORS: DULL
DESCRIPTORS: ACHING;THROBBING
DESCRIPTORS: THROBBING
DESCRIPTORS: DULL
DESCRIPTORS: ACHING

## 2023-03-05 ASSESSMENT — PAIN SCALES - GENERAL: PAINLEVEL_OUTOF10: 5

## 2023-03-05 ASSESSMENT — PAIN DESCRIPTION - LOCATION: LOCATION: HEAD

## 2023-03-05 NOTE — FLOWSHEET NOTE
Dr Addison Meyers on unit and updated on BP's and pt's pain. Will continue with plan of care at this time.

## 2023-03-05 NOTE — FLOWSHEET NOTE
Pt returned to bed from sitting in the chair and using the bathroom. States feels flushed and hot. Cheeks appear flushed. Cool rag applied.

## 2023-03-05 NOTE — PLAN OF CARE
Problem: Pain  Goal: Verbalizes/displays adequate comfort level or baseline comfort level  Outcome: Progressing  Flowsheets (Taken 3/4/2023 2059)  Verbalizes/displays adequate comfort level or baseline comfort level:   Encourage patient to monitor pain and request assistance   Assess pain using appropriate pain scale     Problem: Infection - Adult  Goal: Absence of infection at discharge  Outcome: Progressing  Flowsheets (Taken 3/4/2023 2059)  Absence of infection at discharge:   Monitor lab/diagnostic results   Assess and monitor for signs and symptoms of infection  Goal: Absence of infection during hospitalization  Outcome: Progressing  Flowsheets (Taken 3/4/2023 2059)  Absence of infection during hospitalization:   Assess and monitor for signs and symptoms of infection   Monitor lab/diagnostic results     Problem: Safety - Adult  Goal: Free from fall injury  Outcome: Progressing  Flowsheets (Taken 3/4/2023 2059)  Free From Fall Injury: Instruct family/caregiver on patient safety     Problem: Discharge Planning  Goal: Discharge to home or other facility with appropriate resources  Outcome: Progressing  Flowsheets (Taken 3/4/2023 2059)  Discharge to home or other facility with appropriate resources: Identify barriers to discharge with patient and caregiver   Care plan reviewed with patient and FOB. Patient and FOB verbalize understanding of the plan of care and contribute to goal setting.

## 2023-03-05 NOTE — PROGRESS NOTES
[address] [place] [address]  Antepartum note    Subjective:  No problems overnight. States headache was 8/10 yesterday and now 6/10. Denies tenderness over her sinuses.        Objective:  Vitals:Patient Vitals for the past 24 hrs:   BP Temp Temp src Pulse Resp SpO2 Height Weight   03/05/23 0945 136/82 -- -- (!) 112 18 -- -- --   03/05/23 0901 -- 97.5 °F (36.4 °C) Oral -- -- -- -- --   03/05/23 0845 125/78 -- -- (!) 104 18 -- -- --   03/05/23 0745 139/82 -- -- 94 -- -- -- --   03/05/23 0646 (!) 114/58 -- -- 93 -- -- -- --   03/05/23 0645 (!) 110/56 -- -- 90 18 -- -- --   03/05/23 0545 132/73 -- -- 91 18 -- -- --   03/05/23 0445 (!) 92/51 -- -- 92 18 -- -- --   03/05/23 0345 108/62 97.7 °F (36.5 °C) Temporal 90 18 98 % -- --   03/05/23 0245 137/78 -- -- 92 18 -- -- --   03/05/23 0145 (!) 109/55 97.6 °F (36.4 °C) Temporal 88 18 98 % -- --   03/05/23 0045 117/61 -- -- 87 18 -- -- --   03/04/23 2345 132/78 -- -- 89 18 -- -- --   03/04/23 2245 124/70 -- -- 90 18 -- -- --   03/04/23 2145 (!) 144/81 97.3 °F (36.3 °C) Temporal 95 18 99 % -- --   03/04/23 2125 (!) 140/81 -- -- 83 18 -- -- --   03/04/23 2040 136/82 -- -- 94 18 -- -- --   03/04/23 2030 (!) 141/85 -- -- 93 18 -- -- --   03/04/23 2020 (!) 149/92 -- -- 100 18 -- -- --   03/04/23 2010 (!) 146/90 97.7 °F (36.5 °C) Temporal 96 18 98 % -- --   03/04/23 2007 -- -- -- -- -- -- 5' 4\" (1.626 m) 259 lb (117.5 kg)         Wt Readings from Last 1 Encounters:   03/04/23 259 lb (117.5 kg)       Lab Results   Component Value Date/Time    WBC 9.9 03/04/2023 08:40 PM    RBC 3.89 03/04/2023 08:40 PM    RBC 4.76 11/03/2011 03:10 PM    HGB 12.0 03/04/2023 08:40 PM    HCT 34.8 03/04/2023 08:40 PM    MCV 89.5 03/04/2023 08:40 PM    MCH 30.8 03/04/2023 08:40 PM    MCHC 34.5 03/04/2023 08:40 PM    RDW 12.5 09/02/2016 08:45 AM     03/04/2023 08:40 PM    MPV 9.7 03/04/2023 08:40 PM     Lab Results   Component Value Date/Time     03/04/2023 08:40 PM    K 3.6 03/04/2023 08:40 PM K 3.4 10/13/2022 06:00 PM     03/04/2023 08:40 PM    CO2 20 03/04/2023 08:40 PM    BUN 9 03/04/2023 08:40 PM    CREATININE 0.4 03/04/2023 08:40 PM    GLUCOSE 82 03/04/2023 08:40 PM    GLUCOSE 95 09/02/2016 08:45 AM    CALCIUM 9.2 03/04/2023 08:40 PM       Exam:  General Appearance: Alert, appropriate appearance for age. No acute distress    Assessment:   32 y.o. female at 34w1d with possible severe preeclampsia vs preeclampsia without severe features with sinus headache      Plan:    1) Headache slightly better today, continue current mgmt. 24hr urine running to confirm preeclampsia given early gestation age. Bps mostly 110-130/50-80s and no elevated BPs since midnight.   2) GBSpending  3) Continue celestone course  4) SCDs      @Electronically signed by Dari Priest MD on 3/5/2023 at 11:01 AM@

## 2023-03-05 NOTE — H&P
[address] [place] [address]  History and Physical Update    Pt Name: Paul Urena  MRN: [id number]  YOB: 1995  Date of evaluation: 3/4/2023    [] I have examined the patient and reviewed the H&P/Consult and there are no changes to the patient or plans. [x] I have examined the patient and reviewed the H&P/Consult and have noted the following changes:     32yo  at 34/0 who presents with complaint of headache. She went to [employer] [facility] and check her BP and was 160s/100s. Pt also states she has some congestion and is unsure if her headache is related to her BP or to the congestion. Has had an increase in heartburn. Denies vision changes/n/v/RUQ pain. At her last appt, she had proteinuria and BP was 154/93. Last US on 23 - EFW 2455 83% with RAISSA 14.3  With her first pregnancy she was readmitted following delivery due to severe preeclampsia. Await CMP. CBC is wnl. PCR is 0.30  28yo  at 34/0 with possible severe preeclampsia vs preeclampsia without severe features with sinus headache  1) Reglan/Benadryl for headache. Will try fioricet is no improvement. 2) Sinus congestion- flonase  3) Celestone course  4) GBS collected  5) Will treat headache conservatively but given proteinuria, will keep admitted to monitor blood pressures and headaches. If headaches are not responsive to conservative measures, will consider deliver early as severe preeclampsia more likely. Discussion with the patient and/ or family for proposed care, treatment, services; benefits, risks, side effects; likelihood of achieving goals and potential problems that may occur during recuperation was had and all questions were answered.   Discussion with the patient and/ or family of reasonable alternatives to the proposed care, treatment, services and the discussion of the risks, benefits, side effects related to the alternatives and the risk related to not receiving the proposed care treatment services was also had and all questions were answered. If this is for an elective surgical procedure then The patient was counseled at length about the risks of jasper Covid-19 during their perioperative period and any recovery window from their procedure. The patient was made aware that jasper Covid-19  may worsen their prognosis for recovering from their procedure  and lend to a higher morbidity and/or mortality risk. All material risks, benefits, and reasonable alternatives including postponing the procedure were discussed. The patient  does wish to proceed with the procedure at this time.              Luis Gao MD,MD  Electronically signed 3/4/2023 at 9:11 PM

## 2023-03-05 NOTE — FLOWSHEET NOTE
Pt returned to bed after voiding. SCD's reapplied. Pt states headache has not improved with medications. Encouraged pt to dim lights/TV. Pt states \"headache isn't sensitive to light, just throbbing. \" Pt encouraged to close eyes and try to get rest

## 2023-03-05 NOTE — FLOWSHEET NOTE
Dr Ilene Spring at bedside. Plan of care discussed. Pt and FOB states understanding. Questions answered.

## 2023-03-05 NOTE — FLOWSHEET NOTE
Labor bed removed from room and post partum bed placed in room. Pt positioned back in bed. SCD's applied. Pt provided with jello and popsicle. Denies any other needs at this time.

## 2023-03-05 NOTE — FLOWSHEET NOTE
Orders received by Dr Zenon Morales while on unit. 24 hour urine ordered.   Pt just urinated at 2350, will start 24 hour at that time

## 2023-03-05 NOTE — FLOWSHEET NOTE
Pt of Dr Shen Levy,  at 34 weeks. Pt arrived to labor and delivery with complaints of elevated blood pressures and headache unrelieved with tylenol. Pt notes some increase in heart burn. Denies vision changes and nausea/vomiting. Positive fetal movement noted per pt. Denies leaking of fluid, vaginal bleeding and contractions. Patient to BR to void, informed of maternal drug testing policy in place on all laboring patients. Consent to be signed and urine sent if pt stays.

## 2023-03-06 VITALS
DIASTOLIC BLOOD PRESSURE: 60 MMHG | OXYGEN SATURATION: 100 % | HEIGHT: 64 IN | RESPIRATION RATE: 16 BRPM | WEIGHT: 259 LBS | SYSTOLIC BLOOD PRESSURE: 118 MMHG | HEART RATE: 114 BPM | BODY MASS INDEX: 44.22 KG/M2 | TEMPERATURE: 97.2 F

## 2023-03-06 LAB
CREAT 24H UR-MRATE: 1.1 GM/24HR
CREAT UR-MCNC: 33.2 MG/DL
GP B STREP VAG+RECTUM QL CULT: NORMAL
HOURS COLLECTED: 24 HRS
HOURS COLLECTED: 24 HRS
PROT 24H UR-MCNC: 302 MG/24 HR (ref 42–225)
PROT UR-MCNC: 9 MG/DL
URINE VOLUME, 24 HOUR: 3350 ML
URINE VOLUME: 3350 ML

## 2023-03-06 PROCEDURE — 6370000000 HC RX 637 (ALT 250 FOR IP): Performed by: OBSTETRICS & GYNECOLOGY

## 2023-03-06 RX ORDER — BUTALBITAL, ACETAMINOPHEN AND CAFFEINE 50; 325; 40 MG/1; MG/1; MG/1
1 TABLET ORAL EVERY 6 HOURS PRN
Qty: 30 TABLET | Refills: 1 | Status: ON HOLD | OUTPATIENT
Start: 2023-03-06

## 2023-03-06 RX ORDER — HYDROXYZINE PAMOATE 25 MG/1
25 CAPSULE ORAL ONCE
Status: COMPLETED | OUTPATIENT
Start: 2023-03-06 | End: 2023-03-06

## 2023-03-06 RX ADMIN — HYDROXYZINE PAMOATE 25 MG: 25 CAPSULE ORAL at 04:50

## 2023-03-06 ASSESSMENT — PAIN DESCRIPTION - DESCRIPTORS: DESCRIPTORS: ACHING

## 2023-03-06 NOTE — PROGRESS NOTES
[facility]  Antepartum note    Subjective:  No problems overnight. Headache is much improved. Rates 2/10 and states she hasn't needed pain meds.        Objective:  Vitals:Patient Vitals for the past 24 hrs:   BP Temp Temp src Pulse Resp SpO2   03/06/23 0750 (!) 143/83 -- -- 93 -- --   03/06/23 0650 126/74 -- -- 80 -- --   03/06/23 0551 124/70 97.2 °F (36.2 °C) Temporal 83 16 100 %   03/06/23 0444 123/74 -- -- 85 18 --   03/06/23 0426 (!) 151/80 -- -- 94 18 100 %   03/06/23 0400 126/70 -- -- 93 16 --   03/06/23 0302 134/80 -- -- 73 18 --   03/06/23 0200 (!) 96/51 -- -- 89 16 --   03/06/23 0100 (!) 92/55 -- -- 81 16 --   03/06/23 0000 131/79 97.6 °F (36.4 °C) Temporal 99 18 99 %   03/05/23 2300 134/83 -- -- 88 18 --   03/05/23 2200 130/74 -- -- 86 18 --   03/05/23 2100 (!) 145/82 -- -- 85 18 --   03/05/23 2059 -- -- -- -- 18 --   03/05/23 2000 132/76 -- -- 88 18 --   03/05/23 1910 (!) 141/84 97.2 °F (36.2 °C) Temporal (!) 113 -- 98 %   03/05/23 1810 (!) 142/81 -- -- 97 -- --   03/05/23 1707 133/74 -- -- 100 -- --   03/05/23 1652 (!) 142/74 -- -- (!) 101 18 --   03/05/23 1635 (!) 155/85 -- -- -- -- --   03/05/23 1630 (!) 159/84 -- -- 98 -- --   03/05/23 1606 (!) 194/92 97.3 °F (36.3 °C) Oral 94 18 --   03/05/23 1145 (!) 156/86 -- -- 75 18 --   03/05/23 1144 -- 97.3 °F (36.3 °C) Oral -- -- --   03/05/23 1045 126/67 -- -- (!) 104 18 --   03/05/23 0945 136/82 -- -- (!) 112 18 --         Wt Readings from Last 1 Encounters:   03/04/23 259 lb (117.5 kg)       Lab Results   Component Value Date/Time    WBC 9.9 03/04/2023 08:40 PM    RBC 3.89 03/04/2023 08:40 PM    RBC 4.76 11/03/2011 03:10 PM    HGB 12.0 03/04/2023 08:40 PM    HCT 34.8 03/04/2023 08:40 PM    MCV 89.5 03/04/2023 08:40 PM    MCH 30.8 03/04/2023 08:40 PM    MCHC 34.5 03/04/2023 08:40 PM    RDW 12.5 09/02/2016 08:45 AM     03/04/2023 08:40 PM    MPV 9.7 03/04/2023 08:40 PM     Lab Results   Component Value Date/Time     03/04/2023 08:40 PM    K 3.6 03/04/2023 08:40 PM    K 3.4 10/13/2022 06:00 PM     03/04/2023 08:40 PM    CO2 20 03/04/2023 08:40 PM    BUN 9 03/04/2023 08:40 PM    CREATININE 0.4 03/04/2023 08:40 PM    GLUCOSE 82 03/04/2023 08:40 PM    GLUCOSE 95 09/02/2016 08:45 AM    CALCIUM 9.2 03/04/2023 08:40 PM     24hr urine protein 302mb    Exam:  General Appearance: Alert, appropriate appearance for age. No acute distress    Assessment:   32 y.o. female at 34w2d preeclampsia without severe features       Plan:    Headache much improved. Will d/c home, has appt tomorrow with Dr. Alli Perez. Will add NST to visit. Disc twice weekly NSTs and preeclampsia precautions given. Rx home with fioricet.       @Electronically signed by Yuni Baldwin MD on 3/6/2023 at 9:02 TY@

## 2023-03-06 NOTE — FLOWSHEET NOTE
Patient discharged, waiting for pharmacy to deliver her home meds that dr. Nathanael Meigs prescribed.

## 2023-03-06 NOTE — PLAN OF CARE
Problem: Pain  Goal: Verbalizes/displays adequate comfort level or baseline comfort level  Outcome: Progressing  Flowsheets (Taken 3/5/2023 1924)  Verbalizes/displays adequate comfort level or baseline comfort level:   Encourage patient to monitor pain and request assistance   Assess pain using appropriate pain scale   Administer analgesics based on type and severity of pain and evaluate response     Problem: Infection - Adult  Goal: Absence of infection at discharge  Outcome: Progressing  Flowsheets (Taken 3/5/2023 1924)  Absence of infection at discharge:   Assess and monitor for signs and symptoms of infection   Monitor lab/diagnostic results   Administer medications as ordered  Goal: Absence of infection during hospitalization  Outcome: Progressing     Problem: Safety - Adult  Goal: Free from fall injury  Outcome: Progressing  Flowsheets (Taken 3/5/2023 1924)  Free From Fall Injury: Instruct family/caregiver on patient safety     Problem: Discharge Planning  Goal: Discharge to home or other facility with appropriate resources  Outcome: Progressing  Flowsheets (Taken 3/5/2023 1924)  Discharge to home or other facility with appropriate resources:   Identify barriers to discharge with patient and caregiver   Arrange for needed discharge resources and transportation as appropriate   Identify discharge learning needs (meds, wound care, etc)   Care plan reviewed with patient and FOB. Patient and FOB verbalize understanding of the plan of care and contribute to goal setting.

## 2023-03-06 NOTE — FLOWSHEET NOTE
Patient left for home on foot with her fiance in stable condition. Still feeling baby move, no c/o headache, respirations easy and unlabored.

## 2023-03-06 NOTE — DISCHARGE SUMMARY
GYN Surgery  Discharge Summary     Patient ID:  Linda Shelby  [id number]  59 y.o.  1995    Admit date: 3/4/2023    Admitting Physician: Shahida Lang MD    Discharge Diagnoses: preeclampsia without severe features    Discharged Condition: good      Hospital Course: Patient was admitted due to headache and elevated blood pressures. Headache has improved and pt discharged home. Disposition: home    Patient Instructions:    Activity: modified bed rest  Diet: regular    Discharge Medication:      Medication List        START taking these medications      butalbital-acetaminophen-caffeine -40 MG per tablet  Commonly known as: FIORICET, ESGIC  Take 1 tablet by mouth every 6 hours as needed for Headaches            CONTINUE taking these medications      acetaminophen 500 MG tablet  Commonly known as: TYLENOL     calcium carbonate 500 MG chewable tablet  Commonly known as: TUMS     famotidine 20 MG tablet  Commonly known as: PEPCID     PRENATAL 1+1 PO     Tens Unit Misc  by Does not apply route     vitamin B-6 50 MG tablet  Commonly known as: PYRIDOXINE  Take 0.5 tablets by mouth 3 times daily as needed (nausea, vomiting)            STOP taking these medications      ibuprofen 200 MG tablet  Commonly known as: ADVIL;MOTRIN     ketoconazole 2 % shampoo  Commonly known as: NIZORAL     metoclopramide 10 MG tablet  Commonly known as: Reglan     pantoprazole 40 MG tablet  Commonly known as: PROTONIX     traMADol 50 MG tablet  Commonly known as: ULTRAM               Where to Get Your Medications        These medications were sent to [facility] [address], [address], [address] [address]      Phone: [phone]   butalbital-acetaminophen-caffeine -40 MG per tablet          Discharge Date: 3/6/23    Condition: Good    Follow-up with Dr. Jesus Ng tomorrow    Signed:  Electronically signed by Shahida Lang MD on 3/6/2023 at 9:05 AM

## 2023-03-06 NOTE — FLOWSHEET NOTE
Addended by: Jennie Ospina on: 1/11/2022 02:18 PM     Modules accepted: Brayan Lim Nurse at bedside. Patient states she is feeling better after the administration of vistaril. Patient currently rating headache a   \"2/10\" which is tolerable. Patient voices no other questions or concerns at this time. Call light within reach.

## 2023-03-06 NOTE — FLOWSHEET NOTE
Patient placed call light on. Nurse at bedside. Patient states she feels like she cant breath and her chest is tight. Vitals signs obtained. WNL. Lungs clear bilaterally. Patient asks if she can get an incentive spriometer to \"help her breath\". No other concerns at this time. Dr. Kim Ferreira paged for patient update.

## 2023-03-06 NOTE — FLOWSHEET NOTE
Dr. Alyssa Hobson returned page. Updated on patient status. Vitals and assessment. Order for 25 mg of Vistaril placed. No further questions or concerns at this time.

## 2023-03-06 NOTE — DISCHARGE INSTRUCTIONS
Home Undelivered Discharge Instructions    After Discharge Orders:           Diet: regular diet   Increase fluid intake to 8-10 glasses of water daily    Rest: normal activity as tolerated    Other instructions: Do kick counts once a day on your baby. Choose the time of day your baby is most active. Get in a comfortable lying or sitting position and time how long it takes to feel 10 kicks, twists, turns, swishes, or rolls. Call Your Doctor if Any of the Following Occurs   Leaking fluid from vagina with or without contractions  Bright red vaginal bleeding occurs that is as heavy as or heavier than a period  Regular contractions are longer, stronger, and closer together  Noticeable decreased fetal movement  Elevated temperature >100.5°F and chills  Blurred vision, spots before eyes, unrelievable headache, severe facial swelling, or upper abdominal pain  Contact physician or hospital OB unit if signs of premature labor occur at ?36 weeks  Persistent or rhythmic low back pain that feels different than you are used to  Menstrual like cramps  Intestinal cramps with or without diarrhea  Pelvic pressure or rhythmic tightening that feels different than you are used to  [facility] discharge or a gush of fluid from your vagina  Vaginal bleeding as heavy as a period  General Information     Difference between:  False Labor True Labor   1. Contractions often are irregular and don't consistently get closer together (called [place]-Salgado contractions) 1. Contractions come at regular intervals and, as time goes on, get closer together. 2. Contractions may often stop when you rest or with a position change. 2. Contractions continue despite movement or walking. Contractions get stronger and closer together with time. 3. Often felt in the lower abdomen. 3. Usually felt in back coming around to the front.      Reminder to Patient   Please bring all teaching sheets and discharge information with you if you return to the hospital or the physician's office/clinic for follow-up care. If you have any questions, please call: \"[facility]Nurse\" [phone] or [phone]. Reviewed Dates   5/12/2007  Document developed by   Labor and Delivery Discharge Education Form  Disclaimer: We want you to understand more clearly each of the health conditions and procedures you may have. This patient leaflet is a summary of useful information to help you gain a better understanding of these health topics. Other information about this condition or procedure may be important for you to know. Please talk with your healthcare provider for more information about your special health needs. Coronavirus ([id number]): Pregnancy, Birth and Baby Care    What do you need know if you are pregnant regarding coronavirus (COVID-19)? · From what experts know so far, pregnant people do NOT seem more likely than others to get COVID-19 and do NOT have a higher risk of severe complications. What can you do to protect yourself from COVID-19? · Practice social distancing.  When you need to leave the home try to stay at least 6 feet away from other people. Avoid large crowds. · When you leave the house to prevent spreading sickness to others - Wear a facemask covering your mouth and nose. Remove by folding outside of mask together and place in container, wash daily or as soiled. · Wash your hands often by rubbing your hands with soap and water for at least 20 seconds, rinse, and dry with a paper towel that can be thrown away or may use hand  of at least 60% alcohol. Covering all surfaces of your hands by rubbing them together until dry. · Avoid touching your face with unwashed hands, especially your mouth, nose and eyes. · Avoid traveling. What should you do if you have or think you may have COVID-19? · For most infected, this is a mild illness and you will be able to recover at home.    · To avoid spreading to others:  ? Stay home except to get medical care.  ? Call a medical facility before you show up. This will help the staff prevent others from being exposed. ? Stay separate from others in your home, at least 6 feet. Use a separate bathroom, if possible. ? Wear a cloth facemask to cover your nose and mouth when you are around other people including at home. ? Clean your hands often. Wash your hands with soap and water for at least 20 seconds. ? Cough or sneeze into a tissue or your arm. Wash your hands immediately after. ? Dont share personal household items including towels and bedding. ? Clean and disinfect objects in your isolation area every day. · If you are in labor and you have, or think you may have COVID-19, call your OB care provider and the hospital birthing unit before you go, so the staff can properly prepare and protect you, your baby and others from being infected. Wear a mask when you leave your home, as you will be asked to continue to wear a mask during your time in the hospital.  · Mother-to-child transmission of COVID-19 during pregnancy is unlikely, but after birth a baby is susceptible to person-to-person spread. ? The determination of whether or not to separate you and your baby at the time of birth will be decided using shared decision-making between you and your clinical team.  ? After your baby is born, your health care provider may recommend you not hold your baby and/or that you stay in a separate room from your baby until you get better. ? If you and your baby are not , wear a facemask at all times and wash your hands thoroughly before touching, holding or feeding your baby.

## 2023-03-06 NOTE — FLOWSHEET NOTE
Patient states she has a \"small\" headache but she doesn't need anything for pain. Patient ate breakfast and has no other complaints at this time.

## 2023-03-06 NOTE — FLOWSHEET NOTE
Dr. Elizabeth Metcalf returned page. Updated on patient status and vitals throughout the night. Dr. Elizabeth Metcalf states she will be in this morning to discuss POC with patient. No further questions or concerns at this time.

## 2023-03-09 ENCOUNTER — HOSPITAL ENCOUNTER (OUTPATIENT)
Age: 28
Discharge: HOME | End: 2023-03-09
Payer: MEDICARE

## 2023-03-09 LAB
ALT SERPL W/O P-5'-P-CCNC: 10 U/L (ref 11–66)
AST SERPL-CCNC: 11 U/L (ref 5–40)
BUN SERPL-MCNC: 10 MG/DL (ref 7–22)
CREAT SERPL-MCNC: 0.5 MG/DL (ref 0.4–1.2)
CREAT UR-MCNC: 123 MG/DL
DEPRECATED RDW RBC AUTO: 42.9 FL (ref 35–45)
ERYTHROCYTE [DISTWIDTH] IN BLOOD BY AUTOMATED COUNT: 13.5 % (ref 11.5–14.5)
GFR SERPL CREATININE-BSD FRML MDRD: > 60 ML/MIN/1.73M2
HCT VFR BLD AUTO: 36.1 % (ref 37–47)
HGB BLD-MCNC: 12.6 GM/DL (ref 12–16)
MCH RBC QN AUTO: 30.7 PG (ref 26–33)
MCHC RBC AUTO-ENTMCNC: 34.9 GM/DL (ref 32.2–35.5)
MCV RBC AUTO: 87.8 FL (ref 81–99)
PLATELET # BLD AUTO: 301 THOU/MM3 (ref 130–400)
PMV BLD AUTO: 9.4 FL (ref 9.4–12.4)
PROT UR-MCNC: 30.6 MG/DL
PROT/CREAT 24H UR: 0.25 MG/G{CREAT}
RBC # BLD AUTO: 4.11 MILL/MM3 (ref 4.2–5.4)
WBC # BLD AUTO: 12.1 THOU/MM3 (ref 4.8–10.8)

## 2023-03-09 PROCEDURE — 84460 ALANINE AMINO (ALT) (SGPT): CPT

## 2023-03-09 PROCEDURE — 82565 ASSAY OF CREATININE: CPT

## 2023-03-09 PROCEDURE — 82570 ASSAY OF URINE CREATININE: CPT

## 2023-03-09 PROCEDURE — 85027 COMPLETE CBC AUTOMATED: CPT

## 2023-03-09 PROCEDURE — 84520 ASSAY OF UREA NITROGEN: CPT

## 2023-03-09 PROCEDURE — 84450 TRANSFERASE (AST) (SGOT): CPT

## 2023-03-09 PROCEDURE — 84156 ASSAY OF PROTEIN URINE: CPT

## 2023-03-09 PROCEDURE — 36415 COLL VENOUS BLD VENIPUNCTURE: CPT

## 2023-03-11 ENCOUNTER — ANESTHESIA (OUTPATIENT)
Dept: LABOR AND DELIVERY | Age: 28
End: 2023-03-11
Payer: MEDICARE

## 2023-03-11 ENCOUNTER — HOSPITAL ENCOUNTER (INPATIENT)
Age: 28
LOS: 4 days | Discharge: HOME | End: 2023-03-15
Attending: OBSTETRICS & GYNECOLOGY | Admitting: STUDENT IN AN ORGANIZED HEALTH CARE EDUCATION/TRAINING PROGRAM
Payer: MEDICARE

## 2023-03-11 ENCOUNTER — ANESTHESIA EVENT (OUTPATIENT)
Dept: LABOR AND DELIVERY | Age: 28
End: 2023-03-11
Payer: MEDICARE

## 2023-03-11 PROBLEM — O26.90 PREGNANCY, COMPLICATED: Status: ACTIVE | Noted: 2023-03-11

## 2023-03-11 LAB
ABO: NORMAL
ALBUMIN SERPL BCG-MCNC: 3.7 G/DL (ref 3.5–5.1)
ALP SERPL-CCNC: 97 U/L (ref 38–126)
ALT SERPL W/O P-5'-P-CCNC: 9 U/L (ref 11–66)
AMPHETAMINES UR QL SCN: NEGATIVE
ANION GAP SERPL CALC-SCNC: 12 MEQ/L (ref 8–16)
ANTIBODY SCREEN: NORMAL
AST SERPL-CCNC: 10 U/L (ref 5–40)
BARBITURATES UR QL SCN: NEGATIVE
BENZODIAZ UR QL SCN: NEGATIVE
BILIRUB SERPL-MCNC: < 0.2 MG/DL (ref 0.3–1.2)
BUN SERPL-MCNC: 10 MG/DL (ref 7–22)
BZE UR QL SCN: NEGATIVE
CALCIUM SERPL-MCNC: 9.2 MG/DL (ref 8.5–10.5)
CANNABINOIDS UR QL SCN: POSITIVE
CHLORIDE SERPL-SCNC: 102 MEQ/L (ref 98–111)
CO2 SERPL-SCNC: 25 MEQ/L (ref 23–33)
CREAT SERPL-MCNC: 0.5 MG/DL (ref 0.4–1.2)
CREAT UR-MCNC: 42.8 MG/DL
DEPRECATED RDW RBC AUTO: 44.2 FL (ref 35–45)
ERYTHROCYTE [DISTWIDTH] IN BLOOD BY AUTOMATED COUNT: 13.5 % (ref 11.5–14.5)
FENTANYL: NEGATIVE
GFR SERPL CREATININE-BSD FRML MDRD: > 60 ML/MIN/1.73M2
GLUCOSE SERPL-MCNC: 80 MG/DL (ref 70–108)
HCT VFR BLD AUTO: 36.2 % (ref 37–47)
HGB BLD-MCNC: 12.1 GM/DL (ref 12–16)
MCH RBC QN AUTO: 30.3 PG (ref 26–33)
MCHC RBC AUTO-ENTMCNC: 33.4 GM/DL (ref 32.2–35.5)
MCV RBC AUTO: 90.5 FL (ref 81–99)
OPIATES UR QL SCN: NEGATIVE
OXYCODONE: NEGATIVE
PCP UR QL SCN: NEGATIVE
PLATELET # BLD AUTO: 301 THOU/MM3 (ref 130–400)
PMV BLD AUTO: 9.4 FL (ref 9.4–12.4)
POTASSIUM SERPL-SCNC: 3.8 MEQ/L (ref 3.5–5.2)
PROT SERPL-MCNC: 7 G/DL (ref 6.1–8)
PROT UR-MCNC: 11.8 MG/DL
PROT/CREAT 24H UR: 0.28 MG/G{CREAT}
RBC # BLD AUTO: 4 MILL/MM3 (ref 4.2–5.4)
RH FACTOR: NORMAL
SODIUM SERPL-SCNC: 139 MEQ/L (ref 135–145)
WBC # BLD AUTO: 10.3 THOU/MM3 (ref 4.8–10.8)

## 2023-03-11 PROCEDURE — 6360000002 HC RX W HCPCS

## 2023-03-11 PROCEDURE — 6360000002 HC RX W HCPCS: Performed by: ANESTHESIOLOGY

## 2023-03-11 PROCEDURE — 2709999900 HC NON-CHARGEABLE SUPPLY: Performed by: STUDENT IN AN ORGANIZED HEALTH CARE EDUCATION/TRAINING PROGRAM

## 2023-03-11 PROCEDURE — 7100000001 HC PACU RECOVERY - ADDTL 15 MIN: Performed by: STUDENT IN AN ORGANIZED HEALTH CARE EDUCATION/TRAINING PROGRAM

## 2023-03-11 PROCEDURE — 86900 BLOOD TYPING SEROLOGIC ABO: CPT

## 2023-03-11 PROCEDURE — 2580000003 HC RX 258: Performed by: STUDENT IN AN ORGANIZED HEALTH CARE EDUCATION/TRAINING PROGRAM

## 2023-03-11 PROCEDURE — 6360000002 HC RX W HCPCS: Performed by: STUDENT IN AN ORGANIZED HEALTH CARE EDUCATION/TRAINING PROGRAM

## 2023-03-11 PROCEDURE — 7100000000 HC PACU RECOVERY - FIRST 15 MIN: Performed by: STUDENT IN AN ORGANIZED HEALTH CARE EDUCATION/TRAINING PROGRAM

## 2023-03-11 PROCEDURE — 82570 ASSAY OF URINE CREATININE: CPT

## 2023-03-11 PROCEDURE — 3609079900 HC CESAREAN SECTION: Performed by: STUDENT IN AN ORGANIZED HEALTH CARE EDUCATION/TRAINING PROGRAM

## 2023-03-11 PROCEDURE — 2720000010 HC SURG SUPPLY STERILE: Performed by: STUDENT IN AN ORGANIZED HEALTH CARE EDUCATION/TRAINING PROGRAM

## 2023-03-11 PROCEDURE — 3700000001 HC ADD 15 MINUTES (ANESTHESIA): Performed by: STUDENT IN AN ORGANIZED HEALTH CARE EDUCATION/TRAINING PROGRAM

## 2023-03-11 PROCEDURE — 80053 COMPREHEN METABOLIC PANEL: CPT

## 2023-03-11 PROCEDURE — 86901 BLOOD TYPING SEROLOGIC RH(D): CPT

## 2023-03-11 PROCEDURE — 1220000001 HC SEMI PRIVATE L&D R&B

## 2023-03-11 PROCEDURE — 85027 COMPLETE CBC AUTOMATED: CPT

## 2023-03-11 PROCEDURE — 86850 RBC ANTIBODY SCREEN: CPT

## 2023-03-11 PROCEDURE — 2500000003 HC RX 250 WO HCPCS

## 2023-03-11 PROCEDURE — 2500000003 HC RX 250 WO HCPCS: Performed by: STUDENT IN AN ORGANIZED HEALTH CARE EDUCATION/TRAINING PROGRAM

## 2023-03-11 PROCEDURE — A4216 STERILE WATER/SALINE, 10 ML: HCPCS | Performed by: STUDENT IN AN ORGANIZED HEALTH CARE EDUCATION/TRAINING PROGRAM

## 2023-03-11 PROCEDURE — 6360000002 HC RX W HCPCS: Performed by: NURSE ANESTHETIST, CERTIFIED REGISTERED

## 2023-03-11 PROCEDURE — 80307 DRUG TEST PRSMV CHEM ANLYZR: CPT

## 2023-03-11 PROCEDURE — 3700000000 HC ANESTHESIA ATTENDED CARE: Performed by: STUDENT IN AN ORGANIZED HEALTH CARE EDUCATION/TRAINING PROGRAM

## 2023-03-11 PROCEDURE — 84156 ASSAY OF PROTEIN URINE: CPT

## 2023-03-11 PROCEDURE — 6370000000 HC RX 637 (ALT 250 FOR IP): Performed by: STUDENT IN AN ORGANIZED HEALTH CARE EDUCATION/TRAINING PROGRAM

## 2023-03-11 PROCEDURE — 2500000003 HC RX 250 WO HCPCS: Performed by: NURSE ANESTHETIST, CERTIFIED REGISTERED

## 2023-03-11 PROCEDURE — 36415 COLL VENOUS BLD VENIPUNCTURE: CPT

## 2023-03-11 RX ORDER — DIPHENHYDRAMINE HYDROCHLORIDE 50 MG/ML
25 INJECTION INTRAMUSCULAR; INTRAVENOUS ONCE
Status: COMPLETED | OUTPATIENT
Start: 2023-03-11 | End: 2023-03-11

## 2023-03-11 RX ORDER — SODIUM CHLORIDE 0.9 % (FLUSH) 0.9 %
5-40 SYRINGE (ML) INJECTION EVERY 12 HOURS SCHEDULED
Status: DISCONTINUED | OUTPATIENT
Start: 2023-03-12 | End: 2023-03-12

## 2023-03-11 RX ORDER — AMOXICILLIN 500 MG/1
500 CAPSULE ORAL 3 TIMES DAILY
Status: ON HOLD | COMMUNITY
End: 2023-03-14 | Stop reason: HOSPADM

## 2023-03-11 RX ORDER — SODIUM CHLORIDE 0.9 % (FLUSH) 0.9 %
5-40 SYRINGE (ML) INJECTION PRN
Status: DISCONTINUED | OUTPATIENT
Start: 2023-03-11 | End: 2023-03-12

## 2023-03-11 RX ORDER — SUCCINYLCHOLINE/SOD CL,ISO/PF 200MG/10ML
SYRINGE (ML) INTRAVENOUS PRN
Status: DISCONTINUED | OUTPATIENT
Start: 2023-03-11 | End: 2023-03-11 | Stop reason: SDUPTHER

## 2023-03-11 RX ORDER — METOCLOPRAMIDE HYDROCHLORIDE 5 MG/ML
10 INJECTION INTRAMUSCULAR; INTRAVENOUS ONCE
Status: COMPLETED | OUTPATIENT
Start: 2023-03-11 | End: 2023-03-11

## 2023-03-11 RX ORDER — CARBOPROST TROMETHAMINE 250 UG/ML
INJECTION, SOLUTION INTRAMUSCULAR PRN
Status: DISCONTINUED | OUTPATIENT
Start: 2023-03-11 | End: 2023-03-11 | Stop reason: SDUPTHER

## 2023-03-11 RX ORDER — SODIUM CHLORIDE 9 MG/ML
INJECTION, SOLUTION INTRAVENOUS PRN
Status: DISCONTINUED | OUTPATIENT
Start: 2023-03-11 | End: 2023-03-12

## 2023-03-11 RX ORDER — ACETAMINOPHEN 500 MG
1000 TABLET ORAL ONCE
Status: COMPLETED | OUTPATIENT
Start: 2023-03-11 | End: 2023-03-11

## 2023-03-11 RX ORDER — MIDAZOLAM HYDROCHLORIDE 1 MG/ML
INJECTION INTRAMUSCULAR; INTRAVENOUS PRN
Status: DISCONTINUED | OUTPATIENT
Start: 2023-03-11 | End: 2023-03-11 | Stop reason: SDUPTHER

## 2023-03-11 RX ORDER — LABETALOL HYDROCHLORIDE 5 MG/ML
10 INJECTION, SOLUTION INTRAVENOUS
Status: DISCONTINUED | OUTPATIENT
Start: 2023-03-11 | End: 2023-03-12

## 2023-03-11 RX ORDER — ONDANSETRON 2 MG/ML
4 INJECTION INTRAMUSCULAR; INTRAVENOUS EVERY 6 HOURS PRN
Status: DISCONTINUED | OUTPATIENT
Start: 2023-03-11 | End: 2023-03-12

## 2023-03-11 RX ORDER — SODIUM CHLORIDE, SODIUM LACTATE, POTASSIUM CHLORIDE, CALCIUM CHLORIDE 600; 310; 30; 20 MG/100ML; MG/100ML; MG/100ML; MG/100ML
INJECTION, SOLUTION INTRAVENOUS ONCE
Status: COMPLETED | OUTPATIENT
Start: 2023-03-11 | End: 2023-03-11

## 2023-03-11 RX ORDER — ACETAMINOPHEN 325 MG/1
975 TABLET ORAL ONCE
Status: DISCONTINUED | OUTPATIENT
Start: 2023-03-11 | End: 2023-03-12

## 2023-03-11 RX ORDER — FENTANYL CITRATE 50 UG/ML
INJECTION, SOLUTION INTRAMUSCULAR; INTRAVENOUS PRN
Status: DISCONTINUED | OUTPATIENT
Start: 2023-03-11 | End: 2023-03-11 | Stop reason: SDUPTHER

## 2023-03-11 RX ORDER — TRISODIUM CITRATE DIHYDRATE AND CITRIC ACID MONOHYDRATE 500; 334 MG/5ML; MG/5ML
15 SOLUTION ORAL ONCE
Status: COMPLETED | OUTPATIENT
Start: 2023-03-11 | End: 2023-03-11

## 2023-03-11 RX ORDER — SODIUM CHLORIDE, SODIUM LACTATE, POTASSIUM CHLORIDE, AND CALCIUM CHLORIDE .6; .31; .03; .02 G/100ML; G/100ML; G/100ML; G/100ML
1000 INJECTION, SOLUTION INTRAVENOUS ONCE
Status: DISCONTINUED | OUTPATIENT
Start: 2023-03-11 | End: 2023-03-12

## 2023-03-11 RX ORDER — OXYTOCIN/0.9 % SODIUM CHLORIDE 30/500 ML
87.3 PLASTIC BAG, INJECTION (ML) INTRAVENOUS CONTINUOUS PRN
Status: DISCONTINUED | OUTPATIENT
Start: 2023-03-11 | End: 2023-03-12

## 2023-03-11 RX ORDER — METOCLOPRAMIDE HYDROCHLORIDE 5 MG/ML
10 INJECTION INTRAMUSCULAR; INTRAVENOUS ONCE
Status: DISCONTINUED | OUTPATIENT
Start: 2023-03-11 | End: 2023-03-12

## 2023-03-11 RX ORDER — PROPOFOL 10 MG/ML
INJECTION, EMULSION INTRAVENOUS PRN
Status: DISCONTINUED | OUTPATIENT
Start: 2023-03-11 | End: 2023-03-11 | Stop reason: SDUPTHER

## 2023-03-11 RX ORDER — OXYTOCIN 10 [USP'U]/ML
INJECTION, SOLUTION INTRAMUSCULAR; INTRAVENOUS PRN
Status: DISCONTINUED | OUTPATIENT
Start: 2023-03-11 | End: 2023-03-11 | Stop reason: SDUPTHER

## 2023-03-11 RX ORDER — ONDANSETRON 2 MG/ML
INJECTION INTRAMUSCULAR; INTRAVENOUS PRN
Status: DISCONTINUED | OUTPATIENT
Start: 2023-03-11 | End: 2023-03-11 | Stop reason: SDUPTHER

## 2023-03-11 RX ORDER — MORPHINE SULFATE 2 MG/ML
2 INJECTION, SOLUTION INTRAMUSCULAR; INTRAVENOUS EVERY 5 MIN PRN
Status: DISCONTINUED | OUTPATIENT
Start: 2023-03-11 | End: 2023-03-12

## 2023-03-11 RX ORDER — FENTANYL CITRATE 50 UG/ML
50 INJECTION, SOLUTION INTRAMUSCULAR; INTRAVENOUS EVERY 5 MIN PRN
Status: COMPLETED | OUTPATIENT
Start: 2023-03-11 | End: 2023-03-11

## 2023-03-11 RX ORDER — SODIUM CHLORIDE, SODIUM LACTATE, POTASSIUM CHLORIDE, CALCIUM CHLORIDE 600; 310; 30; 20 MG/100ML; MG/100ML; MG/100ML; MG/100ML
INJECTION, SOLUTION INTRAVENOUS CONTINUOUS
Status: DISCONTINUED | OUTPATIENT
Start: 2023-03-11 | End: 2023-03-12

## 2023-03-11 RX ADMIN — SODIUM CHLORIDE, POTASSIUM CHLORIDE, SODIUM LACTATE AND CALCIUM CHLORIDE: 600; 310; 30; 20 INJECTION, SOLUTION INTRAVENOUS at 21:52

## 2023-03-11 RX ADMIN — ACETAMINOPHEN 1000 MG: 500 TABLET ORAL at 20:00

## 2023-03-11 RX ADMIN — FAMOTIDINE 20 MG: 10 INJECTION, SOLUTION INTRAVENOUS at 21:39

## 2023-03-11 RX ADMIN — SODIUM CHLORIDE, POTASSIUM CHLORIDE, SODIUM LACTATE AND CALCIUM CHLORIDE: 600; 310; 30; 20 INJECTION, SOLUTION INTRAVENOUS at 20:50

## 2023-03-11 RX ADMIN — FENTANYL CITRATE 50 MCG: 50 INJECTION, SOLUTION INTRAMUSCULAR; INTRAVENOUS at 23:15

## 2023-03-11 RX ADMIN — PROPOFOL 200 MG: 10 INJECTION, EMULSION INTRAVENOUS at 22:12

## 2023-03-11 RX ADMIN — FENTANYL CITRATE 100 MCG: 50 INJECTION, SOLUTION INTRAMUSCULAR; INTRAVENOUS at 22:25

## 2023-03-11 RX ADMIN — OXYTOCIN 20 UNITS: 10 INJECTION, SOLUTION INTRAMUSCULAR; INTRAVENOUS at 22:18

## 2023-03-11 RX ADMIN — FENTANYL CITRATE 50 MCG: 50 INJECTION, SOLUTION INTRAMUSCULAR; INTRAVENOUS at 22:35

## 2023-03-11 RX ADMIN — MIDAZOLAM 2 MG: 1 INJECTION INTRAMUSCULAR; INTRAVENOUS at 22:18

## 2023-03-11 RX ADMIN — FENTANYL CITRATE 50 MCG: 50 INJECTION, SOLUTION INTRAMUSCULAR; INTRAVENOUS at 23:20

## 2023-03-11 RX ADMIN — FENTANYL CITRATE 100 MCG: 50 INJECTION, SOLUTION INTRAMUSCULAR; INTRAVENOUS at 23:05

## 2023-03-11 RX ADMIN — FENTANYL CITRATE 50 MCG: 50 INJECTION, SOLUTION INTRAMUSCULAR; INTRAVENOUS at 22:55

## 2023-03-11 RX ADMIN — CARBOPROST TROMETHAMINE 250 MCG: 250 INJECTION, SOLUTION INTRAMUSCULAR at 22:22

## 2023-03-11 RX ADMIN — SODIUM CHLORIDE, POTASSIUM CHLORIDE, SODIUM LACTATE AND CALCIUM CHLORIDE: 600; 310; 30; 20 INJECTION, SOLUTION INTRAVENOUS at 22:38

## 2023-03-11 RX ADMIN — METOCLOPRAMIDE 10 MG: 5 INJECTION, SOLUTION INTRAMUSCULAR; INTRAVENOUS at 20:24

## 2023-03-11 RX ADMIN — ONDANSETRON 4 MG: 2 INJECTION INTRAMUSCULAR; INTRAVENOUS at 22:39

## 2023-03-11 RX ADMIN — SODIUM CITRATE AND CITRIC ACID MONOHYDRATE 15 ML: 500; 334 SOLUTION ORAL at 21:53

## 2023-03-11 RX ADMIN — Medication 200 MG: at 22:12

## 2023-03-11 RX ADMIN — SODIUM CHLORIDE, POTASSIUM CHLORIDE, SODIUM LACTATE AND CALCIUM CHLORIDE: 600; 310; 30; 20 INJECTION, SOLUTION INTRAVENOUS at 23:34

## 2023-03-11 RX ADMIN — FENTANYL CITRATE 100 MCG: 50 INJECTION, SOLUTION INTRAMUSCULAR; INTRAVENOUS at 22:18

## 2023-03-11 RX ADMIN — DIPHENHYDRAMINE HYDROCHLORIDE 25 MG: 50 INJECTION, SOLUTION INTRAMUSCULAR; INTRAVENOUS at 20:25

## 2023-03-11 RX ADMIN — Medication 87.3 MILLI-UNITS/MIN: at 23:35

## 2023-03-11 RX ADMIN — Medication 2000 MG: at 21:53

## 2023-03-11 ASSESSMENT — PAIN SCALES - GENERAL
PAINLEVEL_OUTOF10: 6
PAINLEVEL_OUTOF10: 8
PAINLEVEL_OUTOF10: 7
PAINLEVEL_OUTOF10: 7
PAINLEVEL_OUTOF10: 8
PAINLEVEL_OUTOF10: 7

## 2023-03-11 ASSESSMENT — PAIN DESCRIPTION - ORIENTATION: ORIENTATION: MID

## 2023-03-11 ASSESSMENT — PAIN DESCRIPTION - LOCATION
LOCATION: ABDOMEN
LOCATION: HEAD

## 2023-03-11 ASSESSMENT — PAIN - FUNCTIONAL ASSESSMENT: PAIN_FUNCTIONAL_ASSESSMENT: ACTIVITIES ARE NOT PREVENTED

## 2023-03-11 ASSESSMENT — PAIN DESCRIPTION - PAIN TYPE: TYPE: SURGICAL PAIN

## 2023-03-11 ASSESSMENT — PAIN DESCRIPTION - DESCRIPTORS
DESCRIPTORS: ACHING
DESCRIPTORS: ACHING

## 2023-03-12 LAB — HGB BLD-MCNC: 11.2 GM/DL (ref 12–16)

## 2023-03-12 PROCEDURE — 6360000002 HC RX W HCPCS

## 2023-03-12 PROCEDURE — 85018 HEMOGLOBIN: CPT

## 2023-03-12 PROCEDURE — 2580000003 HC RX 258: Performed by: STUDENT IN AN ORGANIZED HEALTH CARE EDUCATION/TRAINING PROGRAM

## 2023-03-12 PROCEDURE — 6370000000 HC RX 637 (ALT 250 FOR IP): Performed by: STUDENT IN AN ORGANIZED HEALTH CARE EDUCATION/TRAINING PROGRAM

## 2023-03-12 PROCEDURE — 36415 COLL VENOUS BLD VENIPUNCTURE: CPT

## 2023-03-12 PROCEDURE — 6360000002 HC RX W HCPCS: Performed by: STUDENT IN AN ORGANIZED HEALTH CARE EDUCATION/TRAINING PROGRAM

## 2023-03-12 PROCEDURE — 1220000001 HC SEMI PRIVATE L&D R&B

## 2023-03-12 PROCEDURE — 6360000002 HC RX W HCPCS: Performed by: ANESTHESIOLOGY

## 2023-03-12 RX ORDER — CYCLOBENZAPRINE HCL 10 MG
10 TABLET ORAL DAILY PRN
Status: DISCONTINUED | OUTPATIENT
Start: 2023-03-12 | End: 2023-03-12

## 2023-03-12 RX ORDER — IBUPROFEN 800 MG/1
800 TABLET ORAL EVERY 8 HOURS
Status: DISCONTINUED | OUTPATIENT
Start: 2023-03-12 | End: 2023-03-15 | Stop reason: HOSPADM

## 2023-03-12 RX ORDER — BISACODYL 10 MG
10 SUPPOSITORY, RECTAL RECTAL DAILY PRN
Status: DISCONTINUED | OUTPATIENT
Start: 2023-03-12 | End: 2023-03-15 | Stop reason: HOSPADM

## 2023-03-12 RX ORDER — CEPHALEXIN 500 MG/1
500 CAPSULE ORAL EVERY 8 HOURS SCHEDULED
Status: COMPLETED | OUTPATIENT
Start: 2023-03-12 | End: 2023-03-13

## 2023-03-12 RX ORDER — ACETAMINOPHEN 500 MG
1000 TABLET ORAL EVERY 8 HOURS
Status: DISCONTINUED | OUTPATIENT
Start: 2023-03-12 | End: 2023-03-15 | Stop reason: HOSPADM

## 2023-03-12 RX ORDER — FERROUS SULFATE 325(65) MG
325 TABLET ORAL
Status: DISCONTINUED | OUTPATIENT
Start: 2023-03-12 | End: 2023-03-15 | Stop reason: HOSPADM

## 2023-03-12 RX ORDER — OXYCODONE HYDROCHLORIDE 5 MG/1
5 TABLET ORAL EVERY 4 HOURS PRN
Status: DISCONTINUED | OUTPATIENT
Start: 2023-03-12 | End: 2023-03-15 | Stop reason: HOSPADM

## 2023-03-12 RX ORDER — SODIUM CHLORIDE, SODIUM LACTATE, POTASSIUM CHLORIDE, CALCIUM CHLORIDE 600; 310; 30; 20 MG/100ML; MG/100ML; MG/100ML; MG/100ML
INJECTION, SOLUTION INTRAVENOUS CONTINUOUS
Status: DISCONTINUED | OUTPATIENT
Start: 2023-03-12 | End: 2023-03-15 | Stop reason: HOSPADM

## 2023-03-12 RX ORDER — METRONIDAZOLE 500 MG/1
500 TABLET ORAL EVERY 8 HOURS SCHEDULED
Status: COMPLETED | OUTPATIENT
Start: 2023-03-12 | End: 2023-03-13

## 2023-03-12 RX ORDER — MISOPROSTOL 200 UG/1
800 TABLET ORAL PRN
Status: DISCONTINUED | OUTPATIENT
Start: 2023-03-12 | End: 2023-03-15 | Stop reason: HOSPADM

## 2023-03-12 RX ORDER — ENOXAPARIN SODIUM 100 MG/ML
40 INJECTION SUBCUTANEOUS DAILY
Status: DISCONTINUED | OUTPATIENT
Start: 2023-03-12 | End: 2023-03-15 | Stop reason: HOSPADM

## 2023-03-12 RX ORDER — SODIUM CHLORIDE 0.9 % (FLUSH) 0.9 %
5-40 SYRINGE (ML) INJECTION PRN
Status: DISCONTINUED | OUTPATIENT
Start: 2023-03-12 | End: 2023-03-15 | Stop reason: HOSPADM

## 2023-03-12 RX ORDER — FLUTICASONE PROPIONATE 50 MCG
1 SPRAY, SUSPENSION (ML) NASAL DAILY
Status: DISCONTINUED | OUTPATIENT
Start: 2023-03-12 | End: 2023-03-13

## 2023-03-12 RX ORDER — SODIUM CHLORIDE 0.9 % (FLUSH) 0.9 %
5-40 SYRINGE (ML) INJECTION EVERY 12 HOURS SCHEDULED
Status: DISCONTINUED | OUTPATIENT
Start: 2023-03-12 | End: 2023-03-15 | Stop reason: HOSPADM

## 2023-03-12 RX ORDER — MAGNESIUM SULFATE IN WATER 40 MG/ML
4000 INJECTION, SOLUTION INTRAVENOUS ONCE
Status: COMPLETED | OUTPATIENT
Start: 2023-03-12 | End: 2023-03-12

## 2023-03-12 RX ORDER — SODIUM CHLORIDE 9 MG/ML
INJECTION, SOLUTION INTRAVENOUS PRN
Status: DISCONTINUED | OUTPATIENT
Start: 2023-03-12 | End: 2023-03-15 | Stop reason: HOSPADM

## 2023-03-12 RX ORDER — KETOROLAC TROMETHAMINE 30 MG/ML
30 INJECTION, SOLUTION INTRAMUSCULAR; INTRAVENOUS ONCE
Status: COMPLETED | OUTPATIENT
Start: 2023-03-12 | End: 2023-03-12

## 2023-03-12 RX ORDER — PRENATAL WITH FERROUS FUM AND FOLIC ACID 3080; 920; 120; 400; 22; 1.84; 3; 20; 10; 1; 12; 200; 27; 25; 2 [IU]/1; [IU]/1; MG/1; [IU]/1; MG/1; MG/1; MG/1; MG/1; MG/1; MG/1; UG/1; MG/1; MG/1; MG/1; MG/1
1 TABLET ORAL DAILY
Status: DISCONTINUED | OUTPATIENT
Start: 2023-03-12 | End: 2023-03-15 | Stop reason: HOSPADM

## 2023-03-12 RX ORDER — MAGNESIUM SULFATE IN WATER 40 MG/ML
INJECTION, SOLUTION INTRAVENOUS
Status: COMPLETED
Start: 2023-03-12 | End: 2023-03-12

## 2023-03-12 RX ORDER — METHYLERGONOVINE MALEATE 0.2 MG/ML
200 INJECTION INTRAVENOUS PRN
Status: DISCONTINUED | OUTPATIENT
Start: 2023-03-12 | End: 2023-03-15 | Stop reason: HOSPADM

## 2023-03-12 RX ORDER — CARBOPROST TROMETHAMINE 250 UG/ML
250 INJECTION, SOLUTION INTRAMUSCULAR PRN
Status: DISCONTINUED | OUTPATIENT
Start: 2023-03-12 | End: 2023-03-15 | Stop reason: HOSPADM

## 2023-03-12 RX ORDER — DIPHENHYDRAMINE HYDROCHLORIDE 50 MG/ML
25 INJECTION INTRAMUSCULAR; INTRAVENOUS EVERY 6 HOURS PRN
Status: DISCONTINUED | OUTPATIENT
Start: 2023-03-12 | End: 2023-03-15 | Stop reason: HOSPADM

## 2023-03-12 RX ORDER — KETOROLAC TROMETHAMINE 30 MG/ML
30 INJECTION, SOLUTION INTRAMUSCULAR; INTRAVENOUS EVERY 6 HOURS PRN
Status: DISCONTINUED | OUTPATIENT
Start: 2023-03-12 | End: 2023-03-13

## 2023-03-12 RX ORDER — OXYCODONE HYDROCHLORIDE 5 MG/1
10 TABLET ORAL EVERY 4 HOURS PRN
Status: DISCONTINUED | OUTPATIENT
Start: 2023-03-12 | End: 2023-03-15 | Stop reason: HOSPADM

## 2023-03-12 RX ORDER — SIMETHICONE 80 MG
80 TABLET,CHEWABLE ORAL EVERY 6 HOURS PRN
Status: DISCONTINUED | OUTPATIENT
Start: 2023-03-12 | End: 2023-03-15 | Stop reason: HOSPADM

## 2023-03-12 RX ORDER — DOCUSATE SODIUM 100 MG/1
100 CAPSULE, LIQUID FILLED ORAL 2 TIMES DAILY
Status: DISCONTINUED | OUTPATIENT
Start: 2023-03-12 | End: 2023-03-15 | Stop reason: HOSPADM

## 2023-03-12 RX ORDER — DIAZEPAM 5 MG/1
5 TABLET ORAL EVERY 6 HOURS PRN
Status: DISCONTINUED | OUTPATIENT
Start: 2023-03-12 | End: 2023-03-13

## 2023-03-12 RX ORDER — ZOLPIDEM TARTRATE 5 MG/1
10 TABLET ORAL NIGHTLY PRN
Status: DISCONTINUED | OUTPATIENT
Start: 2023-03-12 | End: 2023-03-13

## 2023-03-12 RX ADMIN — MAGNESIUM SULFATE HEPTAHYDRATE 4000 MG: 40 INJECTION, SOLUTION INTRAVENOUS at 01:35

## 2023-03-12 RX ADMIN — CYCLOBENZAPRINE 10 MG: 10 TABLET, FILM COATED ORAL at 11:12

## 2023-03-12 RX ADMIN — HYDROMORPHONE HYDROCHLORIDE 1 MG: 1 INJECTION, SOLUTION INTRAMUSCULAR; INTRAVENOUS; SUBCUTANEOUS at 15:39

## 2023-03-12 RX ADMIN — MAGNESIUM SULFATE HEPTAHYDRATE 1000 MG/HR: 40 INJECTION, SOLUTION INTRAVENOUS at 23:10

## 2023-03-12 RX ADMIN — DOCUSATE SODIUM 100 MG: 100 CAPSULE, LIQUID FILLED ORAL at 21:05

## 2023-03-12 RX ADMIN — METRONIDAZOLE 500 MG: 500 TABLET ORAL at 06:23

## 2023-03-12 RX ADMIN — HYDROMORPHONE HYDROCHLORIDE 1 MG: 1 INJECTION, SOLUTION INTRAMUSCULAR; INTRAVENOUS; SUBCUTANEOUS at 06:12

## 2023-03-12 RX ADMIN — ENOXAPARIN SODIUM 40 MG: 100 INJECTION SUBCUTANEOUS at 17:46

## 2023-03-12 RX ADMIN — CEPHALEXIN 500 MG: 500 CAPSULE ORAL at 14:55

## 2023-03-12 RX ADMIN — SODIUM CHLORIDE, POTASSIUM CHLORIDE, SODIUM LACTATE AND CALCIUM CHLORIDE: 600; 310; 30; 20 INJECTION, SOLUTION INTRAVENOUS at 19:59

## 2023-03-12 RX ADMIN — Medication 4.8 MG: at 07:13

## 2023-03-12 RX ADMIN — OXYCODONE 10 MG: 5 TABLET ORAL at 05:03

## 2023-03-12 RX ADMIN — MAGNESIUM SULFATE HEPTAHYDRATE 1000 MG/HR: 40 INJECTION, SOLUTION INTRAVENOUS at 01:47

## 2023-03-12 RX ADMIN — FERROUS SULFATE TAB 325 MG (65 MG ELEMENTAL FE) 325 MG: 325 (65 FE) TAB at 07:48

## 2023-03-12 RX ADMIN — METRONIDAZOLE 500 MG: 500 TABLET ORAL at 14:55

## 2023-03-12 RX ADMIN — CEPHALEXIN 500 MG: 500 CAPSULE ORAL at 06:23

## 2023-03-12 RX ADMIN — HYDROMORPHONE HYDROCHLORIDE 0.5 MG: 1 INJECTION, SOLUTION INTRAMUSCULAR; INTRAVENOUS; SUBCUTANEOUS at 01:06

## 2023-03-12 RX ADMIN — CEPHALEXIN 500 MG: 500 CAPSULE ORAL at 22:54

## 2023-03-12 RX ADMIN — HYDROMORPHONE HYDROCHLORIDE 0.5 MG: 1 INJECTION, SOLUTION INTRAMUSCULAR; INTRAVENOUS; SUBCUTANEOUS at 01:31

## 2023-03-12 RX ADMIN — FLUTICASONE PROPIONATE 1 SPRAY: 50 SPRAY, METERED NASAL at 07:48

## 2023-03-12 RX ADMIN — SODIUM CHLORIDE, POTASSIUM CHLORIDE, SODIUM LACTATE AND CALCIUM CHLORIDE: 600; 310; 30; 20 INJECTION, SOLUTION INTRAVENOUS at 10:16

## 2023-03-12 RX ADMIN — KETOROLAC TROMETHAMINE 30 MG: 30 INJECTION, SOLUTION INTRAMUSCULAR; INTRAVENOUS at 08:43

## 2023-03-12 RX ADMIN — KETOROLAC TROMETHAMINE 30 MG: 30 INJECTION, SOLUTION INTRAMUSCULAR; INTRAVENOUS at 21:06

## 2023-03-12 RX ADMIN — ACETAMINOPHEN 1000 MG: 500 TABLET ORAL at 19:58

## 2023-03-12 RX ADMIN — DOCUSATE SODIUM 100 MG: 100 CAPSULE, LIQUID FILLED ORAL at 10:10

## 2023-03-12 RX ADMIN — Medication 0.5 MG: at 00:47

## 2023-03-12 RX ADMIN — PRENATAL WITH FERROUS FUM AND FOLIC ACID 1 TABLET: 3080; 920; 120; 400; 22; 1.84; 3; 20; 10; 1; 12; 200; 27; 25; 2 TABLET ORAL at 17:46

## 2023-03-12 RX ADMIN — MORPHINE SULFATE 2 MG: 2 INJECTION, SOLUTION INTRAMUSCULAR; INTRAVENOUS at 00:07

## 2023-03-12 RX ADMIN — OXYCODONE 10 MG: 5 TABLET ORAL at 23:10

## 2023-03-12 RX ADMIN — KETOROLAC TROMETHAMINE 30 MG: 30 INJECTION, SOLUTION INTRAMUSCULAR; INTRAVENOUS at 01:38

## 2023-03-12 RX ADMIN — METRONIDAZOLE 500 MG: 500 TABLET ORAL at 22:53

## 2023-03-12 RX ADMIN — HYDROMORPHONE HYDROCHLORIDE 0.5 MG: 1 INJECTION, SOLUTION INTRAMUSCULAR; INTRAVENOUS; SUBCUTANEOUS at 00:47

## 2023-03-12 RX ADMIN — Medication 0.5 MG: at 01:31

## 2023-03-12 RX ADMIN — OXYCODONE 5 MG: 5 TABLET ORAL at 13:12

## 2023-03-12 RX ADMIN — KETOROLAC TROMETHAMINE 30 MG: 30 INJECTION, SOLUTION INTRAMUSCULAR at 01:41

## 2023-03-12 RX ADMIN — KETOROLAC TROMETHAMINE 30 MG: 30 INJECTION, SOLUTION INTRAMUSCULAR; INTRAVENOUS at 14:54

## 2023-03-12 RX ADMIN — ACETAMINOPHEN 1000 MG: 500 TABLET ORAL at 04:00

## 2023-03-12 RX ADMIN — HYDROMORPHONE HYDROCHLORIDE 1 MG: 1 INJECTION, SOLUTION INTRAMUSCULAR; INTRAVENOUS; SUBCUTANEOUS at 10:10

## 2023-03-12 RX ADMIN — OXYCODONE 10 MG: 5 TABLET ORAL at 19:01

## 2023-03-12 RX ADMIN — DIAZEPAM 5 MG: 5 TABLET ORAL at 21:06

## 2023-03-12 RX ADMIN — MAGNESIUM SULFATE IN WATER 4000 MG: 40 INJECTION, SOLUTION INTRAVENOUS at 01:35

## 2023-03-12 ASSESSMENT — PAIN SCALES - GENERAL
PAINLEVEL_OUTOF10: 10
PAINLEVEL_OUTOF10: 10
PAINLEVEL_OUTOF10: 8
PAINLEVEL_OUTOF10: 10
PAINLEVEL_OUTOF10: 8
PAINLEVEL_OUTOF10: 1
PAINLEVEL_OUTOF10: 10
PAINLEVEL_OUTOF10: 6
PAINLEVEL_OUTOF10: 7
PAINLEVEL_OUTOF10: 8
PAINLEVEL_OUTOF10: 7
PAINLEVEL_OUTOF10: 8
PAINLEVEL_OUTOF10: 7
PAINLEVEL_OUTOF10: 10
PAINLEVEL_OUTOF10: 8
PAINLEVEL_OUTOF10: 10

## 2023-03-12 ASSESSMENT — PAIN DESCRIPTION - LOCATION
LOCATION: ABDOMEN
LOCATION: ABDOMEN;RIB CAGE;SHOULDER
LOCATION: SHOULDER
LOCATION: SHOULDER;RIB CAGE
LOCATION: BACK;ABDOMEN
LOCATION: BACK;NECK
LOCATION: THROAT
LOCATION: ABDOMEN
LOCATION: BACK;ABDOMEN;SHOULDER
LOCATION: ABDOMEN
LOCATION: ABDOMEN
LOCATION: BACK
LOCATION: ABDOMEN
LOCATION: ABDOMEN;SHOULDER
LOCATION: SHOULDER;BACK
LOCATION: ABDOMEN
LOCATION: ABDOMEN

## 2023-03-12 ASSESSMENT — PAIN - FUNCTIONAL ASSESSMENT
PAIN_FUNCTIONAL_ASSESSMENT: ACTIVITIES ARE NOT PREVENTED

## 2023-03-12 ASSESSMENT — PAIN DESCRIPTION - DESCRIPTORS
DESCRIPTORS: NAGGING
DESCRIPTORS: STABBING
DESCRIPTORS: ACHING;DISCOMFORT
DESCRIPTORS: ACHING;NAGGING
DESCRIPTORS: SORE
DESCRIPTORS: DULL;ACHING
DESCRIPTORS: CRAMPING;ACHING;SHARP
DESCRIPTORS: NAGGING
DESCRIPTORS: DULL
DESCRIPTORS: NAGGING
DESCRIPTORS: NAGGING
DESCRIPTORS: THROBBING
DESCRIPTORS: CRAMPING;TIGHTNESS;SHARP
DESCRIPTORS: ACHING;SQUEEZING;CRAMPING

## 2023-03-12 ASSESSMENT — PAIN DESCRIPTION - ORIENTATION
ORIENTATION: UPPER
ORIENTATION: MID

## 2023-03-12 NOTE — FLOWSHEET NOTE
Dr. Alexandra Oms messaged unit and wants update after an hour of meds given to see if any relief from medication for headache. If no relief then pt will most likely be delivered via c. Section for pre-e.  Will update

## 2023-03-12 NOTE — ANESTHESIA PRE PROCEDURE
Department of Anesthesiology  Preprocedure Note       Name:  Manuel Jama   Age:  32 y.o.  :  1995                                          MRN:  [id number]         Date:  3/11/2023      Surgeon: Tyshawn Lopez):  Celio Jonas DO    Procedure: Procedure(s):   SECTION    Medications prior to admission:   Prior to Admission medications    Medication Sig Start Date End Date Taking? Authorizing Provider   amoxicillin (AMOXIL) 500 MG capsule Take 500 mg by mouth 3 times daily    Historical Provider, MD   butalbital-acetaminophen-caffeine (FIORICET, ESGIC) -40 MG per tablet Take 1 tablet by mouth every 6 hours as needed for Headaches 3/6/23   Lane Gottron, MD   Prenatal Vit-Fe Fumarate-FA (PRENATAL 1+1 PO) Take by mouth    Historical Provider, MD   calcium carbonate (TUMS) 500 MG chewable tablet Take 1 tablet by mouth daily  Patient not taking: Reported on 3/11/2023    Historical Provider, MD   famotidine (PEPCID) 20 MG tablet Take 20 mg by mouth 2 times daily as needed  Patient not taking: Reported on 3/11/2023    Historical Provider, MD   acetaminophen (TYLENOL) 500 MG tablet Take 500 mg by mouth every 6 hours as needed for Pain    Historical Provider, MD   vitamin B-6 (PYRIDOXINE) 50 MG tablet Take 0.5 tablets by mouth 3 times daily as needed (nausea, vomiting)  Patient not taking: Reported on 3/11/2023 10/7/22   Fazal Agosto MD   Tens Unit MISC by Does not apply route  Patient not taking: Reported on 3/11/2023 10/11/16   Lily Chappell APRN - CNP       Current medications:    No current facility-administered medications for this visit. No current outpatient medications on file.      Facility-Administered Medications Ordered in Other Visits   Medication Dose Route Frequency Provider Last Rate Last Admin    lactated ringers IV soln infusion   IntraVENous Continuous Fadia Feliz DO        citric acid-sodium citrate (BICITRA) solution 15 mL  15 mL Oral Once Fadia Feliz DO  metoclopramide (REGLAN) injection 10 mg  10 mg IntraVENous Once Fadia Feliz, DO        acetaminophen (TYLENOL) tablet 975 mg  975 mg Oral Once Fadia Feliz, DO        ondansetron (ZOFRAN) injection 4 mg  4 mg IntraVENous Q6H PRN Fadia Feliz, DO        ceFAZolin (ANCEF) 2000 mg in 0.9% sodium chloride 50 mL IVPB  2,000 mg IntraVENous Once Fadia Feliz, DO           Allergies: Allergies   Allergen Reactions    Gabapentin Hives     And mood change       Problem List:    Patient Active Problem List   Diagnosis Code    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 1XX0    Morbid obesity ([address] E66.01    Breech birth, not applicable or unspecified fetus O27. 1XX0    Preeclampsia in postpartum period O14.95    Abdominal pain R10.9    Headache R51.9    Pregnancy, complicated U63.27    Delivery of pregnancy by  section O82       Past Medical History:        Diagnosis Date    COVID-19     Heart abnormality     Hypertension     pre-e with last pregnancy    Ovarian cyst     Scoliosis        Past Surgical History:        Procedure Laterality Date    ADENOIDECTOMY      BACK SURGERY  2006 and 2018     SECTION N/A 2020     SECTION performed by Alvin Ibrahim MD at [facility] [address] [place] [address] Bilateral 2022    medial branch blocks at bilateral L4-L5 and L5-S1 performed by Fidel Moran DO at [facility] Bilateral 2022    B/L lumbar medial branch block at L4-5 and L5-S1 performed by Fidel Moran DO at [facility] Right 2022    : Bilateral lumbar RFA at L4-5 and L5-S1, RIGHT side first performed by Fidel Moran DO at [facility] Left 2022    left lumbar RFA at L4-5 and L5-S1, performed by [facility] Ren[facility] at [facility]          Social History:    Social History     Tobacco Use    Smoking status: Former     Years: 4.00     Types: Cigarettes     Quit date: 12/10/2015     Years since quittin.2    Smokeless tobacco: Never   Substance Use Topics    Alcohol use: Not Currently     Comment: socially                                Counseling given: Not Answered      Vital Signs (Current): There were no vitals filed for this visit. BP Readings from Last 3 Encounters:   23 (!) 149/79   23 118/60   23 133/76       NPO Status:                                                                                 BMI:   Wt Readings from Last 3 Encounters:   23 259 lb (117.5 kg)   23 259 lb (117.5 kg)   23 257 lb (116.6 kg)     There is no height or weight on file to calculate BMI.    CBC:   Lab Results   Component Value Date/Time    WBC 10.3 2023 08:20 PM    RBC 4.00 2023 08:20 PM    RBC 4.76 2011 03:10 PM    HGB 12.1 2023 08:20 PM    HCT 36.2 2023 08:20 PM    MCV 90.5 2023 08:20 PM    RDW 12.5 2016 08:45 AM     2023 08:20 PM       CMP:   Lab Results   Component Value Date/Time     2023 08:20 PM    K 3.8 2023 08:20 PM    K 3.4 10/13/2022 06:00 PM     2023 08:20 PM    CO2 25 2023 08:20 PM    BUN 10 2023 08:20 PM    CREATININE 0.5 2023 08:20 PM    LABGLOM >60 2023 08:20 PM    GLUCOSE 80 2023 08:20 PM    GLUCOSE 95 2016 08:45 AM    PROT 7.0 2023 08:20 PM    CALCIUM 9.2 2023 08:20 PM    BILITOT <0.2 2023 08:20 PM    ALKPHOS 97 2023 08:20 PM    AST 10 2023 08:20 PM    ALT 9 2023 08:20 PM       POC Tests: No results for input(s): POCGLU, POCNA, POCK, POCCL, POCBUN, POCHEMO, POCHCT in the last 72 hours.     Coags: No results found for: PROTIME, INR, APTT    HCG (If Applicable):   Lab Results   Component Value Date    PREGTESTUR negative 06/14/2022    PREGSERUM NEGATIVE 11/15/2021        ABGs: No results found for: PHART, PO2ART, GBC0HBJ, TGJ5QCG, BEART, I6GBJTTO     Type & Screen (If Applicable):  Lab Results   Component Value Date    LABRH POS 10/18/2022       Drug/Infectious Status (If Applicable):  Lab Results   Component Value Date/Time    HEPCAB Negative 10/18/2022 11:05 AM       COVID-19 Screening (If Applicable):   Lab Results   Component Value Date/Time    COVID19 NOT DETECTED 03/05/2023 12:30 AM         Anesthesia Evaluation    Airway: Mallampati: II  TM distance: >3 FB   Neck ROM: full  Mouth opening: > = 3 FB   Dental: normal exam         Pulmonary:normal exam              Patient did not smoke on day of surgery. Cardiovascular:  Exercise tolerance: good (>4 METS),   (+) hypertension:,                   Neuro/Psych:   (+) headaches:,             GI/Hepatic/Renal:   (+) morbid obesity          Endo/Other:              Pt had no PAT visit       Abdominal:   (+) obese,           Vascular: negative vascular ROS. Other Findings:             Anesthesia Plan      general     ASA 2     (Patient presents with malpresentation with ruptured membranes. History significant for major spinal fusion.)  Induction: rapid sequence and intravenous. MIPS: Postoperative opioids intended and Prophylactic antiemetics administered. Anesthetic plan and risks discussed with patient. Plan discussed with attending.                     FIORDALIZA Hodge - AKANKSHA   3/11/2023

## 2023-03-12 NOTE — PLAN OF CARE
Problem: Pain  Goal: Verbalizes/displays adequate comfort level or baseline comfort level  Outcome: Progressing  Flowsheets (Taken 3/11/2023 2021)  Verbalizes/displays adequate comfort level or baseline comfort level:   Encourage patient to monitor pain and request assistance   Administer analgesics based on type and severity of pain and evaluate response   Consider cultural and social influences on pain and pain management   Assess pain using appropriate pain scale   Implement non-pharmacological measures as appropriate and evaluate response     Problem: Vaginal Birth or  Section  Goal: Fetal and maternal status remain reassuring during the birth process  Description:  Birth OB-Pregnancy care plan goal which identifies if the fetal and maternal status remain reassuring during the birth process  Outcome: Progressing  Flowsheets (Taken 3/11/2023 2021)  Fetal and Maternal Status Remain Reassuring During the Birth Process:   Monitor vital signs   Monitor fetal heart rate   Monitor uterine activity     Problem: Infection - Adult  Goal: Absence of infection during hospitalization  Outcome: Progressing  Flowsheets (Taken 3/11/2023 2021)  Absence of infection during hospitalization:   Monitor lab/diagnostic results   Administer medications as ordered   Instruct and encourage patient and family to use good hand hygiene technique     Problem: Safety - Adult  Goal: Free from fall injury  Outcome: Progressing  Flowsheets (Taken 3/11/2023 2021)  Free From Fall Injury: Instruct family/caregiver on patient safety     Problem: Discharge Planning  Goal: Discharge to home or other facility with appropriate resources  Outcome: Progressing  Flowsheets (Taken 3/11/2023 2021)  Discharge to home or other facility with appropriate resources:   Identify barriers to discharge with patient and caregiver   Identify discharge learning needs (meds, wound care, etc)     Problem: Chronic Conditions and Co-morbidities  Goal: Patient's chronic conditions and co-morbidity symptoms are monitored and maintained or improved  Outcome: Progressing  Flowsheets (Taken 3/11/2023 2021)  Care Plan - Patient's Chronic Conditions and Co-Morbidity Symptoms are Monitored and Maintained or Improved: Monitor and assess patient's chronic conditions and comorbid symptoms for stability, deterioration, or improvement  Note: Monitor pt BP's and administer medication to decrease intensity of headache      Care plan reviewed with patient and Remington. Patient and Marc Ferroing verbalize understanding of the plan of care and contribute to goal setting.

## 2023-03-12 NOTE — FLOWSHEET NOTE
Surgery call and notified of c.section for pre-e. Pt initially scheduled for march 24 with dr. ABIEL hirsch. Per surgery they can move forward with c. Section

## 2023-03-12 NOTE — FLOWSHEET NOTE
Dr Bowen Shaw called unit updated on pts request for a different muscle relaxant and to have magnesium stopped. Informed MD pt was on a muscle relaxant prior to pregnancy but pt is ensure of what it's called. Pt to have family bring RX in and it could be ordered. RN to continue magnesium since pt had to have with previous pregnancy.

## 2023-03-12 NOTE — FLOWSHEET NOTE
[id number] XIFIN paged to see if pt can get dilaudid for pain. Pt has had 2 doses fentanyl, 1 dose morphine and nothing has helped.

## 2023-03-12 NOTE — PLAN OF CARE
Problem: Postpartum  Goal: Experiences normal postpartum course  Description:  Postpartum OB-Pregnancy care plan goal which identifies if the mother is experiencing a normal postpartum course  3/12/2023 0707 by Hannah Villarreal RN  Outcome: Progressing  3/12/2023 0512 by Hannah Villarreal RN  Outcome: Progressing  Goal: Appropriate maternal -  bonding  Description:  Postpartum OB-Pregnancy care plan goal which identifies if the mother and  are bonding appropriately  3/12/2023 0707 by Hannah Villarreal RN  Outcome: Progressing  3/12/2023 0512 by Hannah Villarreal RN  Outcome: Progressing  Goal: Establishment of infant feeding pattern  Description:  Postpartum OB-Pregnancy care plan goal which identifies if the mother is establishing a feeding pattern with their   3/12/2023 0707 by Hannah Villarreal RN  Outcome: Progressing  3/12/2023 0512 by Hannah Villarreal RN  Outcome: Progressing  Goal: Incisions, wounds, or drain sites healing without S/S of infection  3/12/2023 0707 by Hannah Villarreal RN  Outcome: Progressing  3/12/2023 0512 by Hannah Villarreal RN  Outcome: Progressing     Problem: Infection - Adult  Goal: Absence of infection during hospitalization  3/12/2023 0707 by Hannah Villarreal RN  Outcome: Progressing  3/12/2023 0511 by Hannah Villarreal RN  Outcome: Progressing  Flowsheets  Taken 3/12/2023 0511  Absence of infection during hospitalization:   Assess and monitor for signs and symptoms of infection   Monitor lab/diagnostic results   Monitor all insertion sites i.e., indwelling lines, tubes and drains   Administer medications as ordered   Instruct and encourage patient and family to use good hand hygiene technique  Taken 3/11/2023 2021  Absence of infection during hospitalization:   Monitor lab/diagnostic results   Administer medications as ordered   Instruct and encourage patient and family to use good hand hygiene technique     Problem: Safety - Adult  Goal: Free from fall  injury  3/12/2023 0707 by Elroy Maza RN  Outcome: Progressing  3/12/2023 0511 by Elroy Maza RN  Outcome: Progressing  Flowsheets  Taken 3/12/2023 0511  Free From Fall Injury: Instruct family/caregiver on patient safety  Taken 3/11/2023 2021  Free From Fall Injury: Instruct family/caregiver on patient safety     Problem: Discharge Planning  Goal: Discharge to home or other facility with appropriate resources  3/12/2023 0707 by Elroy Maza RN  Outcome: Progressing  3/12/2023 0511 by Elroy Maza RN  Outcome: Progressing  Flowsheets  Taken 3/12/2023 0511  Discharge to home or other facility with appropriate resources:   Identify barriers to discharge with patient and caregiver   Identify discharge learning needs (meds, wound care, etc)  Taken 3/11/2023 2021  Discharge to home or other facility with appropriate resources:   Identify barriers to discharge with patient and caregiver   Identify discharge learning needs (meds, wound care, etc)     Problem: Chronic Conditions and Co-morbidities  Goal: Patient's chronic conditions and co-morbidity symptoms are monitored and maintained or improved  3/12/2023 0707 by Elroy Maza RN  Outcome: Progressing  3/12/2023 0511 by Elroy Maza RN  Outcome: Progressing  Flowsheets  Taken 3/12/2023 [address] [place] [address] - Patient's Chronic Conditions and Co-Morbidity Symptoms are Monitored and Maintained or Improved:   Monitor and assess patient's chronic conditions and comorbid symptoms for stability, deterioration, or improvement   Collaborate with multidisciplinary team to address chronic and comorbid conditions and prevent exacerbation or deterioration  Taken 3/11/2023 [address] [place] [address] - Patient's Chronic Conditions and Co-Morbidity Symptoms are Monitored and Maintained or Improved: Monitor and assess patient's chronic conditions and comorbid symptoms for stability, deterioration, or improvement  Note: Continue to monitor blood pressures.  Iv magnesium sulfate infusing for neuro protection    Care plan reviewed with patient and Remington. Patient and Yared Snow verbalize understanding of the plan of care and contribute to goal setting.

## 2023-03-12 NOTE — FLOWSHEET NOTE
Roselia Rodriguez CRNA at bedside to discuss anesthesia. Risks discussed. Consent form signed. Scd on.

## 2023-03-12 NOTE — FLOWSHEET NOTE
Pt informed of what MD told RN. Pt will see if her significant other can bring her bottle of medication in. RN offered to assist pt back to bed. Pt requests to stay sitting up in bedside chair. Pt denies needs at this time.

## 2023-03-12 NOTE — DISCHARGE INSTRUCTIONS
After Your  Delivery Discharge Instructions      After Discharge Orders:  No future appointments. [unfilled]        Call the Physician with any  signs and symptoms:    Warning signs regarding incision:  \"Popping\" of stitches or staples  Foul smelling discharge or pus  More redness or streaks around incision than before    Incision care:  Keep incision dry and covered (if necessary)  No tub baths until OK'd by your Physician or Midwife  You may use cold compresses on incision site  Wash the incision daily with Hibiclens until the bottle is gone. After your delivery - signs and symptoms to watch for:  Fever - Oral temperature greater than 100.4 degrees Fahrenheit  Foul-smelling vaginal discharge  Headache unrelieved by \"pain meds\"  Difficulty urinating  Breasts reddened, hard, hot to the touch  Nipple discharge which is foul-smelling or contains pus  Increased pain at the site of the surgical incision  Difficulty breathing with or without chest pain  New calf pain especially if only on one side  Sudden, continuing increased vaginal bleeding with or without clots  Unrelieved feelings of:   Inability to cope  Sadness  Anxiety  Lack of interest in baby  Insomnia  Crying     What to do at home:  See patient education handouts for full information  Resume activity gradually   Don't lift anything heavier than baby and carrier until OK'd by your Physician or Midwife  No sex until OK'd by your Physician or Midwife  Take care of yourself by sleeping/resting as much as possible  Eat regular nutritious meals  Let someone else care for you, your baby, and housework as much as possible   Take pain medication as prescribed whenever you need them  Wear compression stockings if prescribed   To avoid/relieve constipation take stool softeners if advised   Drink lots of water/fruit juices  Increase fiber in your diet  Breast care: Wear support bra ; use lanolin ointment/cream as needed  Do not drive until you are not taking narcotics (Percocet or Vicodin) and you can brake comfortably. Return to Office in 1 week. Discharge instructions reveiwed. Andrew Contreras DO 3/11/2023 11:21 PM       Postpartum Discharge Instructions      DIET  Eat a well balanced diet focusing on foods high in fiber and protein. Drink plenty of fluids especially water. To avoid constipation you may take a mild stool softener as recommended by your doctor . ACTIVITY  Gradually increase your activity. Resume exercise regimen only after advise by your doctor. Avoid lifting anything heavier than your baby or a gallon of milk for 2 weeks. Avoid driving until your doctor or midwife has given their approval.    Marycarmen Rom slowly from a lying to sitting and then a standing position. Climb stairs one at a time. Use caution when carrying your baby up and down the stairs. NO SEXUAL Activity for 4-6 weeks or until advised by your doctor; Nothing in vagina: intercourse, tampons, or douching. Be prepared to discuss family planning at your follow-up OB visit. You may feel tired or have a lack of energy. You may continue your prenatal vitamin to replenish nutrients post delivery. Nap when baby naps to catch up on sleep. EMOTIONS  You may feed schmidt, sad, teary, & overwhelmed. Contact your OB provider if you feel you may be showing signs of postpartum depression, or have thoughts of harming yourself or your infant. If infant will not stop crying, contact another adult for help or place infant in their crib on their back and take a break. NEVER shake your infant. BLEEDING  Vaginal bleeding will decrease in amount over the next few weeks. You will notice that as your activity increases, your flow may increase. This is your body's way of telling you, you need take things easier and rest more often. Call your OB/ER if you are saturating more than one maxi pad in an hour & resting does help.     BREAST CARE  Take medications as recommended by your doctor or midwife for pain    If you develop a warm, red, tender area on your breast or develop a fever contact your OB provider.     For breastfeeding moms:  If you become engorged, feeding may be more difficult or painful for 1-2 days. You may find it helpful to hand express some milk so that the infant can latch on more easily.     While breastfeeding, continue to take your prenatal vitamins as directed by your doctor or midwife.     Refer to the breastfeeding booklet in the  folder/binder for more information.    For any questions or concerns contact a Lactation Consultant. Leave a message and your call will be returned.     For NON-breastfeeding moms:  You may apply ice packs to your breasts over you bra for twenty minutes at a time for comfort.    Avoid stimulation to your breasts, when showering allow the water to strike your back not your breasts.     Wear a good fitting bra until your milk dries, such as a sports bra.      INCISIONAL CARE           Clean your incision in the shower with Chlorhexidine solution soap.Do not use on your face or vaginal area. After shower pat the incision are dry and allow the area open to air.    If used, Steri-strips should be removed by 2 weeks.    If used, Staples should be removed by the OB office by 1 week.    If used/ordered, an abdominal binder may provide support for your incision.   If a silver dressing is used, it will be removed in the office at your one week appointment. Remove the dressing at home if it swells up like a wet diaper. Remove after 7 days from surgery.    PERINEAL  CARE  Use the fortunato-bottle after toileting until bleeding stops.    Cleanse your perineum from front to back    If used, stitches will dissolve in 4-6 weeks.    You may use a sitz bath or soak in a clean tub as needed for comfort.    Kegel exercises will help restore bladder control.     SWELLING  Try to keep your legs elevated when you are sitting.  When lying down keep your legs elevated. When wearing stocking or socks, make sure they are not too tight. WHEN TO CALL THE DOCTOR  If you have a temp of 100.4 or more. If your bleeding has increased and you are saturating a pad in an hour. Your abdomen is tender to touch. You are passing blood clots bigger than the size of a lemon. If you are experiencing extreme weakness or dizziness. If you are having flu-like symptoms such as achy muscles or joints. There is a foul smell or a green color to your vaginal bleeding. If you have pain that cannot be relieved with Tylenol or Motrin. You have persistent burning with urination or frequency. Call if you have concerns about your well-being. You are unable to sleep, eat, or are having thoughts of harming yourself or your baby. Call you OB provider if your depression score is greater than 10. You have swelling, bleeding, drainage, foul odor, redness, or warmth in/around your incision or stitches. You have a red, warm, tender area in you calf. Please contact your OB physician right way. Please refer to your \"Guide for New Mothers \" Chery Nelson for  further information of caring for yourself & your baby. Follow-up with your [facility] doctor as specified. For Breastfeeding moms, you can contact our lactation specialists with  any problems or questions you may have. Contact our Lactation Consultants at [phone]. Please feel free to leave a message and they will return your call.

## 2023-03-12 NOTE — FLOWSHEET NOTE
Attempted to put pts head of bed down to reposition her in bed. Pt states her shoulder/neck/ribs start to spasm, HOB rolled back up. RN offered pt to rest in chair since she tolerated sitting up in the chair in [facility]. Pt agreeable. Pt assisted up to bedside chair, positioned for comfort. Ice pack to right shoulder/neck region. Pt denies further needs.

## 2023-03-12 NOTE — PROGRESS NOTES
2310- pt to recovery, resp easy and unlabored, VSS, pt alert and oriented, pt medicated for pain per CRNA  2315- fentanyl given for pain  2320- fentanyl given for pain  2330- pt talking with pacu nurse, resp easy and unlabored, VSS, pt appears in no acute distress  2340- pt meets criteria for discharge from pacu, report given to [facility]

## 2023-03-12 NOTE — FLOWSHEET NOTE
Dr. Thi Barrera paged to see if pt can have toradol due to severe pain. BP's stable. Per Dr. Thi Barrera ok to give 30/30 toradol.

## 2023-03-12 NOTE — FLOWSHEET NOTE
Dr Glynn Brush returned page informed of pts request for a muscle relaxer for the spasms in her right shoulder.  Order received

## 2023-03-12 NOTE — H&P
[facility]  History and Physical Update    Pt Name: Jenna Tabor  MRN: [id number]  YOB: 1995  Date of evaluation: 3/11/2023    [] I have examined the patient and reviewed the H&P/Consult and there are no changes to the patient or plans. [x] I have examined the patient and reviewed the H&P/Consult and have noted the following changes:   26yo  @35w0d presented to L&D complaining of headache 7/10 that did not resolve with Tylenol. BPs here have been 140s-150s/70s-90s since being here. Headache has not improved with Tylenol, Reglan, Benadryl. She denies vision changes, CP, SOB, RUQ pain. Pt was admitted last week for elevated BPs and received a course of steroids. First  was under general anesthesia due to pt's hx of extensive back surgeries and spinal fusion. She has talked to Dr. Alicia Aquino and the plan is to go under general anesthesia this time as well. R/B/A of  discussed with patient. All questions answered and consent signed. Will plan on 24hrs magnesium sulfate postpartum due to pre-e with severe features. Discussion with the patient and/ or family for proposed care, treatment, services; benefits, risks, side effects; likelihood of achieving goals and potential problems that may occur during recuperation was had and all questions were answered. Discussion with the patient and/ or family of reasonable alternatives to the proposed care, treatment, services and the discussion of the risks, benefits, side effects related to the alternatives and the risk related to not receiving the proposed care treatment services was also had and all questions were answered. If this is for an elective surgical procedure then The patient was counseled at length about the risks of jasper Covid-19 during their perioperative period and any recovery window from their procedure.   The patient was made aware that jasper Covid-19  may worsen their prognosis for recovering from their procedure  and lend to a higher morbidity and/or mortality risk. All material risks, benefits, and reasonable alternatives including postponing the procedure were discussed. The patient  does wish to proceed with the procedure at this time.              Anna Elena DO  Electronically signed 3/11/2023 at 9:49 PM

## 2023-03-12 NOTE — PLAN OF CARE
Problem: Postpartum  Goal: Experiences normal postpartum course  Description:  Postpartum OB-Pregnancy care plan goal which identifies if the mother is experiencing a normal postpartum course  3/12/2023 1925 by Yaw López RN  Outcome: Progressing  Flowsheets (Taken 3/12/2023 0830)  Experiences Normal Postpartum Course:   Monitor maternal vital signs   Assess uterine involution     Problem: Postpartum  Goal: Appropriate maternal -  bonding  Description:  Postpartum OB-Pregnancy care plan goal which identifies if the mother and  are bonding appropriately  3/12/2023 1925 by Yaw López RN  Outcome: Progressing  Note: Pt asks questions about  son     Problem: Postpartum  Goal: Establishment of infant feeding pattern  Description:  Postpartum OB-Pregnancy care plan goal which identifies if the mother is establishing a feeding pattern with their   3/12/2023 1925 by Yaw López RN  Outcome: Progressing  Flowsheets (Taken 3/12/2023 0830)  Establishment of Infant Feeding Pattern: Assess breast/bottle feeding     Problem: Postpartum  Goal: Incisions, wounds, or drain sites healing without S/S of infection  3/12/2023 1925 by Yaw López RN  Outcome: Progressing  Flowsheets (Taken 3/12/2023 0830)  Incisions, Wounds, or Drain Sites Healing Without Sign and Symptoms of Infection:   TWICE DAILY: Assess and document skin integrity   Implement wound care per orders     Problem: Infection - Adult  Goal: Absence of infection during hospitalization  3/12/2023 1925 by Yaw López RN  Outcome: Progressing  Flowsheets (Taken 3/12/2023 0830)  Absence of infection during hospitalization:   Assess and monitor for signs and symptoms of infection   Identify and instruct in appropriate isolation precautions for identified infection/condition   Instruct and encourage patient and family to use good hand hygiene technique   Administer medications as ordered     Problem: Safety - Adult  Goal: Free from fall injury  3/12/2023 1925 by Marce Hoover RN  Outcome: Progressing  Flowsheets (Taken 3/12/2023 0830)  Free From Fall Injury:   Instruct family/caregiver on patient safety   Based on caregiver fall risk screen, instruct family/caregiver to ask for assistance with transferring infant if caregiver noted to have fall risk factors     Problem: Discharge Planning  Goal: Discharge to home or other facility with appropriate resources  3/12/2023 1925 by Marce Hoover RN  Outcome: Progressing  Flowsheets (Taken 3/12/2023 0830)  Discharge to home or other facility with appropriate resources: Refer to discharge planning if patient needs post-hospital services based on physician order or complex needs related to functional status, cognitive ability or social support system     Problem: Chronic Conditions and Co-morbidities  Goal: Patient's chronic conditions and co-morbidity symptoms are monitored and maintained or improved  3/12/2023 1925 by Marce Hoover RN  Outcome: Progressing  Flowsheets (Taken 3/12/2023 0830)  Care Plan - Patient's Chronic Conditions and Co-Morbidity Symptoms are Monitored and Maintained or Improved: Update acute care plan with appropriate goals if chronic or comorbid symptoms are exacerbated and prevent overall improvement and discharge  Note: Pt on magnesium monitor BP's and I&O       Care plan reviewed with patient and her significant other Remington. Patient and Delaney Gonzalez verbalize understanding of the plan of care and contribute to goal setting.

## 2023-03-12 NOTE — FLOWSHEET NOTE
Pt sitting up in chair states her pain is unbearable, it's the worst pain ever. IV toradol given at 1454. Pt wanting a different muscle relaxer and asks if magnesium can't be stopped. Informed pt that she does have other IV pain meds that she can have, if she wants them to let RN know. Pt verbalizes understanding.

## 2023-03-12 NOTE — FLOWSHEET NOTE
RN went to check on pt seeing her son in [place]. Pt states her pain is doing good, but is ready to ready to return to her room and rest. Pt requesting ambulate to room. Pt allowed to  front of recliner at first to make sure she was ok then walked to SCN door continued to ambulate to her room with FOB following with the wheelchair. Pt tolerated well and states she feels good. Pt positioned for comfort and severed awaiting lunch. Denies further needs at this time.

## 2023-03-12 NOTE — FLOWSHEET NOTE
RN offers oral benadryl and reglan. Pt states oral didn't really help last time but would be willing to try it IV if someone could get an IV on her. Pt states she is a hard stick.  Dr. Corrine Roberts updated

## 2023-03-12 NOTE — FLOWSHEET NOTE
Dr. Molina Castorland called to see if pt can have dilaudid.  Orders received for 0.5mg dilaudid once

## 2023-03-12 NOTE — FLOWSHEET NOTE
Pt of Dr. Bhupinder Winter, , 35w arrived to unit with complaints of a headache since 1pm that has progressively worsened throughout the day. Pt rating pain 7/10. Pt states that she took 1,000mg Tylenol around 1pm and has had no relief. Pt states that her BP's were 150/100's at home. Pt denies blurry vision, floaters, epigastric pain, nausea and vomiting. Pt was seen at her OB appointment yesterday and states BP was ok and NST was  reactive. Pt reports that she has had both doses of steroid injections last weekend when she was here. Pt denies ctx, leaking of fluid, and vaginal bleeding. Pt reports positive fetal movement. Pt oriented to room and instructed to change into hospital gown and collect urine sample if needed to send down to lab.

## 2023-03-12 NOTE — FLOWSHEET NOTE
RN back to SCN to check on pt, pt noted to be smiling and holding baby leaning towards FOB to show him the infant. Appears very comfortable at this time.

## 2023-03-12 NOTE — FLOWSHEET NOTE
Dr Castano Fraction updated on pt ambulating from [facility] and tolerated well. RN to continue with POC and try to use PO pain meds instead of using IV pain meds.

## 2023-03-12 NOTE — FLOWSHEET NOTE
Dr. Soledad Carl phones unit. Updated that pt of Dr. Missy Luciano, , 35 weeks arrived to unit with complaints of headache and elevated BP's at home. 1000mg Tylenol at 1pm with no relief. Has fioricet at home but did not take due to drinking a coffee. Pt states /100's at home per pt. Pt denies blurry vision, epigastric pain. BP's on admission 149/92 and 154/92. FHT minimal variability with occasional accel, no ctx. Pt scheduled for c. Section . Pt labs done on Thursday with pcr 0.25, CBC and CMP WNL. 24hr protein done last weekend at 302. Pt has had both doses of steroid injections. Orders received.

## 2023-03-12 NOTE — FLOWSHEET NOTE
Pericare given scant amount of vaginal bleeding noted, pad changed.  Abdominal binder removed per pts request.

## 2023-03-12 NOTE — FLOWSHEET NOTE
Dr. Sage Mora notified that pt still got no relief with 2nd dose of dilaudid. Dr. Sage Moar wants RN to ask Dr. Sherly Ko if pt can have toradol. Orders received  from Dr. Sage Mora to give an additional 0.5mg dilaudid.

## 2023-03-12 NOTE — ANESTHESIA POSTPROCEDURE EVALUATION
Department of Anesthesiology  Postprocedure Note    Patient: Kaleigh Perez  MRN: [id number]  YOB: 1995  Date of evaluation: 3/12/2023      Procedure Summary     Date: 23 Room / Location: [address]    Anesthesia Start:  Anesthesia Stop:     Procedure:  SECTION (Uterus) Diagnosis:       Pre-eclampsia, antepartum      (Pre-eclampsia, antepartum [O14.90])    Surgeons: Kerline Lee DO Responsible Provider: Edgardo Blancas MD    Anesthesia Type: general ASA Status: 2          Anesthesia Type: No value filed.     Austin Phase I: Austin Score: 10    Austin Phase II: Austin Score: 10      Anesthesia Post Evaluation    Patient location during evaluation: bedside  Patient participation: complete - patient participated  Level of consciousness: awake  Airway patency: patent  Nausea & Vomiting: no vomiting and no nausea  Cardiovascular status: hemodynamically stable  Respiratory status: acceptable  Hydration status: stable

## 2023-03-12 NOTE — FLOWSHEET NOTE
Dr. Maude Guerin phones unit. Updated that pt headache has no resolved. RN discusses option for c. Section delivery due to pre-e and headache not being resolved. Labs reviewed with Dr. Maude Guerin. FHT reactive, no ctx. C. Section orders received. Call surgery to get time.

## 2023-03-12 NOTE — PROGRESS NOTES
Progress note    Subjective:     Postoperative Day 1:  Delivery    Pain is poorly controlled this morning. She is complaining of abdominal pain and musculoskeletal pain in her shoulder. Felipe is in place with good urine output. She is currently on magnesium sulfate for seizure ppx. BPs have mostly been normal since delivery. She denies HA, vision changes, CP, SOB this AM. The patient is not yet ambulating. The patient is tolerating a normal diet. Objective:     Vitals:    23 1045   BP: 130/69   Pulse: 75   Resp: 16   Temp:    SpO2: 100%         In: 397.7 [I.V.:397.7]  Out: 675 [Urine:675]        General:    alert, appears stated age, and cooperative   Uterine Fundus:   Firm, abdomen soft and appropriately tender   Incision:  coveredhealing well, no significant drainage, no dehiscence        CBC   Lab Results   Component Value Date    HGB 11.2 (L) 2023        Assessment:     Status post  section. Poor pain control today. Pre-Eclampsia with severe features    Plan:     Continue magnesium sulfate for 24hrs. Can d/c around midnight tonight. Continue to monitor BPs  Will add Flexeril to see if this helps with pain control.   Continue routine postpartum care      Edna Feliz DO 3/12/2023 11:11 AM

## 2023-03-12 NOTE — FLOWSHEET NOTE
Dr. Edmonds Parents phones unit. Updated on pt status. Having a hard time controlling pain. Pt received fetanyl, morphine, dilaudid, toradol, tylenol, and oxycodone for pain relief. Pt still rating pain 8/10. Pt does have a cough from sinus infection which could be contributing to pain in abdomen. VSS. Orders received.

## 2023-03-12 NOTE — PLAN OF CARE
Problem: Postpartum  Goal: Experiences normal postpartum course  Description:  Postpartum OB-Pregnancy care plan goal which identifies if the mother is experiencing a normal postpartum course  Outcome: Progressing  Goal: Appropriate maternal -  bonding  Description:  Postpartum OB-Pregnancy care plan goal which identifies if the mother and  are bonding appropriately  Outcome: Progressing  Goal: Establishment of infant feeding pattern  Description:  Postpartum OB-Pregnancy care plan goal which identifies if the mother is establishing a feeding pattern with their   Outcome: Progressing  Goal: Incisions, wounds, or drain sites healing without S/S of infection  Outcome: Progressing     Problem: Infection - Adult  Goal: Absence of infection during hospitalization  Outcome: Progressing  Flowsheets  Taken 3/12/2023 0511  Absence of infection during hospitalization:   Assess and monitor for signs and symptoms of infection   Monitor lab/diagnostic results   Monitor all insertion sites i.e., indwelling lines, tubes and drains   Administer medications as ordered   Instruct and encourage patient and family to use good hand hygiene technique  Taken 3/11/2023 2021  Absence of infection during hospitalization:   Monitor lab/diagnostic results   Administer medications as ordered   Instruct and encourage patient and family to use good hand hygiene technique     Problem: Safety - Adult  Goal: Free from fall injury  Outcome: Progressing  Flowsheets  Taken 3/12/2023 0511  Free From Fall Injury: Instruct family/caregiver on patient safety  Taken 3/11/2023 2021  Free From Fall Injury: Instruct family/caregiver on patient safety     Problem: Discharge Planning  Goal: Discharge to home or other facility with appropriate resources  Outcome: Progressing  Flowsheets  Taken 3/12/2023 0511  Discharge to home or other facility with appropriate resources:   Identify barriers to discharge with patient and caregiver Identify discharge learning needs (meds, wound care, etc)  Taken 3/11/2023 2021  Discharge to home or other facility with appropriate resources:   Identify barriers to discharge with patient and caregiver   Identify discharge learning needs (meds, wound care, etc)     Problem: Chronic Conditions and Co-morbidities  Goal: Patient's chronic conditions and co-morbidity symptoms are monitored and maintained or improved  Outcome: Progressing  Flowsheets  Taken 3/12/2023 [facility] - Patient's Chronic Conditions and Co-Morbidity Symptoms are Monitored and Maintained or Improved:   Monitor and assess patient's chronic conditions and comorbid symptoms for stability, deterioration, or improvement   Collaborate with multidisciplinary team to address chronic and comorbid conditions and prevent exacerbation or deterioration  Taken 3/11/2023 2021  Care Plan - Patient's Chronic Conditions and Co-Morbidity Symptoms are Monitored and Maintained or Improved: Monitor and assess patient's chronic conditions and comorbid symptoms for stability, deterioration, or improvement  Note: Continue to monitor blood pressures. Iv magnesium sulfate infusing for neuro protection    Care plan reviewed with patient and Remington. Patient and Excell Lizzyy verbalize understanding of the plan of care and contribute to goal setting.

## 2023-03-12 NOTE — OP NOTE
Department of Obstetrics and Gynecology   Section Note        Pt Name: Rosemarie Drake  MRN: [id number] [place] #: [de-identified]  YOB: 1995      Indications: severe preeclampsia and previous low transverse uterine incision x1    Pre-operative Diagnosis: 35 week pregnancy, severe pre-eclampsia    Post-operative Diagnosis:  Same, Delivered, Living  Male    PMH:  Past Medical History:   Diagnosis Date    COVID-19     Heart abnormality     Hypertension     pre-e with last pregnancy    Ovarian cyst     Scoliosis        Procedure:  repeat low transverse  section    Procedure Performed By: Irian Kraft DO    Findings:  Normal tubes, ovaries and uterus with small subserosal fibroid on the left anterior surface of the uterus near the lower uterine segment, no significant hernia appreciated    Estimated Blood Loss:  800ml           Specimens: placenta sent to pathology       Complications:  None           Condition: infant stable to special care nursery and mother stable      Indications:     Rosemarie Drake is a 32 y.o. female  at 35w0d who presented for   section for  severe preeclampsia. She understood the  risks and benefits and signed informed consent. Procedure: The patient was taken to the Operating Room. The patient was placed on the operating table in the supine position with a left tilt of the hips. She was then prepped and draped in the usual sterile fashion. General anesthesia was established. A midline vertical incision was made over the previous vertical incision in the skin with a scalpel and carried out over subsequent layers of tissue including the fascia. The rectus muscles were then divided in the midline and the peritoneum was identified and entered bluntly. The peritoneal incision was extended using scissors. John-O retractor was placed within the peritoneal cavity.  The bladder blade was reinserted and a transverse incision was made in the lower uterine segment using the scalpel. The uterine incision was extended bilaterally using blunt dissection. The amniotic sac was entered and amniotic fluid was noted to be clear. The surgeon's hand was placed into the uterine cavity. The fetal head was identified, elevated into the abdomen and delivered through the uterine incision. No nuchal cord was identified. The infant was then delivered with traction and the assistance of fundal pressure. The cord was clamped and cut. The infant was then passed off the table to the nurse for further care. Cord blood and segment were obtained for routine testing. The placenta was manually extracted intact with a 3 vessel cord. The uterus was exteriorized and cleared of all clots and remaining products of conception. The uterus was noted to be very atonic after delivery of the placenta. Pt received one dose of Hemabate. Atony improved with Pitocin, Hemabate and uterine massage. The uterine incision was closed using 0 chromic suture in a running locked fashion. Non-hemostatic areas were reinforced using 0 chromic suture in figure-of-eight stitches. Good hemostasis was confirmed. The uterus was replaced in the abdomen. The pericolic gutters were cleared of all clots. Surgicell powder was sprayed over the hysterotomy. The peritoneum was reapproximated using running nonlocked 2-0 Vicryl. The fascia was reapproximated using 0-Vicryl suture in a running nonlocked fashion. The subcutaneous tissue was reapproximated using running nonlocked 3-0 Vicryl suture. The skin was closed using staples. All needle, sponge, and instrument counts were noted to be correct x2 at the end of procedure. The patient tolerated the procedure well and was transferred to the Recovery Room in a stable and satisfactory condition.          Bianca Licona DO 3/11/2023 11:22 PM

## 2023-03-12 NOTE — FLOWSHEET NOTE
Pt standing at side of bed. RN offered pt to freshen up before she is taken to [facility] to see  son. Moderate amount of dark red lochia noted running down pts leg to floor. Pericare given, no active bleeding noted, clean pad put in place. Pt states it feels good to stand up. RN allowed pt to stand at bedside for short time before assisting to wheelchair to go to [facility] to see her son. Pts FOB at bedside.

## 2023-03-12 NOTE — FLOWSHEET NOTE
Pt sitting up eating her breakfast. Denies needs at this time. States is in a lot of pain and will take her pain meds when they are due.

## 2023-03-13 LAB
BASOPHILS ABSOLUTE: 0 THOU/MM3 (ref 0–0.1)
BASOPHILS NFR BLD AUTO: 0.2 %
DEPRECATED RDW RBC AUTO: 45.8 FL (ref 35–45)
EOSINOPHIL NFR BLD AUTO: 0.6 %
EOSINOPHILS ABSOLUTE: 0.1 THOU/MM3 (ref 0–0.4)
ERYTHROCYTE [DISTWIDTH] IN BLOOD BY AUTOMATED COUNT: 13.6 % (ref 11.5–14.5)
HCT VFR BLD AUTO: 30.2 % (ref 37–47)
HGB BLD-MCNC: 10 GM/DL (ref 12–16)
IMM GRANULOCYTES # BLD AUTO: 0.12 THOU/MM3 (ref 0–0.07)
IMM GRANULOCYTES NFR BLD AUTO: 1 %
LYMPHOCYTES ABSOLUTE: 1.8 THOU/MM3 (ref 1–4.8)
LYMPHOCYTES NFR BLD AUTO: 14.4 %
MCH RBC QN AUTO: 30.8 PG (ref 26–33)
MCHC RBC AUTO-ENTMCNC: 33.1 GM/DL (ref 32.2–35.5)
MCV RBC AUTO: 92.9 FL (ref 81–99)
MONOCYTES ABSOLUTE: 0.9 THOU/MM3 (ref 0.4–1.3)
MONOCYTES NFR BLD AUTO: 7.2 %
NEUTROPHILS NFR BLD AUTO: 76.6 %
NRBC BLD AUTO-RTO: 0 /100 WBC
PLATELET # BLD AUTO: 239 THOU/MM3 (ref 130–400)
PMV BLD AUTO: 9.2 FL (ref 9.4–12.4)
RBC # BLD AUTO: 3.25 MILL/MM3 (ref 4.2–5.4)
SEGMENTED NEUTROPHILS ABSOLUTE COUNT: 9.4 THOU/MM3 (ref 1.8–7.7)
WBC # BLD AUTO: 12.3 THOU/MM3 (ref 4.8–10.8)

## 2023-03-13 PROCEDURE — 6360000002 HC RX W HCPCS: Performed by: STUDENT IN AN ORGANIZED HEALTH CARE EDUCATION/TRAINING PROGRAM

## 2023-03-13 PROCEDURE — 6370000000 HC RX 637 (ALT 250 FOR IP): Performed by: STUDENT IN AN ORGANIZED HEALTH CARE EDUCATION/TRAINING PROGRAM

## 2023-03-13 PROCEDURE — 1220000000 HC SEMI PRIVATE OB R&B

## 2023-03-13 PROCEDURE — 85025 COMPLETE CBC W/AUTO DIFF WBC: CPT

## 2023-03-13 PROCEDURE — 36415 COLL VENOUS BLD VENIPUNCTURE: CPT

## 2023-03-13 RX ORDER — MORPHINE SULFATE 2 MG/ML
2 INJECTION, SOLUTION INTRAMUSCULAR; INTRAVENOUS
Status: DISCONTINUED | OUTPATIENT
Start: 2023-03-13 | End: 2023-03-15 | Stop reason: HOSPADM

## 2023-03-13 RX ORDER — ONDANSETRON 4 MG/1
8 TABLET, ORALLY DISINTEGRATING ORAL EVERY 8 HOURS PRN
Status: DISCONTINUED | OUTPATIENT
Start: 2023-03-13 | End: 2023-03-15 | Stop reason: HOSPADM

## 2023-03-13 RX ORDER — ONDANSETRON 2 MG/ML
4 INJECTION INTRAMUSCULAR; INTRAVENOUS EVERY 6 HOURS PRN
Status: DISCONTINUED | OUTPATIENT
Start: 2023-03-13 | End: 2023-03-15 | Stop reason: HOSPADM

## 2023-03-13 RX ORDER — MODIFIED LANOLIN
OINTMENT (GRAM) TOPICAL
Status: DISCONTINUED | OUTPATIENT
Start: 2023-03-13 | End: 2023-03-15 | Stop reason: HOSPADM

## 2023-03-13 RX ORDER — DIAZEPAM 5 MG/1
5 TABLET ORAL EVERY 6 HOURS PRN
Status: DISCONTINUED | OUTPATIENT
Start: 2023-03-13 | End: 2023-03-15 | Stop reason: HOSPADM

## 2023-03-13 RX ORDER — MORPHINE SULFATE 4 MG/ML
4 INJECTION, SOLUTION INTRAMUSCULAR; INTRAVENOUS
Status: DISCONTINUED | OUTPATIENT
Start: 2023-03-13 | End: 2023-03-15 | Stop reason: HOSPADM

## 2023-03-13 RX ADMIN — METRONIDAZOLE 500 MG: 500 TABLET ORAL at 21:21

## 2023-03-13 RX ADMIN — CEPHALEXIN 500 MG: 500 CAPSULE ORAL at 14:31

## 2023-03-13 RX ADMIN — OXYCODONE 10 MG: 5 TABLET ORAL at 09:35

## 2023-03-13 RX ADMIN — DOCUSATE SODIUM 100 MG: 100 CAPSULE, LIQUID FILLED ORAL at 21:21

## 2023-03-13 RX ADMIN — OXYCODONE 10 MG: 5 TABLET ORAL at 18:58

## 2023-03-13 RX ADMIN — ACETAMINOPHEN 1000 MG: 500 TABLET ORAL at 14:30

## 2023-03-13 RX ADMIN — METRONIDAZOLE 500 MG: 500 TABLET ORAL at 06:23

## 2023-03-13 RX ADMIN — CEPHALEXIN 500 MG: 500 CAPSULE ORAL at 21:21

## 2023-03-13 RX ADMIN — DIAZEPAM 5 MG: 5 TABLET ORAL at 21:21

## 2023-03-13 RX ADMIN — DOCUSATE SODIUM 100 MG: 100 CAPSULE, LIQUID FILLED ORAL at 09:39

## 2023-03-13 RX ADMIN — OXYCODONE 10 MG: 5 TABLET ORAL at 14:31

## 2023-03-13 RX ADMIN — IBUPROFEN 800 MG: 800 TABLET, FILM COATED ORAL at 09:29

## 2023-03-13 RX ADMIN — METRONIDAZOLE 500 MG: 500 TABLET ORAL at 14:31

## 2023-03-13 RX ADMIN — OXYCODONE 10 MG: 5 TABLET ORAL at 23:14

## 2023-03-13 RX ADMIN — PRENATAL WITH FERROUS FUM AND FOLIC ACID 1 TABLET: 3080; 920; 120; 400; 22; 1.84; 3; 20; 10; 1; 12; 200; 27; 25; 2 TABLET ORAL at 11:37

## 2023-03-13 RX ADMIN — FLUTICASONE PROPIONATE 1 SPRAY: 50 SPRAY, METERED NASAL at 09:28

## 2023-03-13 RX ADMIN — ENOXAPARIN SODIUM 40 MG: 100 INJECTION SUBCUTANEOUS at 11:36

## 2023-03-13 RX ADMIN — CEPHALEXIN 500 MG: 500 CAPSULE ORAL at 06:23

## 2023-03-13 RX ADMIN — OXYCODONE 10 MG: 5 TABLET ORAL at 04:53

## 2023-03-13 RX ADMIN — IBUPROFEN 800 MG: 800 TABLET, FILM COATED ORAL at 18:00

## 2023-03-13 RX ADMIN — KETOROLAC TROMETHAMINE 30 MG: 30 INJECTION, SOLUTION INTRAMUSCULAR; INTRAVENOUS at 03:10

## 2023-03-13 RX ADMIN — ACETAMINOPHEN 1000 MG: 500 TABLET ORAL at 06:22

## 2023-03-13 ASSESSMENT — PAIN DESCRIPTION - LOCATION
LOCATION: ABDOMEN
LOCATION: ABDOMEN;INCISION
LOCATION: ABDOMEN;INCISION
LOCATION: ABDOMEN
LOCATION: ABDOMEN;INCISION
LOCATION: ABDOMEN;ARM;NECK
LOCATION: ABDOMEN;RIB CAGE

## 2023-03-13 ASSESSMENT — PAIN - FUNCTIONAL ASSESSMENT
PAIN_FUNCTIONAL_ASSESSMENT: ACTIVITIES ARE NOT PREVENTED

## 2023-03-13 ASSESSMENT — PAIN SCALES - GENERAL
PAINLEVEL_OUTOF10: 8
PAINLEVEL_OUTOF10: 7
PAINLEVEL_OUTOF10: 8
PAINLEVEL_OUTOF10: 7
PAINLEVEL_OUTOF10: 9
PAINLEVEL_OUTOF10: 8
PAINLEVEL_OUTOF10: 9

## 2023-03-13 ASSESSMENT — PAIN DESCRIPTION - DESCRIPTORS
DESCRIPTORS: DISCOMFORT;SORE
DESCRIPTORS: ACHING;SHARP;SPASM
DESCRIPTORS: DISCOMFORT;CRAMPING
DESCRIPTORS: DISCOMFORT;SORE
DESCRIPTORS: DISCOMFORT

## 2023-03-13 NOTE — FLOWSHEET NOTE
Dr. Giles Bonds returned call to unit. Updated on pt status, pt still rating pain 7-9/10, mostly c/o muscle spasms to upper back and shoulders. Orders received.

## 2023-03-13 NOTE — FLOWSHEET NOTE
Ice pack provided for pt's neck. Discussed medication ordered by Dr. Thi Barrera. Pt's boyfriend Todd Jerez is bringing Pt's personal medication bottle home with him when he leaves again shortly for safe keeping.

## 2023-03-13 NOTE — PLAN OF CARE
Problem: Pain  Goal: Verbalizes/displays adequate comfort level or baseline comfort level  Recent Flowsheet Documentation  Taken 3/13/2023 1020 by Dennis Sykes RN  Verbalizes/displays adequate comfort level or baseline comfort level:   Encourage patient to monitor pain and request assistance   Assess pain using appropriate pain scale     Problem: Infection - Adult  Goal: Absence of infection during hospitalization  Recent Flowsheet Documentation  Taken 3/13/2023 1020 by Dennis Sykes RN  Absence of infection during hospitalization:   Assess and monitor for signs and symptoms of infection   Monitor lab/diagnostic results     Problem: Safety - Adult  Goal: Free from fall injury  Recent Flowsheet Documentation  Taken 3/13/2023 1020 by Dennis Sykes RN  Free From Fall Injury: Instruct family/caregiver on patient safety     Problem: Discharge Planning  Goal: Discharge to home or other facility with appropriate resources  Recent Flowsheet Documentation  Taken 3/13/2023 1020 by Dennis Sykes RN  Discharge to home or other facility with appropriate resources: Identify barriers to discharge with patient and caregiver     Problem: Postpartum  Goal: Experiences normal postpartum course  Description:  Postpartum OB-Pregnancy care plan goal which identifies if the mother is experiencing a normal postpartum course  3/13/2023 1053 by Dennis Sykes RN  Outcome: Progressing  Flowsheets (Taken 3/13/2023 1020)  Experiences Normal Postpartum Course:   Monitor maternal vital signs   Assess uterine involution     Problem: Postpartum  Goal: Appropriate maternal -  bonding  Description:  Postpartum OB-Pregnancy care plan goal which identifies if the mother and  are bonding appropriately  3/13/2023 1053 by Dennis Sykes RN  Outcome: Progressing  Note: Bonding well with ifant in [facility], calls and will visit today     Problem: Postpartum  Goal: Establishment of infant feeding pattern  Description:  Postpartum OB-Pregnancy care plan goal which identifies if the mother is establishing a feeding pattern with their   3/13/2023 1053 by Chris Winter RN  Outcome: Progressing  Flowsheets (Taken 3/13/2023 1020)  Establishment of Infant Feeding Pattern:   Assess breast/bottle feeding   Refer to lactation as needed     Problem: Postpartum  Goal: Incisions, wounds, or drain sites healing without S/S of infection  3/13/2023 1053 by Chris Winter RN  Outcome: Progressing  Flowsheets  Taken 3/13/2023 1053  Incisions, Wounds, or Drain Sites Healing Without Sign and Symptoms of Infection: TWICE DAILY: Assess and document skin integrity  Taken 3/13/2023 1020  Incisions, Wounds, or Drain Sites Healing Without Sign and Symptoms of Infection: TWICE DAILY: Assess and document skin integrity     Problem: Infection - Adult  Goal: Absence of infection during hospitalization  3/13/2023 1053 by Chris Winter RN  Outcome: Progressing  Flowsheets (Taken 3/13/2023 1020)  Absence of infection during hospitalization:   Assess and monitor for signs and symptoms of infection   Monitor lab/diagnostic results     Problem: Safety - Adult  Goal: Free from fall injury  3/13/2023 1053 by Chris Winter RN  Outcome: Progressing  [address] (Taken 3/13/2023 1020)  Free From Fall Injury: Instruct family/caregiver on patient safety     Problem: Discharge Planning  Goal: Discharge to home or other facility with appropriate resources  3/13/2023 1053 by Chris Winter RN  Outcome: Progressing  Flowsheets (Taken 3/13/2023 1020)  Discharge to home or other facility with appropriate resources: Identify barriers to discharge with patient and caregiver     Problem: Chronic Conditions and Co-morbidities  Goal: Patient's chronic conditions and co-morbidity symptoms are monitored and maintained or improved  3/13/2023 1053 by Chris Winter RN  Outcome: Progressing  [address] (Taken 3/12/2023 0830 by Michela Mcwilliams RN)  Care Plan - Patient's Chronic Conditions and Co-Morbidity Symptoms are Monitored and Maintained or Improved: Update acute care plan with appropriate goals if chronic or comorbid symptoms are exacerbated and prevent overall improvement and discharge     Problem: Cardiovascular - Adult  Goal: Maintains optimal cardiac output and hemodynamic stability  Outcome: Progressing  Flowsheets (Taken 3/13/2023 1053)  Maintains optimal cardiac output and hemodynamic stability: Monitor blood pressure and heart rate   Care plan reviewed with patient and SO. Patient and SO verbalize understanding of the plan of care and contribute to goal setting.

## 2023-03-13 NOTE — CARE COORDINATION
3/13/23, 11:56 AM EDT    DISCHARGE PLANNING EVALUATION    Received SW consult for \"prematurity\" for baby. SW did speak with MOB and ensured that she had all necessary supplies and support systems for when it is time to discharge with baby. Mom reports no issues and a readiness to discharge. No needs expressed at this time, SW will continue to follow. For full assessment, please see SW note in baby's chart.

## 2023-03-13 NOTE — FLOWSHEET NOTE
Patient and SO admitted to room [id number] per wheelchair from L&D. Vitals and assessment completed. Oriented to room and plan of care, they voiced understanding.

## 2023-03-13 NOTE — FLOWSHEET NOTE
Instructed patient on breast pumping and cleaning supplies for breast parts. Labels given to put on expressed breast milk that is pumped. Patient voiced understanding and instruction of pumping.

## 2023-03-13 NOTE — PROGRESS NOTES
Progress note    Subjective:     Postoperative Day 2:  Delivery    Pain is moderately controlled. She says she is in pain all over, in her abdomen, back, neck, arms, legs. She said the Valium did help with her muscle spasms. She is voiding spontaneously and passing flatus. The patient is ambulating well. The patient is tolerating a normal diet. BPs have been well controlled overnight. Not currently on BP meds. Objective:     Vitals:    23 0625   BP: 132/72   Pulse: 88   Resp: 16   Temp: 97.9 °F (36.6 °C)   SpO2: 96%         General:    alert, appears stated age, and cooperative   Uterine Fundus:   Firm, abdomen appropriately tender   Incision:  healing well, no significant drainage, no dehiscence        CBC   Lab Results   Component Value Date    WBC 12.3 (H) 2023    HGB 10.0 (L) 2023    HCT 30.2 (L) 2023     2023        Assessment:     Status post  section. Doing well postoperatively.     Pre-Eclampsia    Plan:     Continue current care  S/p 24hrs magnesium sulfate  Continue to monitor BPs    Fadia Feliz DO 3/13/2023 8:10 AM

## 2023-03-13 NOTE — FLOWSHEET NOTE
Patient up to the bathroom, no bleeding on pad. Patient brushing teeth and freshening up. Denies all needs at this time.

## 2023-03-13 NOTE — FLOWSHEET NOTE
Pt ambulated to [facility] to visit infant son with RN supervision and assistance. Tolerated well. After visit and update from [facility] nurses, pt ambulated in [facility] down through M/B unit and back to room without issues. Tolerated well.

## 2023-03-13 NOTE — FLOWSHEET NOTE
Patient walked to [facility] to visit baby. No further action taken.

## 2023-03-13 NOTE — PLAN OF CARE
Problem: Postpartum  Goal: Experiences normal postpartum course  Description:  Postpartum OB-Pregnancy care plan goal which identifies if the mother is experiencing a normal postpartum course  3/12/2023 2315 by Dariela Bower RN  Outcome: Progressing  3/12/2023 1925 by Maribel Mathews RN  Outcome: Progressing  Flowsheets (Taken 3/12/2023 0830)  Experiences Normal Postpartum Course:   Monitor maternal vital signs   Assess uterine involution  Goal: Appropriate maternal -  bonding  Description:  Postpartum OB-Pregnancy care plan goal which identifies if the mother and  are bonding appropriately  3/12/2023 2315 by Dariela Bower RN  Outcome: Progressing  3/12/2023 1925 by Maribel Mathews RN  Outcome: Progressing  Note: Pt asks questions about  son  Goal: Establishment of infant feeding pattern  Description:  Postpartum OB-Pregnancy care plan goal which identifies if the mother is establishing a feeding pattern with their   3/12/2023 2315 by Dariela Bower RN  Outcome: Progressing  3/12/2023 1925 by Maribel Mathews RN  Outcome: Progressing  Flowsheets (Taken 3/12/2023 0830)  Establishment of Infant Feeding Pattern: Assess breast/bottle feeding  Goal: Incisions, wounds, or drain sites healing without S/S of infection  3/12/2023 2315 by Dariela Bower RN  Outcome: Progressing  3/12/2023 1925 by Maribel Mathews RN  Outcome: Progressing  Flowsheets (Taken 3/12/2023 0830)  Incisions, Wounds, or Drain Sites Healing Without Sign and Symptoms of Infection:   TWICE DAILY: Assess and document skin integrity   Implement wound care per orders     Problem: Infection - Adult  Goal: Absence of infection during hospitalization  3/12/2023 2315 by Dariela Bower RN  Outcome: Progressing  Flowsheets (Taken 3/12/2023 0830 by Maribel Mathews RN)  Absence of infection during hospitalization:   Assess and monitor for signs and symptoms of infection   Identify and instruct in appropriate isolation precautions for identified infection/condition   Instruct and encourage patient and family to use good hand hygiene technique   Administer medications as ordered  Note: ATB given as ordered  3/12/2023 1925 by Mateus Correa RN  Outcome: Progressing  Flowsheets (Taken 3/12/2023 0830)  Absence of infection during hospitalization:   Assess and monitor for signs and symptoms of infection   Identify and instruct in appropriate isolation precautions for identified infection/condition   Instruct and encourage patient and family to use good hand hygiene technique   Administer medications as ordered     Problem: Safety - Adult  Goal: Free from fall injury  3/12/2023 2315 by Brittany Molina RN  Outcome: Progressing  3/12/2023 1925 by Mateus Correa RN  Outcome: Progressing  Flowsheets (Taken 3/12/2023 0830)  Free From Fall Injury:   Instruct family/caregiver on patient safety   Based on caregiver fall risk screen, instruct family/caregiver to ask for assistance with transferring infant if caregiver noted to have fall risk factors     Problem: Discharge Planning  Goal: Discharge to home or other facility with appropriate resources  3/12/2023 2315 by Brittany Molina RN  Outcome: Progressing  3/12/2023 1925 by Mateus Correa RN  Outcome: Progressing  Flowsheets (Taken 3/12/2023 0830)  Discharge to home or other facility with appropriate resources: Refer to discharge planning if patient needs post-hospital services based on physician order or complex needs related to functional status, cognitive ability or social support system     Problem: Chronic Conditions and Co-morbidities  Goal: Patient's chronic conditions and co-morbidity symptoms are monitored and maintained or improved  3/12/2023 2315 by Brittany Molina RN  Outcome: Progressing  Flowsheets (Taken 3/12/2023 0830 by Mateus Correa RN)  Care Plan - Patient's Chronic Conditions and Co-Morbidity Symptoms are Monitored and Maintained or Improved: Update acute care plan with appropriate goals if chronic or comorbid symptoms are exacerbated and prevent overall improvement and discharge  3/12/2023 1925 by America Reno RN  Outcome: Progressing  Flowsheets (Taken 3/12/2023 0830)  Care Plan - Patient's Chronic Conditions and Co-Morbidity Symptoms are Monitored and Maintained or Improved: Update acute care plan with appropriate goals if chronic or comorbid symptoms are exacerbated and prevent overall improvement and discharge  Note: Pt on magnesium monitor BP's and I&O     Care plan reviewed with patient and boyfriend.  Patient and boyfriend verbalize understanding of the plan of care and contribute to goal setting.

## 2023-03-13 NOTE — FLOWSHEET NOTE
Sybil care explained and performed. Fresh pad and mesh undies applied. Pt states would like to leave binder off for now. SCDs remain on and functioning.

## 2023-03-14 PROCEDURE — 1220000000 HC SEMI PRIVATE OB R&B

## 2023-03-14 PROCEDURE — 6360000002 HC RX W HCPCS: Performed by: STUDENT IN AN ORGANIZED HEALTH CARE EDUCATION/TRAINING PROGRAM

## 2023-03-14 PROCEDURE — 6370000000 HC RX 637 (ALT 250 FOR IP): Performed by: STUDENT IN AN ORGANIZED HEALTH CARE EDUCATION/TRAINING PROGRAM

## 2023-03-14 RX ORDER — OXYCODONE HYDROCHLORIDE AND ACETAMINOPHEN 5; 325 MG/1; MG/1
1 TABLET ORAL EVERY 6 HOURS PRN
Qty: 28 TABLET | Refills: 0 | Status: SHIPPED | OUTPATIENT
Start: 2023-03-14 | End: 2023-03-21

## 2023-03-14 RX ORDER — KETOROLAC TROMETHAMINE 10 MG/1
10 TABLET, FILM COATED ORAL EVERY 6 HOURS PRN
Qty: 20 TABLET | Refills: 1 | Status: SHIPPED | OUTPATIENT
Start: 2023-03-14 | End: 2024-03-13

## 2023-03-14 RX ADMIN — DOCUSATE SODIUM 100 MG: 100 CAPSULE, LIQUID FILLED ORAL at 21:33

## 2023-03-14 RX ADMIN — ACETAMINOPHEN 1000 MG: 500 TABLET ORAL at 08:45

## 2023-03-14 RX ADMIN — OXYCODONE 10 MG: 5 TABLET ORAL at 08:42

## 2023-03-14 RX ADMIN — OXYCODONE 10 MG: 5 TABLET ORAL at 17:07

## 2023-03-14 RX ADMIN — OXYCODONE 10 MG: 5 TABLET ORAL at 04:16

## 2023-03-14 RX ADMIN — ENOXAPARIN SODIUM 40 MG: 100 INJECTION SUBCUTANEOUS at 08:43

## 2023-03-14 RX ADMIN — PRENATAL WITH FERROUS FUM AND FOLIC ACID 1 TABLET: 3080; 920; 120; 400; 22; 1.84; 3; 20; 10; 1; 12; 200; 27; 25; 2 TABLET ORAL at 07:40

## 2023-03-14 RX ADMIN — IBUPROFEN 800 MG: 800 TABLET, FILM COATED ORAL at 04:17

## 2023-03-14 RX ADMIN — IBUPROFEN 800 MG: 800 TABLET, FILM COATED ORAL at 21:33

## 2023-03-14 RX ADMIN — DOCUSATE SODIUM 100 MG: 100 CAPSULE, LIQUID FILLED ORAL at 07:40

## 2023-03-14 RX ADMIN — OXYCODONE 10 MG: 5 TABLET ORAL at 21:33

## 2023-03-14 RX ADMIN — ACETAMINOPHEN 1000 MG: 500 TABLET ORAL at 17:08

## 2023-03-14 RX ADMIN — ACETAMINOPHEN 1000 MG: 500 TABLET ORAL at 00:18

## 2023-03-14 RX ADMIN — IBUPROFEN 800 MG: 800 TABLET, FILM COATED ORAL at 13:04

## 2023-03-14 RX ADMIN — OXYCODONE 10 MG: 5 TABLET ORAL at 13:04

## 2023-03-14 ASSESSMENT — PAIN DESCRIPTION - DESCRIPTORS
DESCRIPTORS: DISCOMFORT

## 2023-03-14 ASSESSMENT — PAIN DESCRIPTION - LOCATION
LOCATION: INCISION

## 2023-03-14 ASSESSMENT — PAIN SCALES - GENERAL
PAINLEVEL_OUTOF10: 7
PAINLEVEL_OUTOF10: 8
PAINLEVEL_OUTOF10: 7

## 2023-03-14 ASSESSMENT — PAIN DESCRIPTION - ORIENTATION
ORIENTATION: LOWER

## 2023-03-14 ASSESSMENT — PAIN - FUNCTIONAL ASSESSMENT
PAIN_FUNCTIONAL_ASSESSMENT: ACTIVITIES ARE NOT PREVENTED

## 2023-03-14 NOTE — LACTATION NOTE
Educated pt. On using breast pump. Discussed pump settings and flange sizing. Encouraged pt. To call out for more assistance.

## 2023-03-14 NOTE — PROGRESS NOTES
Department of Obstetrics and Gynecology  Labor and Delivery  Attending Post Partum Progress Note        Subjective: Co's of pain, but walking to special care with out difficulty    POD# 3:  Delivery      Objective:      VITALS:  /81   Pulse 87   Temp 97.9 °F (36.6 °C) (Oral)   Resp 18   Ht 5' 4\" (1.626 m)   Wt 259 lb (117.5 kg)   LMP 2022   SpO2 97%   Breastfeeding Unknown   BMI 44.46 kg/m²     Vitals:    23 0842   BP:    Pulse:    Resp: 18   Temp:    SpO2:          General:    alert, appears stated age, and cooperative   Bowel Sounds:  active           Incision:  healing well, no significant drainage, no dehiscence, no significant erythema         DATA: CBC   Lab Results   Component Value Date    WBC 12.3 (H) 2023    HGB 10.0 (L) 2023    HCT 30.2 (L) 2023     2023        Assessment:    Status post  section. Doing well postoperatively. Plan:     Continue current care.       Brady Garcia MD

## 2023-03-14 NOTE — FLOWSHEET NOTE
Infant has not roomed in the entire shift due to admission to [facility]. Benefits of rooming in provided. Will continue to reinforce education.

## 2023-03-14 NOTE — PLAN OF CARE
Problem: Postpartum  Goal: Experiences normal postpartum course  Description:  Postpartum OB-Pregnancy care plan goal which identifies if the mother is experiencing a normal postpartum course  3/13/2023 2207 by Caio Ryan RN  Outcome: Progressing  Flowsheets  Taken 3/13/2023 2207 by Caio Ryan RN  Experiences Normal Postpartum Course:   Monitor maternal vital signs   Assess uterine involution  Taken 3/13/2023 205 by Caio Ryan RN  Experiences Normal Postpartum Course:   Monitor maternal vital signs   Assess uterine involution  Taken 3/13/2023 1630 by Elizabeth Meade RN  Experiences Normal Postpartum Course:   Monitor maternal vital signs   Assess uterine involution  Taken 3/13/2023 1230 by Elizabeth Meade RN  Experiences Normal Postpartum Course:   Monitor maternal vital signs   Assess uterine involution  Note: Fundus firm, midline u-2  3/13/2023 1053 by Elizabeth Meade RN  Outcome: Progressing  Flowsheets (Taken 3/13/2023 1020)  Experiences Normal Postpartum Course:   Monitor maternal vital signs   Assess uterine involution  Goal: Appropriate maternal -  bonding  Description:  Postpartum OB-Pregnancy care plan goal which identifies if the mother and  are bonding appropriately  3/13/2023 2207 by Caio Ryan RN  Outcome: Progressing  Note: Mother bonding well with infant  3/13/2023 1053 by Elizabeth Meade RN  Outcome: Progressing  Note: Bonding well with ifant in SCN, calls and will visit today  Goal: Establishment of infant feeding pattern  Description:  Postpartum OB-Pregnancy care plan goal which identifies if the mother is establishing a feeding pattern with their   3/13/2023 2207 by Caio Ryan RN  Outcome: Progressing  Flowsheets  Taken 3/13/2023 2207 by Caio Ryan RN  Establishment of Infant Feeding Pattern: Assess breast/bottle feeding  Taken 3/13/2023 205 by Caio Ryan RN  Establishment of Infant Feeding Pattern: Assess breast/bottle feeding  Taken 3/13/2023 1630 by Jasmine Clark RN  Establishment of Infant Feeding Pattern:   Assess breast/bottle feeding   Refer to lactation as needed  Taken 3/13/2023 1230 by Jasmine Clark RN  Establishment of Infant Feeding Pattern:   Assess breast/bottle feeding   Refer to lactation as needed  3/13/2023 1053 by Jasmine Clark RN  Outcome: Progressing  Flowsheets (Taken 3/13/2023 1020)  Establishment of Infant Feeding Pattern:   Assess breast/bottle feeding   Refer to lactation as needed  Goal: Incisions, wounds, or drain sites healing without S/S of infection  3/13/2023 2207 by Roberto Peguero RN  Outcome: Progressing  Flowsheets  Taken 3/13/2023 2207 by Roberto Peguero RN  Incisions, Wounds, or Drain Sites Healing Without Sign and Symptoms of Infection: TWICE DAILY: Assess and document skin integrity  Taken 3/13/2023 2051 by Roberto Peguero RN  Incisions, Wounds, or Drain Sites Healing Without Sign and Symptoms of Infection: TWICE DAILY: Assess and document skin integrity  Taken 3/13/2023 1630 by Jasmine Clark RN  Incisions, Wounds, or Drain Sites Healing Without Sign and Symptoms of Infection: TWICE DAILY: Assess and document skin integrity  Taken 3/13/2023 1230 by Jasmine Clark RN  Incisions, Wounds, or Drain Sites Healing Without Sign and Symptoms of Infection: TWICE DAILY: Assess and document skin integrity  3/13/2023 1053 by Jasmine Clark RN  Outcome: Progressing  Flowsheets  Taken 3/13/2023 1053  Incisions, Wounds, or Drain Sites Healing Without Sign and Symptoms of Infection: TWICE DAILY: Assess and document skin integrity  Taken 3/13/2023 1020  Incisions, Wounds, or Drain Sites Healing Without Sign and Symptoms of Infection: TWICE DAILY: Assess and document skin integrity     Problem: Infection - Adult  Goal: Absence of infection during hospitalization  3/13/2023 2207 by Roberto Peguero RN  Outcome: Progressing  Flowsheets  Taken 3/13/2023 2207 by Roberto Peguero RN  Absence of infection during hospitalization:   Assess and monitor for signs and symptoms of infection   Monitor lab/diagnostic results  Taken 3/13/2023 2051 by Doreen García RN  Absence of infection during hospitalization:   Monitor lab/diagnostic results   Assess and monitor for signs and symptoms of infection  Taken 3/13/2023 1630 by Abbey Fontana RN  Absence of infection during hospitalization:   Assess and monitor for signs and symptoms of infection   Monitor lab/diagnostic results  3/13/2023 1053 by Abbey Fontana RN  Outcome: Progressing  Flowsheets (Taken 3/13/2023 1020)  Absence of infection during hospitalization:   Assess and monitor for signs and symptoms of infection   Monitor lab/diagnostic results     Problem: Safety - Adult  Goal: Free from fall injury  3/13/2023 2207 by Doreen García RN  Outcome: Progressing  Flowsheets  Taken 3/13/2023 2207 by Doreen García RN  Free From Fall Injury: Instruct family/caregiver on patient safety  Taken 3/13/2023 2051 by Doreen García RN  Free From Fall Injury: Instruct family/caregiver on patient safety  Taken 3/13/2023 1630 by Abbey Fontana RN  Free From Fall Injury: Instruct family/caregiver on patient safety  Taken 3/13/2023 1230 by Abbey Fontana RN  Free From Fall Injury: Instruct family/caregiver on patient safety  3/13/2023 1053 by Abbey Fontana RN  Outcome: Progressing  Flowsheets (Taken 3/13/2023 1020)  Free From Fall Injury: Instruct family/caregiver on patient safety     Problem: Discharge Planning  Goal: Discharge to home or other facility with appropriate resources  3/13/2023 2207 by Doreen García RN  Outcome: Progressing  Flowsheets  Taken 3/13/2023 2207 by Doreen García RN  Discharge to home or other facility with appropriate resources: Identify barriers to discharge with patient and caregiver  Taken 3/13/2023 2051 by Doreen García RN  Discharge to home or other facility with appropriate resources: Identify barriers to discharge with patient and caregiver  Taken 3/13/2023 1630 by Dalia Zepeda RN  Discharge to home or other facility with appropriate resources: Identify barriers to discharge with patient and caregiver  3/13/2023 1053 by Dalia Zepeda RN  Outcome: Progressing  Flowsheets (Taken 3/13/2023 1020)  Discharge to home or other facility with appropriate resources: Identify barriers to discharge with patient and caregiver     Problem: Chronic Conditions and Co-morbidities  Goal: Patient's chronic conditions and co-morbidity symptoms are monitored and maintained or improved  3/13/2023 2207 by Amy Newman RN  Outcome: Progressing  [address] (Taken 3/13/2023 2207)  Care Plan - Patient's Chronic Conditions and Co-Morbidity Symptoms are Monitored and Maintained or Improved: Monitor and assess patient's chronic conditions and comorbid symptoms for stability, deterioration, or improvement  3/13/2023 1053 by Dalia Zepeda RN  Outcome: Progressing  Flowsheets (Taken 3/12/2023 0830 by Rae Daily RN)  Care Plan - Patient's Chronic Conditions and Co-Morbidity Symptoms are Monitored and Maintained or Improved: Update acute care plan with appropriate goals if chronic or comorbid symptoms are exacerbated and prevent overall improvement and discharge     Problem: Cardiovascular - Adult  Goal: Maintains optimal cardiac output and hemodynamic stability  3/13/2023 2207 by Amy Newman RN  Outcome: Progressing  Flowsheets  Taken 3/13/2023 2207 by Amy Newman RN  Maintains optimal cardiac output and hemodynamic stability: Monitor blood pressure and heart rate  Taken 3/13/2023 1630 by Dalia Zepeda RN  Maintains optimal cardiac output and hemodynamic stability: Monitor blood pressure and heart rate  Taken 3/13/2023 1230 by Dalia Zepeda RN  Maintains optimal cardiac output and hemodynamic stability: Monitor blood pressure and heart rate  3/13/2023 1053 by Dalia Zepeda RN  Outcome: Progressing  Flowsheets (Taken 3/13/2023 1053)  Maintains optimal cardiac output and hemodynamic stability: Monitor blood pressure and heart rate  Care plan reviewed with patient. Patient verbalizes understanding of the plan of care and contribute to goal setting.

## 2023-03-14 NOTE — PLAN OF CARE
Problem: Postpartum  Goal: Experiences normal postpartum course  Description:  Postpartum OB-Pregnancy care plan goal which identifies if the mother is experiencing a normal postpartum course  3/14/2023 0917 by Mike Evangelista RN  Outcome: Progressing  Flowsheets (Taken 3/14/2023 3173)  Experiences Normal Postpartum Course:   Monitor maternal vital signs   Assess uterine involution  Note: Vital signs stable. Fundus firm midline 2 below umbilicus     Problem: Postpartum  Goal: Appropriate maternal -  bonding  Description:  Postpartum OB-Pregnancy care plan goal which identifies if the mother and  are bonding appropriately  3/14/2023 0917 by Mike Evangelista RN  Outcome: Progressing  Note: Mother bonding well with infant     Problem: Postpartum  Goal: Establishment of infant feeding pattern  Description:  Postpartum OB-Pregnancy care plan goal which identifies if the mother is establishing a feeding pattern with their   3/14/2023 0917 by Mike Evangelista RN  Outcome: Progressing  Flowsheets (Taken 3/14/2023 0728)  Establishment of Infant Feeding Pattern: Assess breast/bottle feeding  Note: Mother pumping every 3-4 hours. Problem: Postpartum  Goal: Incisions, wounds, or drain sites healing without S/S of infection  3/14/2023 0917 by Mike Evangelista RN  Outcome: Progressing  Flowsheets (Taken 3/14/2023 0728)  Incisions, Wounds, or Drain Sites Healing Without Sign and Symptoms of Infection: TWICE DAILY: Assess and document skin integrity  Note: Dressing dry and intact     Problem: Infection - Adult  Goal: Absence of infection during hospitalization  3/14/2023 0917 by Mike Evangelista RN  Outcome: Progressing  Flowsheets (Taken 3/14/2023 0728)  Absence of infection during hospitalization: Assess and monitor for signs and symptoms of infection  Note: Vital signs stable.  No foul vaginal discharge     Problem: Safety - Adult  Goal: Free from fall injury  3/14/2023 0917 by Beth Priest RN  Outcome: Progressing  Flowsheets (Taken 3/14/2023 5396)  Free From Fall Injury: Instruct family/caregiver on patient safety  Note: Safety and security reviewed with mother     Problem: Discharge Planning  Goal: Discharge to home or other facility with appropriate resources  3/14/2023 0917 by Beth Priest RN  Outcome: Progressing  Flowsheets (Taken 3/14/2023 1419)  Discharge to home or other facility with appropriate resources: Identify barriers to discharge with patient and caregiver  Note: Working toward discharge     Problem: Chronic Conditions and Co-morbidities  Goal: Patient's chronic conditions and co-morbidity symptoms are monitored and maintained or improved  3/14/2023 0917 by Beth Priest RN  Outcome: Progressing  Flowsheets (Taken 3/13/2023 2207 by Tank Camacho RN)  Care Plan - Patient's Chronic Conditions and Co-Morbidity Symptoms are Monitored and Maintained or Improved: Monitor and assess patient's chronic conditions and comorbid symptoms for stability, deterioration, or improvement  Note: Bp wnl. DTR's 2+.  Pt occasionally having slight headache     Problem: Cardiovascular - Adult  Goal: Maintains optimal cardiac output and hemodynamic stability  3/14/2023 0917 by Beth Priest RN  Outcome: Progressing  Flowsheets (Taken 3/14/2023 0728)  Maintains optimal cardiac output and hemodynamic stability: Monitor blood pressure and heart rate  Note: BP's WNL     Problem: Pain  Goal: Verbalizes/displays adequate comfort level or baseline comfort level  Recent Flowsheet Documentation  Taken 3/14/2023 0728 by Beth Priest RN  Verbalizes/displays adequate comfort level or baseline comfort level: Encourage patient to monitor pain and request assistance  Taken 3/13/2023 2051 by Tank Camacho RN  Verbalizes/displays adequate comfort level or baseline comfort level:   Encourage patient to monitor pain and request assistance   Assess pain using appropriate pain scale     Problem: Infection - Adult  Goal: Absence of infection during hospitalization  Recent Flowsheet Documentation  Taken 3/14/2023 0728 by Chastity Hoffman RN  Absence of infection during hospitalization: Assess and monitor for signs and symptoms of infection  Taken 3/13/2023 2207 by Key López RN  Absence of infection during hospitalization:   Assess and monitor for signs and symptoms of infection   Monitor lab/diagnostic results  Taken 3/13/2023 2051 by Key López RN  Absence of infection during hospitalization:   Monitor lab/diagnostic results   Assess and monitor for signs and symptoms of infection     Problem: Safety - Adult  Goal: Free from fall injury  Recent Flowsheet Documentation  Taken 3/14/2023 0728 by Chastity Hoffman RN  Free From Fall Injury: Instruct family/caregiver on patient safety  Taken 3/13/2023 2207 by Key López RN  Free From Fall Injury: Instruct family/caregiver on patient safety  Taken 3/13/2023 2051 by Key López RN  Free From Fall Injury: Instruct family/caregiver on patient safety     Problem: Discharge Planning  Goal: Discharge to home or other facility with appropriate resources  Recent Flowsheet Documentation  Taken 3/14/2023 0728 by Chastity Hoffman RN  Discharge to home or other facility with appropriate resources: Identify barriers to discharge with patient and caregiver  Taken 3/13/2023 2207 by Key López RN  Discharge to home or other facility with appropriate resources: Identify barriers to discharge with patient and caregiver  Taken 3/13/2023 2051 by Key López RN  Discharge to home or other facility with appropriate resources: Identify barriers to discharge with patient and caregiver     Problem: Chronic Conditions and Co-morbidities  Goal: Patient's chronic conditions and co-morbidity symptoms are monitored and maintained or improved  Recent Flowsheet Documentation  Taken 3/13/2023 2207 by Key López RN  Care Plan - Patient's Chronic Conditions and Co-Morbidity Symptoms are Monitored and Maintained or Improved: Monitor and assess patient's chronic conditions and comorbid symptoms for stability, deterioration, or improvement     Care plan reviewed with patient and she contributes to goal setting and voices understanding of plan of care.

## 2023-03-14 NOTE — DISCHARGE SUMMARY
C/Section Discharge Summary    Gestational Age:35w0d    Antepartum complications: pregnancy induced hypertension    Type of Delivery: See dictated operative note    Labs: CBC   Lab Results   Component Value Date    WBC 12.3 (H) 03/13/2023    HGB 10.0 (L) 03/13/2023    HCT 30.2 (L) 03/13/2023     03/13/2023        Intrapartum complications: None    Postpartum complications: none    The patient is ambulating well. The patient is tolerating a normal diet.     Discharge Medication:      Medication List        CONTINUE taking these medications      Tens Unit Misc  by Does not apply route            ASK your doctor about these medications      acetaminophen 500 MG tablet  Commonly known as: TYLENOL     amoxicillin 500 MG capsule  Commonly known as: AMOXIL     butalbital-acetaminophen-caffeine -40 MG per tablet  Commonly known as: FIORICET, ESGIC  Take 1 tablet by mouth every 6 hours as needed for Headaches     calcium carbonate 500 MG chewable tablet  Commonly known as: TUMS     famotidine 20 MG tablet  Commonly known as: PEPCID     PRENATAL 1+1 PO     vitamin B-6 50 MG tablet  Commonly known as: PYRIDOXINE  Take 0.5 tablets by mouth 3 times daily as needed (nausea, vomiting)               Condition: Stable    Discharge to home date: 03/15/23    Plan:   Follow up in 7 day(s)    Shelbie Phelps MD

## 2023-03-15 VITALS
TEMPERATURE: 98.2 F | SYSTOLIC BLOOD PRESSURE: 125 MMHG | WEIGHT: 259 LBS | RESPIRATION RATE: 16 BRPM | BODY MASS INDEX: 44.22 KG/M2 | DIASTOLIC BLOOD PRESSURE: 81 MMHG | OXYGEN SATURATION: 100 % | HEIGHT: 64 IN | HEART RATE: 81 BPM

## 2023-03-15 PROCEDURE — 6360000002 HC RX W HCPCS: Performed by: STUDENT IN AN ORGANIZED HEALTH CARE EDUCATION/TRAINING PROGRAM

## 2023-03-15 PROCEDURE — 6370000000 HC RX 637 (ALT 250 FOR IP): Performed by: STUDENT IN AN ORGANIZED HEALTH CARE EDUCATION/TRAINING PROGRAM

## 2023-03-15 RX ADMIN — OXYCODONE 10 MG: 5 TABLET ORAL at 13:34

## 2023-03-15 RX ADMIN — DOCUSATE SODIUM 100 MG: 100 CAPSULE, LIQUID FILLED ORAL at 09:43

## 2023-03-15 RX ADMIN — PRENATAL WITH FERROUS FUM AND FOLIC ACID 1 TABLET: 3080; 920; 120; 400; 22; 1.84; 3; 20; 10; 1; 12; 200; 27; 25; 2 TABLET ORAL at 09:47

## 2023-03-15 RX ADMIN — OXYCODONE 10 MG: 5 TABLET ORAL at 01:12

## 2023-03-15 RX ADMIN — ENOXAPARIN SODIUM 40 MG: 100 INJECTION SUBCUTANEOUS at 09:44

## 2023-03-15 RX ADMIN — ACETAMINOPHEN 1000 MG: 500 TABLET ORAL at 09:43

## 2023-03-15 RX ADMIN — ACETAMINOPHEN 1000 MG: 500 TABLET ORAL at 01:12

## 2023-03-15 RX ADMIN — IBUPROFEN 800 MG: 800 TABLET, FILM COATED ORAL at 13:33

## 2023-03-15 RX ADMIN — OXYCODONE 10 MG: 5 TABLET ORAL at 05:27

## 2023-03-15 RX ADMIN — OXYCODONE 10 MG: 5 TABLET ORAL at 09:43

## 2023-03-15 RX ADMIN — IBUPROFEN 800 MG: 800 TABLET, FILM COATED ORAL at 05:27

## 2023-03-15 ASSESSMENT — PAIN DESCRIPTION - LOCATION
LOCATION: ABDOMEN;INCISION
LOCATION: ABDOMEN;INCISION;HEAD
LOCATION: ABDOMEN;INCISION
LOCATION: ABDOMEN;INCISION

## 2023-03-15 ASSESSMENT — PAIN DESCRIPTION - DESCRIPTORS
DESCRIPTORS: SORE;ACHING
DESCRIPTORS: SORE;CRAMPING
DESCRIPTORS: DISCOMFORT
DESCRIPTORS: DISCOMFORT

## 2023-03-15 ASSESSMENT — PAIN - FUNCTIONAL ASSESSMENT
PAIN_FUNCTIONAL_ASSESSMENT: ACTIVITIES ARE NOT PREVENTED

## 2023-03-15 ASSESSMENT — PAIN SCALES - GENERAL
PAINLEVEL_OUTOF10: 7

## 2023-03-15 NOTE — LACTATION NOTE
Pt. Continues to pump for infant in SCN. Pt.  Stated she has no questions or concerns for lactation at this time. Encouraged pt. To call for assistance as needed or if infant in SCN needs assistance.

## 2023-03-15 NOTE — PROGRESS NOTES
Postpartum education brochure given, teaching complete. [place] postpartum depression screening discussed with patient. Patient instructed to complete [facility] postpartum depression screening in 2 weeks and contact her healthcare provider if her score is > 10. Patient voiced understanding. Reviewed postpartum birth warning signs flyer with patient. Patient has voiced understanding of teaching. Mother's blood type Is b positive.   Mother did not receive Rhogam.    Infant has not roomed in due to being in scn

## 2023-03-15 NOTE — PLAN OF CARE
Problem: Postpartum  Goal: Experiences normal postpartum course  Description:  Postpartum OB-Pregnancy care plan goal which identifies if the mother is experiencing a normal postpartum course  3/15/2023 1108 by Mian Rockwell RN  Outcome: Adequate for Discharge  Flowsheets (Taken 3/15/2023 0751)  Experiences Normal Postpartum Course:   Monitor maternal vital signs   Assess uterine involution  3/14/2023 2125 by Raghu Craft RN  Outcome: Progressing  Flowsheets (Taken 3/14/2023 2125)  Experiences Normal Postpartum Course:   Monitor maternal vital signs   Assess uterine involution  Goal: Appropriate maternal -  bonding  Description:  Postpartum OB-Pregnancy care plan goal which identifies if the mother and  are bonding appropriately  3/15/2023 1108 by Mian Rockwell RN  Outcome: Adequate for Discharge  Note: Infant has roomed in with mother this shift . Benefits of rooming in discussed. 3/14/2023 2125 by Raghu Craft RN  Outcome: Progressing  Note: Bonding with baby, participating in infant care.     Goal: Establishment of infant feeding pattern  Description:  Postpartum OB-Pregnancy care plan goal which identifies if the mother is establishing a feeding pattern with their   3/15/2023 1108 by Mian Rockwell RN  Outcome: Adequate for Discharge  Flowsheets  Taken 3/15/2023 1108  Establishment of Infant Feeding Pattern:   Assess breast/bottle feeding   Refer to lactation as needed  Taken 3/15/2023 0751  Establishment of Infant Feeding Pattern:   Assess breast/bottle feeding   Refer to lactation as needed  3/14/2023 2125 by Raghu Craft RN  Outcome: Progressing  Flowsheets (Taken 3/14/2023 2125)  Establishment of Infant Feeding Pattern: Assess breast/bottle feeding  Goal: Incisions, wounds, or drain sites healing without S/S of infection  3/15/2023 1108 by Mian Rockwell RN  Outcome: Adequate for Discharge  Flowsheets (Taken 3/15/2023 0751)  Incisions, Wounds, or Drain Sites Healing Without Sign and Symptoms of Infection: ADMISSION and DAILY: Assess and document risk factors for pressure ulcer development  3/14/2023 2125 by Brandy Fisher RN  Outcome: Progressing  Flowsheets (Taken 3/14/2023 2125)  Incisions, Wounds, or Drain Sites Healing Without Sign and Symptoms of Infection: TWICE DAILY: Assess and document skin integrity     Problem: Infection - Adult  Goal: Absence of infection during hospitalization  3/15/2023 1108 by Herlinda Vanessa RN  Outcome: Adequate for Discharge  Flowsheets (Taken 3/15/2023 0751)  Absence of infection during hospitalization:   Assess and monitor for signs and symptoms of infection   Monitor lab/diagnostic results  3/14/2023 2125 by Brandy Fisher RN  Outcome: Progressing  Flowsheets (Taken 3/14/2023 2125)  Absence of infection during hospitalization: Monitor lab/diagnostic results     Problem: Safety - Adult  Goal: Free from fall injury  3/15/2023 1108 by Herlinda Vanessa RN  Outcome: Adequate for Discharge  Flowsheets (Taken 3/15/2023 0751)  Free From Fall Injury: Instruct family/caregiver on patient safety  3/14/2023 2125 by Brandy Fisher RN  Outcome: Progressing  [address] (Taken 3/14/2023 2125)  Free From Fall Injury: Instruct family/caregiver on patient safety     Problem: Discharge Planning  Goal: Discharge to home or other facility with appropriate resources  3/15/2023 1108 by Herlinda Vanessa RN  Outcome: Adequate for Discharge  Flowsheets (Taken 3/15/2023 0751)  Discharge to home or other facility with appropriate resources: Identify barriers to discharge with patient and caregiver  3/14/2023 2125 by Brandy Fisher RN  Outcome: Progressing  Flowsheets (Taken 3/14/2023 2125)  Discharge to home or other facility with appropriate resources: Identify barriers to discharge with patient and caregiver     Problem: Chronic Conditions and Co-morbidities  Goal: Patient's chronic conditions and co-morbidity symptoms are monitored and maintained or improved  3/15/2023 1108 by Yrn uLtz RN  Outcome: Adequate for Discharge  Flowsheets (Taken 3/14/2023 2125 by Thomas Langston RN)  Care Plan - Patient's Chronic Conditions and Co-Morbidity Symptoms are Monitored and Maintained or Improved: Monitor and assess patient's chronic conditions and comorbid symptoms for stability, deterioration, or improvement  3/14/2023 2125 by Thomas Langston RN  Outcome: Progressing  Flowsheets (Taken 3/14/2023 2125)  Care Plan - Patient's Chronic Conditions and Co-Morbidity Symptoms are Monitored and Maintained or Improved: Monitor and assess patient's chronic conditions and comorbid symptoms for stability, deterioration, or improvement     Problem: Cardiovascular - Adult  Goal: Maintains optimal cardiac output and hemodynamic stability  3/15/2023 1108 by Yrn Lutz RN  Outcome: Adequate for Discharge  Flowsheets (Taken 3/15/2023 0751)  Maintains optimal cardiac output and hemodynamic stability: Monitor blood pressure and heart rate  3/14/2023 2125 by Thomas Langston RN  Outcome: Progressing  Flowsheets (Taken 3/14/2023 2125)  Maintains optimal cardiac output and hemodynamic stability: Monitor blood pressure and heart rate   Care plan reviewed with patient. Patient verbalizes understanding of the plan of care and contribute to goal setting.

## 2023-03-15 NOTE — PROGRESS NOTES
Discharge teaching and instructions for diagnosis/procedure of postpartum care completed with patient using teachback method. AVS reviewed. prescriptions sent to patient's pharmacy. Patient voiced understanding regarding prescriptions, follow up appointments, and care of self at home.  Discharged ambulatory and in a wheelchair to  home with support per family

## 2023-03-15 NOTE — PLAN OF CARE
Problem: Postpartum  Goal: Experiences normal postpartum course  Description:  Postpartum OB-Pregnancy care plan goal which identifies if the mother is experiencing a normal postpartum course  3/14/2023 2125 by Evan Wilcox RN  Outcome: Progressing  Flowsheets (Taken 3/14/2023 2125)  Experiences Normal Postpartum Course:   Monitor maternal vital signs   Assess uterine involution     Problem: Postpartum  Goal: Appropriate maternal -  bonding  Description:  Postpartum OB-Pregnancy care plan goal which identifies if the mother and  are bonding appropriately  3/14/2023 2125 by Evan Wilcox RN  Outcome: Progressing  Note: Bonding with baby, participating in infant care.        Problem: Postpartum  Goal: Establishment of infant feeding pattern  Description:  Postpartum OB-Pregnancy care plan goal which identifies if the mother is establishing a feeding pattern with their   3/14/2023 2125 by Evan Wilcox RN  Outcome: Progressing  Flowsheets (Taken 3/14/2023 2125)  Establishment of Infant Feeding Pattern: Assess breast/bottle feeding     Problem: Postpartum  Goal: Incisions, wounds, or drain sites healing without S/S of infection  3/14/2023 2125 by Evan Wilcox RN  Outcome: Progressing  Flowsheets (Taken 3/14/2023 2125)  Incisions, Wounds, or Drain Sites Healing Without Sign and Symptoms of Infection: TWICE DAILY: Assess and document skin integrity     Problem: Infection - Adult  Goal: Absence of infection during hospitalization  3/14/2023 2125 by Evan Wilcox RN  Outcome: Progressing  Flowsheets (Taken 3/14/2023 2125)  Absence of infection during hospitalization: Monitor lab/diagnostic results     Problem: Safety - Adult  Goal: Free from fall injury  3/14/2023 2125 by Evan Wilcox RN  Outcome: Progressing  Flowsheets (Taken 3/14/2023 2125)  Free From Fall Injury: Instruct family/caregiver on patient safety     Problem: Discharge Planning  Goal: Discharge to home or other facility with appropriate resources  3/14/2023 2125 by Abi Louis RN  Outcome: Progressing  Flowsheets (Taken 3/14/2023 2125)  Discharge to home or other facility with appropriate resources: Identify barriers to discharge with patient and caregiver     Problem: Chronic Conditions and Co-morbidities  Goal: Patient's chronic conditions and co-morbidity symptoms are monitored and maintained or improved  3/14/2023 2125 by Abi Louis RN  Outcome: Progressing  Flowsheets (Taken 3/14/2023 2125)  Care Plan - Patient's Chronic Conditions and Co-Morbidity Symptoms are Monitored and Maintained or Improved: Monitor and assess patient's chronic conditions and comorbid symptoms for stability, deterioration, or improvement     Problem: Cardiovascular - Adult  Goal: Maintains optimal cardiac output and hemodynamic stability  3/14/2023 2125 by Abi Louis RN  Outcome: Progressing  Flowsheets (Taken 3/14/2023 2125)  Maintains optimal cardiac output and hemodynamic stability: Monitor blood pressure and heart rate   Care plan reviewed with patient and she contributes to goal setting and voices understanding of plan of care.

## 2023-04-06 ENCOUNTER — APPOINTMENT (OUTPATIENT)
Dept: GENERAL RADIOLOGY | Age: 28
End: 2023-04-06
Payer: MEDICARE

## 2023-04-06 ENCOUNTER — HOSPITAL ENCOUNTER (EMERGENCY)
Age: 28
Discharge: HOME | End: 2023-04-06
Attending: EMERGENCY MEDICINE
Payer: MEDICARE

## 2023-04-06 VITALS
TEMPERATURE: 97.8 F | OXYGEN SATURATION: 99 % | SYSTOLIC BLOOD PRESSURE: 121 MMHG | DIASTOLIC BLOOD PRESSURE: 77 MMHG | RESPIRATION RATE: 18 BRPM | WEIGHT: 250 LBS | HEIGHT: 64 IN | HEART RATE: 89 BPM | BODY MASS INDEX: 42.68 KG/M2

## 2023-04-06 DIAGNOSIS — G44.201 INTRACTABLE TENSION-TYPE HEADACHE, UNSPECIFIED CHRONICITY PATTERN: Primary | ICD-10-CM

## 2023-04-06 LAB
ALBUMIN SERPL BCG-MCNC: 4.3 G/DL (ref 3.5–5.1)
ALP SERPL-CCNC: 92 U/L (ref 38–126)
ALT SERPL W/O P-5'-P-CCNC: 22 U/L (ref 11–66)
ANION GAP SERPL CALC-SCNC: 11 MEQ/L (ref 8–16)
AST SERPL-CCNC: 16 U/L (ref 5–40)
BACTERIA URNS QL MICRO: ABNORMAL /HPF
BASOPHILS ABSOLUTE: 0 THOU/MM3 (ref 0–0.1)
BASOPHILS NFR BLD AUTO: 0.5 %
BILIRUB SERPL-MCNC: < 0.2 MG/DL (ref 0.3–1.2)
BILIRUB UR QL STRIP.AUTO: NEGATIVE
BUN SERPL-MCNC: 16 MG/DL (ref 7–22)
CALCIUM SERPL-MCNC: 9.2 MG/DL (ref 8.5–10.5)
CASTS #/AREA URNS LPF: ABNORMAL /LPF
CASTS 2: ABNORMAL /LPF
CHARACTER UR: ABNORMAL
CHLORIDE SERPL-SCNC: 101 MEQ/L (ref 98–111)
CO2 SERPL-SCNC: 25 MEQ/L (ref 23–33)
COLOR: YELLOW
CREAT SERPL-MCNC: 0.5 MG/DL (ref 0.4–1.2)
CREAT UR-MCNC: 166.5 MG/DL
CRYSTALS URNS MICRO: ABNORMAL
DEPRECATED RDW RBC AUTO: 38.6 FL (ref 35–45)
EOSINOPHIL NFR BLD AUTO: 1.9 %
EOSINOPHILS ABSOLUTE: 0.2 THOU/MM3 (ref 0–0.4)
EPITHELIAL CELLS, UA: ABNORMAL /HPF
ERYTHROCYTE [DISTWIDTH] IN BLOOD BY AUTOMATED COUNT: 12.1 % (ref 11.5–14.5)
GFR SERPL CREATININE-BSD FRML MDRD: > 60 ML/MIN/1.73M2
GLUCOSE SERPL-MCNC: 132 MG/DL (ref 70–108)
GLUCOSE UR QL STRIP.AUTO: NEGATIVE MG/DL
HCT VFR BLD AUTO: 37.3 % (ref 37–47)
HGB BLD-MCNC: 12.3 GM/DL (ref 12–16)
HGB UR QL STRIP.AUTO: ABNORMAL
IMM GRANULOCYTES # BLD AUTO: 0.03 THOU/MM3 (ref 0–0.07)
IMM GRANULOCYTES NFR BLD AUTO: 0.3 %
KETONES UR QL STRIP.AUTO: ABNORMAL
LYMPHOCYTES ABSOLUTE: 2.6 THOU/MM3 (ref 1–4.8)
LYMPHOCYTES NFR BLD AUTO: 30.3 %
MCH RBC QN AUTO: 29 PG (ref 26–33)
MCHC RBC AUTO-ENTMCNC: 33 GM/DL (ref 32.2–35.5)
MCV RBC AUTO: 88 FL (ref 81–99)
MISCELLANEOUS 2: ABNORMAL
MONOCYTES ABSOLUTE: 0.4 THOU/MM3 (ref 0.4–1.3)
MONOCYTES NFR BLD AUTO: 4.3 %
NEUTROPHILS NFR BLD AUTO: 62.7 %
NITRITE UR QL STRIP: NEGATIVE
NRBC BLD AUTO-RTO: 0 /100 WBC
OSMOLALITY SERPL CALC.SUM OF ELEC: 276.9 MOSMOL/KG (ref 275–300)
PH UR STRIP.AUTO: 6 [PH] (ref 5–9)
PLATELET # BLD AUTO: 409 THOU/MM3 (ref 130–400)
PMV BLD AUTO: 9.2 FL (ref 9.4–12.4)
POTASSIUM SERPL-SCNC: 3.5 MEQ/L (ref 3.5–5.2)
PROT SERPL-MCNC: 7.4 G/DL (ref 6.1–8)
PROT UR STRIP.AUTO-MCNC: ABNORMAL MG/DL
PROT UR-MCNC: 23 MG/DL
PROT/CREAT 24H UR: 0.14 MG/G{CREAT}
RBC # BLD AUTO: 4.24 MILL/MM3 (ref 4.2–5.4)
RBC URINE: ABNORMAL /HPF
RENAL EPI CELLS #/AREA URNS HPF: ABNORMAL /[HPF]
SEGMENTED NEUTROPHILS ABSOLUTE COUNT: 5.4 THOU/MM3 (ref 1.8–7.7)
SODIUM SERPL-SCNC: 137 MEQ/L (ref 135–145)
SP GR UR REFRACT.AUTO: 1.03 (ref 1–1.03)
UROBILINOGEN, URINE: 0.2 EU/DL (ref 0–1)
WBC # BLD AUTO: 8.6 THOU/MM3 (ref 4.8–10.8)
WBC #/AREA URNS HPF: ABNORMAL /HPF
WBC #/AREA URNS HPF: ABNORMAL /[HPF]
YEAST LIKE FUNGI URNS QL MICRO: ABNORMAL

## 2023-04-06 PROCEDURE — 96372 THER/PROPH/DIAG INJ SC/IM: CPT

## 2023-04-06 PROCEDURE — 80053 COMPREHEN METABOLIC PANEL: CPT

## 2023-04-06 PROCEDURE — 36415 COLL VENOUS BLD VENIPUNCTURE: CPT

## 2023-04-06 PROCEDURE — 84156 ASSAY OF PROTEIN URINE: CPT

## 2023-04-06 PROCEDURE — 85025 COMPLETE CBC W/AUTO DIFF WBC: CPT

## 2023-04-06 PROCEDURE — 87086 URINE CULTURE/COLONY COUNT: CPT

## 2023-04-06 PROCEDURE — 71045 X-RAY EXAM CHEST 1 VIEW: CPT

## 2023-04-06 PROCEDURE — 6360000002 HC RX W HCPCS: Performed by: STUDENT IN AN ORGANIZED HEALTH CARE EDUCATION/TRAINING PROGRAM

## 2023-04-06 PROCEDURE — 81001 URINALYSIS AUTO W/SCOPE: CPT

## 2023-04-06 PROCEDURE — 96374 THER/PROPH/DIAG INJ IV PUSH: CPT

## 2023-04-06 PROCEDURE — 82570 ASSAY OF URINE CREATININE: CPT

## 2023-04-06 PROCEDURE — 6360000002 HC RX W HCPCS: Performed by: EMERGENCY MEDICINE

## 2023-04-06 PROCEDURE — 99284 EMERGENCY DEPT VISIT MOD MDM: CPT

## 2023-04-06 RX ORDER — NIFEDIPINE 30 MG/1
30 TABLET, EXTENDED RELEASE ORAL DAILY
Qty: 30 TABLET | Refills: 0 | Status: SHIPPED | OUTPATIENT
Start: 2023-04-06

## 2023-04-06 RX ORDER — PROCHLORPERAZINE EDISYLATE 5 MG/ML
10 INJECTION INTRAMUSCULAR; INTRAVENOUS ONCE
Status: COMPLETED | OUTPATIENT
Start: 2023-04-06 | End: 2023-04-06

## 2023-04-06 RX ORDER — KETOROLAC TROMETHAMINE 30 MG/ML
30 INJECTION, SOLUTION INTRAMUSCULAR; INTRAVENOUS ONCE
Status: COMPLETED | OUTPATIENT
Start: 2023-04-06 | End: 2023-04-06

## 2023-04-06 RX ORDER — NIFEDIPINE 30 MG/1
30 TABLET, EXTENDED RELEASE ORAL DAILY
Status: DISCONTINUED | OUTPATIENT
Start: 2023-04-07 | End: 2023-04-06

## 2023-04-06 RX ADMIN — PROCHLORPERAZINE EDISYLATE 10 MG: 5 INJECTION INTRAMUSCULAR; INTRAVENOUS at 21:01

## 2023-04-06 RX ADMIN — KETOROLAC TROMETHAMINE 30 MG: 30 INJECTION, SOLUTION INTRAMUSCULAR at 20:02

## 2023-04-06 ASSESSMENT — PAIN DESCRIPTION - LOCATION: LOCATION: HEAD

## 2023-04-06 ASSESSMENT — PAIN DESCRIPTION - DESCRIPTORS: DESCRIPTORS: ACHING

## 2023-04-06 ASSESSMENT — PAIN - FUNCTIONAL ASSESSMENT: PAIN_FUNCTIONAL_ASSESSMENT: 0-10

## 2023-04-06 ASSESSMENT — PAIN SCALES - GENERAL: PAINLEVEL_OUTOF10: 10

## 2023-04-06 NOTE — ED TRIAGE NOTES
Patient presents to the ed with c/o headache. Pt states that she is 4 weeks postpartum. Patient had a csection 5 weeks early d/t headaches. Pt states that even after delivery she continued to have headaches. The pain medication would take the edge off. Pt states that she informed her OBGYN at her check up but the provider did not seem concerned. Pt rating 9/10 pain at this time.

## 2023-04-06 NOTE — ED PROVIDER NOTES
SECTION performed by Gabriela Rodríguez MD at [facility] [address] N/A 3/11/2023     SECTION performed by Rudy Rojas DO at [facility] [place]    PAIN MANAGEMENT PROCEDURE Bilateral 2022    medial branch blocks at bilateral L4-L5 and L5-S1 performed by Trenton Davey DO at [facility] Bilateral 2022    B/L lumbar medial branch block at L4-5 and L5-S1 performed by Trenton Davey DO at [facility] Right 2022    : Bilateral lumbar RFA at L4-5 and L5-S1, RIGHT side first performed by Trenton Davey DO at [facility] Left 2022    left lumbar RFA at L4-5 and L5-S1, performed by Trenton Davey DO at [facility] [address]   No current facility-administered medications for this encounter.     Current Outpatient Medications:     NIFEdipine (PROCARDIA XL) 30 MG extended release tablet, Take 1 tablet by mouth daily, Disp: 30 tablet, Rfl: 0    ketorolac (TORADOL) 10 MG tablet, Take 1 tablet by mouth every 6 hours as needed for Pain, Disp: 20 tablet, Rfl: 1    Prenatal Vit-Fe Fumarate-FA (PRENATAL 1+1 PO), Take by mouth, Disp: , Rfl:     calcium carbonate (TUMS) 500 MG chewable tablet, Take 1 tablet by mouth daily (Patient not taking: Reported on 3/11/2023), Disp: , Rfl:     acetaminophen (TYLENOL) 500 MG tablet, Take 500 mg by mouth every 6 hours as needed for Pain, Disp: , Rfl:     Tens Unit MISC, by Does not apply route (Patient not taking: Reported on 3/11/2023), Disp: 1 each, Rfl: 0    Discharge Medication List as of 2023  9:41 PM        CONTINUE these medications which have NOT CHANGED    Details   ketorolac (TORADOL) 10 MG tablet Take 1 tablet by mouth every 6 hours as needed for Pain, Disp-20 tablet, R-1Normal      Prenatal Vit-Fe Fumarate-FA (PRENATAL 1+1 PO) Take by mouthHistorical Med      calcium carbonate
occasionally words aremis-transcribed.)    MD Kyra Lowe MD  04/06/23 [id number]

## 2023-04-08 LAB
BACTERIA UR CULT: ABNORMAL
ORGANISM: ABNORMAL

## 2023-05-04 ENCOUNTER — OFFICE VISIT (OUTPATIENT)
Dept: PHYSICAL MEDICINE AND REHAB | Age: 28
End: 2023-05-04

## 2023-05-04 VITALS — HEART RATE: 69 BPM | OXYGEN SATURATION: 99 % | SYSTOLIC BLOOD PRESSURE: 108 MMHG | DIASTOLIC BLOOD PRESSURE: 80 MMHG

## 2023-05-04 DIAGNOSIS — M47.816 LUMBAR SPONDYLOSIS: Primary | ICD-10-CM

## 2023-05-04 DIAGNOSIS — G89.4 CHRONIC PAIN SYNDROME: ICD-10-CM

## 2023-05-04 DIAGNOSIS — M79.2 NEUROPATHIC PAIN: ICD-10-CM

## 2023-05-04 DIAGNOSIS — M53.3 SI (SACROILIAC) JOINT DYSFUNCTION: ICD-10-CM

## 2023-05-04 RX ORDER — BACLOFEN 10 MG/1
10 TABLET ORAL 3 TIMES DAILY PRN
Qty: 90 TABLET | Refills: 0 | Status: SHIPPED | OUTPATIENT
Start: 2023-05-04

## 2023-05-04 RX ORDER — TRAMADOL HYDROCHLORIDE 50 MG/1
50 TABLET ORAL 2 TIMES DAILY PRN
Qty: 60 TABLET | Refills: 0 | Status: SHIPPED | OUTPATIENT
Start: 2023-05-04 | End: 2023-06-03

## 2023-05-04 RX ORDER — LANOLIN ALCOHOL/MO/W.PET/CERES
400 CREAM (GRAM) TOPICAL DAILY
COMMUNITY

## 2023-05-04 NOTE — PROGRESS NOTES
Chronic Pain/PM&R Clinic Note     Encounter Date: 5/4/23    Subjective:   Chief Complaint:   Chief Complaint   Patient presents with    Follow-up     Low back pain continues and since being pregnant now her right leg goes numb        History of Present Illness:   Maureen Stanton is a 32 y.o. female seen in the clinic initially on 03/16/2022 [facility]  for her history of lumbar pain. States her pain started back when she was 9 she has a history of adolescent scoliosis. Patient had her first back surgery in 2006, she had a laminectomy and fusion from T1-L2. About 2 years later in January 2009 she had all of her hardware removed due to issues with the hardware placement. Patient states she was doing well overall for about 10 years. In 2018 she was working as a  at [employer] in [place] and her right leg will go completely numb and give out. At that time she moved back to [place] and she had a lumbar laminectomy and fusion at L3-L4 at [facility] clinic in May 2018. She is currently on disability since her last surgery. She follows with the [facility] [employer] clinic who referred her here for interventional procedures for pain. She was told she is now having issues with her L5. Patient states her pain is worse with standing, walking long distance, sitting too long. She states it is hard for her to stand and do dishes. Majority of her pain is located in her mid low back. She states she does get some pain that radiates to her left lateral hip, but not past her knee. This is only with increased activity. Patient states she does get some relief when laying in bed but gets uncomfortable if in 1 position for too long. Patient states she has tried ibuprofen, Tylenol, heat, ice, TENS unit, lidocaine patches all which have been relatively ineffective. She has been using an herbal patch which has been the only thing that is taken the edge off.   Patient states she had a

## 2023-05-05 ENCOUNTER — PREP FOR PROCEDURE (OUTPATIENT)
Dept: PHYSICAL MEDICINE AND REHAB | Age: 28
End: 2023-05-05

## 2023-05-08 ENCOUNTER — TELEPHONE (OUTPATIENT)
Dept: PHYSICAL MEDICINE AND REHAB | Age: 28
End: 2023-05-08

## 2023-05-08 NOTE — TELEPHONE ENCOUNTER
Pt. Called the office. Does not have a  for upcoming procedure. Procedure and follow up rescheduled. Verbalized understanding.

## 2023-05-10 ENCOUNTER — PREP FOR PROCEDURE (OUTPATIENT)
Dept: PHYSICAL MEDICINE AND REHAB | Age: 28
End: 2023-05-10

## 2023-05-16 NOTE — H&P
pain  Neurological: negative for any leg weakness. Right leg numbness/tingling  Behavioral/Psych: negative for anxiety/depression   All other systems reviewed and are negative    Objective: There were no vitals filed for this visit. Constitutional: Pleasant, no acute distress   Head: Normocephalic, atraumatic   Eyes: Conjunctivae normal   Neck: Supple, symmetrical   Lungs: Normal respiratory effort, non-labored breathing   Cardiovascular: Limbs warm and well perfused   Abdomen: Non-protruded   Musculoskeletal: Muscle bulk symmetric, no atrophy, no gross deformities   · Lower Extremities: ROM WNL. · Thorax: No paraspinal tenderness bilaterally. No scoliosis or kyphosis. · Lumbar Spine: ROM limited due to hx fusion. Lumbar paraspinals non-tender to palpation bilaterally. SLR neg bilaterally. DUGLAS neg bilaterally. GAENSLEN neg bilaterally. Positive facet loading bilaterally. Bilateral SI joints tender to palpation. Bilateral greater trochanters non-tender to palpation. Neurological: Cranial nerves II-XII grossly intact. · Gait - Antalgic gait. Ambulates without assistive device. Uses a cane occasionally. · Motor: 5/5 muscle strength in bilateral hip flexion, knee flexion, knee extension, ankle dorsiflexion, and ankle plantar flexion   · Sensory: LT sensation intact in lower limbs   · Reflexes: 2+ symmetrical in bilateral achilles, 2+ bilateral patellar, negative ankle clonus, downgoing babinski. Decreased sensation to right thigh and right calf. Skin: No rashes or lesions present   Psychological: Cooperative, no exaggerated pain behaviors     Assessment:    Diagnosis Orders   1. Lumbar spondylosis        2. SI (sacroiliac) joint dysfunction  traMADol (ULTRAM) 50 MG tablet    Urine Drug Screen    CHG FLUOR NEEDLE/CATH SPINE/PARASPINAL DX/THER ADDON    NE INJECT SI JOINT ARTHRGRPHY&/ANES/STEROID W/BRIAN      3. Neuropathic pain        4. Chronic pain syndrome          Karly Pickard is a 32 y. o.female

## 2023-05-17 ENCOUNTER — HOSPITAL ENCOUNTER (OUTPATIENT)
Age: 28
Setting detail: OUTPATIENT SURGERY
Discharge: HOME | End: 2023-05-17
Attending: ANESTHESIOLOGY | Admitting: ANESTHESIOLOGY
Payer: MEDICARE

## 2023-05-17 ENCOUNTER — APPOINTMENT (OUTPATIENT)
Dept: GENERAL RADIOLOGY | Age: 28
End: 2023-05-17
Attending: ANESTHESIOLOGY
Payer: MEDICARE

## 2023-05-17 VITALS
SYSTOLIC BLOOD PRESSURE: 135 MMHG | HEART RATE: 87 BPM | BODY MASS INDEX: 44.46 KG/M2 | WEIGHT: 260.4 LBS | DIASTOLIC BLOOD PRESSURE: 69 MMHG | HEIGHT: 64 IN | RESPIRATION RATE: 15 BRPM | OXYGEN SATURATION: 97 % | TEMPERATURE: 97.9 F

## 2023-05-17 LAB — PREGNANCY, URINE: NEGATIVE

## 2023-05-17 PROCEDURE — 6360000004 HC RX CONTRAST MEDICATION: Performed by: ANESTHESIOLOGY

## 2023-05-17 PROCEDURE — 2709999900 HC NON-CHARGEABLE SUPPLY: Performed by: ANESTHESIOLOGY

## 2023-05-17 PROCEDURE — 3600000054 HC PAIN LEVEL 3 BASE: Performed by: ANESTHESIOLOGY

## 2023-05-17 PROCEDURE — 2500000003 HC RX 250 WO HCPCS: Performed by: ANESTHESIOLOGY

## 2023-05-17 PROCEDURE — 99152 MOD SED SAME PHYS/QHP 5/>YRS: CPT | Performed by: ANESTHESIOLOGY

## 2023-05-17 PROCEDURE — 3209999900 FLUORO FOR SURGICAL PROCEDURES

## 2023-05-17 PROCEDURE — 7100000011 HC PHASE II RECOVERY - ADDTL 15 MIN: Performed by: ANESTHESIOLOGY

## 2023-05-17 PROCEDURE — 81025 URINE PREGNANCY TEST: CPT

## 2023-05-17 PROCEDURE — 6360000002 HC RX W HCPCS: Performed by: ANESTHESIOLOGY

## 2023-05-17 PROCEDURE — 7100000010 HC PHASE II RECOVERY - FIRST 15 MIN: Performed by: ANESTHESIOLOGY

## 2023-05-17 RX ORDER — MIDAZOLAM HYDROCHLORIDE 1 MG/ML
INJECTION INTRAMUSCULAR; INTRAVENOUS PRN
Status: DISCONTINUED | OUTPATIENT
Start: 2023-05-17 | End: 2023-05-17 | Stop reason: ALTCHOICE

## 2023-05-17 RX ORDER — FENTANYL CITRATE 50 UG/ML
INJECTION, SOLUTION INTRAMUSCULAR; INTRAVENOUS PRN
Status: DISCONTINUED | OUTPATIENT
Start: 2023-05-17 | End: 2023-05-17 | Stop reason: ALTCHOICE

## 2023-05-17 RX ORDER — METHYLPREDNISOLONE ACETATE 80 MG/ML
INJECTION, SUSPENSION INTRA-ARTICULAR; INTRALESIONAL; INTRAMUSCULAR; SOFT TISSUE PRN
Status: DISCONTINUED | OUTPATIENT
Start: 2023-05-17 | End: 2023-05-17 | Stop reason: ALTCHOICE

## 2023-05-17 RX ORDER — CHLORAL HYDRATE 500 MG
CAPSULE ORAL DAILY
COMMUNITY

## 2023-05-17 RX ORDER — BUPIVACAINE HYDROCHLORIDE 2.5 MG/ML
INJECTION, SOLUTION EPIDURAL; INFILTRATION; INTRACAUDAL PRN
Status: DISCONTINUED | OUTPATIENT
Start: 2023-05-17 | End: 2023-05-17 | Stop reason: ALTCHOICE

## 2023-05-17 RX ORDER — LIDOCAINE HYDROCHLORIDE 10 MG/ML
INJECTION, SOLUTION EPIDURAL; INFILTRATION; INTRACAUDAL; PERINEURAL PRN
Status: DISCONTINUED | OUTPATIENT
Start: 2023-05-17 | End: 2023-05-17 | Stop reason: ALTCHOICE

## 2023-05-17 ASSESSMENT — PAIN - FUNCTIONAL ASSESSMENT: PAIN_FUNCTIONAL_ASSESSMENT: 0-10

## 2023-05-17 ASSESSMENT — PAIN SCALES - GENERAL: PAINLEVEL_OUTOF10: 4

## 2023-05-17 NOTE — PRE SEDATION
Chart review done.  updated. Immunizations reviewed & updated. Care Everywhere updated.   JULIA SENT TO [facility] FOR THE PATIENT EYE EXAM FAX# [phone]    
PROCEDURE   See procedure note  SEDATION  Planned agent: Versed and Fentanyl  ASA Classification: 2  Class 1: A normal healthy patient  Class 2: Pt with mild to moderate systemic disease  Class 3: Severe systemic disease or disturbance  Class 4: Severe systemic disorders that are already life threatening. Class 5: Moribund pt with little chances of survival, for more than 24 hours. Mallampati I Airway Classification: 1    1. Pre-procedure diagnostic studies complete and results available. 2. Previous sedation/anesthesia experiences assessed. 3. The patient is an appropriate candidate to undergo the planned procedure sedation and anesthesia. (Refer to nursing sedation/analgesia documentation record)  4. Formulation and discussion of sedation/procedure plan, risks, and expectations with patient and/or responsible adult completed. 5. Patient examined immediately prior to the procedure.  (Refer to nursing sedation/analgesia documentation record)    Anand Perez DO  Electronically signed 5/17/2023 at 6:36 PM

## 2023-05-17 NOTE — PROGRESS NOTES
1026-  Patient arrived to phase II via cart. Spontaneous respiraitons even and unlabored. Placed on monitor--VSS. Report received from [facility].   9942-  Assessment completed. Patient is alert and oriented x4. IV capped off-- no complications. Patient states pain 5/10, but tolerable at this time. Injection sites clean and dry. 1030-  Snack and drink given to patient. Family called to come in.   [id number]  IV removed-- no complications. Bandage applied. 1050-  Patient dressed. 1053-  Patient discharged in stable condition with all belongings. This RN walked patient to car.

## 2023-05-17 NOTE — POST SEDATION
[facility]  Sedation/Analgesia Post Sedation Record    Pt Name: Fly Fuller  MRN: [id number]  YOB: 1995  Procedure Performed By: Anand Perez DO  Primary Care Physician: FIORDALIZA Garrido - ELIZABETH    POST-PROCEDURE    Physicians/Assistants: Anand Perez DO  Procedure Performed: See Procedure Note   Sedation/Anesthesia: Versed and Fentanyl (See procedure note for amount and duration)  Estimated Blood Loss:     0  ml  Specimens Removed: None        Complications: None           Mekhi Castro DO  Electronically signed 5/17/2023 at 6:36 PM

## 2023-06-09 ENCOUNTER — OFFICE VISIT (OUTPATIENT)
Dept: PHYSICAL MEDICINE AND REHAB | Age: 28
End: 2023-06-09

## 2023-06-09 VITALS
DIASTOLIC BLOOD PRESSURE: 58 MMHG | BODY MASS INDEX: 44.39 KG/M2 | HEIGHT: 64 IN | SYSTOLIC BLOOD PRESSURE: 96 MMHG | WEIGHT: 260 LBS

## 2023-06-09 DIAGNOSIS — G89.4 CHRONIC PAIN SYNDROME: ICD-10-CM

## 2023-06-09 DIAGNOSIS — M47.816 LUMBAR SPONDYLOSIS: Primary | ICD-10-CM

## 2023-06-09 DIAGNOSIS — M53.3 SI (SACROILIAC) JOINT DYSFUNCTION: ICD-10-CM

## 2023-06-09 RX ORDER — TRAMADOL HYDROCHLORIDE 50 MG/1
50 TABLET ORAL 2 TIMES DAILY PRN
Qty: 60 TABLET | Refills: 0 | Status: SHIPPED | OUTPATIENT
Start: 2023-06-09 | End: 2023-07-09

## 2023-06-09 NOTE — PROGRESS NOTES
spondylosis  C-Reactive Protein    Sedimentation Rate    Rheumatoid Factor    CHG FLUOR NEEDLE/CATH SPINE/PARASPINAL DX/THER ADDON    NC DSTR NROLYTC AGNT PARVERTEB FCT SNGL LMBR/SACRAL    NC DSTR NROLYTC AGNT PARVERTEB FCT ADDL LMBR/SACRAL    traMADol (ULTRAM) 50 MG tablet    MRI SACRUM COCCYX WO CONTRAST      2. SI (sacroiliac) joint dysfunction  C-Reactive Protein    Sedimentation Rate    Rheumatoid Factor    MRI SACRUM COCCYX WO CONTRAST      3. Chronic pain syndrome  C-Reactive Protein    Sedimentation Rate    Rheumatoid Factor    MRI SACRUM COCCYX WO CONTRAST          Karly ANDREW Ellis is a 32 y. o.female presenting to the pain clinic for evaluation of low back pain. Patient had a significant response to the bilateral lumbar facet RFA back in June 2023. The ablation has since wore off and I have set her up for repeat lumbar RFA, starting with the right side first then completing the left one week later. She did find the SI joint injections to be effective but it is difficult to determine her response due to the significance of her lumbar pain. I have continued her tramadol 50 mg twice daily as needed for severe pain (>7/10), while this has been effective in the past and she does not take consistently. I have also continued her baclofen 10 mg 3 times daily as needed. Patient encouraged to continue with weight loss efforts. Plan: The following treatment recommendations and plan were discussed in detail with Karly Yee. Imaging:   I have reviewed patients imaging of lumbar MRI and results were discussed with patient today. Analgesics:   I have started the patient on tramadol 50 mg twice daily as needed severe pain (>7/10). We discussed risk of tolerance and dependence to this medication. She is to not take more than prescribed. The side effect profile of long-term opioid use was discussed and recommended emphasis on multimodal strategies for pain management.      OARRS reviewed  Urine drug screen

## 2023-06-14 NOTE — FLOWSHEET NOTE
Discharge instructions given, all questions answered, patient and fiance voiced all understanding. Please see below messages.     Dose changed yesterday. Should labs still be checked at 4 weeks?     Routed to covering MD.

## 2023-06-21 NOTE — H&P
Today, patient presents for planned lumbar radiofrequency ablation at RIGHT L4-L5 and L5-S1    This note is reflective of the patient's previous visit for evaluation. We will proceed with today's planned procedure. Since patient's last visit for evaluation, there have been no interval changes in medical history. Patient has no new numbness, weakness, or focal neurological deficit since evaluation. Patient has no contraindications to injection (no anticoagulation or recent antibiotic intake for active infections), and has a  present or is able to drive themselves (as discussed and cleared by physician). Allergies to latex, contrast dye, and steroid medications have been confirmed with the patient prior to the procedure. NPO necessity has been assessed and accepted based on procedure complexity. The risks and benefits of the procedure have been explained including but are not limited to infection, bleeding, paralysis, immediate post procedure weakness, and dizziness; the patient acknowledges understanding and desires to proceed with the procedure. Patient has signed consent for same procedure as discussed in previous clinic encounter. All other questions and concerns were addressed at bedside. See procedure note for full details. Post procedure Instructions: The patient was advised not to drive during the day of the procedure and not to engage in any significant decision making (unless otherwise states by physician). The patient was also advised to be cautious with walking/activity for 24 hours following today's visit and asked not to engage in over-exertion (unless otherwise states by physician). After this time, it is ok to resume pre-procedure level of activity. Patient advised to apply ice to site of injection in situations of pain and discomfort. Patient advised to not submerge site of injection during bath or pool activities for approximately 24 hours post-procedure.  Patient attested to

## 2023-06-21 NOTE — DISCHARGE INSTRUCTIONS
Post procedure Instructions:    No driving or making significant decisions for the remainder of the day. Be cautions with walking and activity for 24 hours, do not over exert yourself. Ok to resume pre-procedure activity level today. Apply ice to site of injection site if you have pain or discomfort. Do not submerge sit of injection during bath or pool activities for 48 hours post-procedure. Resume blood thinning medications in 24 hours. Call office [phone] if you have:  Temperature greater than 100.4  Persistent nausea and vomiting  Severe uncontrolled pain  Redness, tenderness, or signs of infection (pain, swelling, redness, odor or green/yellow discharge around the site)  Difficulty breathing, headache or visual disturbances  Hives  Persistent dizziness or light-headedness  Extreme fatigue  Any other questions or concerns you may have after discharge    In an emergency, call 911 or go to an Emergency Department at a nearby hospital    Surgical Site Infections      How can we work together to prevent Surgical Site Infections? We would like to thank you for choosing [facility] for your Surgical Care. Below you will find helpful information on how we can work together to prevent Surgical Site Infections. What is a Surgical Site Infection (SSI)? A surgical site infection is an infection that occurs after surgery in the part of the body where the surgery took place. Most patients who have surgery do not develop an infection. However, infections develop in about 1 to 3 out of every 100 patients who have surgery. Some of the common symptoms of a surgical site infection are:  Redness and pain around the area where you had surgery  Drainage of cloudy fluid from your surgical wound  Fever    Can SSIs be treated? Yes. Most surgical site infections can be treated with antibiotics. The antibiotic given to you depends on the bacteria (germs) causing the infection.  Sometimes patients with

## 2023-06-22 ENCOUNTER — APPOINTMENT (OUTPATIENT)
Dept: GENERAL RADIOLOGY | Age: 28
End: 2023-06-22
Attending: ANESTHESIOLOGY
Payer: MEDICARE

## 2023-06-22 ENCOUNTER — HOSPITAL ENCOUNTER (OUTPATIENT)
Age: 28
Setting detail: OUTPATIENT SURGERY
Discharge: HOME | End: 2023-06-22
Attending: ANESTHESIOLOGY | Admitting: ANESTHESIOLOGY
Payer: MEDICARE

## 2023-06-22 VITALS
SYSTOLIC BLOOD PRESSURE: 129 MMHG | HEIGHT: 64 IN | DIASTOLIC BLOOD PRESSURE: 74 MMHG | BODY MASS INDEX: 44.73 KG/M2 | HEART RATE: 60 BPM | WEIGHT: 262 LBS | RESPIRATION RATE: 17 BRPM | TEMPERATURE: 97.2 F | OXYGEN SATURATION: 95 %

## 2023-06-22 LAB — PREGNANCY, URINE: NEGATIVE

## 2023-06-22 PROCEDURE — 2500000003 HC RX 250 WO HCPCS: Performed by: ANESTHESIOLOGY

## 2023-06-22 PROCEDURE — 6360000002 HC RX W HCPCS: Performed by: ANESTHESIOLOGY

## 2023-06-22 PROCEDURE — 64636 DESTROY L/S FACET JNT ADDL: CPT | Performed by: ANESTHESIOLOGY

## 2023-06-22 PROCEDURE — 2709999900 HC NON-CHARGEABLE SUPPLY: Performed by: ANESTHESIOLOGY

## 2023-06-22 PROCEDURE — 3209999900 FLUORO FOR SURGICAL PROCEDURES

## 2023-06-22 PROCEDURE — 7100000010 HC PHASE II RECOVERY - FIRST 15 MIN: Performed by: ANESTHESIOLOGY

## 2023-06-22 PROCEDURE — 7100000011 HC PHASE II RECOVERY - ADDTL 15 MIN: Performed by: ANESTHESIOLOGY

## 2023-06-22 PROCEDURE — 99152 MOD SED SAME PHYS/QHP 5/>YRS: CPT | Performed by: ANESTHESIOLOGY

## 2023-06-22 PROCEDURE — 81025 URINE PREGNANCY TEST: CPT

## 2023-06-22 PROCEDURE — 3600000055 HC PAIN LEVEL 3 ADDL 15 MIN: Performed by: ANESTHESIOLOGY

## 2023-06-22 PROCEDURE — 3600000054 HC PAIN LEVEL 3 BASE: Performed by: ANESTHESIOLOGY

## 2023-06-22 PROCEDURE — 64635 DESTROY LUMB/SAC FACET JNT: CPT | Performed by: ANESTHESIOLOGY

## 2023-06-22 RX ORDER — DEXAMETHASONE SODIUM PHOSPHATE 4 MG/ML
INJECTION, SOLUTION INTRA-ARTICULAR; INTRALESIONAL; INTRAMUSCULAR; INTRAVENOUS; SOFT TISSUE PRN
Status: DISCONTINUED | OUTPATIENT
Start: 2023-06-22 | End: 2023-06-22 | Stop reason: ALTCHOICE

## 2023-06-22 RX ORDER — LIDOCAINE HYDROCHLORIDE 20 MG/ML
INJECTION, SOLUTION EPIDURAL; INFILTRATION; INTRACAUDAL; PERINEURAL PRN
Status: DISCONTINUED | OUTPATIENT
Start: 2023-06-22 | End: 2023-06-22 | Stop reason: ALTCHOICE

## 2023-06-22 RX ORDER — FENTANYL CITRATE 50 UG/ML
INJECTION, SOLUTION INTRAMUSCULAR; INTRAVENOUS PRN
Status: DISCONTINUED | OUTPATIENT
Start: 2023-06-22 | End: 2023-06-22 | Stop reason: ALTCHOICE

## 2023-06-22 RX ORDER — MIDAZOLAM HYDROCHLORIDE 1 MG/ML
INJECTION INTRAMUSCULAR; INTRAVENOUS PRN
Status: DISCONTINUED | OUTPATIENT
Start: 2023-06-22 | End: 2023-06-22 | Stop reason: ALTCHOICE

## 2023-06-22 RX ORDER — LIDOCAINE HYDROCHLORIDE 10 MG/ML
INJECTION, SOLUTION EPIDURAL; INFILTRATION; INTRACAUDAL; PERINEURAL PRN
Status: DISCONTINUED | OUTPATIENT
Start: 2023-06-22 | End: 2023-06-22 | Stop reason: ALTCHOICE

## 2023-06-22 ASSESSMENT — PAIN - FUNCTIONAL ASSESSMENT: PAIN_FUNCTIONAL_ASSESSMENT: 0-10

## 2023-06-22 ASSESSMENT — PAIN SCALES - GENERAL: PAINLEVEL_OUTOF10: 0

## 2023-06-22 NOTE — PROGRESS NOTES
1134- Patient to Phase II via cart. Report received from Jerardo RN. Patient responsive. Vitals obtained and stable. Respirations even and unlabored on room air. Patient denies pain, nausea, numbness and tingling. Patient able to move all extremities. No drainage noted at injection sites. Patient instructed to stay in bed. Instructed on call light use. 1140- Pt eating and drinking at this time. 1144- Pt's IV removed at this time. 1147- Pt getting changed in bed at this time    1157- Patient meets discharge criteria. Discharged in stable condition with responsible . All belongings given to patient. Patient ambulated to car with assistance from RN. Patient tolerated well.

## 2023-06-22 NOTE — PROCEDURES
Pre-operative Diagnosis: Lumbar facet pain     Post-operative Diagnosis: Lumbar facet pain     Procedure: RIGHT lumbar thermal radiofrequency ablation targeting facet joints L4/L5 and L5/S1    Procedure Description:  After consent was obtained, the patient was placed in the prone position with a pillow under the abdomen to reduce the lordotic curve of the lumbar spine. The lower back was prepped with chloraprep and draped in a sterile fashion. Then, 0.5 cc of 1 % lidocaine was used for local anesthesia of the skin and superficial subcutaneous tissues. Three 20-gauge 100mm SMK cannulas with 10-mm active tips were advanced under fluoroscopic guidance in an AP view to the junction of the superior articular process and the transverse process of the L4 and L5 vertebra and at the sacral ala. There were no paresthesias, heme or CSF obtained. Needle placement was confirmed using AP and oblique views. Sensory and motor stimulation at 50Hz and 2Hz and impedance measurements were carried out having reached threshold at:     RIGHT  L4: 0.2V/3V/150-300 Ohms  L5: 0.2V/3V/150-300 Ohms  SA: 0.2V/3V/150-300 Ohms     Then, 1cc of 2% Lidocaine was injected at the site. Temperature was then raised to 80 degrees centigrade for 90 seconds with a 15 second temperature ramp. No pain was reported during the lesioning. The needles were then withdrawn without complications. The patient tolerated the procedure well. The patient was transported to the recovery room and discharged in ambulatory fashion.     Procedural Complications: None  Estimated Blood Loss: 0 mL    IV sedation was used during the procedure:  - Moderate intravenous conscious sedation was supervised by Dr. Kenyatta Davis  - The patient was independently monitored by a Registered Nurse assigned to the procedure room  - Monitoring included automated blood pressure, continuous EKG, and continuous pulse oximetry  - The detailed conscious record is permanently stored in the

## 2023-06-22 NOTE — POST SEDATION
[facility]  Sedation/Analgesia Post Sedation Record    Pt Name: Deja Noe  MRN: [id number]  YOB: 1995  Procedure Performed By: Michelle Vang DO  Primary Care Physician: FIORDALIZA Granado - ELIZABETH    POST-PROCEDURE    Physicians/Assistants: Michelle Vang DO  Procedure Performed: See Procedure Note   Sedation/Anesthesia: Versed and Fentanyl (See procedure note for amount and duration)  Estimated Blood Loss:     0  ml  Specimens Removed: None        Complications: None           Mekhi Grossman DO  Electronically signed 6/22/2023 at 1:29 PM

## 2023-06-22 NOTE — PRE SEDATION
[facility]  Pre-Sedation/Analgesia History & Physical    Pt Name: Rosi Pleitez  MRN: [id number]  YOB: 1995  Provider Performing Procedure: Micaela Garcia DO   Primary Care Physician: FIORDALIZA Yuan CNP      MEDICAL HISTORY       has a past medical history of COVID-19, Heart abnormality, Hypertension, Ovarian cyst, and Scoliosis. SURGICAL HISTORY   has a past surgical history that includes Tonsillectomy; Adenoidectomy; back surgery (2006 and 2009, );  section (N/A, 2020); Pain management procedure (Bilateral, 2022); Pain management procedure (Bilateral, 2022); Pain management procedure (Right, 2022); Pain management procedure (Left, 2022);  section (N/A, 3/11/2023); and Back Injection (Bilateral, 2023). ALLERGIES   Allergies as of 2023 - Fully Reviewed 2023   Allergen Reaction Noted    Gabapentin Hives 2020       MEDICATIONS   Prior to Admission medications    Medication Sig Start Date End Date Taking? Authorizing Provider   Probiotic Product (DAILY PROBIOTIC PO) Take by mouth    Historical Provider, MD   traMADol (ULTRAM) 50 MG tablet Take 1 tablet by mouth 2 times daily as needed for Pain for up to 30 days.  Max Daily Amount: 100 mg 23  FIORDALIZA Pearson CNP   Omega-3 Fatty Acids (FISH OIL) 1000 MG capsule Take by mouth daily    Historical Provider, MD   magnesium oxide (MAG-OX) 400 (240 Mg) MG tablet Take 250 mg by mouth daily    Historical Provider, MD   baclofen (LIORESAL) 10 MG tablet Take 1 tablet by mouth 3 times daily as needed (pain) 23   FIORDALIZA Pearson CNP   Prenatal Vit-Fe Fumarate-FA (PRENATAL 1+1 PO) Take by mouth    Historical Provider, MD   acetaminophen (TYLENOL) 500 MG tablet Take 1 tablet by mouth every 6 hours as needed for Pain    Historical Provider, MD     PHYSICAL:   Vitals:    23 1134   BP: 129/74   Pulse: 60   Resp: 17   Temp: 97.2 °F (36.2 °C)

## 2023-06-27 ENCOUNTER — APPOINTMENT (OUTPATIENT)
Dept: GENERAL RADIOLOGY | Age: 28
End: 2023-06-27
Attending: ANESTHESIOLOGY
Payer: MEDICARE

## 2023-06-27 ENCOUNTER — HOSPITAL ENCOUNTER (OUTPATIENT)
Age: 28
Setting detail: OUTPATIENT SURGERY
Discharge: HOME | End: 2023-06-27
Attending: ANESTHESIOLOGY | Admitting: ANESTHESIOLOGY
Payer: MEDICARE

## 2023-06-27 VITALS
WEIGHT: 259 LBS | BODY MASS INDEX: 44.22 KG/M2 | RESPIRATION RATE: 16 BRPM | OXYGEN SATURATION: 95 % | SYSTOLIC BLOOD PRESSURE: 120 MMHG | DIASTOLIC BLOOD PRESSURE: 60 MMHG | HEART RATE: 80 BPM | HEIGHT: 64 IN | TEMPERATURE: 96.4 F

## 2023-06-27 LAB — PREGNANCY, URINE: NEGATIVE

## 2023-06-27 PROCEDURE — 7100000011 HC PHASE II RECOVERY - ADDTL 15 MIN: Performed by: ANESTHESIOLOGY

## 2023-06-27 PROCEDURE — 64635 DESTROY LUMB/SAC FACET JNT: CPT | Performed by: ANESTHESIOLOGY

## 2023-06-27 PROCEDURE — 3209999900 FLUORO FOR SURGICAL PROCEDURES

## 2023-06-27 PROCEDURE — 81025 URINE PREGNANCY TEST: CPT

## 2023-06-27 PROCEDURE — 3600000054 HC PAIN LEVEL 3 BASE: Performed by: ANESTHESIOLOGY

## 2023-06-27 PROCEDURE — 6360000002 HC RX W HCPCS: Performed by: ANESTHESIOLOGY

## 2023-06-27 PROCEDURE — 2709999900 HC NON-CHARGEABLE SUPPLY: Performed by: ANESTHESIOLOGY

## 2023-06-27 PROCEDURE — 3600000055 HC PAIN LEVEL 3 ADDL 15 MIN: Performed by: ANESTHESIOLOGY

## 2023-06-27 PROCEDURE — 99152 MOD SED SAME PHYS/QHP 5/>YRS: CPT | Performed by: ANESTHESIOLOGY

## 2023-06-27 PROCEDURE — 7100000010 HC PHASE II RECOVERY - FIRST 15 MIN: Performed by: ANESTHESIOLOGY

## 2023-06-27 PROCEDURE — 2500000003 HC RX 250 WO HCPCS: Performed by: ANESTHESIOLOGY

## 2023-06-27 PROCEDURE — 64636 DESTROY L/S FACET JNT ADDL: CPT | Performed by: ANESTHESIOLOGY

## 2023-06-27 RX ORDER — LIDOCAINE HYDROCHLORIDE 10 MG/ML
INJECTION, SOLUTION EPIDURAL; INFILTRATION; INTRACAUDAL; PERINEURAL PRN
Status: DISCONTINUED | OUTPATIENT
Start: 2023-06-27 | End: 2023-06-27 | Stop reason: ALTCHOICE

## 2023-06-27 RX ORDER — LIDOCAINE HYDROCHLORIDE 20 MG/ML
INJECTION, SOLUTION EPIDURAL; INFILTRATION; INTRACAUDAL; PERINEURAL PRN
Status: DISCONTINUED | OUTPATIENT
Start: 2023-06-27 | End: 2023-06-27 | Stop reason: ALTCHOICE

## 2023-06-27 RX ORDER — MIDAZOLAM HYDROCHLORIDE 1 MG/ML
INJECTION INTRAMUSCULAR; INTRAVENOUS PRN
Status: DISCONTINUED | OUTPATIENT
Start: 2023-06-27 | End: 2023-06-27 | Stop reason: ALTCHOICE

## 2023-06-27 RX ORDER — FENTANYL CITRATE 50 UG/ML
INJECTION, SOLUTION INTRAMUSCULAR; INTRAVENOUS PRN
Status: DISCONTINUED | OUTPATIENT
Start: 2023-06-27 | End: 2023-06-27 | Stop reason: ALTCHOICE

## 2023-06-27 ASSESSMENT — PAIN - FUNCTIONAL ASSESSMENT: PAIN_FUNCTIONAL_ASSESSMENT: 0-10

## 2023-06-28 ENCOUNTER — HOSPITAL ENCOUNTER (OUTPATIENT)
Dept: MRI IMAGING | Age: 28
Discharge: HOME | End: 2023-06-28
Payer: MEDICARE

## 2023-06-28 ENCOUNTER — HOSPITAL ENCOUNTER (OUTPATIENT)
Age: 28
Discharge: HOME | End: 2023-06-28
Payer: MEDICARE

## 2023-06-28 DIAGNOSIS — M47.816 LUMBAR SPONDYLOSIS: ICD-10-CM

## 2023-06-28 DIAGNOSIS — M53.3 SI (SACROILIAC) JOINT DYSFUNCTION: ICD-10-CM

## 2023-06-28 DIAGNOSIS — G89.4 CHRONIC PAIN SYNDROME: ICD-10-CM

## 2023-06-28 LAB
CRP SERPL-MCNC: < 0.3 MG/DL (ref 0–1)
ERYTHROCYTE [SEDIMENTATION RATE] IN BLOOD BY WESTERGREN METHOD: 19 MM/HR (ref 0–20)
RHEUMATOID FACT SERPL-ACNC: 11 IU/ML (ref 0–13)

## 2023-06-28 PROCEDURE — 85651 RBC SED RATE NONAUTOMATED: CPT

## 2023-06-28 PROCEDURE — 36415 COLL VENOUS BLD VENIPUNCTURE: CPT

## 2023-06-28 PROCEDURE — 86140 C-REACTIVE PROTEIN: CPT

## 2023-06-28 PROCEDURE — 86430 RHEUMATOID FACTOR TEST QUAL: CPT

## 2023-06-28 PROCEDURE — 72195 MRI PELVIS W/O DYE: CPT

## 2023-06-30 ENCOUNTER — TELEPHONE (OUTPATIENT)
Dept: PHYSICAL MEDICINE AND REHAB | Age: 28
End: 2023-06-30

## 2023-07-11 ENCOUNTER — OFFICE VISIT (OUTPATIENT)
Dept: PHYSICAL MEDICINE AND REHAB | Age: 28
End: 2023-07-11
Payer: MEDICARE

## 2023-07-11 VITALS
BODY MASS INDEX: 44.22 KG/M2 | HEIGHT: 64 IN | DIASTOLIC BLOOD PRESSURE: 64 MMHG | SYSTOLIC BLOOD PRESSURE: 116 MMHG | WEIGHT: 259 LBS

## 2023-07-11 DIAGNOSIS — M79.18 MYOFASCIAL PAIN: ICD-10-CM

## 2023-07-11 DIAGNOSIS — M53.3 SACROILIAC JOINT DYSFUNCTION OF BOTH SIDES: ICD-10-CM

## 2023-07-11 DIAGNOSIS — M54.17 RADICULOPATHY, LUMBOSACRAL REGION: Primary | ICD-10-CM

## 2023-07-11 DIAGNOSIS — M79.2 NEUROPATHIC PAIN: ICD-10-CM

## 2023-07-11 DIAGNOSIS — M47.816 LUMBAR SPONDYLOSIS: ICD-10-CM

## 2023-07-11 DIAGNOSIS — M53.3 SCLEROSIS OF SACROILIAC JOINT: ICD-10-CM

## 2023-07-11 PROCEDURE — 99214 OFFICE O/P EST MOD 30 MIN: CPT | Performed by: NURSE PRACTITIONER

## 2023-07-11 PROCEDURE — 20552 NJX 1/MLT TRIGGER POINT 1/2: CPT | Performed by: NURSE PRACTITIONER

## 2023-07-11 PROCEDURE — G8417 CALC BMI ABV UP PARAM F/U: HCPCS | Performed by: NURSE PRACTITIONER

## 2023-07-11 PROCEDURE — 1036F TOBACCO NON-USER: CPT | Performed by: NURSE PRACTITIONER

## 2023-07-11 PROCEDURE — G8427 DOCREV CUR MEDS BY ELIG CLIN: HCPCS | Performed by: NURSE PRACTITIONER

## 2023-07-11 PROCEDURE — 77002 NEEDLE LOCALIZATION BY XRAY: CPT | Performed by: NURSE PRACTITIONER

## 2023-07-11 RX ORDER — METHOCARBAMOL 100 MG/ML
100 INJECTION, SOLUTION INTRAMUSCULAR; INTRAVENOUS ONCE
Status: COMPLETED | OUTPATIENT
Start: 2023-07-11 | End: 2023-07-11

## 2023-07-11 RX ADMIN — METHOCARBAMOL 100 MG: 100 INJECTION, SOLUTION INTRAMUSCULAR; INTRAVENOUS at 12:05

## 2023-07-26 ENCOUNTER — TELEPHONE (OUTPATIENT)
Dept: PHYSICAL MEDICINE AND REHAB | Age: 28
End: 2023-07-26

## 2023-08-09 NOTE — H&P
Today, patient presents for planned lumbar epidural steroid injection approach at lumbar 4/5    This note is reflective of the patient's previous visit for evaluation. We will proceed with today's planned procedure. Since patient's last visit for evaluation, there have been no interval changes in medical history. Patient has no new numbness, weakness, or focal neurological deficit since evaluation. Patient has no contraindications to injection (no anticoagulation or recent antibiotic intake for active infections), and has a  present or is able to drive themselves (as discussed and cleared by physician). Allergies to latex, contrast dye, and steroid medications have been confirmed with the patient prior to the procedure. NPO necessity has been assessed and accepted based on procedure complexity. The risks and benefits of the procedure have been explained including but are not limited to infection, bleeding, paralysis, immediate post procedure weakness, and dizziness; the patient acknowledges understanding and desires to proceed with the procedure. Patient has signed consent for same procedure as discussed in previous clinic encounter. All other questions and concerns were addressed at bedside. See procedure note for full details. Post procedure Instructions: The patient was advised not to drive during the day of the procedure and not to engage in any significant decision making (unless otherwise states by physician). The patient was also advised to be cautious with walking/activity for 24 hours following today's visit and asked not to engage in over-exertion (unless otherwise states by physician). After this time, it is ok to resume pre-procedure level of activity. Patient advised to apply ice to site of injection in situations of pain and discomfort. Patient advised to not submerge site of injection during bath or pool activities for approximately 24 hours post-procedure.  Patient attested to

## 2023-08-10 ENCOUNTER — HOSPITAL ENCOUNTER (OUTPATIENT)
Age: 28
Setting detail: OUTPATIENT SURGERY
Discharge: HOME | End: 2023-08-10
Attending: ANESTHESIOLOGY | Admitting: ANESTHESIOLOGY
Payer: MEDICARE

## 2023-08-10 ENCOUNTER — APPOINTMENT (OUTPATIENT)
Dept: GENERAL RADIOLOGY | Age: 28
End: 2023-08-10
Attending: ANESTHESIOLOGY
Payer: MEDICARE

## 2023-08-10 VITALS
SYSTOLIC BLOOD PRESSURE: 120 MMHG | RESPIRATION RATE: 16 BRPM | DIASTOLIC BLOOD PRESSURE: 71 MMHG | BODY MASS INDEX: 44.05 KG/M2 | OXYGEN SATURATION: 96 % | WEIGHT: 258 LBS | TEMPERATURE: 97.4 F | HEIGHT: 64 IN | HEART RATE: 72 BPM

## 2023-08-10 LAB — PREGNANCY, URINE: NEGATIVE

## 2023-08-10 PROCEDURE — 3600000054 HC PAIN LEVEL 3 BASE: Performed by: ANESTHESIOLOGY

## 2023-08-10 PROCEDURE — 81025 URINE PREGNANCY TEST: CPT

## 2023-08-10 PROCEDURE — 6360000002 HC RX W HCPCS: Performed by: ANESTHESIOLOGY

## 2023-08-10 PROCEDURE — 99152 MOD SED SAME PHYS/QHP 5/>YRS: CPT | Performed by: ANESTHESIOLOGY

## 2023-08-10 PROCEDURE — 2500000003 HC RX 250 WO HCPCS: Performed by: ANESTHESIOLOGY

## 2023-08-10 PROCEDURE — 2709999900 HC NON-CHARGEABLE SUPPLY: Performed by: ANESTHESIOLOGY

## 2023-08-10 PROCEDURE — 62323 NJX INTERLAMINAR LMBR/SAC: CPT | Performed by: ANESTHESIOLOGY

## 2023-08-10 PROCEDURE — 7100000011 HC PHASE II RECOVERY - ADDTL 15 MIN: Performed by: ANESTHESIOLOGY

## 2023-08-10 PROCEDURE — 6360000004 HC RX CONTRAST MEDICATION: Performed by: ANESTHESIOLOGY

## 2023-08-10 PROCEDURE — 7100000010 HC PHASE II RECOVERY - FIRST 15 MIN: Performed by: ANESTHESIOLOGY

## 2023-08-10 RX ORDER — ACYCLOVIR 200 MG/1
CAPSULE ORAL PRN
Status: DISCONTINUED | OUTPATIENT
Start: 2023-08-10 | End: 2023-08-10 | Stop reason: ALTCHOICE

## 2023-08-10 RX ORDER — MIDAZOLAM HYDROCHLORIDE 1 MG/ML
INJECTION INTRAMUSCULAR; INTRAVENOUS PRN
Status: DISCONTINUED | OUTPATIENT
Start: 2023-08-10 | End: 2023-08-10 | Stop reason: ALTCHOICE

## 2023-08-10 RX ORDER — DEXAMETHASONE SODIUM PHOSPHATE 4 MG/ML
INJECTION, SOLUTION INTRA-ARTICULAR; INTRALESIONAL; INTRAMUSCULAR; INTRAVENOUS; SOFT TISSUE PRN
Status: DISCONTINUED | OUTPATIENT
Start: 2023-08-10 | End: 2023-08-10 | Stop reason: ALTCHOICE

## 2023-08-10 RX ORDER — LIDOCAINE HYDROCHLORIDE 10 MG/ML
INJECTION, SOLUTION EPIDURAL; INFILTRATION; INTRACAUDAL; PERINEURAL PRN
Status: DISCONTINUED | OUTPATIENT
Start: 2023-08-10 | End: 2023-08-10 | Stop reason: ALTCHOICE

## 2023-08-10 RX ORDER — FENTANYL CITRATE 50 UG/ML
INJECTION, SOLUTION INTRAMUSCULAR; INTRAVENOUS PRN
Status: DISCONTINUED | OUTPATIENT
Start: 2023-08-10 | End: 2023-08-10 | Stop reason: ALTCHOICE

## 2023-08-10 ASSESSMENT — PAIN - FUNCTIONAL ASSESSMENT: PAIN_FUNCTIONAL_ASSESSMENT: 0-10

## 2023-08-10 ASSESSMENT — PAIN SCALES - GENERAL: PAINLEVEL_OUTOF10: 4

## 2023-08-10 NOTE — PROGRESS NOTES
1436: Patient arrives to recovery room via cart. Spontaneous respirations, vss, report received from surgical RN. Patient denies pain, numbness, tingling , nausea. Injection site clean, dry, intact. HOB elevated. IV capped. Snack and drink given. Call light within reach. 1443: IV removed. Patient getting dressed, denies needs. Ride called. 1451: patient ambulated to discharge lobby in stable condition. Patient discharged home with Dad.

## 2023-08-11 NOTE — POST SEDATION
[facility]  Sedation/Analgesia Post Sedation Record    Pt Name: Rey Bowman  MRN: [id number]  YOB: 1995  Procedure Performed By: Adrianne Pickard DO  Primary Care Physician: Silvana Ovalle APRN - CNP    POST-PROCEDURE    Physicians/Assistants: Adrianne Pickard DO  Procedure Performed: See Procedure Note   Sedation/Anesthesia: Versed and Fentanyl (See procedure note for amount and duration)  Estimated Blood Loss:     0  ml  Specimens Removed: None        Complications: None           Mekhi Boogie DO  Electronically signed 8/10/2023 at 8:28 PM

## 2023-08-11 NOTE — PROCEDURES
Pre-operative Diagnosis: Radicular leg pain     Post-operative Diagnosis: Radicular leg pain     Procedure: Lumbar epidural steroid injection    Procedure Description:  After having obtained a signed informed consent, the patient was taken to the fluoroscopy suite and placed in the prone position. The patient's back was prepped with chloraprep and draped in a sterile fashion. A total of 1.5 cc of 1 % lidocaine was used to anesthetize the skin and underlying tissues. Under fluoroscopic guidance, a single 20G Tuohy needle was advanced using midline approach at the L4/L5 interspace until gaining the epidural space using the loss of resistance to saline syringe technique. There were no paresthesias, heme, or CSF aspiration. A total of 0.25 cc of Omnipaque 300 were injected having had adequate dye spread within the epidural space. Needle placement and contrast spread was confirmed using the AP and contralateral views. 10 mg of Dexamethasone with 1 cc of saline solution were injected in the epidural space. The needle was flushed and removed without any complication. The patient tolerated the procedure well and was transported to the recovery room. The patient was observed for 15 minutes to then discharged in an ambulatory fashion.     Procedural Complications: None  Estimated Blood Loss: 0 mL      IV sedation was used during the procedure:  - Moderate intravenous conscious sedation was supervised by Dr. Skylar Gallagher  - The patient was independently monitored by a Registered Nurse assigned to the procedure room  - Monitoring included automated blood pressure, continuous EKG, and continuous pulse oximetry  - The detailed conscious record is permanently stored in the [address]  - The following is the conscious sedation record:  Start Time: 14:29  End Time : 14:44  Duration: 15 minutes   Medications Administered: 2 mg Versed, 50 mcg Fentanyl       Mekhi Muller DO  Interventional Pain Management/PM&R [address] [address]

## 2023-08-24 ENCOUNTER — OFFICE VISIT (OUTPATIENT)
Dept: PHYSICAL MEDICINE AND REHAB | Age: 28
End: 2023-08-24

## 2023-08-24 VITALS
BODY MASS INDEX: 44.05 KG/M2 | WEIGHT: 258 LBS | DIASTOLIC BLOOD PRESSURE: 68 MMHG | HEIGHT: 64 IN | SYSTOLIC BLOOD PRESSURE: 116 MMHG

## 2023-08-24 DIAGNOSIS — M54.17 RADICULOPATHY, LUMBOSACRAL REGION: ICD-10-CM

## 2023-08-24 DIAGNOSIS — M79.18 MYOFASCIAL PAIN: Primary | ICD-10-CM

## 2023-08-24 DIAGNOSIS — M47.816 LUMBAR SPONDYLOSIS: ICD-10-CM

## 2023-08-24 DIAGNOSIS — G89.4 CHRONIC PAIN SYNDROME: ICD-10-CM

## 2023-08-24 DIAGNOSIS — M53.3 SCLEROSIS OF SACROILIAC JOINT: ICD-10-CM

## 2023-08-24 RX ORDER — BACLOFEN 10 MG/1
10 TABLET ORAL 3 TIMES DAILY PRN
Qty: 90 TABLET | Refills: 0 | Status: SHIPPED | OUTPATIENT
Start: 2023-08-24

## 2023-08-24 RX ORDER — TRAMADOL HYDROCHLORIDE 50 MG/1
50 TABLET ORAL 2 TIMES DAILY PRN
Qty: 60 TABLET | Refills: 0 | Status: SHIPPED | OUTPATIENT
Start: 2023-08-24 | End: 2023-09-23

## 2023-09-22 NOTE — PROGRESS NOTES
(2 VIEWS); Future  3. Cervicalgia  -     Quantiferon TB Gold; Future  -     C-Reactive Protein; Future  -     Comprehensive Metabolic Panel; Future  -     CBC with Auto Differential; Future  -     Sedimentation Rate; Future  -     Hepatitis Panel, Acute; Future  -     HLA-B27 Antigen; Future  -     XR Cervical Spine 2 or 3 VW; Future  -     XR THORACIC SPINE (2 VIEWS); Future  4. Paresthesia of both hands  5. H/O pre-eclampsia  -     Miscellaneous Sendout; Future  -     Anti SSA; Future  -     Anti SSB; Future  -     Lupus Anticoagulant; Future  -     Cardiolipin Antibodies IgG & IgM; Future  -     Miscellaneous Sendout; Future        No follow-ups on file. Electronically signed by Diana Montalvo DO on 9/22/2023 at 7:37 AM    New Prescriptions    No medications on file       9/22/2023       The risks and benefits of my recommendations, as well as other treatment options, benefits and side effects were discussed with the patient today. Questions were answered. Thank you for allowing me to participate in the care of this patient. Please call if there are any questions.

## 2023-09-27 ENCOUNTER — OFFICE VISIT (OUTPATIENT)
Dept: RHEUMATOLOGY | Age: 28
End: 2023-09-27
Payer: MEDICARE

## 2023-09-27 VITALS
DIASTOLIC BLOOD PRESSURE: 68 MMHG | OXYGEN SATURATION: 99 % | WEIGHT: 257.94 LBS | HEART RATE: 60 BPM | BODY MASS INDEX: 44.04 KG/M2 | SYSTOLIC BLOOD PRESSURE: 120 MMHG | HEIGHT: 64 IN

## 2023-09-27 DIAGNOSIS — G89.29 CHRONIC MIDLINE LOW BACK PAIN WITH RIGHT-SIDED SCIATICA: ICD-10-CM

## 2023-09-27 DIAGNOSIS — R53.83 OTHER FATIGUE: ICD-10-CM

## 2023-09-27 DIAGNOSIS — M53.3 SCLEROSIS OF SACROILIAC JOINT: ICD-10-CM

## 2023-09-27 DIAGNOSIS — M54.41 CHRONIC MIDLINE LOW BACK PAIN WITH RIGHT-SIDED SCIATICA: ICD-10-CM

## 2023-09-27 DIAGNOSIS — Z87.59 H/O PRE-ECLAMPSIA: ICD-10-CM

## 2023-09-27 DIAGNOSIS — M54.2 CERVICALGIA: ICD-10-CM

## 2023-09-27 DIAGNOSIS — M41.116 JUVENILE IDIOPATHIC SCOLIOSIS OF LUMBAR REGION: Primary | ICD-10-CM

## 2023-09-27 DIAGNOSIS — R20.2 PARESTHESIA OF BOTH HANDS: ICD-10-CM

## 2023-09-27 DIAGNOSIS — M46.1 DEGENERATIVE JOINT DISEASE OF SACROILIAC JOINT (HCC): ICD-10-CM

## 2023-09-27 LAB
ALBUMIN SERPL-MCNC: 4.5 G/DL
ALP BLD-CCNC: 55 U/L
ALT SERPL-CCNC: 27 U/L
ANION GAP SERPL CALCULATED.3IONS-SCNC: 14 MMOL/L
AST SERPL-CCNC: 26 U/L
BASOPHILS ABSOLUTE: 0.04 /ΜL
BASOPHILS RELATIVE PERCENT: 0.6 %
BILIRUB SERPL-MCNC: 0.3 MG/DL (ref 0.1–1.4)
BUN BLDV-MCNC: 8 MG/DL
C-REACTIVE PROTEIN: 5.6
CALCIUM SERPL-MCNC: 9.5 MG/DL
CHLORIDE BLD-SCNC: 104 MMOL/L
CO2: 23 MMOL/L
CREAT SERPL-MCNC: 0.6 MG/DL
EGFR: 125
EOSINOPHILS ABSOLUTE: 0.14 /ΜL
EOSINOPHILS RELATIVE PERCENT: 2 %
GLUCOSE BLD-MCNC: 92 MG/DL
HCT VFR BLD CALC: 38.5 % (ref 36–46)
HEMOGLOBIN: 12.9 G/DL (ref 12–16)
LYMPHOCYTES ABSOLUTE: 2.03 /ΜL
LYMPHOCYTES RELATIVE PERCENT: 29.6 %
MCH RBC QN AUTO: 28.7 PG
MCHC RBC AUTO-ENTMCNC: 33.5 G/DL
MCV RBC AUTO: 85.7 FL
MONOCYTES ABSOLUTE: 0.33 /ΜL
MONOCYTES RELATIVE PERCENT: 4.8 %
NEUTROPHILS ABSOLUTE: 4.29 /ΜL
NEUTROPHILS RELATIVE PERCENT: 62.7 %
PDW BLD-RTO: 39 %
PLATELET # BLD: 363 K/ΜL
PMV BLD AUTO: 9.6 FL
POTASSIUM SERPL-SCNC: 3.5 MMOL/L
QUANTI TB GOLD PLUS: NEGATIVE
QUANTI TB1 MINUS NIL: 0.35
QUANTI TB2 MINUS NIL: 0.35
QUANTIFERON MITOGEN: NORMAL
QUANTIFERON NIL: NORMAL
RBC # BLD: 4.49 10^6/ΜL
SEDIMENTATION RATE, ERYTHROCYTE: 13
SODIUM BLD-SCNC: 137 MMOL/L
TOTAL PROTEIN: 8
WBC # BLD: 6.85 10^3/ML

## 2023-09-27 PROCEDURE — G8427 DOCREV CUR MEDS BY ELIG CLIN: HCPCS | Performed by: INTERNAL MEDICINE

## 2023-09-27 PROCEDURE — 99205 OFFICE O/P NEW HI 60 MIN: CPT | Performed by: INTERNAL MEDICINE

## 2023-09-27 PROCEDURE — G8417 CALC BMI ABV UP PARAM F/U: HCPCS | Performed by: INTERNAL MEDICINE

## 2023-09-27 PROCEDURE — 1036F TOBACCO NON-USER: CPT | Performed by: INTERNAL MEDICINE

## 2023-09-27 ASSESSMENT — ENCOUNTER SYMPTOMS
RESPIRATORY NEGATIVE: 1
EYES NEGATIVE: 1
GASTROINTESTINAL NEGATIVE: 1

## 2023-09-28 RX ORDER — BACLOFEN 10 MG/1
10 TABLET ORAL 3 TIMES DAILY PRN
Qty: 90 TABLET | Refills: 0 | Status: SHIPPED | OUTPATIENT
Start: 2023-09-28

## 2023-09-28 NOTE — TELEPHONE ENCOUNTER
OARRS reviewed. UDS: .   Last seen: 8/24/2023.  Follow-up:   Future Appointments   Date Time Provider [address]   10/6/2023 11:00 AM FIORDALIZA Fried CNP [facility] [place]   11/29/2023 11:20 AM FIORDALIZA Franco CNP N [facility] Rheum [address]

## 2023-09-29 ENCOUNTER — PATIENT MESSAGE (OUTPATIENT)
Dept: RHEUMATOLOGY | Age: 28
End: 2023-09-29

## 2023-09-29 NOTE — TELEPHONE ENCOUNTER
From: Mariel Gallo  To: Dr. Dave Rojo: 9/29/2023 9:49 AM EDT  Subject: Lab Needs New Codes    Hi I went to get the lab work done at [facility] in [facility] and three of the labs have codes that were not able to be used. The Hepatitis Actue PA, HLA-B27, and PTT-LA. The codes for those three labs would not work. Please call or fax them the new codes so I can get the lab work done.    Thanks,  [employer]

## 2023-10-02 DIAGNOSIS — M54.2 CERVICALGIA: ICD-10-CM

## 2023-10-02 DIAGNOSIS — M54.41 CHRONIC MIDLINE LOW BACK PAIN WITH RIGHT-SIDED SCIATICA: ICD-10-CM

## 2023-10-02 DIAGNOSIS — M46.1 DEGENERATIVE JOINT DISEASE OF SACROILIAC JOINT (HCC): ICD-10-CM

## 2023-10-02 DIAGNOSIS — R53.83 OTHER FATIGUE: ICD-10-CM

## 2023-10-02 DIAGNOSIS — G89.29 CHRONIC MIDLINE LOW BACK PAIN WITH RIGHT-SIDED SCIATICA: ICD-10-CM

## 2023-10-02 DIAGNOSIS — M53.3 SCLEROSIS OF SACROILIAC JOINT: ICD-10-CM

## 2023-10-06 ENCOUNTER — OFFICE VISIT (OUTPATIENT)
Dept: PHYSICAL MEDICINE AND REHAB | Age: 28
End: 2023-10-06

## 2023-10-06 VITALS
BODY MASS INDEX: 43.87 KG/M2 | DIASTOLIC BLOOD PRESSURE: 70 MMHG | SYSTOLIC BLOOD PRESSURE: 132 MMHG | HEIGHT: 64 IN | WEIGHT: 257 LBS

## 2023-10-06 DIAGNOSIS — M53.3 SACROILIAC JOINT DYSFUNCTION OF BOTH SIDES: ICD-10-CM

## 2023-10-06 DIAGNOSIS — M54.17 RADICULOPATHY, LUMBOSACRAL REGION: Primary | ICD-10-CM

## 2023-10-06 DIAGNOSIS — M47.816 LUMBAR SPONDYLOSIS: ICD-10-CM

## 2023-10-06 DIAGNOSIS — M79.18 MYOFASCIAL PAIN: ICD-10-CM

## 2023-10-06 DIAGNOSIS — G89.4 CHRONIC PAIN SYNDROME: ICD-10-CM

## 2023-10-06 RX ORDER — TRAMADOL HYDROCHLORIDE 50 MG/1
50 TABLET ORAL 2 TIMES DAILY PRN
Qty: 60 TABLET | Refills: 0 | Status: SHIPPED | OUTPATIENT
Start: 2023-10-06 | End: 2023-11-05

## 2023-10-06 RX ORDER — METHOCARBAMOL 100 MG/ML
100 INJECTION, SOLUTION INTRAMUSCULAR; INTRAVENOUS ONCE
Status: COMPLETED | OUTPATIENT
Start: 2023-10-06 | End: 2023-10-06

## 2023-10-06 RX ADMIN — METHOCARBAMOL 100 MG: 100 INJECTION, SOLUTION INTRAMUSCULAR; INTRAVENOUS at 12:04

## 2023-10-06 NOTE — PROGRESS NOTES
[address]  [facility]  [facility]. [address] [place] [address]  Dept: [phone]  Dept Fax: [phone]: [phone]    Visit Date: 10/6/2023    Functionality Assessment/Goals Worksheet     On a scale of 0 (Does not Interfere) to 10 (Completely Interferes)     1. Which number describes how during the past week pain has interfered with       the following:  A. General Activity:  9  B. Mood: 7  C. Walking Ability:  9  D. Normal Work (Includes both work outside the home and housework):  9  E. Relations with Other People:   7  F. Sleep:   8  G. Enjoyment of Life:   8    2. Patient Prefers to Take their Pain Medications:     []  On a regular basis   [x]  Only when necessary    []  Does not take pain medications    3. What are the Patient's Goals/Expectations for Visiting Pain Management?      [x]  Learn about my pain    [x]  Receive Medication   []  Physical Therapy     []  Treat Depression   [x]  Receive Injections    [x]  Treat Sleep   []  Deal with Anxiety and Stress   []  Treat Opoid Dependence/Addiction   []  Other:

## 2023-10-06 NOTE — PROGRESS NOTES
sac[facility]ronic Pain/PM&R Clinic Note     Encounter Date: 10/6/23    Subjective:   Chief Complaint:   Chief Complaint   Patient presents with    Follow-up     Pt would like to discuss what the rheumatologist said and would like to get epidural injection for leg numbness       History of Present Illness:   Silvina Russell is a 25 y.o. female seen in the clinic initially on 03/16/2022 upon request from Jaye Walker, [facility])  for her history of lumbar pain. States her pain started back when she was 9 she has a history of adolescent scoliosis. Patient had her first back surgery in 2006, she had a laminectomy and fusion from T1-L2. About 2 years later in January 2009 she had all of her hardware removed due to issues with the hardware placement. Patient states she was doing well overall for about 10 years. In 2018 she was working as a  at [employer] in [place] and her right leg will go completely numb and give out. At that time she moved back to [place] and she had a lumbar laminectomy and fusion at L3-L4 at [facility] in May 2018. She is currently on disability since her last surgery. She follows with the [facility] clinic who referred her here for interventional procedures for pain. She was told she is now having issues with her L5. Patient states her pain is worse with standing, walking long distance, sitting too long. She states it is hard for her to stand and do dishes. Majority of her pain is located in her mid low back. She states she does get some pain that radiates to her left lateral hip, but not past her knee. This is only with increased activity. Patient states she does get some relief when laying in bed but gets uncomfortable if in 1 position for too long. Patient states she has tried ibuprofen, Tylenol, heat, ice, TENS unit, lidocaine patches all which have been relatively ineffective.   She has been using an herbal patch which has been the only thing that is taken the

## 2023-10-06 NOTE — PROGRESS NOTES
Pre-operative Diagnosis:  lumbar pain     Post-operative Diagnosis: lumbar pain     Procedure: Trigger point injection(s)     Procedure Description:  After having signed the informed consent, the patient was seated in a chair. The patient's lumbar region was prepped with alcohol wipes. Trigger points were identified in the bilateral quadratus lumborum for a total of 10 trigger point injections. After negative aspiration, 1 cc of a mixture containing 1:10 100 mg methocarbamol: 0.25% bupivacaine was injected at each trigger point for a total of 10 trigger points. The patient tolerated the procedure well.        Procedural Complications: None        FIORDALIZA Gatica - ELIZABETH   Interventional Pain Management/PM&R   [address]

## 2023-10-09 ENCOUNTER — TELEPHONE (OUTPATIENT)
Dept: PHYSICAL MEDICINE AND REHAB | Age: 28
End: 2023-10-09

## 2023-10-23 ENCOUNTER — TELEPHONE (OUTPATIENT)
Dept: PHYSICAL MEDICINE AND REHAB | Age: 28
End: 2023-10-23

## 2023-10-23 NOTE — TELEPHONE ENCOUNTER
Patient cancelled her 11/14 procedure and follow up due to being pregnant. She will call back to schedule after child birth.

## 2023-11-30 ENCOUNTER — NURSE ONLY (OUTPATIENT)
Dept: LAB | Age: 28
End: 2023-11-30

## 2023-11-30 ENCOUNTER — HOSPITAL ENCOUNTER (OUTPATIENT)
Age: 28
Discharge: HOME | End: 2023-11-30
Payer: MEDICARE

## 2023-11-30 LAB
ABO: NORMAL
ALBUMIN SERPL BCG-MCNC: 4.6 G/DL (ref 3.5–5.1)
ALP SERPL-CCNC: 67 U/L (ref 38–126)
ALT SERPL W/O P-5'-P-CCNC: 16 U/L (ref 11–66)
ANION GAP SERPL CALC-SCNC: 13 MEQ/L (ref 8–16)
ANTIBODY SCREEN: NORMAL
AST SERPL-CCNC: 20 U/L (ref 5–40)
BILIRUB SERPL-MCNC: 0.3 MG/DL (ref 0.3–1.2)
BUN SERPL-MCNC: 6 MG/DL (ref 7–22)
CALCIUM SERPL-MCNC: 9.4 MG/DL (ref 8.5–10.5)
CHLORIDE SERPL-SCNC: 100 MEQ/L (ref 98–111)
CO2 SERPL-SCNC: 22 MEQ/L (ref 23–33)
CREAT SERPL-MCNC: 0.5 MG/DL (ref 0.4–1.2)
DEPRECATED RDW RBC AUTO: 40.2 FL (ref 35–45)
ERYTHROCYTE [DISTWIDTH] IN BLOOD BY AUTOMATED COUNT: 12.9 % (ref 11.5–14.5)
GFR SERPL CREATININE-BSD FRML MDRD: > 60 ML/MIN/1.73M2
GLUCOSE SERPL-MCNC: 85 MG/DL (ref 70–108)
HBV SURFACE AG SERPL QL IA: NEGATIVE
HCT VFR BLD AUTO: 43.1 % (ref 37–47)
HCV IGG SERPL QL IA: NEGATIVE
HGB BLD-MCNC: 14.3 GM/DL (ref 12–16)
MCH RBC QN AUTO: 28.3 PG (ref 26–33)
MCHC RBC AUTO-ENTMCNC: 33.2 GM/DL (ref 32.2–35.5)
MCV RBC AUTO: 85.2 FL (ref 81–99)
PLATELET # BLD AUTO: 264 THOU/MM3 (ref 130–400)
PMV BLD AUTO: 9.4 FL (ref 9.4–12.4)
POTASSIUM SERPL-SCNC: 3.1 MEQ/L (ref 3.5–5.2)
PROT SERPL-MCNC: 8.3 G/DL (ref 6.1–8)
RBC # BLD AUTO: 5.06 MILL/MM3 (ref 4.2–5.4)
RH FACTOR: NORMAL
RUBV IGG SERPL IA-ACNC: 171.5 IU/ML
SODIUM SERPL-SCNC: 135 MEQ/L (ref 135–145)
WBC # BLD AUTO: 3.2 THOU/MM3 (ref 4.8–10.8)

## 2023-11-30 PROCEDURE — 86900 BLOOD TYPING SEROLOGIC ABO: CPT

## 2023-11-30 PROCEDURE — 85027 COMPLETE CBC AUTOMATED: CPT

## 2023-11-30 PROCEDURE — 36415 COLL VENOUS BLD VENIPUNCTURE: CPT

## 2023-11-30 PROCEDURE — 86762 RUBELLA ANTIBODY: CPT

## 2023-11-30 PROCEDURE — 87389 HIV-1 AG W/HIV-1&-2 AB AG IA: CPT

## 2023-11-30 PROCEDURE — 86901 BLOOD TYPING SEROLOGIC RH(D): CPT

## 2023-11-30 PROCEDURE — 86850 RBC ANTIBODY SCREEN: CPT

## 2023-11-30 PROCEDURE — 80053 COMPREHEN METABOLIC PANEL: CPT

## 2023-11-30 PROCEDURE — 87340 HEPATITIS B SURFACE AG IA: CPT

## 2023-11-30 PROCEDURE — 87086 URINE CULTURE/COLONY COUNT: CPT

## 2023-11-30 PROCEDURE — 86592 SYPHILIS TEST NON-TREP QUAL: CPT

## 2023-11-30 PROCEDURE — 86803 HEPATITIS C AB TEST: CPT

## 2023-12-01 LAB
BACTERIA UR CULT: ABNORMAL
HIV 1+2 AB+HIV1 P24 AG SERPL QL IA: NONREACTIVE
ORGANISM: ABNORMAL
RPR SER QL: NONREACTIVE

## 2023-12-04 LAB
SOURCE: NORMAL
TRICHOMONAS VAGINALI, MOLECULAR: NEGATIVE

## 2023-12-07 LAB — CYTOLOGY THIN PREP PAP: NORMAL

## 2023-12-08 LAB
C. TRACHOMATIS DNA,THIN PREP: NEGATIVE
N. GONORRHOEAE DNA, THIN PREP: NEGATIVE
SOURCE: NORMAL

## 2024-01-12 ENCOUNTER — HOSPITAL ENCOUNTER (OUTPATIENT)
Age: 29
Discharge: HOME | End: 2024-01-12
Payer: MEDICARE

## 2024-01-12 LAB
ALT SERPL W/O P-5'-P-CCNC: 9 U/L (ref 11–66)
AST SERPL-CCNC: 9 U/L (ref 5–40)
BUN SERPL-MCNC: 6 MG/DL (ref 7–22)
CREAT SERPL-MCNC: 0.4 MG/DL (ref 0.4–1.2)
CREAT UR-MCNC: 84.9 MG/DL
DEPRECATED RDW RBC AUTO: 45 FL (ref 35–45)
ERYTHROCYTE [DISTWIDTH] IN BLOOD BY AUTOMATED COUNT: 14.5 % (ref 11.5–14.5)
GFR SERPL CREATININE-BSD FRML MDRD: > 60 ML/MIN/1.73M2
GLUCOSE SERPL-MCNC: 127 MG/DL (ref 69–140)
HCT VFR BLD AUTO: 36.4 % (ref 37–47)
HGB BLD-MCNC: 12.3 GM/DL (ref 12–16)
LDH SERPL L TO P-CCNC: 135 U/L (ref 100–190)
MCH RBC QN AUTO: 29 PG (ref 26–33)
MCHC RBC AUTO-ENTMCNC: 33.8 GM/DL (ref 32.2–35.5)
MCV RBC AUTO: 85.8 FL (ref 81–99)
PLATELET # BLD AUTO: 315 THOU/MM3 (ref 130–400)
PMV BLD AUTO: 9.8 FL (ref 9.4–12.4)
PROT UR-MCNC: 9.6 MG/DL
PROT/CREAT 24H UR: 0.11 MG/G{CREAT}
RBC # BLD AUTO: 4.24 MILL/MM3 (ref 4.2–5.4)
T4 FREE SERPL-MCNC: 1.15 NG/DL (ref 0.93–1.76)
TSH SERPL DL<=0.005 MIU/L-ACNC: 0.54 UIU/ML (ref 0.4–4.2)
WBC # BLD AUTO: 8.5 THOU/MM3 (ref 4.8–10.8)

## 2024-01-12 PROCEDURE — 82565 ASSAY OF CREATININE: CPT

## 2024-01-12 PROCEDURE — 36415 COLL VENOUS BLD VENIPUNCTURE: CPT

## 2024-01-12 PROCEDURE — 84520 ASSAY OF UREA NITROGEN: CPT

## 2024-01-12 PROCEDURE — 84156 ASSAY OF PROTEIN URINE: CPT

## 2024-01-12 PROCEDURE — 82570 ASSAY OF URINE CREATININE: CPT

## 2024-01-12 PROCEDURE — 84460 ALANINE AMINO (ALT) (SGPT): CPT

## 2024-01-12 PROCEDURE — 84439 ASSAY OF FREE THYROXINE: CPT

## 2024-01-12 PROCEDURE — 85027 COMPLETE CBC AUTOMATED: CPT

## 2024-01-12 PROCEDURE — 84450 TRANSFERASE (AST) (SGOT): CPT

## 2024-01-12 PROCEDURE — 83615 LACTATE (LD) (LDH) ENZYME: CPT

## 2024-01-12 PROCEDURE — 84443 ASSAY THYROID STIM HORMONE: CPT

## 2024-01-12 PROCEDURE — 82950 GLUCOSE TEST: CPT

## 2024-02-02 ENCOUNTER — HOSPITAL ENCOUNTER (OUTPATIENT)
Dept: MRI IMAGING | Age: 29
Discharge: HOME | End: 2024-02-02
Payer: MEDICARE

## 2024-02-02 DIAGNOSIS — M45.9 POKER SPINE (HCC): ICD-10-CM

## 2024-02-02 PROCEDURE — 72141 MRI NECK SPINE W/O DYE: CPT

## 2024-02-13 ENCOUNTER — OFFICE VISIT (OUTPATIENT)
Dept: PHYSICAL MEDICINE AND REHAB | Age: 29
End: 2024-02-13
Payer: MEDICARE

## 2024-02-13 VITALS
BODY MASS INDEX: 43.54 KG/M2 | DIASTOLIC BLOOD PRESSURE: 66 MMHG | WEIGHT: 255 LBS | HEIGHT: 64 IN | SYSTOLIC BLOOD PRESSURE: 120 MMHG

## 2024-02-13 DIAGNOSIS — M47.816 LUMBAR SPONDYLOSIS: ICD-10-CM

## 2024-02-13 DIAGNOSIS — M79.18 MYOFASCIAL PAIN: ICD-10-CM

## 2024-02-13 DIAGNOSIS — G89.4 CHRONIC PAIN SYNDROME: ICD-10-CM

## 2024-02-13 DIAGNOSIS — M53.3 SCLEROSIS OF SACROILIAC JOINT: ICD-10-CM

## 2024-02-13 DIAGNOSIS — M54.17 RADICULOPATHY, LUMBOSACRAL REGION: Primary | ICD-10-CM

## 2024-02-13 DIAGNOSIS — M54.12 CERVICAL RADICULOPATHY: ICD-10-CM

## 2024-02-13 PROCEDURE — 1036F TOBACCO NON-USER: CPT | Performed by: NURSE PRACTITIONER

## 2024-02-13 PROCEDURE — G8484 FLU IMMUNIZE NO ADMIN: HCPCS | Performed by: NURSE PRACTITIONER

## 2024-02-13 PROCEDURE — 99214 OFFICE O/P EST MOD 30 MIN: CPT | Performed by: NURSE PRACTITIONER

## 2024-02-13 PROCEDURE — G8427 DOCREV CUR MEDS BY ELIG CLIN: HCPCS | Performed by: NURSE PRACTITIONER

## 2024-02-13 PROCEDURE — G8417 CALC BMI ABV UP PARAM F/U: HCPCS | Performed by: NURSE PRACTITIONER

## 2024-02-13 PROCEDURE — 20553 NJX 1/MLT TRIGGER POINTS 3/>: CPT | Performed by: NURSE PRACTITIONER

## 2024-02-13 RX ORDER — ASPIRIN 81 MG/1
81 TABLET, CHEWABLE ORAL DAILY
COMMUNITY

## 2024-02-13 RX ORDER — BIOTIN 1 MG
TABLET ORAL
COMMUNITY

## 2024-02-13 RX ORDER — LIDOCAINE 50 MG/G
OINTMENT TOPICAL
Qty: 30 G | Refills: 2 | Status: SHIPPED | OUTPATIENT
Start: 2024-02-13

## 2024-02-13 RX ORDER — CYCLOBENZAPRINE HCL 5 MG
5 TABLET ORAL NIGHTLY
COMMUNITY

## 2024-02-13 NOTE — PROGRESS NOTES
Functionality Assessment/Goals Worksheet     On a scale of 0 (Does not Interfere) to 10 (Completely Interferes)     1.  Which number describes how during the past week pain has interfered with           the following:  A.  General Activity:  7  B.  Mood: 7  C.  Walking Ability:  4  D.  Normal Work (Includes both work outside the home and housework):  8  E.  Relations with Other People:   6  F.  Sleep:   8  G.  Enjoyment of Life:   6    2.  Patient Prefers to Take their Pain Medications:     []  On a regular basis   [x]  Only when necessary    []  Does not take pain medications    3.  What are the Patient's Goals/Expectations for Visiting Pain Management?     [x]  Learn about my pain    [x]  Receive Medication   []  Physical Therapy     []  Treat Depression   [x]  Receive Injections    [x]  Treat Sleep   []  Deal with Anxiety and Stress   []  Treat Opoid Dependence/Addiction   []  Other:                                
Pre-operative Diagnosis:  cervical and lumbar pain     Post-operative Diagnosis: cervical and lumbar pain     Procedure: Trigger point injection(s)     Procedure Description:  After having signed the informed consent, the patient was seated in a chair.  The patient's cervical and lumbar region was prepped with alcohol wipes.  Trigger points were identified in the bilateral cervical paraspinals and bilateral lumbar paraspinals for a total of 20 trigger point injections. After negative aspiration, 1 cc containing  0.25% bupivacaine was injected at each trigger point for a total of 20 trigger points (2 syringes). The patient tolerated the procedure well.     Procedural Complications: None        FIORDALIZA Calloway - CNP   Interventional Pain Management/PM&R   [facility] [place]   
MRI disc to take to the chiropractor.    Plan:   The following treatment recommendations and plan were discussed in detail with Karly Yee.    Imaging:   I have reviewed patient’s imaging of cervical MRI and results were discussed with patient today.     Analgesics:   Patient is taking Acetaminophen. Patient informed that the maximum amount of acetaminophen taken on a regular basis should only be 4000 mg per day.     Adjuvants:   Continue flexeril PRN  Lidocaine 5% ointment PRN    Interventions:   Cervical and lumbar trigger point junctions in office today utilizing bupivacaine only     Multidisciplinary Pain Management:   In the presence of complex, chronic, and multi-factorial pain, the importance of a multidisciplinary approach to pain management in the patient’s management regimen was emphasized and discussed in great detail.   PHYSICAL THERAPY: Patient is advised to see a physical therapist for gentle stretching exercises and conditioning exercises for management of pain.     Referrals:  None    Prescriptions Written This Visit:   Lidocaine 5% ointment    Follow-up: 4 weeks for TPI. Patient will schedule on FIORDALIZA Millard - CNP

## 2024-02-14 ENCOUNTER — TELEPHONE (OUTPATIENT)
Dept: PHYSICAL MEDICINE AND REHAB | Age: 29
End: 2024-02-14

## 2024-02-14 NOTE — TELEPHONE ENCOUNTER
PA sent to [facility]  (Key: [id number])  PA Case ID #: [id number]  Rx #: [id number]  Lidocaine 5% ointment

## 2024-02-15 NOTE — TELEPHONE ENCOUNTER
Received fax from [employer] that lidocaine denied d/t used for chronic pain syndrome which not an approved use. Submitted via fax forms to appeal along with office notes.

## 2024-02-20 ENCOUNTER — PATIENT MESSAGE (OUTPATIENT)
Dept: PHYSICAL MEDICINE AND REHAB | Age: 29
End: 2024-02-20

## 2024-02-22 ENCOUNTER — TELEPHONE (OUTPATIENT)
Dept: PHYSICAL MEDICINE AND REHAB | Age: 29
End: 2024-02-22

## 2024-02-22 NOTE — TELEPHONE ENCOUNTER
Pt is calling and states the muscle relaxer that the OBGYN gave her (flexeral) and she states it is not working.  The OB told pt to ask this office to choose 3 options of muscle relaxers the office would recommend and then the OB will choose which one to prescribe.  Please advise pt at [phone]

## 2024-03-12 ENCOUNTER — OFFICE VISIT (OUTPATIENT)
Dept: PHYSICAL MEDICINE AND REHAB | Age: 29
End: 2024-03-12
Payer: MEDICARE

## 2024-03-12 VITALS
DIASTOLIC BLOOD PRESSURE: 82 MMHG | HEIGHT: 64 IN | SYSTOLIC BLOOD PRESSURE: 124 MMHG | WEIGHT: 255 LBS | BODY MASS INDEX: 43.54 KG/M2

## 2024-03-12 DIAGNOSIS — M54.17 RADICULOPATHY, LUMBOSACRAL REGION: Primary | ICD-10-CM

## 2024-03-12 DIAGNOSIS — M47.816 LUMBAR SPONDYLOSIS: ICD-10-CM

## 2024-03-12 DIAGNOSIS — M79.18 MYOFASCIAL PAIN: ICD-10-CM

## 2024-03-12 DIAGNOSIS — G89.4 CHRONIC PAIN SYNDROME: ICD-10-CM

## 2024-03-12 DIAGNOSIS — M79.2 NEUROPATHIC PAIN: ICD-10-CM

## 2024-03-12 DIAGNOSIS — M54.12 CERVICAL RADICULOPATHY: ICD-10-CM

## 2024-03-12 PROCEDURE — G8484 FLU IMMUNIZE NO ADMIN: HCPCS | Performed by: NURSE PRACTITIONER

## 2024-03-12 PROCEDURE — G8427 DOCREV CUR MEDS BY ELIG CLIN: HCPCS | Performed by: NURSE PRACTITIONER

## 2024-03-12 PROCEDURE — 1036F TOBACCO NON-USER: CPT | Performed by: NURSE PRACTITIONER

## 2024-03-12 PROCEDURE — G8417 CALC BMI ABV UP PARAM F/U: HCPCS | Performed by: NURSE PRACTITIONER

## 2024-03-12 PROCEDURE — 20553 NJX 1/MLT TRIGGER POINTS 3/>: CPT | Performed by: NURSE PRACTITIONER

## 2024-03-12 PROCEDURE — 99214 OFFICE O/P EST MOD 30 MIN: CPT | Performed by: NURSE PRACTITIONER

## 2024-03-12 RX ORDER — OXYCODONE HYDROCHLORIDE AND ACETAMINOPHEN 5; 325 MG/1; MG/1
1 TABLET ORAL
COMMUNITY
Start: 2024-03-07

## 2024-03-12 NOTE — PROGRESS NOTES
Pre-operative Diagnosis:  cervical and lumbar pain     Post-operative Diagnosis: cervical and lumbar pain     Procedure: Trigger point injection(s)     Procedure Description:  After having signed the informed consent, the patient was seated in a chair.  The patient's cervical and lumbar region was prepped with alcohol wipes.  Trigger points were identified in the bilateral cervical paraspinals and bilateral lumbar paraspinals for a total of 20 trigger point injections. After negative aspiration, 1 cc containing  0.25% bupivacaine was injected at each trigger point for a total of 20 trigger points (2 syringes). The patient tolerated the procedure well.     Procedural Complications: None        FIORDALIZA Calloway - CNP   Interventional Pain Management/PM&R   [facility] [place]   
pain.     Referrals:  None    Prescriptions Written This Visit:   None    Follow-up: 4 weeks for TPI?. Patient will schedule on FIORDALIZA Millard - CNP

## 2024-03-21 ENCOUNTER — HOSPITAL ENCOUNTER (OUTPATIENT)
Age: 29
Discharge: HOME | End: 2024-03-21
Payer: MEDICARE

## 2024-03-21 LAB
GLUCOSE SERPL-MCNC: 163 MG/DL (ref 69–140)
HGB BLD-MCNC: 12 GM/DL (ref 12–16)

## 2024-03-21 PROCEDURE — 85018 HEMOGLOBIN: CPT

## 2024-03-21 PROCEDURE — 82950 GLUCOSE TEST: CPT

## 2024-03-21 PROCEDURE — 36415 COLL VENOUS BLD VENIPUNCTURE: CPT

## 2024-03-26 ENCOUNTER — HOSPITAL ENCOUNTER (OUTPATIENT)
Age: 29
Discharge: HOME | End: 2024-03-26
Payer: MEDICARE

## 2024-03-26 LAB
GLUCOSE 1H P 75 G GLC PO SERPL-MCNC: 144 MG/DL (ref 120–170)
GLUCOSE 2H P 75 G GLC PO SERPL-MCNC: 131 MG/DL (ref 70–120)
GLUCOSE 3H P 75 G GLC PO SERPL-MCNC: 118 MG/DL (ref 70–120)
GLUCOSE SERPL-MCNC: 82 MG/DL (ref 69–105)

## 2024-03-26 PROCEDURE — 36415 COLL VENOUS BLD VENIPUNCTURE: CPT

## 2024-03-26 PROCEDURE — 82951 GLUCOSE TOLERANCE TEST (GTT): CPT

## 2024-04-25 ENCOUNTER — OFFICE VISIT (OUTPATIENT)
Dept: PHYSICAL MEDICINE AND REHAB | Age: 29
End: 2024-04-25

## 2024-04-25 VITALS
SYSTOLIC BLOOD PRESSURE: 122 MMHG | BODY MASS INDEX: 43.54 KG/M2 | WEIGHT: 255 LBS | DIASTOLIC BLOOD PRESSURE: 58 MMHG | HEIGHT: 64 IN

## 2024-04-25 DIAGNOSIS — M54.12 CERVICAL RADICULOPATHY: ICD-10-CM

## 2024-04-25 DIAGNOSIS — M79.2 NEUROPATHIC PAIN: ICD-10-CM

## 2024-04-25 DIAGNOSIS — M54.17 RADICULOPATHY, LUMBOSACRAL REGION: Primary | ICD-10-CM

## 2024-04-25 DIAGNOSIS — M47.816 LUMBAR SPONDYLOSIS: ICD-10-CM

## 2024-04-25 DIAGNOSIS — G89.4 CHRONIC PAIN SYNDROME: ICD-10-CM

## 2024-04-25 DIAGNOSIS — M79.18 MYOFASCIAL PAIN: ICD-10-CM

## 2024-04-25 DIAGNOSIS — M47.819 FACET ARTHROPATHY: ICD-10-CM

## 2024-04-25 DIAGNOSIS — M53.3 SACROILIAC JOINT DYSFUNCTION OF BOTH SIDES: ICD-10-CM

## 2024-04-25 NOTE — PROGRESS NOTES
Functionality Assessment/Goals Worksheet     On a scale of 0 (Does not Interfere) to 10 (Completely Interferes)     1.  Which number describes how during the past week pain has interfered with           the following:  A.  General Activity:  10  B.  Mood: 8  C.  Walking Ability:  8  D.  Normal Work (Includes both work outside the home and housework):  10  E.  Relations with Other People:   6  F.  Sleep:   7  G.  Enjoyment of Life:   5    2.  Patient Prefers to Take their Pain Medications:     []  On a regular basis   [x]  Only when necessary    [x]  Does not take pain medications    3.  What are the Patient's Goals/Expectations for Visiting Pain Management?     [x]  Learn about my pain    [x]  Receive Medication   [x]  Physical Therapy     []  Treat Depression   [x]  Receive Injections    [x]  Treat Sleep   []  Deal with Anxiety and Stress   []  Treat Opoid Dependence/Addiction   []  Other:                                
Pre-operative Diagnosis:  cervical and lumbar pain     Post-operative Diagnosis: cervical and lumbar pain     Procedure: Trigger point injection(s)     Procedure Description:  After having signed the informed consent, the patient was seated in a chair.  The patient's cervical region was prepped with alcohol wipes.  Trigger points were identified in the bilateral cervical paraspinals and bilateral trapezius for a total of 10 trigger point injections. After negative aspiration, 1 cc containing  0.25% bupivacaine was injected at each trigger point for a total of 20 trigger points (2 syringes). The patient tolerated the procedure well.     Procedural Complications: None        Laura Merrill, FIORDALIZA - CNP   Interventional Pain Management/PM&R   [facility] [place]   
for planned follow-up on chronic pain.  Patient states she obtained about 3 to 4 days of relief with the cervical trigger point injections obtained last visit.  She did not notice any relief in her lumbar region.  She would like to repeat cervical trigger point injections in office today.  She states her pain has remained about the same since her last visit.  She has good and bad days.  She states she has 3.5 tabs of Percocet left and she was only given 5 tabs.  She states she has been taking a half tab as needed when her pain is severe and she has to be productive.  She has been participating in physical therapy for her neck but they have mainly been doing massage and heat due to the extent of tonicity in her neck and trapezius.  She denies any new symptoms at this time.    History of Interventions:   Surgery: Laminectomy and fusion T1-L2 (2006)  Hardware removal (January 2009)  Lumbar laminectomy and fusion L3-L4 (May 2018) - [facility]  Injections: B/L lumbar facet MBB at L4-5 and L5-S1 (04/05/2022) - >85% relief x 2 days  B/L lumbar facet MBB L4-5 and L5-S1 (05/04/2022) - >85% relief x 2 days  B/L lumbar RFA at L4-5 and L5-S1 (06/07/2022 and 06/14/2022. 6/22/2023 and 06/27/2023) - effective   B/L SI joint injection (05/17/2023) - effective  LESI L4/5 (08/10/2023) -100% relief x 5 weeks, pain gradually returning  Lumbar TPI (08/24/2023) - >80% relief x 3-4 weeks    Cervical and lumbar TPI with bupivacaine only -first round ineffective    Current Treatment Medications:   Tylenol OTC - ineffective  Flexeril 5mg - extreme fatigue  Percocet as needed    Historical Treatment Medications:   THC - edible or smoke - helps anxiety and sleep.   Gabapentin - schmidt/acne  Flexeril - ineffective  Tizanidine - mild effectiveness  Valium - helped  Percocet - helped  Tramdaol - helped  Robaxin - ineffective  Baclofen 10 mg TID  Mobic - ineffective   Ibuprofen OTC  Tramadol 50mg BID PRN - prescribed by me  Baclofen 10 mg TID

## 2024-05-12 ENCOUNTER — HOSPITAL ENCOUNTER (OUTPATIENT)
Age: 29
Discharge: HOME | End: 2024-05-12
Attending: OBSTETRICS & GYNECOLOGY | Admitting: OBSTETRICS & GYNECOLOGY
Payer: COMMERCIAL

## 2024-05-12 VITALS
DIASTOLIC BLOOD PRESSURE: 62 MMHG | WEIGHT: 273 LBS | OXYGEN SATURATION: 98 % | SYSTOLIC BLOOD PRESSURE: 136 MMHG | BODY MASS INDEX: 46.61 KG/M2 | HEART RATE: 95 BPM | HEIGHT: 64 IN

## 2024-05-12 PROBLEM — I99.8 BLOOD PRESSURE INSTABILITY: Status: ACTIVE | Noted: 2024-05-12

## 2024-05-12 LAB
ALBUMIN SERPL BCG-MCNC: 3.5 G/DL (ref 3.5–5.1)
ALP SERPL-CCNC: 69 U/L (ref 38–126)
ALT SERPL W/O P-5'-P-CCNC: 9 U/L (ref 11–66)
ANION GAP SERPL CALC-SCNC: 14 MEQ/L (ref 8–16)
AST SERPL-CCNC: 10 U/L (ref 5–40)
BILIRUB SERPL-MCNC: < 0.2 MG/DL (ref 0.3–1.2)
BUN SERPL-MCNC: 8 MG/DL (ref 7–22)
CALCIUM SERPL-MCNC: 9.2 MG/DL (ref 8.5–10.5)
CHLORIDE SERPL-SCNC: 103 MEQ/L (ref 98–111)
CO2 SERPL-SCNC: 20 MEQ/L (ref 23–33)
CREAT SERPL-MCNC: 0.4 MG/DL (ref 0.4–1.2)
CREAT UR-MCNC: 36.2 MG/DL
DEPRECATED RDW RBC AUTO: 46.2 FL (ref 35–45)
ERYTHROCYTE [DISTWIDTH] IN BLOOD BY AUTOMATED COUNT: 13.8 % (ref 11.5–14.5)
GFR SERPL CREATININE-BSD FRML MDRD: > 90 ML/MIN/1.73M2
GLUCOSE SERPL-MCNC: 121 MG/DL (ref 70–108)
HCT VFR BLD AUTO: 35.7 % (ref 37–47)
HGB BLD-MCNC: 12.1 GM/DL (ref 12–16)
MCH RBC QN AUTO: 31.3 PG (ref 26–33)
MCHC RBC AUTO-ENTMCNC: 33.9 GM/DL (ref 32.2–35.5)
MCV RBC AUTO: 92.5 FL (ref 81–99)
PLATELET # BLD AUTO: 264 THOU/MM3 (ref 130–400)
PMV BLD AUTO: 9.6 FL (ref 9.4–12.4)
POTASSIUM SERPL-SCNC: 3.7 MEQ/L (ref 3.5–5.2)
PROT SERPL-MCNC: 6.8 G/DL (ref 6.1–8)
PROT UR-MCNC: 6.7 MG/DL
PROT/CREAT 24H UR: 0.19 MG/G{CREAT}
RBC # BLD AUTO: 3.86 MILL/MM3 (ref 4.2–5.4)
SODIUM SERPL-SCNC: 137 MEQ/L (ref 135–145)
WBC # BLD AUTO: 10.7 THOU/MM3 (ref 4.8–10.8)

## 2024-05-12 PROCEDURE — 84156 ASSAY OF PROTEIN URINE: CPT

## 2024-05-12 PROCEDURE — 36415 COLL VENOUS BLD VENIPUNCTURE: CPT

## 2024-05-12 PROCEDURE — 80053 COMPREHEN METABOLIC PANEL: CPT

## 2024-05-12 PROCEDURE — 85027 COMPLETE CBC AUTOMATED: CPT

## 2024-05-12 PROCEDURE — 6370000000 HC RX 637 (ALT 250 FOR IP): Performed by: STUDENT IN AN ORGANIZED HEALTH CARE EDUCATION/TRAINING PROGRAM

## 2024-05-12 PROCEDURE — 82570 ASSAY OF URINE CREATININE: CPT

## 2024-05-12 RX ORDER — HYDROCODONE BITARTRATE AND ACETAMINOPHEN 5; 325 MG/1; MG/1
2 TABLET ORAL ONCE
Status: COMPLETED | OUTPATIENT
Start: 2024-05-12 | End: 2024-05-12

## 2024-05-12 RX ADMIN — HYDROCODONE BITARTRATE AND ACETAMINOPHEN 2 TABLET: 5; 325 TABLET ORAL at 18:24

## 2024-05-12 ASSESSMENT — PAIN DESCRIPTION - LOCATION: LOCATION: HEAD

## 2024-05-12 ASSESSMENT — PAIN SCALES - GENERAL: PAINLEVEL_OUTOF10: 7

## 2024-05-12 NOTE — FLOWSHEET NOTE
Spoke with Dr. Feliz on the phone and updated on patient arrival for a bad headache that wont go away with tylenol, states she has pre-e with previous pregnancy and this feels the same, patient is 32w4d, , FHTs are not tracing well d/t maternal girth, no contractions noted and no leaking of fuids, patient also denies blurred vision or epigastric pain, reviewed BPs, orders received.

## 2024-05-12 NOTE — FLOWSHEET NOTE
Patient arrived to the unit for a headache that wont go away and a history of the same with a previous pregnancy that required delivery, patient states this is her 3rd pregnancy. She denies any contractions or leaking of fluids and states baby is moving as usual.    Patient shown to the RR for voiding and changing into gown.

## 2024-05-12 NOTE — FLOWSHEET NOTE
Discharge instructions given to patient, discussed increasing fluids to at least 8-10 glasses of water daily and promoted rest, instructed on keeping next scheduled appointment and to come in if any notice of vaginal bleeding or leaking/gushing of fluids, decreased fetal movement or contractions that become increasingly stronger and closer together. Patient verbalizes understanding and denied any questions. Copy of discharged instructions given to patient.

## 2024-05-12 NOTE — DISCHARGE INSTRUCTIONS
Home Undelivered Discharge Instructions    After Discharge Orders:           Diet: regular diet   Increase fluid intake to 8-10 glasses of water daily    Rest: normal activity as tolerated    Other instructions: Do kick counts once a day on your baby. Choose the time of day your baby is most active. Get in a comfortable lying or sitting position and time how long it takes to feel 10 kicks, twists, turns, swishes, or rolls.     Call Your Doctor if Any of the Following Occurs   Leaking fluid from vagina with or without contractions  Bright red vaginal bleeding occurs that is as heavy as or heavier than a period  Regular contractions are longer, stronger, and closer together  Noticeable decreased fetal movement  Elevated temperature >100.5°F and chills  Blurred vision, spots before eyes, unrelievable headache, severe facial swelling, or upper abdominal pain  Contact physician or hospital OB unit if signs of premature labor occur at ?36 weeks  Persistent or rhythmic low back pain that feels different than you are used to  Menstrual like cramps  Intestinal cramps with or without diarrhea  Pelvic pressure or rhythmic tightening that feels different than you are used to  Watery discharge or a gush of fluid from your vagina  Vaginal bleeding as heavy as a period  General Information     Difference between:  False Labor True Labor   1. Contractions often are irregular and don't consistently get closer together (called Edwar-Salgado contractions) 1. Contractions come at regular intervals and, as time goes on, get closer together.   2. Contractions may often stop when you rest or with a position change. 2. Contractions continue despite movement or walking. Contractions get stronger and closer together with time.   3. Often felt in the lower abdomen. 3. Usually felt in back coming around to the front.     Reminder to Patient   Please bring all teaching sheets and discharge information with you if you return to the hospital or

## 2024-05-12 NOTE — FLOWSHEET NOTE
Spoke with Dr. Feliz on the phone and reviewed lab work, states that she is feeling better, FHTs are reactive at this time. Discharge order received.

## 2024-06-27 ENCOUNTER — ANESTHESIA (OUTPATIENT)
Dept: LABOR AND DELIVERY | Age: 29
End: 2024-06-27
Payer: COMMERCIAL

## 2024-06-27 ENCOUNTER — ANESTHESIA EVENT (OUTPATIENT)
Dept: LABOR AND DELIVERY | Age: 29
End: 2024-06-27
Payer: COMMERCIAL

## 2024-06-27 ENCOUNTER — HOSPITAL ENCOUNTER (INPATIENT)
Age: 29
LOS: 4 days | Discharge: HOME | End: 2024-07-01
Attending: OBSTETRICS & GYNECOLOGY | Admitting: OBSTETRICS & GYNECOLOGY
Payer: COMMERCIAL

## 2024-06-27 LAB
ABO: NORMAL
AMPHETAMINES UR QL SCN: NEGATIVE
ANTIBODY SCREEN: NORMAL
BARBITURATES UR QL SCN: NEGATIVE
BENZODIAZ UR QL SCN: NEGATIVE
BZE UR QL SCN: NEGATIVE
CANNABINOIDS UR QL SCN: NEGATIVE
CREAT UR-MCNC: 38 MG/DL
DEPRECATED RDW RBC AUTO: 46.3 FL (ref 35–45)
ERYTHROCYTE [DISTWIDTH] IN BLOOD BY AUTOMATED COUNT: 14.3 % (ref 11.5–14.5)
FENTANYL: NEGATIVE
HCT VFR BLD AUTO: 38.6 % (ref 37–47)
HGB BLD-MCNC: 12.9 GM/DL (ref 12–16)
MCH RBC QN AUTO: 30.4 PG (ref 26–33)
MCHC RBC AUTO-ENTMCNC: 33.4 GM/DL (ref 32.2–35.5)
MCV RBC AUTO: 90.8 FL (ref 81–99)
OPIATES UR QL SCN: NEGATIVE
OXYCODONE: NEGATIVE
PHENCYCLIDINE QUANTITATIVE URINE: NEGATIVE
PLATELET # BLD AUTO: 255 THOU/MM3 (ref 130–400)
PMV BLD AUTO: 9.8 FL (ref 9.4–12.4)
PROT UR-MCNC: 12.1 MG/DL
PROT/CREAT 24H UR: 0.32 MG/G{CREAT}
RBC # BLD AUTO: 4.25 MILL/MM3 (ref 4.2–5.4)
RH FACTOR: NORMAL
RPR SER QL: NONREACTIVE
WBC # BLD AUTO: 11.2 THOU/MM3 (ref 4.8–10.8)

## 2024-06-27 PROCEDURE — 86592 SYPHILIS TEST NON-TREP QUAL: CPT

## 2024-06-27 PROCEDURE — 2580000003 HC RX 258: Performed by: OBSTETRICS & GYNECOLOGY

## 2024-06-27 PROCEDURE — 3700000001 HC ADD 15 MINUTES (ANESTHESIA): Performed by: OBSTETRICS & GYNECOLOGY

## 2024-06-27 PROCEDURE — 84156 ASSAY OF PROTEIN URINE: CPT

## 2024-06-27 PROCEDURE — 6360000002 HC RX W HCPCS: Performed by: OBSTETRICS & GYNECOLOGY

## 2024-06-27 PROCEDURE — 7100000001 HC PACU RECOVERY - ADDTL 15 MIN: Performed by: OBSTETRICS & GYNECOLOGY

## 2024-06-27 PROCEDURE — 2500000003 HC RX 250 WO HCPCS

## 2024-06-27 PROCEDURE — 3700000000 HC ANESTHESIA ATTENDED CARE: Performed by: OBSTETRICS & GYNECOLOGY

## 2024-06-27 PROCEDURE — 7100000000 HC PACU RECOVERY - FIRST 15 MIN: Performed by: OBSTETRICS & GYNECOLOGY

## 2024-06-27 PROCEDURE — 2500000003 HC RX 250 WO HCPCS: Performed by: REGISTERED NURSE

## 2024-06-27 PROCEDURE — 2500000003 HC RX 250 WO HCPCS: Performed by: OBSTETRICS & GYNECOLOGY

## 2024-06-27 PROCEDURE — 6370000000 HC RX 637 (ALT 250 FOR IP): Performed by: OBSTETRICS & GYNECOLOGY

## 2024-06-27 PROCEDURE — 2709999900 HC NON-CHARGEABLE SUPPLY: Performed by: OBSTETRICS & GYNECOLOGY

## 2024-06-27 PROCEDURE — 1200000000 HC SEMI PRIVATE

## 2024-06-27 PROCEDURE — 80305 DRUG TEST PRSMV DIR OPT OBS: CPT

## 2024-06-27 PROCEDURE — 6360000002 HC RX W HCPCS

## 2024-06-27 PROCEDURE — 85027 COMPLETE CBC AUTOMATED: CPT

## 2024-06-27 PROCEDURE — 6360000002 HC RX W HCPCS: Performed by: ANESTHESIOLOGY

## 2024-06-27 PROCEDURE — 3609079900 HC CESAREAN SECTION: Performed by: OBSTETRICS & GYNECOLOGY

## 2024-06-27 PROCEDURE — 2720000010 HC SURG SUPPLY STERILE: Performed by: OBSTETRICS & GYNECOLOGY

## 2024-06-27 PROCEDURE — 82570 ASSAY OF URINE CREATININE: CPT

## 2024-06-27 PROCEDURE — 6360000002 HC RX W HCPCS: Performed by: REGISTERED NURSE

## 2024-06-27 PROCEDURE — 86901 BLOOD TYPING SEROLOGIC RH(D): CPT

## 2024-06-27 PROCEDURE — 86850 RBC ANTIBODY SCREEN: CPT

## 2024-06-27 PROCEDURE — 86900 BLOOD TYPING SEROLOGIC ABO: CPT

## 2024-06-27 RX ORDER — SODIUM CHLORIDE 9 MG/ML
INJECTION, SOLUTION INTRAVENOUS PRN
Status: DISCONTINUED | OUTPATIENT
Start: 2024-06-27 | End: 2024-06-27

## 2024-06-27 RX ORDER — TRANEXAMIC ACID 10 MG/ML
INJECTION, SOLUTION INTRAVENOUS
Status: COMPLETED
Start: 2024-06-27 | End: 2024-06-27

## 2024-06-27 RX ORDER — CARBOPROST TROMETHAMINE 250 UG/ML
250 INJECTION, SOLUTION INTRAMUSCULAR PRN
Status: DISCONTINUED | OUTPATIENT
Start: 2024-06-27 | End: 2024-07-01 | Stop reason: HOSPADM

## 2024-06-27 RX ORDER — ACETAMINOPHEN 500 MG
1000 TABLET ORAL EVERY 8 HOURS SCHEDULED
Status: DISCONTINUED | OUTPATIENT
Start: 2024-06-27 | End: 2024-07-01 | Stop reason: HOSPADM

## 2024-06-27 RX ORDER — METRONIDAZOLE 500 MG/1
500 TABLET ORAL EVERY 8 HOURS SCHEDULED
Status: COMPLETED | OUTPATIENT
Start: 2024-06-27 | End: 2024-06-29

## 2024-06-27 RX ORDER — SODIUM CHLORIDE 0.9 % (FLUSH) 0.9 %
10 SYRINGE (ML) INJECTION EVERY 12 HOURS SCHEDULED
Status: DISCONTINUED | OUTPATIENT
Start: 2024-06-27 | End: 2024-06-27

## 2024-06-27 RX ORDER — KETOROLAC TROMETHAMINE 30 MG/ML
INJECTION, SOLUTION INTRAMUSCULAR; INTRAVENOUS PRN
Status: DISCONTINUED | OUTPATIENT
Start: 2024-06-27 | End: 2024-06-27 | Stop reason: SDUPTHER

## 2024-06-27 RX ORDER — HYDROMORPHONE HYDROCHLORIDE 2 MG/ML
INJECTION, SOLUTION INTRAMUSCULAR; INTRAVENOUS; SUBCUTANEOUS PRN
Status: DISCONTINUED | OUTPATIENT
Start: 2024-06-27 | End: 2024-06-27 | Stop reason: SDUPTHER

## 2024-06-27 RX ORDER — CEPHALEXIN 500 MG/1
500 CAPSULE ORAL EVERY 8 HOURS SCHEDULED
Status: COMPLETED | OUTPATIENT
Start: 2024-06-27 | End: 2024-06-29

## 2024-06-27 RX ORDER — MEPERIDINE HYDROCHLORIDE 25 MG/ML
INJECTION INTRAMUSCULAR; INTRAVENOUS; SUBCUTANEOUS
Status: DISCONTINUED
Start: 2024-06-27 | End: 2024-06-27 | Stop reason: WASHOUT

## 2024-06-27 RX ORDER — SODIUM CHLORIDE 9 MG/ML
INJECTION, SOLUTION INTRAVENOUS PRN
Status: DISCONTINUED | OUTPATIENT
Start: 2024-06-27 | End: 2024-06-27 | Stop reason: HOSPADM

## 2024-06-27 RX ORDER — OXYCODONE HYDROCHLORIDE 5 MG/1
5 TABLET ORAL EVERY 4 HOURS PRN
Status: DISCONTINUED | OUTPATIENT
Start: 2024-06-27 | End: 2024-07-01 | Stop reason: HOSPADM

## 2024-06-27 RX ORDER — PRENATAL WITH FERROUS FUM AND FOLIC ACID 3080; 920; 120; 400; 22; 1.84; 3; 20; 10; 1; 12; 200; 27; 25; 2 [IU]/1; [IU]/1; MG/1; [IU]/1; MG/1; MG/1; MG/1; MG/1; MG/1; MG/1; UG/1; MG/1; MG/1; MG/1; MG/1
1 TABLET ORAL DAILY
Status: DISCONTINUED | OUTPATIENT
Start: 2024-06-27 | End: 2024-07-01 | Stop reason: HOSPADM

## 2024-06-27 RX ORDER — CYCLOBENZAPRINE HCL 10 MG
5 TABLET ORAL NIGHTLY
Status: DISCONTINUED | OUTPATIENT
Start: 2024-06-27 | End: 2024-07-01 | Stop reason: HOSPADM

## 2024-06-27 RX ORDER — DEXAMETHASONE SODIUM PHOSPHATE 10 MG/ML
INJECTION, EMULSION INTRAMUSCULAR; INTRAVENOUS PRN
Status: DISCONTINUED | OUTPATIENT
Start: 2024-06-27 | End: 2024-06-27 | Stop reason: SDUPTHER

## 2024-06-27 RX ORDER — DOCUSATE SODIUM 100 MG/1
100 CAPSULE, LIQUID FILLED ORAL 2 TIMES DAILY
Status: DISCONTINUED | OUTPATIENT
Start: 2024-06-27 | End: 2024-07-01 | Stop reason: HOSPADM

## 2024-06-27 RX ORDER — ONDANSETRON 2 MG/ML
INJECTION INTRAMUSCULAR; INTRAVENOUS PRN
Status: DISCONTINUED | OUTPATIENT
Start: 2024-06-27 | End: 2024-06-27 | Stop reason: SDUPTHER

## 2024-06-27 RX ORDER — NIFEDIPINE 10 MG/1
10 CAPSULE ORAL
Status: COMPLETED | OUTPATIENT
Start: 2024-06-27 | End: 2024-06-27

## 2024-06-27 RX ORDER — SODIUM CHLORIDE, SODIUM LACTATE, POTASSIUM CHLORIDE, CALCIUM CHLORIDE 600; 310; 30; 20 MG/100ML; MG/100ML; MG/100ML; MG/100ML
INJECTION, SOLUTION INTRAVENOUS CONTINUOUS
Status: DISCONTINUED | OUTPATIENT
Start: 2024-06-27 | End: 2024-06-27

## 2024-06-27 RX ORDER — IBUPROFEN 800 MG/1
800 TABLET ORAL EVERY 8 HOURS
Status: DISCONTINUED | OUTPATIENT
Start: 2024-06-28 | End: 2024-07-01 | Stop reason: HOSPADM

## 2024-06-27 RX ORDER — SODIUM CHLORIDE 0.9 % (FLUSH) 0.9 %
10 SYRINGE (ML) INJECTION PRN
Status: DISCONTINUED | OUTPATIENT
Start: 2024-06-27 | End: 2024-06-27

## 2024-06-27 RX ORDER — SODIUM CHLORIDE, SODIUM LACTATE, POTASSIUM CHLORIDE, AND CALCIUM CHLORIDE .6; .31; .03; .02 G/100ML; G/100ML; G/100ML; G/100ML
1000 INJECTION, SOLUTION INTRAVENOUS ONCE
Status: COMPLETED | OUTPATIENT
Start: 2024-06-27 | End: 2024-06-27

## 2024-06-27 RX ORDER — METOCLOPRAMIDE HYDROCHLORIDE 5 MG/ML
10 INJECTION INTRAMUSCULAR; INTRAVENOUS ONCE
Status: COMPLETED | OUTPATIENT
Start: 2024-06-27 | End: 2024-06-27

## 2024-06-27 RX ORDER — ONDANSETRON 2 MG/ML
4 INJECTION INTRAMUSCULAR; INTRAVENOUS EVERY 6 HOURS PRN
Status: DISCONTINUED | OUTPATIENT
Start: 2024-06-27 | End: 2024-06-27

## 2024-06-27 RX ORDER — NALOXONE HYDROCHLORIDE 0.4 MG/ML
INJECTION, SOLUTION INTRAMUSCULAR; INTRAVENOUS; SUBCUTANEOUS PRN
Status: DISCONTINUED | OUTPATIENT
Start: 2024-06-27 | End: 2024-06-27 | Stop reason: HOSPADM

## 2024-06-27 RX ORDER — ACETAMINOPHEN 325 MG/1
975 TABLET ORAL ONCE
Status: COMPLETED | OUTPATIENT
Start: 2024-06-27 | End: 2024-06-27

## 2024-06-27 RX ORDER — BISACODYL 10 MG
10 SUPPOSITORY, RECTAL RECTAL DAILY PRN
Status: DISCONTINUED | OUTPATIENT
Start: 2024-06-27 | End: 2024-07-01 | Stop reason: HOSPADM

## 2024-06-27 RX ORDER — ZOLPIDEM TARTRATE 10 MG/1
10 TABLET ORAL NIGHTLY PRN
Status: DISCONTINUED | OUTPATIENT
Start: 2024-06-27 | End: 2024-07-01 | Stop reason: HOSPADM

## 2024-06-27 RX ORDER — PROPOFOL 10 MG/ML
INJECTION, EMULSION INTRAVENOUS PRN
Status: DISCONTINUED | OUTPATIENT
Start: 2024-06-27 | End: 2024-06-27 | Stop reason: SDUPTHER

## 2024-06-27 RX ORDER — SUCCINYLCHOLINE/SOD CL,ISO/PF 200MG/10ML
SYRINGE (ML) INTRAVENOUS PRN
Status: DISCONTINUED | OUTPATIENT
Start: 2024-06-27 | End: 2024-06-27 | Stop reason: SDUPTHER

## 2024-06-27 RX ORDER — METHYLERGONOVINE MALEATE 0.2 MG/ML
INJECTION INTRAVENOUS PRN
Status: DISCONTINUED | OUTPATIENT
Start: 2024-06-27 | End: 2024-06-27 | Stop reason: SDUPTHER

## 2024-06-27 RX ORDER — SODIUM CHLORIDE 0.9 % (FLUSH) 0.9 %
5-40 SYRINGE (ML) INJECTION PRN
Status: DISCONTINUED | OUTPATIENT
Start: 2024-06-27 | End: 2024-07-01 | Stop reason: HOSPADM

## 2024-06-27 RX ORDER — SIMETHICONE 80 MG
80 TABLET,CHEWABLE ORAL EVERY 6 HOURS PRN
Status: DISCONTINUED | OUTPATIENT
Start: 2024-06-27 | End: 2024-07-01 | Stop reason: HOSPADM

## 2024-06-27 RX ORDER — TRANEXAMIC ACID 10 MG/ML
INJECTION, SOLUTION INTRAVENOUS PRN
Status: DISCONTINUED | OUTPATIENT
Start: 2024-06-27 | End: 2024-06-27 | Stop reason: SDUPTHER

## 2024-06-27 RX ORDER — OXYCODONE HYDROCHLORIDE 5 MG/1
10 TABLET ORAL EVERY 4 HOURS PRN
Status: DISCONTINUED | OUTPATIENT
Start: 2024-06-27 | End: 2024-07-01 | Stop reason: HOSPADM

## 2024-06-27 RX ORDER — MORPHINE SULFATE 4 MG/ML
4 INJECTION, SOLUTION INTRAMUSCULAR; INTRAVENOUS
Status: DISCONTINUED | OUTPATIENT
Start: 2024-06-27 | End: 2024-07-01 | Stop reason: HOSPADM

## 2024-06-27 RX ORDER — DIPHENHYDRAMINE HCL 25 MG
25 TABLET ORAL EVERY 6 HOURS PRN
Status: DISCONTINUED | OUTPATIENT
Start: 2024-06-27 | End: 2024-07-01 | Stop reason: HOSPADM

## 2024-06-27 RX ORDER — MORPHINE SULFATE 2 MG/ML
2 INJECTION, SOLUTION INTRAMUSCULAR; INTRAVENOUS
Status: DISCONTINUED | OUTPATIENT
Start: 2024-06-27 | End: 2024-07-01 | Stop reason: HOSPADM

## 2024-06-27 RX ORDER — NIFEDIPINE 10 MG/1
20 CAPSULE ORAL
Status: DISCONTINUED | OUTPATIENT
Start: 2024-06-27 | End: 2024-07-01 | Stop reason: HOSPADM

## 2024-06-27 RX ORDER — SODIUM CHLORIDE, SODIUM LACTATE, POTASSIUM CHLORIDE, CALCIUM CHLORIDE 600; 310; 30; 20 MG/100ML; MG/100ML; MG/100ML; MG/100ML
INJECTION, SOLUTION INTRAVENOUS CONTINUOUS
Status: DISCONTINUED | OUTPATIENT
Start: 2024-06-27 | End: 2024-07-01 | Stop reason: HOSPADM

## 2024-06-27 RX ORDER — DIPHENHYDRAMINE HCL 25 MG
25 TABLET ORAL EVERY 4 HOURS PRN
Status: DISCONTINUED | OUTPATIENT
Start: 2024-06-27 | End: 2024-06-27 | Stop reason: SDUPTHER

## 2024-06-27 RX ORDER — ONDANSETRON 4 MG/1
4 TABLET, ORALLY DISINTEGRATING ORAL EVERY 8 HOURS PRN
Status: DISCONTINUED | OUTPATIENT
Start: 2024-06-27 | End: 2024-07-01 | Stop reason: HOSPADM

## 2024-06-27 RX ORDER — SODIUM CHLORIDE 0.9 % (FLUSH) 0.9 %
5-40 SYRINGE (ML) INJECTION PRN
Status: DISCONTINUED | OUTPATIENT
Start: 2024-06-27 | End: 2024-06-27 | Stop reason: HOSPADM

## 2024-06-27 RX ORDER — CITRIC ACID/SODIUM CITRATE 334-500MG
15 SOLUTION, ORAL ORAL ONCE
Status: COMPLETED | OUTPATIENT
Start: 2024-06-27 | End: 2024-06-27

## 2024-06-27 RX ORDER — METHYLERGONOVINE MALEATE 0.2 MG/ML
200 INJECTION INTRAVENOUS PRN
Status: DISCONTINUED | OUTPATIENT
Start: 2024-06-27 | End: 2024-07-01 | Stop reason: HOSPADM

## 2024-06-27 RX ORDER — SODIUM CHLORIDE 0.9 % (FLUSH) 0.9 %
5-40 SYRINGE (ML) INJECTION EVERY 12 HOURS SCHEDULED
Status: DISCONTINUED | OUTPATIENT
Start: 2024-06-27 | End: 2024-06-27 | Stop reason: HOSPADM

## 2024-06-27 RX ORDER — SODIUM CHLORIDE 0.9 % (FLUSH) 0.9 %
5-40 SYRINGE (ML) INJECTION EVERY 12 HOURS SCHEDULED
Status: DISCONTINUED | OUTPATIENT
Start: 2024-06-27 | End: 2024-07-01 | Stop reason: HOSPADM

## 2024-06-27 RX ORDER — OXYTOCIN/0.9 % SODIUM CHLORIDE 30/500 ML
87.3 PLASTIC BAG, INJECTION (ML) INTRAVENOUS CONTINUOUS PRN
Status: DISCONTINUED | OUTPATIENT
Start: 2024-06-27 | End: 2024-06-27

## 2024-06-27 RX ORDER — FERROUS SULFATE 325(65) MG
325 TABLET ORAL
Status: DISCONTINUED | OUTPATIENT
Start: 2024-06-28 | End: 2024-07-01 | Stop reason: HOSPADM

## 2024-06-27 RX ORDER — MISOPROSTOL 200 UG/1
800 TABLET ORAL PRN
Status: DISCONTINUED | OUTPATIENT
Start: 2024-06-27 | End: 2024-07-01 | Stop reason: HOSPADM

## 2024-06-27 RX ORDER — FENTANYL CITRATE 50 UG/ML
INJECTION, SOLUTION INTRAMUSCULAR; INTRAVENOUS PRN
Status: DISCONTINUED | OUTPATIENT
Start: 2024-06-27 | End: 2024-06-27 | Stop reason: SDUPTHER

## 2024-06-27 RX ORDER — DROPERIDOL 2.5 MG/ML
INJECTION, SOLUTION INTRAMUSCULAR; INTRAVENOUS
Status: DISCONTINUED
Start: 2024-06-27 | End: 2024-06-27 | Stop reason: WASHOUT

## 2024-06-27 RX ORDER — SODIUM CHLORIDE 9 MG/ML
INJECTION, SOLUTION INTRAVENOUS PRN
Status: DISCONTINUED | OUTPATIENT
Start: 2024-06-27 | End: 2024-07-01 | Stop reason: HOSPADM

## 2024-06-27 RX ORDER — KETOROLAC TROMETHAMINE 30 MG/ML
30 INJECTION, SOLUTION INTRAMUSCULAR; INTRAVENOUS EVERY 6 HOURS
Status: DISPENSED | OUTPATIENT
Start: 2024-06-27 | End: 2024-06-28

## 2024-06-27 RX ORDER — NIFEDIPINE 10 MG/1
CAPSULE ORAL
Status: DISPENSED
Start: 2024-06-27 | End: 2024-06-27

## 2024-06-27 RX ORDER — MODIFIED LANOLIN
OINTMENT (GRAM) TOPICAL
Status: DISCONTINUED | OUTPATIENT
Start: 2024-06-27 | End: 2024-07-01 | Stop reason: HOSPADM

## 2024-06-27 RX ORDER — ONDANSETRON 2 MG/ML
4 INJECTION INTRAMUSCULAR; INTRAVENOUS EVERY 6 HOURS PRN
Status: DISCONTINUED | OUTPATIENT
Start: 2024-06-27 | End: 2024-07-01 | Stop reason: HOSPADM

## 2024-06-27 RX ORDER — LABETALOL HYDROCHLORIDE 5 MG/ML
20 INJECTION, SOLUTION INTRAVENOUS
Status: DISPENSED | OUTPATIENT
Start: 2024-06-27 | End: 2024-06-28

## 2024-06-27 RX ADMIN — SODIUM CHLORIDE, POTASSIUM CHLORIDE, SODIUM LACTATE AND CALCIUM CHLORIDE: 600; 310; 30; 20 INJECTION, SOLUTION INTRAVENOUS at 12:38

## 2024-06-27 RX ADMIN — DIPHENHYDRAMINE HYDROCHLORIDE 25 MG: 25 TABLET ORAL at 23:42

## 2024-06-27 RX ADMIN — ACETAMINOPHEN 1000 MG: 500 TABLET ORAL at 12:33

## 2024-06-27 RX ADMIN — CEPHALEXIN 500 MG: 500 CAPSULE ORAL at 21:00

## 2024-06-27 RX ADMIN — ACETAMINOPHEN 975 MG: 325 TABLET ORAL at 06:26

## 2024-06-27 RX ADMIN — Medication 200 MG: at 07:57

## 2024-06-27 RX ADMIN — SODIUM CITRATE AND CITRIC ACID MONOHYDRATE 15 ML: 500; 334 SOLUTION ORAL at 06:26

## 2024-06-27 RX ADMIN — TRANEXAMIC ACID 1 G: 10 INJECTION, SOLUTION INTRAVENOUS at 08:06

## 2024-06-27 RX ADMIN — OXYCODONE HYDROCHLORIDE 10 MG: 5 TABLET ORAL at 20:53

## 2024-06-27 RX ADMIN — Medication 87.3 MILLI-UNITS/MIN: at 09:06

## 2024-06-27 RX ADMIN — HYDROMORPHONE HYDROCHLORIDE 0.25 MG: 1 INJECTION, SOLUTION INTRAMUSCULAR; INTRAVENOUS; SUBCUTANEOUS at 10:25

## 2024-06-27 RX ADMIN — SODIUM CHLORIDE, POTASSIUM CHLORIDE, SODIUM LACTATE AND CALCIUM CHLORIDE 1000 ML: 600; 310; 30; 20 INJECTION, SOLUTION INTRAVENOUS at 06:19

## 2024-06-27 RX ADMIN — DEXAMETHASONE SODIUM PHOSPHATE 10 MG: 10 INJECTION, EMULSION INTRAMUSCULAR; INTRAVENOUS at 07:57

## 2024-06-27 RX ADMIN — KETOROLAC TROMETHAMINE 30 MG: 30 INJECTION, SOLUTION INTRAMUSCULAR at 08:39

## 2024-06-27 RX ADMIN — Medication 3000 MG: at 07:36

## 2024-06-27 RX ADMIN — KETOROLAC TROMETHAMINE 30 MG: 30 INJECTION, SOLUTION INTRAMUSCULAR at 23:34

## 2024-06-27 RX ADMIN — HYDROMORPHONE HYDROCHLORIDE 0.5 MG: 1 INJECTION, SOLUTION INTRAMUSCULAR; INTRAVENOUS; SUBCUTANEOUS at 10:12

## 2024-06-27 RX ADMIN — PROPOFOL 200 MG: 10 INJECTION, EMULSION INTRAVENOUS at 07:57

## 2024-06-27 RX ADMIN — SODIUM CHLORIDE, POTASSIUM CHLORIDE, SODIUM LACTATE AND CALCIUM CHLORIDE: 600; 310; 30; 20 INJECTION, SOLUTION INTRAVENOUS at 07:10

## 2024-06-27 RX ADMIN — METOCLOPRAMIDE 10 MG: 5 INJECTION, SOLUTION INTRAMUSCULAR; INTRAVENOUS at 06:26

## 2024-06-27 RX ADMIN — FAMOTIDINE 20 MG: 10 INJECTION, SOLUTION INTRAVENOUS at 06:26

## 2024-06-27 RX ADMIN — OXYCODONE HYDROCHLORIDE 10 MG: 5 TABLET ORAL at 16:53

## 2024-06-27 RX ADMIN — METRONIDAZOLE 500 MG: 500 TABLET ORAL at 21:00

## 2024-06-27 RX ADMIN — SODIUM CHLORIDE, POTASSIUM CHLORIDE, SODIUM LACTATE AND CALCIUM CHLORIDE: 600; 310; 30; 20 INJECTION, SOLUTION INTRAVENOUS at 08:51

## 2024-06-27 RX ADMIN — HYDROMORPHONE HYDROCHLORIDE 0.5 MG: 1 INJECTION, SOLUTION INTRAMUSCULAR; INTRAVENOUS; SUBCUTANEOUS at 09:15

## 2024-06-27 RX ADMIN — METRONIDAZOLE 500 MG: 500 TABLET ORAL at 12:37

## 2024-06-27 RX ADMIN — FENTANYL CITRATE 50 MCG: 50 INJECTION, SOLUTION INTRAMUSCULAR; INTRAVENOUS at 08:06

## 2024-06-27 RX ADMIN — HYDROMORPHONE HYDROCHLORIDE 0.25 MG: 1 INJECTION, SOLUTION INTRAMUSCULAR; INTRAVENOUS; SUBCUTANEOUS at 10:34

## 2024-06-27 RX ADMIN — CEPHALEXIN 500 MG: 500 CAPSULE ORAL at 12:37

## 2024-06-27 RX ADMIN — OXYCODONE HYDROCHLORIDE 10 MG: 5 TABLET ORAL at 12:33

## 2024-06-27 RX ADMIN — FENTANYL CITRATE 50 MCG: 50 INJECTION, SOLUTION INTRAMUSCULAR; INTRAVENOUS at 08:02

## 2024-06-27 RX ADMIN — KETOROLAC TROMETHAMINE 30 MG: 30 INJECTION, SOLUTION INTRAMUSCULAR at 14:34

## 2024-06-27 RX ADMIN — ONDANSETRON 4 MG: 2 INJECTION INTRAMUSCULAR; INTRAVENOUS at 07:57

## 2024-06-27 RX ADMIN — HYDROMORPHONE HYDROCHLORIDE 0.5 MG: 2 INJECTION INTRAMUSCULAR; INTRAVENOUS; SUBCUTANEOUS at 08:10

## 2024-06-27 RX ADMIN — NIFEDIPINE 10 MG: 10 CAPSULE ORAL at 10:10

## 2024-06-27 RX ADMIN — HYDROMORPHONE HYDROCHLORIDE 0.5 MG: 2 INJECTION INTRAMUSCULAR; INTRAVENOUS; SUBCUTANEOUS at 08:35

## 2024-06-27 RX ADMIN — ACETAMINOPHEN 1000 MG: 500 TABLET ORAL at 20:53

## 2024-06-27 RX ADMIN — SODIUM CHLORIDE, POTASSIUM CHLORIDE, SODIUM LACTATE AND CALCIUM CHLORIDE: 600; 310; 30; 20 INJECTION, SOLUTION INTRAVENOUS at 21:39

## 2024-06-27 RX ADMIN — SODIUM CHLORIDE, POTASSIUM CHLORIDE, SODIUM LACTATE AND CALCIUM CHLORIDE: 600; 310; 30; 20 INJECTION, SOLUTION INTRAVENOUS at 08:01

## 2024-06-27 RX ADMIN — HYDROMORPHONE HYDROCHLORIDE 0.5 MG: 2 INJECTION INTRAMUSCULAR; INTRAVENOUS; SUBCUTANEOUS at 08:50

## 2024-06-27 RX ADMIN — METHYLERGONOVINE MALEATE 200 MCG: 0.2 INJECTION, SOLUTION INTRAMUSCULAR; INTRAVENOUS at 08:06

## 2024-06-27 RX ADMIN — DOCUSATE SODIUM 100 MG: 100 CAPSULE, LIQUID FILLED ORAL at 20:54

## 2024-06-27 RX ADMIN — HYDROMORPHONE HYDROCHLORIDE 0.5 MG: 2 INJECTION INTRAMUSCULAR; INTRAVENOUS; SUBCUTANEOUS at 08:02

## 2024-06-27 RX ADMIN — KETOROLAC TROMETHAMINE 30 MG: 30 INJECTION, SOLUTION INTRAMUSCULAR; INTRAVENOUS at 08:39

## 2024-06-27 ASSESSMENT — PAIN DESCRIPTION - PAIN TYPE
TYPE: SURGICAL PAIN

## 2024-06-27 ASSESSMENT — PAIN SCALES - GENERAL
PAINLEVEL_OUTOF10: 7
PAINLEVEL_OUTOF10: 5
PAINLEVEL_OUTOF10: 5
PAINLEVEL_OUTOF10: 7
PAINLEVEL_OUTOF10: 5
PAINLEVEL_OUTOF10: 7
PAINLEVEL_OUTOF10: 3
PAINLEVEL_OUTOF10: 8
PAINLEVEL_OUTOF10: 4
PAINLEVEL_OUTOF10: 3

## 2024-06-27 ASSESSMENT — PAIN DESCRIPTION - DESCRIPTORS
DESCRIPTORS: DISCOMFORT

## 2024-06-27 ASSESSMENT — PAIN DESCRIPTION - LOCATION
LOCATION: ABDOMEN

## 2024-06-27 ASSESSMENT — PAIN DESCRIPTION - ORIENTATION
ORIENTATION: LOWER;MID
ORIENTATION: MID;LOWER

## 2024-06-27 ASSESSMENT — PAIN DESCRIPTION - ONSET
ONSET: ON-GOING
ONSET: GRADUAL
ONSET: ON-GOING
ONSET: GRADUAL

## 2024-06-27 ASSESSMENT — PAIN - FUNCTIONAL ASSESSMENT
PAIN_FUNCTIONAL_ASSESSMENT: NONE - DENIES PAIN
PAIN_FUNCTIONAL_ASSESSMENT: ACTIVITIES ARE NOT PREVENTED

## 2024-06-27 ASSESSMENT — PAIN DESCRIPTION - FREQUENCY
FREQUENCY: INTERMITTENT
FREQUENCY: INTERMITTENT
FREQUENCY: CONTINUOUS
FREQUENCY: CONTINUOUS

## 2024-06-27 NOTE — FLOWSHEET NOTE
JAYLA BARLOW, Christine, here for US guided IV insertion. Christine is successful, labs obtained and fluid running

## 2024-06-27 NOTE — PROGRESS NOTES
Patient admitted to 15 via bed, holding infant in arms. FOB at bedside. Patient and spouse oriented to unit and room. Call light and belongings within reach. Plan of care and ducks in a row reviewed. Show packet and shaken baby video shown at this time.

## 2024-06-27 NOTE — FLOWSHEET NOTE
Felipe drained, fortunato care provided for pt in bed. Binder applied to pt abdomen. Pt transported to 5a15 via bed with infant daughter in arms. Report given to Carolyn BARLOW.

## 2024-06-27 NOTE — LACTATION NOTE
Met with pt in room.  Gave booklet, reviewed support.  Gave hand pump and educated on use.  Pt has history with breastfeeding with 15 month old.  Pt wants to breastfeed as much as possible.  Pt has WIC support too.

## 2024-06-27 NOTE — FLOWSHEET NOTE
Emergency department called and notified RN unable to obtain IV access.  States they will send someone up

## 2024-06-27 NOTE — FLOWSHEET NOTE
06/27/24 0701   Provider Notification   Name of Team Member Notified Dr Hernandez   Method of Communication Secure Message   Response Waiting for response     Message to Dr Hernandez regarding pt being ready and that and confirming he is aware that she is a general    0712 - Dr Hernandez responds that he is aware via secure message

## 2024-06-27 NOTE — H&P
[facility]  History and Physical Update    Pt Name: Karly Yee  MRN: [id number]  YOB: 1995  Date of evaluation: 2024    [] I have examined the patient and reviewed the H&P/Consult and there are no changes to the patient or plans.    [x] I have examined the patient and reviewed the H&P/Consult and have noted the following changes:   29 yo  at 39 weeks for repeat cesection  2 prior csections and prior back surgery requiring General anesthesia    Discussion with the patient and/ or family for proposed care, treatment, services; benefits, risks, side effects; likelihood of achieving goals and potential problems that may occur during recuperation was had and all questions were answered.  Discussion with the patient and/ or family of reasonable alternatives to the proposed care, treatment, services and the discussion of the risks, benefits, side effects related to the alternatives and the risk related to not receiving the proposed care treatment services was also had and all questions were answered.    If this is for an elective surgical procedure then The patient was counseled at length about the risks of jasper Covid-19 during their perioperative period and any recovery window from their procedure.  The patient was made aware that jasper Covid-19  may worsen their prognosis for recovering from their procedure  and lend to a higher morbidity and/or mortality risk.  All material risks, benefits, and reasonable alternatives including postponing the procedure were discussed. The patient  does wish to proceed with the procedure at this time.             Ktaie Machado MD,MD  Electronically signed 2024 at 7:38 AM

## 2024-06-27 NOTE — FLOWSHEET NOTE
Dr. TASHIA Machado given update on pt BP in pacu. MD notified of /95 on admission to Pacu with following BP of 54/84 and 184/72. Md notified of Pt pain of 7/10. MD order 10 mg of Nifedipine and to continue with dilaudid as ordered.

## 2024-06-27 NOTE — FLOWSHEET NOTE
RN notifying PACU nursing staff that this pt will have a scheduled  around 0730 that will be under general anesthesia. RN will call when we are getting close to skin, likely around [phone].

## 2024-06-27 NOTE — FLOWSHEET NOTE
06/27/24 0651   Provider Notification   Name of Team Member Notified Dr Machado   Method of Communication Call   Response No new orders     Dr Machado phones unit to get update on patient. Informed of patient being ready to go, pre-op meds given, wray catheter placed. Anesthesia has yet to see patient, aware patient is a general.

## 2024-06-27 NOTE — PROGRESS NOTES
0900: Pt arrives to recovery, pt responds to verbal stimuli. Pt on NC, respirations easy and unlabored. Denies pain currently. VSS  0910: Ice chips provided at this time, tolerated well  0915: 0.5mg of dilaudid administered  0920: Pt alert and talking, skin to skin with baby at this time  0925: Pt states pain 3/10 and tolerable  0935: Pt meets criteria for discharge from pacu, hand off given to OBRN

## 2024-06-27 NOTE — ANESTHESIA PRE PROCEDURE
Department of Anesthesiology  Preprocedure Note       Name:  Karly Yee   Age:  28 y.o.  :  1995                                          MRN:  [id number]         Date:  2024      Surgeon: Surgeon(s):  Katie Machado MD    Procedure: Procedure(s):  REPEAT     Medications prior to admission:   Prior to Admission medications    Medication Sig Start Date End Date Taking? Authorizing Provider   oxyCODONE-acetaminophen (PERCOCET) 5-325 MG per tablet 1 tablet.  Patient not taking: Reported on 2024 3/7/24   ProviderYoni MD   Biotin 1000 MCG TABS Take by mouth    ProviderYoni MD   aspirin 81 MG chewable tablet Take 1 tablet by mouth daily    ProviderYoni MD   cyclobenzaprine (FLEXERIL) 5 MG tablet Take 1 tablet by mouth at bedtime    ProviderYoni MD   lidocaine (XYLOCAINE) 5 % ointment Apply topically as needed.  Patient not taking: Reported on 2024   Laura Merrill, FIORDALIZA - CNP   magnesium oxide (MAG-OX) 400 (240 Mg) MG tablet Take 250 mg by mouth daily    ProviderYoni MD   Prenatal Vit-Fe Fumarate-FA (PRENATAL 1+1 PO) Take by mouth    ProviderYoni MD   acetaminophen (TYLENOL) 500 MG tablet Take 1 tablet by mouth every 6 hours as needed for Pain    ProviderYoni MD       Current medications:    Current Facility-Administered Medications   Medication Dose Route Frequency Provider Last Rate Last Admin   • lactated ringers IV soln infusion   IntraVENous Continuous Katie Machado  mL/hr at 24 0710 New Bag at 24 0710   • sodium chloride flush 0.9 % injection 10 mL  10 mL IntraVENous 2 times per day Katie Machado MD       • sodium chloride flush 0.9 % injection 10 mL  10 mL IntraVENous PRN Katie Machado MD       • 0.9 % sodium chloride infusion   IntraVENous PRN Katie Machado MD       • oxytocin (PITOCIN) 30 units in 500 mL infusion  87.3 bigg-units/min IntraVENous

## 2024-06-27 NOTE — FLOWSHEET NOTE
Pt arrives to unit for scheduled  section via wheelchair from ED entrance. To BRM to change and provide urine for UDS, verbal consent given, written to be obtained. Pt reports good FM, no VB/LOF.

## 2024-06-28 LAB — HGB BLD-MCNC: 10.5 GM/DL (ref 12–16)

## 2024-06-28 PROCEDURE — 36415 COLL VENOUS BLD VENIPUNCTURE: CPT

## 2024-06-28 PROCEDURE — 6360000002 HC RX W HCPCS: Performed by: OBSTETRICS & GYNECOLOGY

## 2024-06-28 PROCEDURE — 6370000000 HC RX 637 (ALT 250 FOR IP): Performed by: OBSTETRICS & GYNECOLOGY

## 2024-06-28 PROCEDURE — 1200000000 HC SEMI PRIVATE

## 2024-06-28 PROCEDURE — 85018 HEMOGLOBIN: CPT

## 2024-06-28 RX ORDER — DIAZEPAM 5 MG/1
5 TABLET ORAL EVERY 6 HOURS PRN
Status: DISCONTINUED | OUTPATIENT
Start: 2024-06-28 | End: 2024-07-01 | Stop reason: HOSPADM

## 2024-06-28 RX ORDER — OXYCODONE HYDROCHLORIDE AND ACETAMINOPHEN 5; 325 MG/1; MG/1
1 TABLET ORAL EVERY 6 HOURS PRN
Qty: 28 TABLET | Refills: 0 | Status: SHIPPED | OUTPATIENT
Start: 2024-06-28 | End: 2024-07-05

## 2024-06-28 RX ORDER — IBUPROFEN 600 MG/1
600 TABLET ORAL EVERY 6 HOURS PRN
Qty: 60 TABLET | Refills: 3 | Status: SHIPPED | OUTPATIENT
Start: 2024-06-28

## 2024-06-28 RX ADMIN — OXYCODONE HYDROCHLORIDE 10 MG: 5 TABLET ORAL at 04:47

## 2024-06-28 RX ADMIN — DIAZEPAM 5 MG: 5 TABLET ORAL at 21:52

## 2024-06-28 RX ADMIN — IBUPROFEN 800 MG: 800 TABLET, FILM COATED ORAL at 19:30

## 2024-06-28 RX ADMIN — PRENATAL WITH FERROUS FUM AND FOLIC ACID 1 TABLET: 3080; 920; 120; 400; 22; 1.84; 3; 20; 10; 1; 12; 200; 27; 25; 2 TABLET ORAL at 08:20

## 2024-06-28 RX ADMIN — CEPHALEXIN 500 MG: 500 CAPSULE ORAL at 15:53

## 2024-06-28 RX ADMIN — OXYCODONE HYDROCHLORIDE 10 MG: 5 TABLET ORAL at 22:59

## 2024-06-28 RX ADMIN — DOCUSATE SODIUM 100 MG: 100 CAPSULE, LIQUID FILLED ORAL at 08:21

## 2024-06-28 RX ADMIN — METRONIDAZOLE 500 MG: 500 TABLET ORAL at 22:59

## 2024-06-28 RX ADMIN — METRONIDAZOLE 500 MG: 500 TABLET ORAL at 15:53

## 2024-06-28 RX ADMIN — OXYCODONE HYDROCHLORIDE 10 MG: 5 TABLET ORAL at 13:59

## 2024-06-28 RX ADMIN — OXYCODONE HYDROCHLORIDE 10 MG: 5 TABLET ORAL at 08:59

## 2024-06-28 RX ADMIN — DOCUSATE SODIUM 100 MG: 100 CAPSULE, LIQUID FILLED ORAL at 22:59

## 2024-06-28 RX ADMIN — CEPHALEXIN 500 MG: 500 CAPSULE ORAL at 05:36

## 2024-06-28 RX ADMIN — METRONIDAZOLE 500 MG: 500 TABLET ORAL at 05:36

## 2024-06-28 RX ADMIN — CEPHALEXIN 500 MG: 500 CAPSULE ORAL at 22:59

## 2024-06-28 RX ADMIN — ACETAMINOPHEN 1000 MG: 500 TABLET ORAL at 22:59

## 2024-06-28 RX ADMIN — OXYCODONE HYDROCHLORIDE 10 MG: 5 TABLET ORAL at 00:50

## 2024-06-28 RX ADMIN — ACETAMINOPHEN 1000 MG: 500 TABLET ORAL at 04:47

## 2024-06-28 RX ADMIN — IBUPROFEN 800 MG: 800 TABLET, FILM COATED ORAL at 12:21

## 2024-06-28 RX ADMIN — OXYCODONE HYDROCHLORIDE 10 MG: 5 TABLET ORAL at 19:02

## 2024-06-28 RX ADMIN — KETOROLAC TROMETHAMINE 30 MG: 30 INJECTION, SOLUTION INTRAMUSCULAR at 05:36

## 2024-06-28 RX ADMIN — ACETAMINOPHEN 1000 MG: 500 TABLET ORAL at 13:59

## 2024-06-28 ASSESSMENT — PAIN SCALES - GENERAL
PAINLEVEL_OUTOF10: 7
PAINLEVEL_OUTOF10: 8
PAINLEVEL_OUTOF10: 8
PAINLEVEL_OUTOF10: 7
PAINLEVEL_OUTOF10: 8
PAINLEVEL_OUTOF10: 7
PAINLEVEL_OUTOF10: 8

## 2024-06-28 ASSESSMENT — PAIN DESCRIPTION - DESCRIPTORS
DESCRIPTORS: ACHING;DISCOMFORT

## 2024-06-28 ASSESSMENT — PAIN - FUNCTIONAL ASSESSMENT
PAIN_FUNCTIONAL_ASSESSMENT: ACTIVITIES ARE NOT PREVENTED

## 2024-06-28 ASSESSMENT — PAIN DESCRIPTION - LOCATION
LOCATION: GENERALIZED;INCISION
LOCATION: INCISION
LOCATION: INCISION;GENERALIZED

## 2024-06-28 NOTE — L&D DELIVERY NOTE
Department of Obstetrics and Gynecology   Section Note    Pt Name: Karly Yee  MRN: [id number] Acct #: [id number]  YOB: 1995  Procedure Performed By: Katie Machado MD, MD    Indications: previous uterine incision     Pre-operative Diagnosis: 39 week pregnancy.  Post-operative Diagnosis:  Same, Delivered, Living newbornFemale  Procedure:  repeat low transverse  section  Findings:  Normal tubes, ovaries and uterus. Baby Female, Apgars  8,9 and weight of 7 lbs and 5 ounces.    Estimated Blood Loss:  650ml         Specimens: None     Complications:  very attenuated fascia of the midline incision,  Uterine incision with a 2 cm deficit on the right that was VERY thin         Condition: infant stable to general nursery and mother stable    Indications:     Karly Yee is a 28 y.o. female  at 39w1d who presented for   section for  previous uterine incision .  She understood the  risks and benefits and signed informed consent.     Procedure:  The patient was taken to the Operating Room where general anesthesia was placed.  She was placed in the dorsal supine position with a leftward tilt and prepped and draped.  A Vertical skin incision was made with the scalpel taken down to the fascia.  The fascia and peritoneum was nicked in the midline.  This incision extended superiorly and inferiorly.     This incision extended superiorly and inferior with good visualization of the bladder.  The vesicouterine peritoneum was identified   .  The uterus was incised in a low transverse fashion and extended digitally.  The vertex was removed from the uterus with the aid of vacuum assistance and fundal pressure.    The rest of the baby delivered.  The cord was clamped and cut and the baby was stimulated.  Cord blood was obtained, the placenta delivered intact with a 3 vessel cord.  The uterus was exteriorized, cleared of all clots and debris and repaired with #1 chromic in a running 
MD     Provider Role    Saige Nichols, RN Primary Nurse    America Reno, RN Primary Nurse    Evita Villalta RN Primary  Nurse    Pavithra Lutz APRN - CRNA Nurse Anesthetist    Rodri Beltran, RCP Respiratory Therapist              Palmer Assessment    Living Status: Living        Skin Color:   Heart Rate:   Reflex Irritability:   Muscle Tone:   Respiratory Effort:   Total:            1 Minute:    0    2    2    2    2    8         5 Minute:    1    2    2    2    2    9                                        Apgars Assigned By: ANDERSONRAGHAFSA              Resuscitation    Method: Bulb Suction, Stimulation              Measurements      Birth Weight: 3440 g   Birth Length: 52.1 cm     Head Circumference: 33 cm     Chest Circumference: 31.8 cm              Skin to Skin      Skin to Skin Initiation Date/Time: 24 08:11:48 EDT     Skin to Skin With: Father

## 2024-06-28 NOTE — ANESTHESIA POSTPROCEDURE EVALUATION
Department of Anesthesiology  Postprocedure Note    Patient: Karly Yee  MRN: [id number]  YOB: 1995  Date of evaluation: 2024    Procedure Summary       Date: 24 Room / Location: [address]    Anesthesia Start: 739 Anesthesia Stop: 09    Procedure: REPEAT  (Uterus) Diagnosis:       Previous  section      (Previous  section [Z98.891])    Surgeons: Katie Machado MD Responsible Provider: Marcelo Hernandez DO    Anesthesia Type: general ASA Status: 3            Anesthesia Type: No value filed.    Austin Phase I: Austin Score: 10    Austin Phase II: Austin Score: 10    Anesthesia Post Evaluation    Patient location during evaluation: floor  Patient participation: complete - patient participated  Level of consciousness: awake  Airway patency: patent  Nausea & Vomiting: no vomiting and no nausea  Cardiovascular status: hemodynamically stable  Respiratory status: acceptable  Hydration status: stable  Pain management: adequate        No notable events documented.

## 2024-06-28 NOTE — FLOWSHEET NOTE
Assisted pt to side of bed to dangle, then to ambulate in halls per pt request. Sybil care done with assistance in BR after ambulating. Pads changed. Small lochia. Assisted pt back to bed

## 2024-06-28 NOTE — FLOWSHEET NOTE
Up to shower, tolerated well.  Infant has roomed in with mother this shift  Benefits of rooming in discussed.

## 2024-06-28 NOTE — LACTATION NOTE
Pt. Stated she has no questions for lactation at this time. Will continue to follow up with pt PRN.

## 2024-06-28 NOTE — LACTATION NOTE
Pt. Stated pumping and breastfeeding is going well.  Pt. Requested an electric pump to be set up.  Lactation provided breast pump for pt. Encouraged pt. To call out for assistance as needed.

## 2024-06-29 LAB
DEPRECATED RDW RBC AUTO: 48.2 FL (ref 35–45)
ERYTHROCYTE [DISTWIDTH] IN BLOOD BY AUTOMATED COUNT: 14.4 % (ref 11.5–14.5)
HCT VFR BLD AUTO: 32.3 % (ref 37–47)
HGB BLD-MCNC: 10.8 GM/DL (ref 12–16)
MCH RBC QN AUTO: 30.9 PG (ref 26–33)
MCHC RBC AUTO-ENTMCNC: 33.4 GM/DL (ref 32.2–35.5)
MCV RBC AUTO: 92.6 FL (ref 81–99)
PLATELET # BLD AUTO: 196 THOU/MM3 (ref 130–400)
PMV BLD AUTO: 9.4 FL (ref 9.4–12.4)
RBC # BLD AUTO: 3.49 MILL/MM3 (ref 4.2–5.4)
WBC # BLD AUTO: 12.8 THOU/MM3 (ref 4.8–10.8)

## 2024-06-29 PROCEDURE — 85027 COMPLETE CBC AUTOMATED: CPT

## 2024-06-29 PROCEDURE — 1200000000 HC SEMI PRIVATE

## 2024-06-29 PROCEDURE — 36415 COLL VENOUS BLD VENIPUNCTURE: CPT

## 2024-06-29 PROCEDURE — 6370000000 HC RX 637 (ALT 250 FOR IP): Performed by: OBSTETRICS & GYNECOLOGY

## 2024-06-29 RX ADMIN — ACETAMINOPHEN 1000 MG: 500 TABLET ORAL at 23:03

## 2024-06-29 RX ADMIN — IBUPROFEN 800 MG: 800 TABLET, FILM COATED ORAL at 11:00

## 2024-06-29 RX ADMIN — OXYCODONE HYDROCHLORIDE 10 MG: 5 TABLET ORAL at 03:00

## 2024-06-29 RX ADMIN — ACETAMINOPHEN 1000 MG: 500 TABLET ORAL at 07:01

## 2024-06-29 RX ADMIN — ACETAMINOPHEN 1000 MG: 500 TABLET ORAL at 15:08

## 2024-06-29 RX ADMIN — IBUPROFEN 800 MG: 800 TABLET, FILM COATED ORAL at 03:00

## 2024-06-29 RX ADMIN — DOCUSATE SODIUM 100 MG: 100 CAPSULE, LIQUID FILLED ORAL at 23:03

## 2024-06-29 RX ADMIN — METRONIDAZOLE 500 MG: 500 TABLET ORAL at 07:01

## 2024-06-29 RX ADMIN — OXYCODONE HYDROCHLORIDE 10 MG: 5 TABLET ORAL at 07:01

## 2024-06-29 RX ADMIN — OXYCODONE HYDROCHLORIDE 10 MG: 5 TABLET ORAL at 19:07

## 2024-06-29 RX ADMIN — OXYCODONE HYDROCHLORIDE 10 MG: 5 TABLET ORAL at 23:03

## 2024-06-29 RX ADMIN — DOCUSATE SODIUM 100 MG: 100 CAPSULE, LIQUID FILLED ORAL at 07:58

## 2024-06-29 RX ADMIN — CEPHALEXIN 500 MG: 500 CAPSULE ORAL at 07:01

## 2024-06-29 RX ADMIN — OXYCODONE HYDROCHLORIDE 10 MG: 5 TABLET ORAL at 15:08

## 2024-06-29 RX ADMIN — PRENATAL WITH FERROUS FUM AND FOLIC ACID 1 TABLET: 3080; 920; 120; 400; 22; 1.84; 3; 20; 10; 1; 12; 200; 27; 25; 2 TABLET ORAL at 07:58

## 2024-06-29 RX ADMIN — OXYCODONE HYDROCHLORIDE 10 MG: 5 TABLET ORAL at 11:01

## 2024-06-29 RX ADMIN — IBUPROFEN 800 MG: 800 TABLET, FILM COATED ORAL at 19:07

## 2024-06-29 ASSESSMENT — PAIN DESCRIPTION - LOCATION
LOCATION: INCISION
LOCATION: INCISION
LOCATION: ABDOMEN;INCISION;BACK

## 2024-06-29 ASSESSMENT — PAIN SCALES - GENERAL
PAINLEVEL_OUTOF10: 8
PAINLEVEL_OUTOF10: 7
PAINLEVEL_OUTOF10: 7

## 2024-06-29 ASSESSMENT — PAIN - FUNCTIONAL ASSESSMENT
PAIN_FUNCTIONAL_ASSESSMENT: ACTIVITIES ARE NOT PREVENTED

## 2024-06-29 ASSESSMENT — PAIN DESCRIPTION - ORIENTATION: ORIENTATION: LOWER

## 2024-06-29 ASSESSMENT — PAIN DESCRIPTION - DESCRIPTORS
DESCRIPTORS: ACHING;DISCOMFORT
DESCRIPTORS: DISCOMFORT;ACHING
DESCRIPTORS: ACHING;DISCOMFORT

## 2024-06-29 NOTE — LACTATION NOTE
Pt. Stated she has no questions for lactation at this time.  Encouraged pt. To rest and call out for assistance as needed

## 2024-06-29 NOTE — PROGRESS NOTES
Progress note    Subjective:     Postpartum Day 2:  Delivery    Pain is moderately controlled with current medications.  The patient is ambulating well. The patient is tolerating a normal diet. Flatus has been passed. Ready to go home today.    Objective:     Vitals:    24 0002   BP: 133/63   Pulse: 95   Resp: 16   Temp: 98.6 °F (37 °C)   SpO2:      -130/60-70s    General:    alert, appears stated age, and cooperative   Uterine Fundus:   firm   Incision:  coveredhealing well, no significant drainage, no dehiscence, no significant erythema, scan drainage on incision        CBC   Lab Results   Component Value Date    HGB 10.8 (L) 2024        Assessment:     POD#2 s/p Status post  section. Doing well postoperatively.      Plan:     Encourage ambulation   Rh+  Con't post partum care  Anticipate d/c home tomorrow.      Krystal Hdz MD 2024 7:31 AM

## 2024-06-30 PROCEDURE — 6370000000 HC RX 637 (ALT 250 FOR IP): Performed by: OBSTETRICS & GYNECOLOGY

## 2024-06-30 PROCEDURE — 1200000000 HC SEMI PRIVATE

## 2024-06-30 RX ADMIN — OXYCODONE HYDROCHLORIDE 10 MG: 5 TABLET ORAL at 14:58

## 2024-06-30 RX ADMIN — IBUPROFEN 800 MG: 800 TABLET, FILM COATED ORAL at 10:55

## 2024-06-30 RX ADMIN — ACETAMINOPHEN 1000 MG: 500 TABLET ORAL at 23:33

## 2024-06-30 RX ADMIN — IBUPROFEN 800 MG: 800 TABLET, FILM COATED ORAL at 19:32

## 2024-06-30 RX ADMIN — IBUPROFEN 800 MG: 800 TABLET, FILM COATED ORAL at 03:10

## 2024-06-30 RX ADMIN — DOCUSATE SODIUM 100 MG: 100 CAPSULE, LIQUID FILLED ORAL at 08:25

## 2024-06-30 RX ADMIN — OXYCODONE HYDROCHLORIDE 10 MG: 5 TABLET ORAL at 19:33

## 2024-06-30 RX ADMIN — ACETAMINOPHEN 1000 MG: 500 TABLET ORAL at 07:22

## 2024-06-30 RX ADMIN — OXYCODONE HYDROCHLORIDE 10 MG: 5 TABLET ORAL at 23:33

## 2024-06-30 RX ADMIN — ACETAMINOPHEN 1000 MG: 500 TABLET ORAL at 14:58

## 2024-06-30 RX ADMIN — DIAZEPAM 5 MG: 5 TABLET ORAL at 18:15

## 2024-06-30 RX ADMIN — OXYCODONE HYDROCHLORIDE 10 MG: 5 TABLET ORAL at 10:55

## 2024-06-30 RX ADMIN — DOCUSATE SODIUM 100 MG: 100 CAPSULE, LIQUID FILLED ORAL at 19:33

## 2024-06-30 RX ADMIN — OXYCODONE HYDROCHLORIDE 10 MG: 5 TABLET ORAL at 03:10

## 2024-06-30 RX ADMIN — OXYCODONE HYDROCHLORIDE 10 MG: 5 TABLET ORAL at 07:22

## 2024-06-30 RX ADMIN — PRENATAL WITH FERROUS FUM AND FOLIC ACID 1 TABLET: 3080; 920; 120; 400; 22; 1.84; 3; 20; 10; 1; 12; 200; 27; 25; 2 TABLET ORAL at 08:25

## 2024-06-30 ASSESSMENT — PAIN - FUNCTIONAL ASSESSMENT
PAIN_FUNCTIONAL_ASSESSMENT: ACTIVITIES ARE NOT PREVENTED
PAIN_FUNCTIONAL_ASSESSMENT: ACTIVITIES ARE NOT PREVENTED

## 2024-06-30 ASSESSMENT — PAIN SCALES - GENERAL
PAINLEVEL_OUTOF10: 7

## 2024-06-30 ASSESSMENT — PAIN DESCRIPTION - DESCRIPTORS
DESCRIPTORS: ACHING;DISCOMFORT
DESCRIPTORS: ACHING;DISCOMFORT

## 2024-06-30 ASSESSMENT — PAIN DESCRIPTION - LOCATION
LOCATION: INCISION
LOCATION: INCISION

## 2024-06-30 NOTE — FLOWSHEET NOTE
Infant has roomed in with mother this shift except for maternal exhaustion. Benefits of rooming in discussed.     Yes

## 2024-06-30 NOTE — PROGRESS NOTES
Progress note    Subjective:     Postpartum Day 3:  Delivery    Pain is moderately controlled with current medications.  The patient is ambulating well. The patient is tolerating a normal diet. Flatus has been passed. Wants now to stay until Monday. She is tired and would like an extra night of help with the baby.    Objective:     Vitals:    24 0828   BP: (!) 145/73   Pulse: 94   Resp: 17   Temp: 98.2 °F (36.8 °C)   SpO2: 99%     -130/60-70s; occasionally 140/80    General:    alert, appears stated age, and cooperative             CBC   Lab Results   Component Value Date    HGB 10.8 (L) 2024        Assessment:     POD#3 s/p Status post  section. Doing well postoperatively.      Plan:     Encourage ambulation   Rh+  Con't post partum care  Anticipate d/c home tomorrow.      Krystal Hdz MD 2024 10:14 AM

## 2024-07-01 VITALS
WEIGHT: 284 LBS | TEMPERATURE: 98.3 F | SYSTOLIC BLOOD PRESSURE: 141 MMHG | HEIGHT: 64 IN | RESPIRATION RATE: 16 BRPM | BODY MASS INDEX: 48.49 KG/M2 | OXYGEN SATURATION: 98 % | HEART RATE: 96 BPM | DIASTOLIC BLOOD PRESSURE: 82 MMHG

## 2024-07-01 PROBLEM — Z98.891 PREVIOUS CESAREAN SECTION: Status: ACTIVE | Noted: 2024-07-01

## 2024-07-01 PROCEDURE — 6370000000 HC RX 637 (ALT 250 FOR IP): Performed by: OBSTETRICS & GYNECOLOGY

## 2024-07-01 RX ADMIN — ACETAMINOPHEN 1000 MG: 500 TABLET ORAL at 07:52

## 2024-07-01 RX ADMIN — IBUPROFEN 800 MG: 800 TABLET, FILM COATED ORAL at 03:36

## 2024-07-01 RX ADMIN — PRENATAL WITH FERROUS FUM AND FOLIC ACID 1 TABLET: 3080; 920; 120; 400; 22; 1.84; 3; 20; 10; 1; 12; 200; 27; 25; 2 TABLET ORAL at 07:52

## 2024-07-01 RX ADMIN — OXYCODONE HYDROCHLORIDE 10 MG: 5 TABLET ORAL at 11:38

## 2024-07-01 RX ADMIN — DOCUSATE SODIUM 100 MG: 100 CAPSULE, LIQUID FILLED ORAL at 07:52

## 2024-07-01 RX ADMIN — OXYCODONE HYDROCHLORIDE 10 MG: 5 TABLET ORAL at 03:36

## 2024-07-01 RX ADMIN — IBUPROFEN 800 MG: 800 TABLET, FILM COATED ORAL at 11:38

## 2024-07-01 RX ADMIN — OXYCODONE HYDROCHLORIDE 10 MG: 5 TABLET ORAL at 07:53

## 2024-07-01 ASSESSMENT — PAIN DESCRIPTION - ORIENTATION
ORIENTATION: LOWER
ORIENTATION: LOWER

## 2024-07-01 ASSESSMENT — PAIN SCALES - GENERAL
PAINLEVEL_OUTOF10: 7
PAINLEVEL_OUTOF10: 7

## 2024-07-01 ASSESSMENT — PAIN DESCRIPTION - DESCRIPTORS
DESCRIPTORS: DISCOMFORT
DESCRIPTORS: SORE

## 2024-07-01 ASSESSMENT — PAIN DESCRIPTION - LOCATION
LOCATION: ABDOMEN;INCISION
LOCATION: ABDOMEN;INCISION

## 2024-07-01 NOTE — PROGRESS NOTES
Progress note    Subjective:     Postpartum Day 4:  Delivery    Pain is well controlled with current medications.  The patient is ambulating well. The patient is tolerating a normal diet. Flatus has been passed.    Objective:     Vitals:    24 0400   BP:    Pulse:    Resp: 18   Temp:    SpO2:          General:    alert, appears stated age, and cooperative   Uterine Fundus:   firm   Incision:  healing well, no significant drainage, no dehiscence, no significant erythema        CBC   Lab Results   Component Value Date    WBC 12.8 (H) 2024    HGB 10.8 (L) 2024    HCT 32.3 (L) 2024     2024        Assessment:     Status post  section. Doing well postoperatively.      Plan:     Discharge home with standard precautions and return to clinic in 4-6 weeks    Asya Ochoa MD 2024 8:00 AM

## 2024-07-01 NOTE — PROGRESS NOTES
Post birth warning signs education paper given and reviewed, teaching complete. [place] postpartum depression screening discussed with patient, instructed to contact her healthcare provider if her score is > 10. Patient voiced understanding.  Mother's blood type is B+

## 2024-07-01 NOTE — PLAN OF CARE
Problem: Postpartum  Goal: Experiences normal postpartum course  Description:  Postpartum OB-Pregnancy care plan goal which identifies if the mother is experiencing a normal postpartum course  2024 by Flor Franco RN  Outcome: Progressing  Note: Vital signs and assessments WNL.    2024 0138 by Shilpi Limon RN  Outcome: Progressing  Flowsheets (Taken 2024)  Experiences Normal Postpartum Course:   Monitor maternal vital signs   Assess uterine involution  Goal: Appropriate maternal -  bonding  Description:  Postpartum OB-Pregnancy care plan goal which identifies if the mother and  are bonding appropriately  2024 by Flor Franco RN  Outcome: Progressing  Note: Bonding with baby, participating in infant care.    2024 by Shilpi Limon RN  Outcome: Progressing  Note: VSS  Goal: Establishment of infant feeding pattern  Description:  Postpartum OB-Pregnancy care plan goal which identifies if the mother is establishing a feeding pattern with their   2024 by Flor Franco RN  Outcome: Progressing  Note: Breast and bottle feeding  2024 by Shilpi Limon RN  Outcome: Progressing  Flowsheets (Taken 2024)  Establishment of Infant Feeding Pattern: Assess breast/bottle feeding  Goal: Incisions, wounds, or drain sites healing without S/S of infection  2024 by Flor Franco RN  Outcome: Progressing  Note: Vital signs and assessments WNL.    2024 by Shilpi Limon RN  Outcome: Progressing  Flowsheets (Taken 2024)  Incisions, Wounds, or Drain Sites Healing Without Sign and Symptoms of Infection: ADMISSION and DAILY: Assess and document risk factors for pressure ulcer development     Problem: Pain  Goal: Verbalizes/displays adequate comfort level or baseline comfort level  2024 by Flor Franco RN  Outcome: Progressing  Note: Pain controlled with po meds. 
  Problem: Postpartum  Goal: Experiences normal postpartum course  Description:  Postpartum OB-Pregnancy care plan goal which identifies if the mother is experiencing a normal postpartum course  2024 by Shilpi Limon RN  Outcome: Progressing  Flowsheets (Taken 2024)  Experiences Normal Postpartum Course:   Monitor maternal vital signs   Assess uterine involution     Problem: Postpartum  Goal: Appropriate maternal -  bonding  Description:  Postpartum OB-Pregnancy care plan goal which identifies if the mother and  are bonding appropriately  2024 by Shilpi Limon RN  Outcome: Progressing  Note: VSS     Problem: Postpartum  Goal: Establishment of infant feeding pattern  Description:  Postpartum OB-Pregnancy care plan goal which identifies if the mother is establishing a feeding pattern with their   2024 by Shilpi Limon RN  Outcome: Progressing  Flowsheets (Taken 2024)  Establishment of Infant Feeding Pattern: Assess breast/bottle feeding     Problem: Postpartum  Goal: Incisions, wounds, or drain sites healing without S/S of infection  2024 by Shilpi Limon RN  Outcome: Progressing  Flowsheets (Taken 2024)  Incisions, Wounds, or Drain Sites Healing Without Sign and Symptoms of Infection: ADMISSION and DAILY: Assess and document risk factors for pressure ulcer development     Problem: Pain  Goal: Verbalizes/displays adequate comfort level or baseline comfort level  2024 by Shilpi Limon RN  Outcome: Progressing  Flowsheets (Taken 2024)  Verbalizes/displays adequate comfort level or baseline comfort level: Encourage patient to monitor pain and request assistance     Problem: Infection - Adult  Goal: Absence of infection at discharge  2024 by Shilpi Limon RN  Outcome: Progressing  Flowsheets (Taken 2024)  Absence of infection at discharge:   Assess and monitor for 
  Problem: Postpartum  Goal: Experiences normal postpartum course  Description:  Postpartum OB-Pregnancy care plan goal which identifies if the mother is experiencing a normal postpartum course  2024 by Shilpi Limon RN  Outcome: Progressing  Flowsheets (Taken 2024)  Experiences Normal Postpartum Course:   Monitor maternal vital signs   Assess uterine involution     Problem: Postpartum  Goal: Appropriate maternal -  bonding  Description:  Postpartum OB-Pregnancy care plan goal which identifies if the mother and  are bonding appropriately  2024 by Shilpi Limon RN  Outcome: Progressing  Note: VSS     Problem: Postpartum  Goal: Establishment of infant feeding pattern  Description:  Postpartum OB-Pregnancy care plan goal which identifies if the mother is establishing a feeding pattern with their   2024 by Shilpi Limon RN  Outcome: Progressing  Flowsheets (Taken 2024)  Establishment of Infant Feeding Pattern: Assess breast/bottle feeding     Problem: Postpartum  Goal: Incisions, wounds, or drain sites healing without S/S of infection  2024 by Shilpi Limon RN  Outcome: Progressing  Flowsheets (Taken 2024)  Incisions, Wounds, or Drain Sites Healing Without Sign and Symptoms of Infection: ADMISSION and DAILY: Assess and document risk factors for pressure ulcer development     Problem: Pain  Goal: Verbalizes/displays adequate comfort level or baseline comfort level  2024 by Shilpi Limon RN  Outcome: Progressing  Flowsheets (Taken 2024)  Verbalizes/displays adequate comfort level or baseline comfort level: Encourage patient to monitor pain and request assistance     Problem: Infection - Adult  Goal: Absence of infection at discharge  2024 by Shilpi Limon RN  Outcome: Progressing  Flowsheets (Taken 2024)  Absence of infection at discharge:   Assess and monitor for 
  Problem: Postpartum  Goal: Experiences normal postpartum course  Description:  Postpartum OB-Pregnancy care plan goal which identifies if the mother is experiencing a normal postpartum course  Outcome: Progressing  Flowsheets (Taken 2024 1220)  Experiences Normal Postpartum Course:   Monitor maternal vital signs   Assess uterine involution  Goal: Appropriate maternal -  bonding  Description:  Postpartum OB-Pregnancy care plan goal which identifies if the mother and  are bonding appropriately  Outcome: Progressing  Note: Mother bonding with infant at this time.   Goal: Establishment of infant feeding pattern  Description:  Postpartum OB-Pregnancy care plan goal which identifies if the mother is establishing a feeding pattern with their   Outcome: Progressing  Flowsheets (Taken 2024 1220)  Establishment of Infant Feeding Pattern:   Assess breast/bottle feeding   Refer to lactation as needed  Goal: Incisions, wounds, or drain sites healing without S/S of infection  Outcome: Progressing  Flowsheets (Taken 2024 1220)  Incisions, Wounds, or Drain Sites Healing Without Sign and Symptoms of Infection: TWICE DAILY: Assess and document skin integrity     Problem: Pain  Goal: Verbalizes/displays adequate comfort level or baseline comfort level  Outcome: Progressing  Flowsheets  Taken 2024 1123 by John Merrill, RN  Verbalizes/displays adequate comfort level or baseline comfort level:   Encourage patient to monitor pain and request assistance   Assess pain using appropriate pain scale  Taken 2024 0846 by Saige Nichols, RN  Verbalizes/displays adequate comfort level or baseline comfort level:   Assess pain using appropriate pain scale   Encourage patient to monitor pain and request assistance   Administer analgesics based on type and severity of pain and evaluate response   Implement non-pharmacological measures as appropriate and evaluate response     Problem: Infection - 
  Problem: Postpartum  Goal: Experiences normal postpartum course  Description:  Postpartum OB-Pregnancy care plan goal which identifies if the mother is experiencing a normal postpartum course  Outcome: Progressing  Note: Vital signs and assessments WNL.    Goal: Appropriate maternal -  bonding  Description:  Postpartum OB-Pregnancy care plan goal which identifies if the mother and  are bonding appropriately  Outcome: Progressing  Note: Bonding with baby, participating in infant care.    Goal: Establishment of infant feeding pattern  Description:  Postpartum OB-Pregnancy care plan goal which identifies if the mother is establishing a feeding pattern with their   Outcome: Progressing  Note: Breast and bottle feeding  Goal: Incisions, wounds, or drain sites healing without S/S of infection  Outcome: Progressing  Note: WNL     Problem: Pain  Goal: Verbalizes/displays adequate comfort level or baseline comfort level  Outcome: Progressing  Note: Pain controlled with po meds. Discussed ice for perineal pain and/or incisional pain or the use of warm blanket/heating pad for uterine cramps. Pt states her pain goal 6/10 has been met.       Problem: Infection - Adult  Goal: Absence of infection at discharge  Outcome: Progressing  Goal: Absence of infection during hospitalization  Outcome: Progressing     Problem: Safety - Adult  Goal: Free from fall injury  Outcome: Progressing     Problem: Discharge Planning  Goal: Discharge to home or other facility with appropriate resources  Outcome: Progressing     Problem: Chronic Conditions and Co-morbidities  Goal: Patient's chronic conditions and co-morbidity symptoms are monitored and maintained or improved  Outcome: Progressing     Problem: Risk for Maternal Injury  Goal: Remains free from seizures and injury related to seizure activity  Description: INTERVENTIONS:.  -Maintain airway, patient safety and administer oxygen as ordered  -Monitor patient for seizure 
  Problem: Vaginal Birth or  Section  Goal: Fetal and maternal status remain reassuring during the birth process  Description:  Birth OB-Pregnancy care plan goal which identifies if the fetal and maternal status remain reassuring during the birth process  Outcome: Progressing  Flowsheets (Taken 2024)  Fetal and Maternal Status Remain Reassuring During the Birth Process:   Monitor vital signs   Deep vein thrombosis prophylaxis ( delivery)   Monitor fetal heart rate   Monitor uterine activity   Surgical antibiotic prophylaxis ( delivery)     Problem: Pain  Goal: Verbalizes/displays adequate comfort level or baseline comfort level  Outcome: Progressing  Flowsheets (Taken 2024)  Verbalizes/displays adequate comfort level or baseline comfort level:   Assess pain using appropriate pain scale   Encourage patient to monitor pain and request assistance   Administer analgesics based on type and severity of pain and evaluate response   Implement non-pharmacological measures as appropriate and evaluate response     Problem: Infection - Adult  Goal: Absence of infection at discharge  Outcome: Progressing  Flowsheets (Taken 2024)  Absence of infection at discharge:   Assess and monitor for signs and symptoms of infection   Instruct and encourage patient and family to use good hand hygiene technique     Problem: Infection - Adult  Goal: Absence of infection during hospitalization  Outcome: Progressing  Flowsheets (Taken 2024)  Absence of infection during hospitalization:   Assess and monitor for signs and symptoms of infection   Instruct and encourage patient and family to use good hand hygiene technique     Problem: Safety - Adult  Goal: Free from fall injury  Outcome: Progressing  Flowsheets (Taken 2024)  Free From Fall Injury:   Instruct family/caregiver on patient safety   Based on caregiver fall risk screen, instruct family/caregiver to ask for 
activity, document and report duration and descrition  -If Seizure occurs, turn patient to dide and suction secretions as needed  -Reorient patient post seizure  -Seizure Pads  -Instruct patient/family to notify RN of any seizure activity  -Instruct patient/family to call for assistance with activity based on assessment  Outcome: Progressing     Problem: Genitourinary - Adult  Goal: Absence of urinary retention  Outcome: Progressing  Goal: Urinary catheter remains patent  Outcome: Progressing   Care plan reviewed with patient and she contributes to goal setting and voices understanding of plan of care.    
scale     Problem: Infection - Adult  Goal: Absence of infection at discharge  7/1/2024 0804 by Cleo Romero RN  Outcome: Completed  6/30/2024 2010 by Krystal Eng RN  Outcome: Progressing  Flowsheets (Taken 6/30/2024 2010)  Absence of infection at discharge:   Assess and monitor for signs and symptoms of infection   Monitor lab/diagnostic results  Goal: Absence of infection during hospitalization  7/1/2024 0804 by Cleo Romero RN  Outcome: Completed  6/30/2024 2010 by Krystal Eng RN  Outcome: Progressing  Flowsheets (Taken 6/30/2024 2010)  Absence of infection during hospitalization: Assess and monitor for signs and symptoms of infection     Problem: Safety - Adult  Goal: Free from fall injury  7/1/2024 0804 by Cleo Romero RN  Outcome: Completed  6/30/2024 2010 by Krystal Eng RN  Outcome: Progressing  Flowsheets (Taken 6/30/2024 2010)  Free From Fall Injury: Instruct family/caregiver on patient safety     Problem: Discharge Planning  Goal: Discharge to home or other facility with appropriate resources  7/1/2024 0804 by Cleo Romero RN  Outcome: Completed  6/30/2024 2010 by Krystal Eng RN  Outcome: Progressing  Flowsheets (Taken 6/30/2024 2010)  Discharge to home or other facility with appropriate resources: Identify barriers to discharge with patient and caregiver     Problem: Chronic Conditions and Co-morbidities  Goal: Patient's chronic conditions and co-morbidity symptoms are monitored and maintained or improved  7/1/2024 0804 by Cleo Romero RN  Outcome: Completed  6/30/2024 2010 by Krystal Eng RN  Outcome: Progressing  Flowsheets (Taken 6/30/2024 2010)  Care Plan - Patient's Chronic Conditions and Co-Morbidity Symptoms are Monitored and Maintained or Improved: Monitor and assess patient's chronic conditions and comorbid symptoms for stability, deterioration, or improvement     Problem: Risk for Maternal Injury  Goal: Remains free from seizures and injury related to seizure 
assessment  6/30/2024 2010 by Krystal Eng, RN  Outcome: Progressing  Flowsheets (Taken 6/30/2024 2010)  Remains free from seizures and injury related to seizure activity: Maintain airway, patient safety  and administer oxygen as ordered     Problem: Genitourinary - Adult  Goal: Absence of urinary retention  6/30/2024 2010 by Krystal Eng, RN  Outcome: Progressing  Flowsheets (Taken 6/30/2024 2010)  Absence of urinary retention: Assess patient’s ability to void and empty bladder   Plan of care discussed with mother and she contributes to goal setting and voices understanding of plan of care.   
duration and descrition  -If Seizure occurs, turn patient to dide and suction secretions as needed  -Reorient patient post seizure  -Seizure Pads  -Instruct patient/family to notify RN of any seizure activity  -Instruct patient/family to call for assistance with activity based on assessment  6/28/2024 0040 by Shilpi Limon, RN  Outcome: Progressing  Flowsheets (Taken 6/27/2024 2053)  Remains free from seizures and injury related to seizure activity: Maintain airway, patient safety  and administer oxygen as ordered     Problem: Genitourinary - Adult  Goal: Absence of urinary retention  Outcome: Progressing  Flowsheets (Taken 6/28/2024 0040)  Absence of urinary retention: Assess patient’s ability to void and empty bladder   Plan of care discussed with mother and she contributes to goal setting and voices understanding of plan of care.

## 2024-07-01 NOTE — LACTATION NOTE
Discussed supply and demand with pt.  Encouraged pt. To continue pumping.  Encouraged pt. To follow up with support group

## 2024-07-01 NOTE — DISCHARGE SUMMARY
Obstetrical Discharge Form      Pt Name: Karly Yee  MRN: [id number] Acct #: [id number]  YOB: 1995      Admitting Diagnosis  IUP  OB History          3    Para   3    Term   2       1    AB        Living   3         SAB        IAB        Ectopic        Molar        Multiple   0    Live Births   3                Reasons for Admission on 2024  4:50 AM    No comment available  C section.      Intrapartum Procedures        Multiple birth?: No                 Postpartum/Operative Complications       [place] Data  Information for the patient's :  Joselito, Girl Karly [[id number]]   female   Birth Weight: 3.44 kg (7 lb 9.3 oz)     Discharge Diagnosis     Status post .    Discharge Information  Current Discharge Medication List        START taking these medications    Details   !! oxyCODONE-acetaminophen (PERCOCET) 5-325 MG per tablet Take 1 tablet by mouth every 6 hours as needed for Pain for up to 7 days. Max Daily Amount: 4 tablets  Qty: 28 tablet, Refills: 0    Comments: Reduce doses taken as pain becomes manageable Reduce doses taken as pain becomes manageable  Associated Diagnoses: Single delivery by       ibuprofen (IBU) 600 MG tablet Take 1 tablet by mouth every 6 hours as needed for Pain  Qty: 60 tablet, Refills: 3       !! - Potential duplicate medications found. Please discuss with provider.        CONTINUE these medications which have NOT CHANGED    Details   !! oxyCODONE-acetaminophen (PERCOCET) 5-325 MG per tablet 1 tablet.      Biotin 1000 MCG TABS Take by mouth      aspirin 81 MG chewable tablet Take 1 tablet by mouth daily      cyclobenzaprine (FLEXERIL) 5 MG tablet Take 1 tablet by mouth at bedtime      lidocaine (XYLOCAINE) 5 % ointment Apply topically as needed.  Qty: 30 g, Refills: 2      magnesium oxide (MAG-OX) 400 (240 Mg) MG tablet Take 250 mg by mouth daily      Prenatal Vit-Fe Fumarate-FA (PRENATAL 1+1 PO) Take by mouth      acetaminophen

## 2024-12-09 NOTE — FLOWSHEET NOTE
Pt sound asleep sitting up in chair with her feet up. Writer attempted to contact patient x2.  Both times patient answered, writer introduced self and then call was disconnected both times.   Writer attempted 3x and call went to voicemail.  Voicemail left for callback to schedule consult.  Office number provided.     If patient calls back please offer 12.12.2024 0900 AWP.  Should have pelvic ultrasound done prior also.  Will order at call back.

## 2025-01-16 ENCOUNTER — HOSPITAL ENCOUNTER (OUTPATIENT)
Dept: GENERAL RADIOLOGY | Age: 30
Discharge: HOME | End: 2025-01-16
Payer: COMMERCIAL

## 2025-01-16 ENCOUNTER — HOSPITAL ENCOUNTER (OUTPATIENT)
Age: 30
Discharge: HOME | End: 2025-01-16
Payer: COMMERCIAL

## 2025-01-16 DIAGNOSIS — R52 PAIN: ICD-10-CM

## 2025-01-16 PROCEDURE — 73110 X-RAY EXAM OF WRIST: CPT

## 2025-03-05 ENCOUNTER — PROCEDURE VISIT (OUTPATIENT)
Dept: NEUROLOGY | Age: 30
End: 2025-03-05

## 2025-03-05 DIAGNOSIS — M79.602 BILATERAL ARM PAIN: ICD-10-CM

## 2025-03-05 DIAGNOSIS — R20.0 BILATERAL HAND NUMBNESS: ICD-10-CM

## 2025-03-05 DIAGNOSIS — M79.601 BILATERAL ARM PAIN: ICD-10-CM

## 2025-03-05 DIAGNOSIS — G56.03 BILATERAL CARPAL TUNNEL SYNDROME: Primary | ICD-10-CM

## 2025-03-05 DIAGNOSIS — M54.12 CERVICAL RADICULOPATHY: ICD-10-CM

## 2025-03-17 ENCOUNTER — OFFICE VISIT (OUTPATIENT)
Dept: PHYSICAL MEDICINE AND REHAB | Age: 30
End: 2025-03-17
Payer: COMMERCIAL

## 2025-03-17 VITALS
BODY MASS INDEX: 48.48 KG/M2 | WEIGHT: 283.95 LBS | HEIGHT: 64 IN | SYSTOLIC BLOOD PRESSURE: 118 MMHG | DIASTOLIC BLOOD PRESSURE: 84 MMHG

## 2025-03-17 DIAGNOSIS — M54.12 CERVICAL RADICULOPATHY: ICD-10-CM

## 2025-03-17 DIAGNOSIS — M47.816 LUMBAR SPONDYLOSIS: ICD-10-CM

## 2025-03-17 DIAGNOSIS — M79.18 MYOFASCIAL PAIN: Primary | ICD-10-CM

## 2025-03-17 DIAGNOSIS — G89.4 CHRONIC PAIN SYNDROME: ICD-10-CM

## 2025-03-17 DIAGNOSIS — M47.819 FACET ARTHROPATHY: ICD-10-CM

## 2025-03-17 PROCEDURE — 20553 NJX 1/MLT TRIGGER POINTS 3/>: CPT | Performed by: NURSE PRACTITIONER

## 2025-03-17 PROCEDURE — 99215 OFFICE O/P EST HI 40 MIN: CPT | Performed by: NURSE PRACTITIONER

## 2025-03-17 RX ORDER — TRAMADOL HYDROCHLORIDE 50 MG/1
50 TABLET ORAL 2 TIMES DAILY PRN
Qty: 14 TABLET | Refills: 0 | Status: SHIPPED | OUTPATIENT
Start: 2025-03-17 | End: 2025-03-24

## 2025-03-17 RX ORDER — NORGESTIMATE AND ETHINYL ESTRADIOL 0.25-0.035
1 KIT ORAL DAILY
COMMUNITY

## 2025-03-17 RX ORDER — BACLOFEN 10 MG/1
10 TABLET ORAL 3 TIMES DAILY PRN
Qty: 90 TABLET | Refills: 2 | Status: SHIPPED | OUTPATIENT
Start: 2025-03-17

## 2025-03-17 RX ORDER — METHOCARBAMOL 100 MG/ML
100 INJECTION, SOLUTION INTRAMUSCULAR; INTRAVENOUS ONCE
Status: COMPLETED | OUTPATIENT
Start: 2025-03-17 | End: 2025-03-17

## 2025-03-17 RX ORDER — PANTOPRAZOLE SODIUM 40 MG/10ML
40 INJECTION, POWDER, LYOPHILIZED, FOR SOLUTION INTRAVENOUS DAILY
COMMUNITY

## 2025-03-17 RX ADMIN — METHOCARBAMOL 100 MG: 100 INJECTION, SOLUTION INTRAMUSCULAR; INTRAVENOUS at 16:19

## 2025-03-17 NOTE — PROGRESS NOTES
Functionality Assessment/Goals Worksheet     On a scale of 0 (Does not Interfere) to 10 (Completely Interferes)     1.  Which number describes how during the past week pain has interfered with           the following:  A.  General Activity:  7  B.  Mood: 6  C.  Walking Ability:  7  D.  Normal Work (Includes both work outside the home and housework):  10  E.  Relations with Other People:   6  F.  Sleep:   10  G.  Enjoyment of Life:   5    2.  Patient Prefers to Take their Pain Medications:     []  On a regular basis   [x]  Only when necessary    []  Does not take pain medications    3.  What are the Patient's Goals/Expectations for Visiting Pain Management?     [x]  Learn about my pain    [x]  Receive Medication   []  Physical Therapy     []  Treat Depression   [x]  Receive Injections    []  Treat Sleep   []  Deal with Anxiety and Stress   []  Treat Opoid Dependence/Addiction   [x]  Other:                                
Pre-operative Diagnosis: Cervical myofascial pain     Post-operative Diagnosis: Cervical myofascial pain     Procedure: Trigger point injection(s)     Procedure Description:  After having signed the informed consent, the patient was seated in a chair.  The patient's cervical region was prepped with alcohol wipes.  Trigger points were identified in the bilateral cervical paraspinals, bilateral trapezius, right rhomboid for a total of 10 trigger point injections. After negative aspiration, 1 cc of a mixture containing 1:10 100 mg methocarbamol: 0.25% bupivacaine was injected at each trigger point for a total of 10 trigger points. The patient tolerated the procedure well.     Procedural Complications: None        Laura Merrill, FIORDALIZA - CNP   Interventional Pain Management/PM&R   [facility] [place]   
to the procedure.  We will start an IV prior to the procedure    2 WEEKS LATER    Interventions:   In presence of lumbar axial back pain and with physical exam consistent for facetal pain, the option of lumbar radiofrequency ablation at bilateral L4-L5 and L5-S1 was chosen,  The risks and benefits were discussed in detail with the patient. Patient wants to proceed with the injection.     Anticoagulation/NPO Recommendations:   Patient does Ibuprofen x2 days prior to the procedure.  Patient will need to be NPO x 8 hours prior to the procedure.  We will start an IV prior to the procedure       Multidisciplinary Pain Management:   In the presence of complex, chronic, and multi-factorial pain, the importance of a multidisciplinary approach to pain management in the patient’s management regimen was emphasized and discussed in great detail.   PHYSICAL THERAPY: Patient is advised to see a physical therapist for gentle stretching exercises and conditioning exercises for management of pain.     Referrals:  None    Prescriptions Written This Visit:   Baclofen 10 mg   Tramadol 50 mg    Follow-up: TONA LIU, in office 4 weeks after     FIORDALIZA Calloway - CNP

## 2025-04-10 NOTE — DISCHARGE INSTRUCTIONS
[facility]  Hospitalist Progress Note   10/25/2023          Assessment and Plan:       Ravi Ahmadi is a 33 year old male with alcohol use disorder with prior hospitalizations for alcohol withdrawal, alcoholic hepatitis, hypertension and type 2 diabetes admitted on 10/23/2023 for management of acute alcohol withdrawal.     Acute alcohol withdrawal with hallucinations   Anion gap metabolic acidosis from alcohol use - Resolved   Elevated ketones from alcohol use, starvation.  Alcohol abuse.  History of alcohol withdrawal seizures   --Since his most recent hospitalization in 8/2023, he has had intermittent periods of sobriety lasting a few weeks at a time. Approximately 1 week prior to admission he began drinking alcohol again. Reported drinking up to 1.75 L of whiskey a day.  Last drink was on 10/21/23. He has felt poorly since that time.   --Reported worsening Auditory and visual hallucinations that culminated in patient calling police due to them.   -- At the time of admission, patient was tremulous and hallucinating.  afebrile, tachycardic, hypertensive with BPs 170s/100s.   -WBC 13.1. Renal function stable, bicarb 25, anion gap 17, ketones 1.7, glucose 150, BAL negative.   EKG showed sinus tachycardia.  -- Has been treated for alcohol withdrawal with supportive care, clonidine, gabapentin and Valium symptoms improving.  This morning reports had mild auditory hallucinations.  Reporting anxiety.   Ativan 0.5 mg twice daily ordered.  Continue scheduled gabapentin for withdrawal.  Continue CIWA.  IV/p.o. as needed Ativan ordered.  Continue thiamine multivitamin and folic acid supplements.  Psychiatry consult requested -patient open to considering Antabuse/acamprosate. Patient interested in formulating a plan to maintain sobriety going forward.  Social work assistING with transition plans.  Emphasized need to consider abstinence from alcohol use, seems motivated.  Follow ketones in AM.    Abdominal  Post procedure Instructions:    No driving or making significant decisions for the remainder of the day.   Be cautions with walking and activity for 24 hours, do not over exert yourself.  Ok to resume pre-procedure activity level today.  Apply ice to site of injection site if you have pain or discomfort.  Do not submerge sit of injection during bath or pool activities for 48 hours post-procedure.  Resume blood thinning medications in 24 hours.     Call office [phone] if you have:  Temperature greater than 100.4  Persistent nausea and vomiting  Severe uncontrolled pain  Redness, tenderness, or signs of infection (pain, swelling, redness, odor or green/yellow discharge around the site)  Difficulty breathing, headache or visual disturbances  Hives  Persistent dizziness or light-headedness  Extreme fatigue  Any other questions or concerns you may have after discharge    In an emergency, call 911 or go to an Emergency Department at a nearby hospital    “Surgical Site Infections”      How can we work together to prevent Surgical Site Infections?   We would like to thank you for choosing [facility] for your Surgical Care.  Below you will find helpful information on how we can work together to prevent Surgical Site Infections.    What is a Surgical Site Infection (SSI)?  A surgical site infection is an infection that occurs after surgery in the part of the body where the surgery took place. Most patients who have surgery do not develop an infection. However, infections develop in about 1 to 3 out of every 100 patients who have surgery.  Some of the common symptoms of a surgical site infection are:  Redness and pain around the area where you had surgery  Drainage of cloudy fluid from your surgical wound  Fever    Can SSIs be treated?  Yes. Most surgical site infections can be treated with antibiotics. The antibiotic given to you depends on the bacteria (germs) causing the infection. Sometimes patients with  discomfort likely from gastritis due to alcohol/nicotine use  Complained of abdominal discomfort, bloating sensation.  Lipase 53.  Creatinine within normal limits.  Hemoglobin stable.  No blood in the stools or blood in the urine.  Emphasized need to consider abstinence from alcohol and nicotine use.  Trial of oral Protonix for 2 weeks.  GI evaluation as outpatient.    Suspected alcoholic hepatitis  Hepatic steatosis.  *Hx of abnl LFTs noted during prior admissions.  Recent ultrasound 8/2023 with hepatic steatosis.  *LFTs abnl but seems to be close to his baseline and better than LFTs back in 6/2023.   -- trend labs, avoid hepatotoxic drugs.  Need to consider abstinence from alcohol use.     Hypertension   *Elevated BPs noted during prior admissions.  Elevated blood pressures suspect due to alcohol withdrawal.  Continue PTA lisinopril 10 mg oral daily.  Blood pressure improving, as needed separable.      Sinus tachycardia improving.  No chest pain or palpitations.  EKG sinus tachycardia heart rate 103, QTc 495.  TSH within normal limits.  heart rate improving.  Correct electrolyte abnormalities.     DM II with hemoglobin A1c of 5.7 [10/24/2023)  *A1c 8.4 in 8/2023.  A1c of 10.8 in 6/2023   -- PTA on metformin 1000 mg twice daily, Lantus 10 units twice daily.  Per report prior to admission not been taking Lantus in over a month but continues to use metformin.  -- We will discontinue metformin given concern for alcoholic hepatitis.  Lantus 8 units daily a.m.  Monitor blood sugars closely, optimize regimen.  Hypoglycemia protocol in place.    Leukocytosis likely reactive.  WBC 13.1 > 9.3.  No signs or symptoms of infection at this time.    Thrombocytopenia from alcohol use.  Platelets dropped from 1 45- 128  No Active bleeding.    Hypokalemia from alcohol use, poor intake  Hypercalcemia likely from alcohol use, dehydration  Hypomagnesemia likely from poor intake, alcohol use  *Initial BMP with Ca 11.0 (corrects to 10.2  when accounting for albumin), Mag 1.6.  Potassium 3.0  Electrolyte replacement protocol.  Monitor electrolytes this morning.    Nicotine dependence.  Continues to smoke about a pack a day.  Emphasized need to consider abstinence.    Hyperlipidemia.  PTA not on meds.  Age-appropriate health maintenance as outpatient    Obesity with a BMI of 33.  Need to consider lifestyle modification with diet and exercise as able to.     Orders Placed This Encounter      Combination Diet Regular Diet Adult      DVT Prophylaxis: SCDs, ambulate.  Code Status: Full Code  Disposition: Expected discharge in 1 to 2 days pending clinical improvement.    Discussed with patient, bedside RN  >52 minutes spent by me on the date of service doing chart review, history, exam, documentation & further activities per the note.      Kentrell Fernandez MD        Interval History:        Patient lying in bed.  Denies any chest pain or palpitations at this time.  Does admit to having some anxiety.  Denies any nausea.  No vomiting.  Does admit to some on and off head ache.  No new tingling or numbness.  No bowel or bladder incontinence.  Reports some improvement in hallucinations.  Blood pressures improving.  Tolerating oral diet.  Motivated to consider oral therapy-Antabuse/acamprosate       Physical Exam:        Physical Exam   Temp:  [97.3  F (36.3  C)-97.9  F (36.6  C)] 97.9  F (36.6  C)  Pulse:  [81-88] 81  Resp:  [16-18] 16  BP: (137-143)/(82-95) 142/82  SpO2:  [92 %-96 %] 92 %    Intake/Output Summary (Last 24 hours) at 10/24/2023 1423  Last data filed at 10/24/2023 1010  Gross per 24 hour   Intake 240 ml   Output --   Net 240 ml       Admission Weight: 113.4 kg (250 lb)  Current Weight: 113.4 kg (250 lb)    PHYSICAL EXAM  GENERAL: Patient is in no distress. Alert and oriented.  HEENT: Oropharynx pink, moist.   HEART: Regular rate and rhythm. S1S2. No murmurs  LUNGS: Respirations unlabored  ABDOMEN: Soft, no abdominal tenderness, bowel sounds  heard   NEURO: Moving all extremities.  EXTREMITIES: No pedal edema  SKIN: Warm, dry. No rash   PSYCHIATRY Cooperative       Medications:         folic acid  1 mg Oral Daily    [START ON 10/31/2023] gabapentin  100 mg Oral Q8H    [START ON 10/29/2023] gabapentin  300 mg Oral Q8H    [START ON 10/27/2023] gabapentin  600 mg Oral Q8H    gabapentin  900 mg Oral Q8H    insulin aspart  1-7 Units Subcutaneous TID AC    insulin aspart  1-5 Units Subcutaneous At Bedtime    lisinopril  10 mg Oral Daily    multivitamin w/minerals  1 tablet Oral Daily    pantoprazole  40 mg Oral QAM AC    thiamine  100 mg Oral Daily     glucose **OR** dextrose **OR** glucagon, flumazenil, OLANZapine zydis **OR** haloperidol lactate, hydrALAZINE, LORazepam **OR** LORazepam, melatonin, naloxone **OR** naloxone **OR** naloxone **OR** naloxone, ondansetron **OR** ondansetron, oxyCODONE IR, prochlorperazine **OR** prochlorperazine **OR** prochlorperazine, senna-docusate **OR** senna-docusate         Data:      All new lab and imaging data was reviewed.

## 2025-04-13 NOTE — H&P
denies any new leg weakness, leg paresthesias, saddle anesthesia, bowel/bladder incontinence.    Today, 2/13/2024, patient presents for planned follow-up on chronic pain.  Patient states shortly after she became pregnant she started to develop headaches, tightness in her neck, and numbness into her left arm.  She states she thought she had a pinched nerve and that it would improve with time but it has not.  She did have a visit to the ER and was told she could take the remainder of her Percocet from a prior surgery.  She did have a cervical MRI which showed significant changes in her neck and stenosis.  She states she has been having a lot of pain in her neck and arm, in addition to her low back.  She is wondering what her treatment options are.  She states her OB/GYN did give her Flexeril 5 mg but this knocks her out.  She has been using lidocaine patches but they do not always stay in place.  She states her fiancé has been giving her massages which seem to help.  She does have a visit with [facility] for next month to discuss her neck and treatment options after she has the baby.    Today, 3/12/2024, patient presents for planned follow-up on chronic pain.  Patient underwent trigger point injections last visit and did not obtain much relief but she would like to try these again.  She states the lidocaine 5% was denied by insurance.  She did buy over-the-counter hemorrhoid cream with 5% lidocaine in it and has been applying this to her back, it has been ineffective.  She did see her team at the [facility] in regards to her neck (Dr. Caruso and Tierney Hill, APRN-CNP).  They referred her to physical therapy and discuss potential neck surgery.  She recommended she pursue injections prior to potential surgery.  She is seeing Dr. Giordano at [facility] chiropractic.  She does feel like chiropractic care does help take the edge off.  She states her OB/GYN prescribed her 5 tabs of Percocet that she can use

## 2025-04-15 ENCOUNTER — HOSPITAL ENCOUNTER (OUTPATIENT)
Age: 30
Setting detail: OUTPATIENT SURGERY
Discharge: HOME | End: 2025-04-15
Attending: ANESTHESIOLOGY | Admitting: ANESTHESIOLOGY
Payer: COMMERCIAL

## 2025-04-15 ENCOUNTER — APPOINTMENT (OUTPATIENT)
Dept: GENERAL RADIOLOGY | Age: 30
End: 2025-04-15
Attending: ANESTHESIOLOGY
Payer: COMMERCIAL

## 2025-04-15 VITALS
DIASTOLIC BLOOD PRESSURE: 68 MMHG | HEIGHT: 64 IN | OXYGEN SATURATION: 96 % | BODY MASS INDEX: 37.01 KG/M2 | WEIGHT: 216.8 LBS | HEART RATE: 76 BPM | SYSTOLIC BLOOD PRESSURE: 116 MMHG | TEMPERATURE: 97.3 F | RESPIRATION RATE: 16 BRPM

## 2025-04-15 LAB — PREGNANCY, URINE: NEGATIVE

## 2025-04-15 PROCEDURE — 6360000002 HC RX W HCPCS: Performed by: ANESTHESIOLOGY

## 2025-04-15 PROCEDURE — 3600000054 HC PAIN LEVEL 3 BASE: Performed by: ANESTHESIOLOGY

## 2025-04-15 PROCEDURE — 7100000010 HC PHASE II RECOVERY - FIRST 15 MIN: Performed by: ANESTHESIOLOGY

## 2025-04-15 PROCEDURE — 7100000011 HC PHASE II RECOVERY - ADDTL 15 MIN: Performed by: ANESTHESIOLOGY

## 2025-04-15 PROCEDURE — 99152 MOD SED SAME PHYS/QHP 5/>YRS: CPT | Performed by: ANESTHESIOLOGY

## 2025-04-15 PROCEDURE — 62321 NJX INTERLAMINAR CRV/THRC: CPT | Performed by: ANESTHESIOLOGY

## 2025-04-15 PROCEDURE — 6360000004 HC RX CONTRAST MEDICATION: Performed by: ANESTHESIOLOGY

## 2025-04-15 PROCEDURE — 2709999900 HC NON-CHARGEABLE SUPPLY: Performed by: ANESTHESIOLOGY

## 2025-04-15 PROCEDURE — 2500000003 HC RX 250 WO HCPCS: Performed by: ANESTHESIOLOGY

## 2025-04-15 PROCEDURE — 81025 URINE PREGNANCY TEST: CPT

## 2025-04-15 RX ORDER — LIDOCAINE HYDROCHLORIDE 10 MG/ML
INJECTION, SOLUTION EPIDURAL; INFILTRATION; INTRACAUDAL; PERINEURAL PRN
Status: DISCONTINUED | OUTPATIENT
Start: 2025-04-15 | End: 2025-04-15 | Stop reason: ALTCHOICE

## 2025-04-15 RX ORDER — PHENTERMINE HYDROCHLORIDE 37.5 MG/1
37.5 TABLET ORAL
COMMUNITY
Start: 2025-03-12

## 2025-04-15 RX ORDER — ACYCLOVIR 200 MG/1
CAPSULE ORAL PRN
Status: DISCONTINUED | OUTPATIENT
Start: 2025-04-15 | End: 2025-04-15 | Stop reason: ALTCHOICE

## 2025-04-15 RX ORDER — FENTANYL CITRATE 50 UG/ML
INJECTION, SOLUTION INTRAMUSCULAR; INTRAVENOUS PRN
Status: DISCONTINUED | OUTPATIENT
Start: 2025-04-15 | End: 2025-04-15 | Stop reason: ALTCHOICE

## 2025-04-15 RX ORDER — IOPAMIDOL 612 MG/ML
INJECTION, SOLUTION INTRATHECAL PRN
Status: DISCONTINUED | OUTPATIENT
Start: 2025-04-15 | End: 2025-04-15 | Stop reason: ALTCHOICE

## 2025-04-15 RX ORDER — DEXAMETHASONE SODIUM PHOSPHATE 4 MG/ML
INJECTION, SOLUTION INTRA-ARTICULAR; INTRALESIONAL; INTRAMUSCULAR; INTRAVENOUS; SOFT TISSUE PRN
Status: DISCONTINUED | OUTPATIENT
Start: 2025-04-15 | End: 2025-04-15 | Stop reason: ALTCHOICE

## 2025-04-15 RX ORDER — MIDAZOLAM HYDROCHLORIDE 1 MG/ML
INJECTION, SOLUTION INTRAMUSCULAR; INTRAVENOUS PRN
Status: DISCONTINUED | OUTPATIENT
Start: 2025-04-15 | End: 2025-04-15 | Stop reason: ALTCHOICE

## 2025-04-15 ASSESSMENT — PAIN SCALES - GENERAL: PAINLEVEL_OUTOF10: 0

## 2025-04-15 ASSESSMENT — PAIN - FUNCTIONAL ASSESSMENT
PAIN_FUNCTIONAL_ASSESSMENT: NONE - DENIES PAIN
PAIN_FUNCTIONAL_ASSESSMENT: 0-10

## 2025-04-15 ASSESSMENT — PAIN DESCRIPTION - LOCATION: LOCATION: NECK

## 2025-04-15 NOTE — PROCEDURES
Pre-operative Diagnosis: Radicular arm pain     Post-operative Diagnosis: Radicular arm pain     Procedure: Cervical epidural steroid injection    Procedure Description:  After having obtained a signed informed consent, the patient was taken to the fluoroscopy suite and placed in the prone position.  The neck was prepped with chloraprep and draped in a sterile fashion.  A total of 1 cc of  1% lidocaine was used to anesthetize the skin and underlying tissues.  Under fluoroscopic guidance, a single 20G Tuohy needle was advanced midline at the C6/C7 interspace until gaining the epidural space using the loss of resistance to saline syringe technique.  There were no paresthesias, heme, or CSF aspiration. A total of 0.25 cc of Omnipaque 300 were injected having had adequate dye spread within the epidural space. Needle placement was confirmed using the AP and contra-lateral oblique views.  10 mg of dexamethasone flushed with 0.25 cc of saline solution were injected in the epidural space. The needle was removed without any complication.  The patient tolerated the procedure well and was transported to the recovery room where he was observed for 15 minutes to then be discharged in ambulatory fashion.      Procedural Complications: None  Estimated Blood Loss: 0 mL      IV sedation was used during the procedure:  - Moderate intravenous conscious sedation was supervised by Dr. Muller  - The patient was independently monitored by a Registered Nurse assigned to the procedure room  - Monitoring included automated blood pressure, continuous EKG, and continuous pulse oximetry  - The detailed conscious record is permanently stored in the Hospital Information System  - The following is the conscious sedation record:  Start Time: 8:57  End Time : 9:12  Duration: 15 minutes   Medications Administered: 2 mg Versed, 100 mcg Fentanyl      Mekhi Muller DO  Interventional Pain Management/PM&R   [facility]

## 2025-04-15 NOTE — PROGRESS NOTES
0904-Patient to Phase II via cart. Report received from Mae BARLOW. Patient drowsy but responsive.Vitals obtained and stable. Respirations even and unlabored on room air. Patient denies pain, nausea, numbness and tingling. Patient able to move all extremities. No drainage noted at injection sites. Patient instructed to stay in bed. Instructed on call light use.  0908-Patient provided with snack and drink. Denies needs. Call light remains in reach.  0925-Patient meets discharge criteria.  Discharged in stable condition with responsible . All belongings given to patient. Patient ambulated to car with assistance from RN. Patient tolerated well.

## 2025-04-15 NOTE — PRE SEDATION
[facility]  Pre-Sedation/Analgesia History & Physical    Pt Name: Karly Yee  MRN: [id number]  YOB: 1995  Provider Performing Procedure: Mekhi Muller DO   Primary Care Physician: Ne Callaway, APRN - CNP      MEDICAL HISTORY       has a past medical history of Ankylosing spondylitis, COVID-19, Heart abnormality, Hypertension, Ovarian cyst, and Scoliosis.  SURGICAL HISTORY   has a past surgical history that includes Tonsillectomy; Adenoidectomy; back surgery (2006 and 2009, );  section (N/A, 2020); Pain management procedure (Bilateral, 2022); Pain management procedure (Bilateral, 2022); Pain management procedure (Right, 2022); Pain management procedure (Left, 2022);  section (N/A, 3/11/2023); Back Injection (Bilateral, 2023); Pain management procedure (Right, 2023); Pain management procedure (Left, 2023); Pain management procedure (N/A, 8/10/2023); and  section (N/A, 2024).    ALLERGIES   Allergies as of 2025 - Fully Reviewed 2025   Allergen Reaction Noted    Gabapentin Hives 2020       MEDICATIONS   Prior to Admission medications    Medication Sig Start Date End Date Taking? Authorizing Provider   phentermine (ADIPEX-P) 37.5 MG tablet Take 1 tablet by mouth every morning (before breakfast). 3/12/25  Yes Yoni Zepeda MD   norgestimate-ethinyl estradiol (SPRINTEC 28) 0.25-35 MG-MCG per tablet Take 1 tablet by mouth daily   Yes Yoni Zepeda MD   pantoprazole (PROTONIX) 40 MG injection Infuse 40 mg intravenously daily   Yes Yoni Zepeda MD   baclofen (LIORESAL) 10 MG tablet Take 1 tablet by mouth 3 times daily as needed (pain) 3/17/25  Yes Laura Merrill APRN - CNP   ibuprofen (IBU) 600 MG tablet Take 1 tablet by mouth every 6 hours as needed for Pain 24  Yes Katie Machado MD   Biotin 1000 MCG TABS Take by mouth   Yes Provider, Historical, MD

## 2025-04-15 NOTE — POST SEDATION
[facility]  Sedation/Analgesia Post Sedation Record    Pt Name: Karly Yee  MRN: [id number]  YOB: 1995  Procedure Performed By: Mekhi Muller DO  Primary Care Physician: Ne Callaway, APRN - CNP    POST-PROCEDURE    Physicians/Assistants: Mekhi Muller DO  Procedure Performed: See Procedure Note   Sedation/Anesthesia: Versed and Fentanyl (See procedure note for amount and duration)  Estimated Blood Loss:     0  ml  Specimens Removed: None        Complications: None           Mekhi Muller DO  Electronically signed 4/15/2025 at 10:38 AM

## 2025-04-28 ENCOUNTER — TRANSCRIBE ORDERS (OUTPATIENT)
Dept: ADMINISTRATIVE | Age: 30
End: 2025-04-28

## 2025-04-28 DIAGNOSIS — R09.A2 GLOBUS SENSATION: Primary | ICD-10-CM

## 2025-04-28 DIAGNOSIS — R05.9 COUGH, UNSPECIFIED TYPE: ICD-10-CM

## 2025-04-28 NOTE — H&P
Today, patient presents for planned bilateral L4/L5 and L5/S1 lumbar radiofrequency ablation.    This note is reflective of the patient's previous visit for evaluation. We will proceed with today's planned procedure. Since patient's last visit for evaluation, there have been no interval changes in medical history. Patient has no new numbness, weakness, or focal neurological deficit since evaluation. Patient has no contraindications to injection (no anticoagulation or recent antibiotic intake for active infections), and has a  present or is able to drive themselves (as discussed and cleared by physician).  Allergies to latex, contrast dye, and steroid medications have been confirmed with the patient prior to the procedure.  NPO necessity has been assessed and accepted based on procedure complexity. The risks and benefits of the procedure have been explained including but are not limited to infection, bleeding, paralysis, immediate post procedure weakness, and dizziness; the patient acknowledges understanding and desires to proceed with the procedure. Patient has signed consent for same procedure as discussed in previous clinic encounter. All other questions and concerns were addressed at bedside. See procedure note for full details.       Post procedure Instructions: The patient was advised not to drive during the day of the procedure and not to engage in any significant decision making (unless otherwise states by physician). The patient was also advised to be cautious with walking/activity for 24 hours following today's visit and asked not to engage in over-exertion (unless otherwise states by physician). After this time, it is ok to resume pre-procedure level of activity. Patient advised to apply ice to site of injection in situations of pain and discomfort. Patient advised to not submerge site of injection during bath or pool activities for approximately 24 hours post-procedure. Patient attested to

## 2025-04-29 ENCOUNTER — APPOINTMENT (OUTPATIENT)
Dept: GENERAL RADIOLOGY | Age: 30
End: 2025-04-29
Attending: ANESTHESIOLOGY
Payer: COMMERCIAL

## 2025-04-29 ENCOUNTER — HOSPITAL ENCOUNTER (OUTPATIENT)
Age: 30
Setting detail: OUTPATIENT SURGERY
Discharge: HOME | End: 2025-04-29
Attending: ANESTHESIOLOGY | Admitting: ANESTHESIOLOGY
Payer: COMMERCIAL

## 2025-04-29 VITALS
TEMPERATURE: 97.9 F | HEIGHT: 64 IN | DIASTOLIC BLOOD PRESSURE: 65 MMHG | SYSTOLIC BLOOD PRESSURE: 123 MMHG | RESPIRATION RATE: 12 BRPM | WEIGHT: 212 LBS | OXYGEN SATURATION: 98 % | HEART RATE: 82 BPM | BODY MASS INDEX: 36.19 KG/M2

## 2025-04-29 LAB — PREGNANCY, URINE: NEGATIVE

## 2025-04-29 PROCEDURE — 2709999900 HC NON-CHARGEABLE SUPPLY: Performed by: ANESTHESIOLOGY

## 2025-04-29 PROCEDURE — 99152 MOD SED SAME PHYS/QHP 5/>YRS: CPT | Performed by: ANESTHESIOLOGY

## 2025-04-29 PROCEDURE — 7100000011 HC PHASE II RECOVERY - ADDTL 15 MIN: Performed by: ANESTHESIOLOGY

## 2025-04-29 PROCEDURE — 64635 DESTROY LUMB/SAC FACET JNT: CPT | Performed by: ANESTHESIOLOGY

## 2025-04-29 PROCEDURE — 3600000057 HC PAIN LEVEL 4 ADDL 15 MIN: Performed by: ANESTHESIOLOGY

## 2025-04-29 PROCEDURE — 6360000002 HC RX W HCPCS: Performed by: ANESTHESIOLOGY

## 2025-04-29 PROCEDURE — 3600000056 HC PAIN LEVEL 4 BASE: Performed by: ANESTHESIOLOGY

## 2025-04-29 PROCEDURE — 64636 DESTROY L/S FACET JNT ADDL: CPT | Performed by: ANESTHESIOLOGY

## 2025-04-29 PROCEDURE — 7100000010 HC PHASE II RECOVERY - FIRST 15 MIN: Performed by: ANESTHESIOLOGY

## 2025-04-29 PROCEDURE — 81025 URINE PREGNANCY TEST: CPT

## 2025-04-29 RX ORDER — LIDOCAINE HYDROCHLORIDE 10 MG/ML
INJECTION, SOLUTION EPIDURAL; INFILTRATION; INTRACAUDAL; PERINEURAL PRN
Status: DISCONTINUED | OUTPATIENT
Start: 2025-04-29 | End: 2025-04-29 | Stop reason: ALTCHOICE

## 2025-04-29 RX ORDER — FENTANYL CITRATE 50 UG/ML
INJECTION, SOLUTION INTRAMUSCULAR; INTRAVENOUS PRN
Status: DISCONTINUED | OUTPATIENT
Start: 2025-04-29 | End: 2025-04-29 | Stop reason: ALTCHOICE

## 2025-04-29 RX ORDER — LIDOCAINE HYDROCHLORIDE 20 MG/ML
INJECTION, SOLUTION EPIDURAL; INFILTRATION; INTRACAUDAL; PERINEURAL PRN
Status: DISCONTINUED | OUTPATIENT
Start: 2025-04-29 | End: 2025-04-29 | Stop reason: ALTCHOICE

## 2025-04-29 RX ORDER — MIDAZOLAM HYDROCHLORIDE 1 MG/ML
INJECTION, SOLUTION INTRAMUSCULAR; INTRAVENOUS PRN
Status: DISCONTINUED | OUTPATIENT
Start: 2025-04-29 | End: 2025-04-29 | Stop reason: ALTCHOICE

## 2025-04-29 ASSESSMENT — PAIN - FUNCTIONAL ASSESSMENT
PAIN_FUNCTIONAL_ASSESSMENT: 0-10
PAIN_FUNCTIONAL_ASSESSMENT: 0-10

## 2025-04-29 ASSESSMENT — PAIN DESCRIPTION - DESCRIPTORS: DESCRIPTORS: ACHING

## 2025-04-29 ASSESSMENT — PAIN SCALES - GENERAL: PAINLEVEL_OUTOF10: 8

## 2025-04-29 NOTE — POST SEDATION
[facility]  Sedation/Analgesia Post Sedation Record    Pt Name: Karly Yee  MRN: [id number]  YOB: 1995  Procedure Performed By: Mekhi Muller DO  Primary Care Physician: Ne Callaway, APRN - CNP    POST-PROCEDURE    Physicians/Assistants: Mekhi Muller DO  Procedure Performed: See Procedure Note   Sedation/Anesthesia: Versed and Fentanyl (See procedure note for amount and duration)  Estimated Blood Loss:     0  ml  Specimens Removed: None        Complications: None           Mekhi Muller DO  Electronically signed 4/29/2025 at 12:21 PM

## 2025-04-29 NOTE — PRE SEDATION
daily  Patient not taking: Reported on 4/29/2025    Yoni Zepeda MD   ibuprofen (IBU) 600 MG tablet Take 1 tablet by mouth every 6 hours as needed for Pain  Patient not taking: Reported on 4/29/2025 6/28/24   Katie Machado MD   lidocaine (XYLOCAINE) 5 % ointment Apply topically as needed. 2/13/24   Laura Merrill, APRN - CNP   Prenatal Vit-Fe Fumarate-FA (PRENATAL 1+1 PO) Take by mouth  Patient not taking: Reported on 4/29/2025    Yoni Zepeda MD     PHYSICAL:   Vitals:    04/29/25 1038   BP: 123/65   Pulse: 82   Resp: 12   Temp:    SpO2: 98%     PLANNED PROCEDURE   See procedure note  SEDATION  Planned agent: Versed and Fentanyl  ASA Classification: 2  Class 1: A normal healthy patient  Class 2: Pt with mild to moderate systemic disease  Class 3: Severe systemic disease or disturbance  Class 4: Severe systemic disorders that are already life threatening.  Class 5: Moribund pt with little chances of survival, for more than 24 hours.  Mallampati I Airway Classification: 2    1. Pre-procedure diagnostic studies complete and results available.  2. Previous sedation/anesthesia experiences assessed.  3. The patient is an appropriate candidate to undergo the planned procedure sedation and anesthesia. (Refer to nursing sedation/analgesia documentation record)  4. Formulation and discussion of sedation/procedure plan, risks, and expectations with patient and/or responsible adult completed.  5. Patient examined immediately prior to the procedure. (Refer to nursing sedation/analgesia documentation record)    Mekhi Muller DO  Electronically signed 4/29/2025 at 12:21 PM

## 2025-04-29 NOTE — PROGRESS NOTES
1018-Patient to Phase II. Report received from surgical RN. Patient awake and alert. Vital signs obtained and stable. Respirations unlabored on room air. Patient denies pain and nausea. Denies numbness and tingling in extremities. Injection site open to air and no drainage noted. Patient instructed to stay in bed. Call light in reach.     1022-snack and drink provided.     1030-Pt sitting on the side of the bed, repeat VSS. IV removed. Pt getting dressed. Ride notified    1038-Pt discharged home in stable condition. All belongings sent. Walked out to the car with staff and home with family.

## 2025-04-29 NOTE — PROCEDURES
Pre-operative Diagnosis: Lumbar facet pain     Post-operative Diagnosis: Lumbar facet pain     Procedure: Bilateral lumbar thermal radiofrequency ablation targeting facet joints L4/L5 and L5/S1    Procedure Description:  After consent was obtained, the patient was placed in the prone position with a pillow under the abdomen to reduce the lordotic curve of the lumbar spine. The lower back was prepped with chloraprep and draped in a sterile fashion.  Then, 0.5 cc of 1 % lidocaine was used for local anesthesia of the skin and superficial subcutaneous tissues.  Three 20-gauge 100mm SMK cannulas with 10-mm active tips were advanced under fluoroscopic guidance in an AP view to the junction of the superior articular process and the transverse process of the L4 and L5 vertebra and at the sacral ala. There were no paresthesias, heme or CSF obtained.  Needle placement was confirmed using AP and oblique views. This procedure was repeated on the contralateral side.      Sensory and motor stimulation at 50Hz and 2Hz and impedance measurements were carried out having reached threshold at:     RIGHT  L4: unable to obtain due to hardware  L5: 0.2V/3V/150-300 Ohms  SA: 0.2V/3V/150-300 Ohms     LEFT  L4: unable to obtain due to hardware  L5: 0.2V/3V/150-300 Ohms  SA: 0.2V/3V/150-300 Ohms        Then, 1cc of 2% Lidocaine was injected at the site. Temperature was then raised to 80 degrees centigrade for 90 seconds with a 15 second temperature ramp. No pain was reported during the lesioning. The needles were then withdrawn without complications. The patient tolerated the procedure well. The patient was transported to the recovery room and discharged in ambulatory fashion.    Procedural Complications: None  Estimated Blood Loss: 0 mL    IV sedation was used during the procedure:  - Moderate intravenous conscious sedation was supervised by Dr. Muller  - The patient was independently monitored by a Registered Nurse assigned to the

## 2025-05-15 ENCOUNTER — HOSPITAL ENCOUNTER (OUTPATIENT)
Dept: GENERAL RADIOLOGY | Age: 30
Discharge: HOME | End: 2025-05-15
Payer: COMMERCIAL

## 2025-05-15 ENCOUNTER — HOSPITAL ENCOUNTER (OUTPATIENT)
Dept: ULTRASOUND IMAGING | Age: 30
Discharge: HOME | End: 2025-05-15
Payer: COMMERCIAL

## 2025-05-15 DIAGNOSIS — R09.A2 GLOBUS SENSATION: ICD-10-CM

## 2025-05-15 DIAGNOSIS — R05.9 COUGH IN ADULT: ICD-10-CM

## 2025-05-15 DIAGNOSIS — R05.9 COUGH, UNSPECIFIED TYPE: ICD-10-CM

## 2025-05-15 PROCEDURE — 76536 US EXAM OF HEAD AND NECK: CPT

## 2025-05-15 PROCEDURE — 70360 X-RAY EXAM OF NECK: CPT

## 2025-05-27 ENCOUNTER — TELEPHONE (OUTPATIENT)
Dept: PHYSICAL MEDICINE AND REHAB | Age: 30
End: 2025-05-27

## 2025-05-27 ENCOUNTER — OFFICE VISIT (OUTPATIENT)
Dept: PHYSICAL MEDICINE AND REHAB | Age: 30
End: 2025-05-27
Payer: COMMERCIAL

## 2025-05-27 VITALS
WEIGHT: 212 LBS | HEIGHT: 64 IN | BODY MASS INDEX: 36.19 KG/M2 | SYSTOLIC BLOOD PRESSURE: 126 MMHG | DIASTOLIC BLOOD PRESSURE: 78 MMHG

## 2025-05-27 DIAGNOSIS — M54.17 RADICULOPATHY, LUMBOSACRAL REGION: Primary | ICD-10-CM

## 2025-05-27 DIAGNOSIS — M47.816 LUMBAR SPONDYLOSIS: ICD-10-CM

## 2025-05-27 DIAGNOSIS — M79.18 MYOFASCIAL PAIN: ICD-10-CM

## 2025-05-27 DIAGNOSIS — M54.12 CERVICAL RADICULOPATHY: ICD-10-CM

## 2025-05-27 DIAGNOSIS — M25.531 RIGHT WRIST PAIN: ICD-10-CM

## 2025-05-27 PROCEDURE — 99215 OFFICE O/P EST HI 40 MIN: CPT | Performed by: NURSE PRACTITIONER

## 2025-05-27 PROCEDURE — 20553 NJX 1/MLT TRIGGER POINTS 3/>: CPT | Performed by: NURSE PRACTITIONER

## 2025-05-27 PROCEDURE — 96372 THER/PROPH/DIAG INJ SC/IM: CPT | Performed by: NURSE PRACTITIONER

## 2025-05-27 RX ORDER — ORPHENADRINE CITRATE 100 MG/1
100 TABLET ORAL 2 TIMES DAILY
Qty: 20 TABLET | Refills: 0 | Status: SHIPPED | OUTPATIENT
Start: 2025-05-27 | End: 2025-06-06

## 2025-05-27 RX ORDER — KETOROLAC TROMETHAMINE 30 MG/ML
60 INJECTION, SOLUTION INTRAMUSCULAR; INTRAVENOUS ONCE
Status: DISCONTINUED | OUTPATIENT
Start: 2025-05-27 | End: 2025-05-27

## 2025-05-27 RX ORDER — KETOROLAC TROMETHAMINE 30 MG/ML
30 INJECTION, SOLUTION INTRAMUSCULAR; INTRAVENOUS ONCE
Status: COMPLETED | OUTPATIENT
Start: 2025-05-27 | End: 2025-05-27

## 2025-05-27 RX ORDER — TRAMADOL HYDROCHLORIDE 50 MG/1
50 TABLET ORAL 2 TIMES DAILY PRN
Qty: 30 TABLET | Refills: 0 | Status: SHIPPED | OUTPATIENT
Start: 2025-05-27 | End: 2025-06-11

## 2025-05-27 RX ORDER — METHOCARBAMOL 100 MG/ML
100 INJECTION, SOLUTION INTRAMUSCULAR; INTRAVENOUS ONCE
Status: COMPLETED | OUTPATIENT
Start: 2025-05-27 | End: 2025-05-27

## 2025-05-27 RX ADMIN — KETOROLAC TROMETHAMINE 30 MG: 30 INJECTION, SOLUTION INTRAMUSCULAR; INTRAVENOUS at 13:22

## 2025-05-27 RX ADMIN — METHOCARBAMOL 100 MG: 100 INJECTION, SOLUTION INTRAMUSCULAR; INTRAVENOUS at 13:23

## 2025-05-27 NOTE — PROGRESS NOTES
Functionality Assessment/Goals Worksheet     On a scale of 0 (Does not Interfere) to 10 (Completely Interferes)     1.  Which number describes how during the past week pain has interfered with           the following:  A.  General Activity:  6  B.  Mood: 5  C.  Walking Ability:  6  D.  Normal Work (Includes both work outside the home and housework):  6  E.  Relations with Other People:   3  F.  Sleep:   10  G.  Enjoyment of Life:   7    2.  Patient Prefers to Take their Pain Medications:     []  On a regular basis   [x]  Only when necessary    []  Does not take pain medications    3.  What are the Patient's Goals/Expectations for Visiting Pain Management?     []  Learn about my pain    [x]  Receive Medication   [x]  Physical Therapy     []  Treat Depression   [x]  Receive Injections    []  Treat Sleep   []  Deal with Anxiety and Stress   []  Treat Opoid Dependence/Addiction   []  Other:                                
Pre-operative Diagnosis: Lumbar myofascial pain     Post-operative Diagnosis: Lumbar myofascial pain     Procedure: Trigger point injection(s)     Procedure Description:  After having signed the informed consent, the patient was seated in a chair.  The patient's lumbar region was prepped with alcohol wipes.  Trigger points were identified in the bilateral lumbar paraspinals for a total of 10 trigger point injections. After negative aspiration, 1 cc of a mixture containing 1:10 100 mg methocarbamol: 0.25% bupivacaine was injected at each trigger point for a total of 10 trigger points. The patient tolerated the procedure well.     Procedural Complications: None        FIORDALIZA Calloway - CNP   Interventional Pain Management/PM&R   [facility] [place]   
- Antalgic gait. Ambulates without assistive device. Uses a cane occasionally.   · Motor: 5/5 muscle strength in bilateral hip flexion, knee flexion, knee extension, ankle dorsiflexion, and ankle plantar flexion   · Sensory: LT sensation intact in lower limbs   · Reflexes: 2+ symmetrical in bilateral achilles, 2+ bilateral patellar, negative ankle clonus, downgoing babinski. Decreased sensation to right thigh and right calf.    Skin: No rashes or lesions present   Psychological: Cooperative, no exaggerated pain behaviors     Assessment:    Diagnosis Orders   1. Radiculopathy, lumbosacral region  traMADol (ULTRAM) 50 MG tablet    SCHEDULE PROCEDURE    SCHEDULE PROCEDURE      2. Right wrist pain  traMADol (ULTRAM) 50 MG tablet    ketorolac (TORADOL) injection 30 mg    ketorolac (TORADOL) injection 30 mg    DISCONTINUED: ketorolac (TORADOL) injection 60 mg      3. Cervical radiculopathy  traMADol (ULTRAM) 50 MG tablet      4. Lumbar spondylosis  traMADol (ULTRAM) 50 MG tablet      5. Myofascial pain  methocarbamol (ROBAXIN) injection 100 mg          Karly Yee is a 29 y.o.female presenting to the pain clinic for evaluation of low back pain.  Patient had a significant response to the bilateral lumbar facet RFA,  SI joint injections, and most recently the lumbar epidural steroid injection.       In regards to her neck, shoulder, arm pain this is consistent with cervical radiculopathy as evidenced by her EMG as well as MRI with significant stenosis.  I have set her up for a cervical epidural steroid injection at C6-7 with Dr. Muller with fluoroscopy.  She has failed to respond to NSAID therapy, activity modification, physical therapy.  She may also benefit from facet injections.  She underwent cervical trigger point injections in office today.  In regards to her low back pain, this is secondary to facet mediated pain.  She has failed to respond to NSAID therapy and physical therapy.  I have set her up for repeat

## 2025-06-16 NOTE — H&P
a cane occasionally.   · Motor: 5/5 muscle strength in bilateral hip flexion, knee flexion, knee extension, ankle dorsiflexion, and ankle plantar flexion   · Sensory: LT sensation intact in lower limbs   · Reflexes: 2+ symmetrical in bilateral achilles, 2+ bilateral patellar, negative ankle clonus, downgoing babinski. Decreased sensation to right thigh and right calf.    Skin: No rashes or lesions present   Psychological: Cooperative, no exaggerated pain behaviors     Assessment:    Diagnosis Orders   1. Radiculopathy, lumbosacral region  traMADol (ULTRAM) 50 MG tablet    SCHEDULE PROCEDURE    SCHEDULE PROCEDURE      2. Right wrist pain  traMADol (ULTRAM) 50 MG tablet    ketorolac (TORADOL) injection 30 mg    ketorolac (TORADOL) injection 30 mg    DISCONTINUED: ketorolac (TORADOL) injection 60 mg      3. Cervical radiculopathy  traMADol (ULTRAM) 50 MG tablet      4. Lumbar spondylosis  traMADol (ULTRAM) 50 MG tablet      5. Myofascial pain  methocarbamol (ROBAXIN) injection 100 mg          Karly Yee is a 29 y.o.female presenting to the pain clinic for evaluation of low back pain.  Patient had a significant response to the bilateral lumbar facet RFA,  SI joint injections, and most recently the lumbar epidural steroid injection.       In regards to her neck, shoulder, arm pain this is consistent with cervical radiculopathy as evidenced by her EMG as well as MRI with significant stenosis.  I have set her up for a cervical epidural steroid injection at C6-7 with Dr. Muller with fluoroscopy.  She has failed to respond to NSAID therapy, activity modification, physical therapy.  She may also benefit from facet injections.  She underwent cervical trigger point injections in office today.  In regards to her low back pain, this is secondary to facet mediated pain.  She has failed to respond to NSAID therapy and physical therapy.  I have set her up for repeat lumbar radiofrequency ablation targeting bilateral L4-5 and

## 2025-06-17 ENCOUNTER — APPOINTMENT (OUTPATIENT)
Dept: GENERAL RADIOLOGY | Age: 30
End: 2025-06-17
Attending: ANESTHESIOLOGY
Payer: COMMERCIAL

## 2025-06-17 ENCOUNTER — HOSPITAL ENCOUNTER (OUTPATIENT)
Age: 30
Setting detail: OUTPATIENT SURGERY
Discharge: HOME | End: 2025-06-17
Attending: ANESTHESIOLOGY | Admitting: ANESTHESIOLOGY
Payer: COMMERCIAL

## 2025-06-17 VITALS
DIASTOLIC BLOOD PRESSURE: 60 MMHG | BODY MASS INDEX: 34.76 KG/M2 | HEART RATE: 79 BPM | WEIGHT: 203.6 LBS | HEIGHT: 64 IN | OXYGEN SATURATION: 98 % | TEMPERATURE: 97.8 F | RESPIRATION RATE: 17 BRPM | SYSTOLIC BLOOD PRESSURE: 100 MMHG

## 2025-06-17 LAB — PREGNANCY, URINE: NEGATIVE

## 2025-06-17 PROCEDURE — 99152 MOD SED SAME PHYS/QHP 5/>YRS: CPT | Performed by: ANESTHESIOLOGY

## 2025-06-17 PROCEDURE — 62323 NJX INTERLAMINAR LMBR/SAC: CPT | Performed by: ANESTHESIOLOGY

## 2025-06-17 PROCEDURE — 2500000003 HC RX 250 WO HCPCS: Performed by: ANESTHESIOLOGY

## 2025-06-17 PROCEDURE — 7100000011 HC PHASE II RECOVERY - ADDTL 15 MIN: Performed by: ANESTHESIOLOGY

## 2025-06-17 PROCEDURE — 6360000002 HC RX W HCPCS: Performed by: ANESTHESIOLOGY

## 2025-06-17 PROCEDURE — 3600000054 HC PAIN LEVEL 3 BASE: Performed by: ANESTHESIOLOGY

## 2025-06-17 PROCEDURE — 2709999900 HC NON-CHARGEABLE SUPPLY: Performed by: ANESTHESIOLOGY

## 2025-06-17 PROCEDURE — 6360000004 HC RX CONTRAST MEDICATION: Performed by: ANESTHESIOLOGY

## 2025-06-17 PROCEDURE — 81025 URINE PREGNANCY TEST: CPT

## 2025-06-17 PROCEDURE — 7100000010 HC PHASE II RECOVERY - FIRST 15 MIN: Performed by: ANESTHESIOLOGY

## 2025-06-17 RX ORDER — MIDAZOLAM HYDROCHLORIDE 1 MG/ML
INJECTION, SOLUTION INTRAMUSCULAR; INTRAVENOUS PRN
Status: DISCONTINUED | OUTPATIENT
Start: 2025-06-17 | End: 2025-06-17 | Stop reason: ALTCHOICE

## 2025-06-17 RX ORDER — LIDOCAINE HYDROCHLORIDE 10 MG/ML
INJECTION, SOLUTION EPIDURAL; INFILTRATION; INTRACAUDAL; PERINEURAL PRN
Status: DISCONTINUED | OUTPATIENT
Start: 2025-06-17 | End: 2025-06-17 | Stop reason: ALTCHOICE

## 2025-06-17 RX ORDER — ORPHENADRINE CITRATE 100 MG/1
100 TABLET ORAL 2 TIMES DAILY PRN
COMMUNITY

## 2025-06-17 RX ORDER — IOPAMIDOL 612 MG/ML
INJECTION, SOLUTION INTRAVASCULAR PRN
Status: DISCONTINUED | OUTPATIENT
Start: 2025-06-17 | End: 2025-06-17 | Stop reason: ALTCHOICE

## 2025-06-17 RX ORDER — DEXAMETHASONE SODIUM PHOSPHATE 4 MG/ML
INJECTION, SOLUTION INTRA-ARTICULAR; INTRALESIONAL; INTRAMUSCULAR; INTRAVENOUS; SOFT TISSUE PRN
Status: DISCONTINUED | OUTPATIENT
Start: 2025-06-17 | End: 2025-06-17 | Stop reason: ALTCHOICE

## 2025-06-17 RX ORDER — FENTANYL CITRATE 50 UG/ML
INJECTION, SOLUTION INTRAMUSCULAR; INTRAVENOUS PRN
Status: DISCONTINUED | OUTPATIENT
Start: 2025-06-17 | End: 2025-06-17 | Stop reason: ALTCHOICE

## 2025-06-17 RX ORDER — ACYCLOVIR 200 MG/1
CAPSULE ORAL PRN
Status: DISCONTINUED | OUTPATIENT
Start: 2025-06-17 | End: 2025-06-17 | Stop reason: ALTCHOICE

## 2025-06-17 ASSESSMENT — PAIN DESCRIPTION - DESCRIPTORS: DESCRIPTORS: ACHING

## 2025-06-17 ASSESSMENT — PAIN - FUNCTIONAL ASSESSMENT: PAIN_FUNCTIONAL_ASSESSMENT: 0-10

## 2025-06-17 NOTE — POST SEDATION
[facility]  Sedation/Analgesia Post Sedation Record    Pt Name: Karly Yee  MRN: [id number]  YOB: 1995  Procedure Performed By: Mekhi Muller DO  Primary Care Physician: Ne Callaway, APRN - CNP    POST-PROCEDURE    Physicians/Assistants: Mekhi Muller DO  Procedure Performed: See Procedure Note   Sedation/Anesthesia: Versed and Fentanyl (See procedure note for amount and duration)  Estimated Blood Loss:     0  ml  Specimens Removed: None        Complications: None           Mekhi Muller DO  Electronically signed 6/17/2025 at 1:08 PM

## 2025-06-17 NOTE — PROCEDURES
.Pre-operative Diagnosis: Radicular leg pain     Post-operative Diagnosis: Radicular leg pain     Procedure: Lumbar epidural steroid injection    Procedure Description:  After having obtained a signed informed consent, the patient was taken to the fluoroscopy suite and placed in the prone position. The patient's back was prepped with chloraprep and draped in a sterile fashion.  A total of 1.5 cc of 1 % lidocaine was used to anesthetize the skin and underlying tissues.  Under fluoroscopic guidance, a single 20G Tuohy needle was advanced using midline approach at the L4/L5 interspace until gaining the epidural space using the loss of resistance to saline syringe technique.  There were no paresthesias, heme, or CSF aspiration.  A total of 0.25 cc of Isovue 300 were injected having had adequate dye spread within the epidural space. Needle placement and contrast spread was confirmed using the AP and contralateral views.  10 mg of Dexamethasone with 1 cc of saline solution were injected in the epidural space. The needle was flushed and removed without any complication.  The patient tolerated the procedure well and was transported to the recovery room. The patient was observed for 15 minutes to then discharged in an ambulatory fashion.    Procedural Complications: None  Estimated Blood Loss: 0 mL      IV sedation was used during the procedure:  - Moderate intravenous conscious sedation was supervised by Dr. Muller  - The patient was independently monitored by a Registered Nurse assigned to the procedure room  - Monitoring included automated blood pressure, continuous EKG, and continuous pulse oximetry  - The detailed conscious record is permanently stored in the Hospital Information System  - The following is the conscious sedation record:  Start Time: 11:05  End Time : 11:20  Duration: 15 minutes   Medications Administered: 2 mg Versed, 100 mcg Fentanyl       Mekhi Muller DO  Interventional Pain Management/PM&R   [facility]

## 2025-06-17 NOTE — PROGRESS NOTES
1109: Pt arrived to Phase II recovery via cart. Awake and alert. Report received from CAIN Hare.  1111: VSS. Pt denies pain. HOB elevated. Snack and drink provided. Call light in reach.  1125: VSS. IV removed. Patient dressed.  1129: Pt escorted to vehicle and discharged home in stable condition with .

## 2025-06-17 NOTE — PRE SEDATION
[facility]  Pre-Sedation/Analgesia History & Physical    Pt Name: Karly Yee  MRN: [id number]  YOB: 1995  Provider Performing Procedure: Mekhi Muller DO   Primary Care Physician: Ne Callaway, APRN - CNP      MEDICAL HISTORY       has a past medical history of Ankylosing spondylitis (HCC), COVID-19, Heart abnormality, Hypertension, Ovarian cyst, and Scoliosis.  SURGICAL HISTORY   has a past surgical history that includes Tonsillectomy; Adenoidectomy; back surgery (2006 and 2009, );  section (N/A, 2020); Pain management procedure (Bilateral, 2022); Pain management procedure (Bilateral, 2022); Pain management procedure (Right, 2022); Pain management procedure (Left, 2022);  section (N/A, 3/11/2023); Back Injection (Bilateral, 2023); Pain management procedure (Right, 2023); Pain management procedure (Left, 2023); Pain management procedure (N/A, 8/10/2023);  section (N/A, 2024); Pain management procedure (Bilateral, 4/15/2025); and Pain management procedure (Bilateral, 2025).    ALLERGIES   Allergies as of 2025 - Fully Reviewed 2025   Allergen Reaction Noted    Gabapentin Hives 2020       MEDICATIONS   Prior to Admission medications    Medication Sig Start Date End Date Taking? Authorizing Provider   orphenadrine (NORFLEX) 100 MG extended release tablet Take 1 tablet by mouth 2 times daily as needed for Muscle spasms   Yes Yoni Zepeda MD   MAGNESIUM COMPLEX HIGH POTENCY PO Take by mouth   Yes Yoni Zepeda MD   Probiotic Product (PROBIOTIC DAILY PO) Take by mouth   Yes Yoni Zepeda MD   phentermine (ADIPEX-P) 37.5 MG tablet Take 1 tablet by mouth every morning (before breakfast). 3/12/25  Yes Yoni Zepeda MD   norgestimate-ethinyl estradiol (SPRINTEC 28) 0.25-35 MG-MCG per tablet Take 1 tablet by mouth daily   Yes Yoni Zepeda MD   ibuprofen

## 2025-07-18 NOTE — FLOWSHEET NOTE
Addended by: JOHNNIE TINOCO on: 7/18/2025 09:54 AM     Modules accepted: Orders     Urine collected and placed into specimen container

## 2025-08-05 ENCOUNTER — TELEPHONE (OUTPATIENT)
Dept: PHYSICAL MEDICINE AND REHAB | Age: 30
End: 2025-08-05

## 2025-08-06 ENCOUNTER — OFFICE VISIT (OUTPATIENT)
Dept: PHYSICAL MEDICINE AND REHAB | Age: 30
End: 2025-08-06
Payer: COMMERCIAL

## 2025-08-06 VITALS
WEIGHT: 203 LBS | HEIGHT: 64 IN | BODY MASS INDEX: 34.66 KG/M2 | DIASTOLIC BLOOD PRESSURE: 72 MMHG | SYSTOLIC BLOOD PRESSURE: 124 MMHG

## 2025-08-06 DIAGNOSIS — M79.2 NEUROPATHIC PAIN: ICD-10-CM

## 2025-08-06 DIAGNOSIS — M47.819 FACET ARTHROPATHY: ICD-10-CM

## 2025-08-06 DIAGNOSIS — G89.4 CHRONIC PAIN SYNDROME: ICD-10-CM

## 2025-08-06 DIAGNOSIS — M54.12 CERVICAL RADICULOPATHY: ICD-10-CM

## 2025-08-06 DIAGNOSIS — G56.21 ULNAR NEUROPATHY OF RIGHT UPPER EXTREMITY: ICD-10-CM

## 2025-08-06 DIAGNOSIS — M47.812 CERVICAL SPONDYLOSIS: Primary | ICD-10-CM

## 2025-08-06 DIAGNOSIS — M79.18 MYOFASCIAL PAIN: ICD-10-CM

## 2025-08-06 DIAGNOSIS — M25.531 RIGHT WRIST PAIN: ICD-10-CM

## 2025-08-06 DIAGNOSIS — M47.816 LUMBAR SPONDYLOSIS: ICD-10-CM

## 2025-08-06 DIAGNOSIS — M54.17 RADICULOPATHY, LUMBOSACRAL REGION: ICD-10-CM

## 2025-08-06 PROCEDURE — 20553 NJX 1/MLT TRIGGER POINTS 3/>: CPT | Performed by: NURSE PRACTITIONER

## 2025-08-06 PROCEDURE — 99215 OFFICE O/P EST HI 40 MIN: CPT | Performed by: NURSE PRACTITIONER

## 2025-08-06 RX ORDER — METHOCARBAMOL 100 MG/ML
100 INJECTION, SOLUTION INTRAMUSCULAR; INTRAVENOUS ONCE
Status: COMPLETED | OUTPATIENT
Start: 2025-08-06 | End: 2025-08-06

## 2025-08-06 RX ORDER — PREGABALIN 75 MG/1
75 CAPSULE ORAL 2 TIMES DAILY
Qty: 60 CAPSULE | Refills: 0 | Status: SHIPPED | OUTPATIENT
Start: 2025-08-06 | End: 2025-09-05

## 2025-08-06 RX ORDER — TRAMADOL HYDROCHLORIDE 50 MG/1
50 TABLET ORAL 2 TIMES DAILY PRN
Qty: 60 TABLET | Refills: 0 | Status: SHIPPED | OUTPATIENT
Start: 2025-08-06 | End: 2025-09-05

## 2025-08-06 RX ADMIN — METHOCARBAMOL 100 MG: 100 INJECTION, SOLUTION INTRAMUSCULAR; INTRAVENOUS at 11:28

## 2025-09-02 ENCOUNTER — APPOINTMENT (OUTPATIENT)
Dept: GENERAL RADIOLOGY | Age: 30
End: 2025-09-02
Attending: ANESTHESIOLOGY
Payer: COMMERCIAL

## 2025-09-02 ENCOUNTER — HOSPITAL ENCOUNTER (OUTPATIENT)
Age: 30
Setting detail: OUTPATIENT SURGERY
Discharge: HOME | End: 2025-09-02
Attending: ANESTHESIOLOGY | Admitting: ANESTHESIOLOGY
Payer: COMMERCIAL

## 2025-09-02 VITALS
TEMPERATURE: 97.5 F | HEIGHT: 64 IN | DIASTOLIC BLOOD PRESSURE: 72 MMHG | OXYGEN SATURATION: 100 % | WEIGHT: 193.4 LBS | SYSTOLIC BLOOD PRESSURE: 113 MMHG | BODY MASS INDEX: 33.02 KG/M2 | RESPIRATION RATE: 16 BRPM | HEART RATE: 80 BPM

## 2025-09-02 PROBLEM — M47.812 CERVICAL SPONDYLOSIS: Status: ACTIVE | Noted: 2025-09-02

## 2025-09-02 LAB — PREGNANCY, URINE: NEGATIVE

## 2025-09-02 PROCEDURE — 6360000002 HC RX W HCPCS: Performed by: ANESTHESIOLOGY

## 2025-09-02 PROCEDURE — 81025 URINE PREGNANCY TEST: CPT

## 2025-09-02 PROCEDURE — 7100000011 HC PHASE II RECOVERY - ADDTL 15 MIN: Performed by: ANESTHESIOLOGY

## 2025-09-02 PROCEDURE — 99152 MOD SED SAME PHYS/QHP 5/>YRS: CPT | Performed by: ANESTHESIOLOGY

## 2025-09-02 PROCEDURE — 2709999900 HC NON-CHARGEABLE SUPPLY: Performed by: ANESTHESIOLOGY

## 2025-09-02 PROCEDURE — 7100000010 HC PHASE II RECOVERY - FIRST 15 MIN: Performed by: ANESTHESIOLOGY

## 2025-09-02 PROCEDURE — 3600000056 HC PAIN LEVEL 4 BASE: Performed by: ANESTHESIOLOGY

## 2025-09-02 PROCEDURE — 64491 INJ PARAVERT F JNT C/T 2 LEV: CPT | Performed by: ANESTHESIOLOGY

## 2025-09-02 PROCEDURE — 64490 INJ PARAVERT F JNT C/T 1 LEV: CPT | Performed by: ANESTHESIOLOGY

## 2025-09-02 RX ORDER — MIDAZOLAM HYDROCHLORIDE 1 MG/ML
INJECTION, SOLUTION INTRAMUSCULAR; INTRAVENOUS PRN
Status: DISCONTINUED | OUTPATIENT
Start: 2025-09-02 | End: 2025-09-02 | Stop reason: ALTCHOICE

## 2025-09-02 RX ORDER — FENTANYL CITRATE 50 UG/ML
INJECTION, SOLUTION INTRAMUSCULAR; INTRAVENOUS PRN
Status: DISCONTINUED | OUTPATIENT
Start: 2025-09-02 | End: 2025-09-02 | Stop reason: ALTCHOICE

## 2025-09-02 RX ORDER — BUPIVACAINE HYDROCHLORIDE 5 MG/ML
INJECTION, SOLUTION PERINEURAL PRN
Status: DISCONTINUED | OUTPATIENT
Start: 2025-09-02 | End: 2025-09-02 | Stop reason: ALTCHOICE

## 2025-09-02 RX ORDER — LIDOCAINE HYDROCHLORIDE 10 MG/ML
INJECTION, SOLUTION EPIDURAL; INFILTRATION; INTRACAUDAL; PERINEURAL PRN
Status: DISCONTINUED | OUTPATIENT
Start: 2025-09-02 | End: 2025-09-02 | Stop reason: ALTCHOICE

## 2025-09-02 ASSESSMENT — PAIN - FUNCTIONAL ASSESSMENT
PAIN_FUNCTIONAL_ASSESSMENT: 0-10
PAIN_FUNCTIONAL_ASSESSMENT: 0-10

## 2025-09-02 ASSESSMENT — PAIN SCALES - GENERAL: PAINLEVEL_OUTOF10: 8

## (undated) DEVICE — NEEDLE HYPO 18GA L1.5IN THN WALL PIVOTING SHLD BVL ORIENTED

## (undated) DEVICE — PACK PROCEDURE SURG C SECT SRMC LF

## (undated) DEVICE — SOLUTION IV IRRIG POUR BRL 0.9% SODIUM CHL 2F7124

## (undated) DEVICE — MARKER,SKIN,WI/RULER AND LABELS: Brand: MEDLINE

## (undated) DEVICE — SYRINGE MED 3ML CLR PLAS STD N CTRL LUERLOCK TIP DISP

## (undated) DEVICE — APPLICATOR MEDICATED 3 CC SOLUTION CLR STRL CHLORAPREP

## (undated) DEVICE — SOLUTION IRRIG 1000ML STRL H2O USP PLAS POUR BTL

## (undated) DEVICE — HYPODERMIC SAFETY NEEDLE: Brand: MAGELLAN

## (undated) DEVICE — SOLUTION SCRB 4OZ 4% CHG H2O AIDED FOR PREOPERATIVE SKIN

## (undated) DEVICE — 1840 FOAM BLOCK NEEDLE COUNTER: Brand: DEVON

## (undated) DEVICE — SUTURE VCRL SZ 4-0 L27IN ABSRB UD L60MM KS STR REV CUT NDL J662H

## (undated) DEVICE — SPONGE LAP W18XL18IN WHT COT 4 PLY FLD STRUNG RADPQ DISP ST

## (undated) DEVICE — GAUZE SPONGES,USP TYPE VII GAUZE, 12 PLY: Brand: CURITY

## (undated) DEVICE — PENCIL SMK EVAC ALL IN 1 DSGN ENH VISIBILITY IMPROVED AIR

## (undated) DEVICE — GLOVE BIOGEL POWDER FREE SZ 8

## (undated) DEVICE — SYRINGE MED 10ML LUERLOCK TIP W/O SFTY DISP

## (undated) DEVICE — SPONGE LAP W18XL18IN WHT COT 4 PLY FLD STRUNG RADPQ DISP ST 2 PER PACK

## (undated) DEVICE — SYRINGE MEDICAL 3ML CLEAR PLASTIC STANDARD NON CONTROL LUERLOCK TIP DISPOSABLE

## (undated) DEVICE — SUTURE ABSORBABLE MONOFILAMENT 0 CTX 60 IN VIO PDS + PDP990G

## (undated) DEVICE — TOWEL,OR,DSP,ST,BLUE,STD,4/PK,20PK/CS: Brand: MEDLINE

## (undated) DEVICE — GLOVE SURG SZ 6 THK91MIL LTX FREE SYN POLYISOPRENE ANTI

## (undated) DEVICE — DRESSING ANITMICROBIAL FOAM OPTIFOAM POSTOP STRP 35 X 10 IN

## (undated) DEVICE — GLOVE ORANGE PI 8   MSG9080

## (undated) DEVICE — GLOVE ORANGE PI 7   MSG9070

## (undated) DEVICE — SOLUTION IV IRRIG WATER 1000ML POUR BRL 2F7114

## (undated) DEVICE — SUTURE VCRL + SZ 2-0 L27IN ABSRB CLR CT-1 1/2 CIR TAPERCUT VCP259H

## (undated) DEVICE — SUTURE VCRL + SZ 3-0 L27IN ABSRB WHT CT-1 1/2 CIR VCP258H

## (undated) DEVICE — 6 ML SYRINGE LUER-LOCK TIP: Brand: MONOJECT

## (undated) DEVICE — DRESSING HYDROFIBER AQUACEL AG ADVANTAGE 3.5X12 IN

## (undated) DEVICE — APPLICATOR MEDICATED 26 CC SOLUTION HI LT ORNG CHLORAPREP

## (undated) DEVICE — SC PAIN PACK: Brand: MEDLINE INDUSTRIES, INC.

## (undated) DEVICE — NEEDLE SPNL 22GA L3.5IN BLK HUB S STL REG WALL FIT STYL W/

## (undated) DEVICE — SYRINGE MED 7ML PLAS LUERSLIP LOSS OF RESISTANCE EPILOR

## (undated) DEVICE — GLOVE ORANGE PI 8 1/2   MSG9085

## (undated) DEVICE — SUTURE CHROMIC GUT SZ 0 L36IN ABSRB BRN CTX L48MM 1/2 CIR 904H

## (undated) DEVICE — APPLICATOR MEDICATED 10.5 CC SOLUTION CLR STRL CHLORAPREP

## (undated) DEVICE — Device

## (undated) DEVICE — SUTURE CHROMIC GUT SZ 1 L36IN ABSRB BRN L48MM CTX 1/2 CIR 905H

## (undated) DEVICE — SOLUTION IRRIG 1000ML 0.9% SOD CHL USP POUR PLAS BTL

## (undated) DEVICE — GLOVE SURG SZ 65 THK91MIL LTX FREE SYN POLYISOPRENE

## (undated) DEVICE — SUTURE VCRL SZ 2-0 L27IN ABSRB UD L36MM CT-1 1/2 CIR JJ42G

## (undated) DEVICE — SUTURE PLN GUT SZ 2-0 L27IN ABSRB YELLOWISH TAN L36MM CT-1 843H

## (undated) DEVICE — 3 ML SYRINGE LUER-LOCK TIP: Brand: MONOJECT

## (undated) DEVICE — SUTURE VCRL + SZ 0 L36IN ABSRB VLT L36MM CT-1 1/2 CIR VCP346H

## (undated) DEVICE — GLOVE ORANGE PI 7 1/2   MSG9075

## (undated) DEVICE — LARGE, DISPOSABLE ALEXIS O C-SECTION PROTECTOR - RETRACTOR: Brand: ALEXIS ® O C-SECTION PROTECTOR - RETRACTOR

## (undated) DEVICE — NEEDLE EPI L3.5IN OD20GA CLR POLYCARB HUB WNG N DEHP TUOHY

## (undated) DEVICE — AGENT HEMSTAT 3GM OXIDIZED REGENERATED CELOS ABSRB FOR CONT (ORDER MULTIPLES OF 5EA)

## (undated) DEVICE — BINDER ABD 2XL H12XL60 75IN UNISX STD E 4 PNL DISPOSABLE

## (undated) DEVICE — SUTURE PDS II SZ 0 L60IN ABSRB VLT L48MM CTX 1/2 CIR Z990G

## (undated) DEVICE — SOLUTION SURG PREP 26 CC PURPREP

## (undated) DEVICE — SUTURE VICRYL SZ 4-0 L27IN ABSRB UD L60MM KS STR REV CUT NDL J662H

## (undated) DEVICE — SPONGE GZ W4XL4IN COT 12 PLY TYP VII WVN C FLD DSGN

## (undated) DEVICE — 4-PORT MANIFOLD: Brand: NEPTUNE 2

## (undated) DEVICE — NEEDLE SPINAL 22GA L3.5IN SPINOCAN

## (undated) DEVICE — COUNTER NDL 10 COUNT HLD 20 FOAM BLK SGL MAG

## (undated) DEVICE — APPLICATOR PREP 26ML 0.7% IOD POVACRYLEX 74% ISO ALC ST

## (undated) DEVICE — NEEDLE HYPO 25GA L1.5IN ROBUST SFTY SHLD SELF LEVELING SHTH

## (undated) DEVICE — SUTURE VICRYL + SZ 0 L27IN ABSRB UD L36MM CT-1 1/2 CIR VCPP41D

## (undated) DEVICE — SUTURE VCRL SZ 1 L36IN ABSRB UD CTX L48MM 1/2 CIR J977H

## (undated) DEVICE — PAD,NON-ADHERENT,3X8,STERILE,LF,1/PK: Brand: MEDLINE